# Patient Record
Sex: MALE | Race: WHITE | NOT HISPANIC OR LATINO
[De-identification: names, ages, dates, MRNs, and addresses within clinical notes are randomized per-mention and may not be internally consistent; named-entity substitution may affect disease eponyms.]

---

## 2016-10-15 RX ORDER — METOPROLOL TARTRATE 50 MG
2 TABLET ORAL
Qty: 0 | Refills: 0 | COMMUNITY
Start: 2016-10-15

## 2016-10-15 RX ORDER — METOPROLOL TARTRATE 50 MG
1 TABLET ORAL
Qty: 0 | Refills: 0 | COMMUNITY
Start: 2016-10-15

## 2017-02-01 ENCOUNTER — NON-APPOINTMENT (OUTPATIENT)
Age: 72
End: 2017-02-01

## 2017-02-01 ENCOUNTER — APPOINTMENT (OUTPATIENT)
Dept: CARDIOLOGY | Facility: CLINIC | Age: 72
End: 2017-02-01

## 2017-02-01 VITALS
HEART RATE: 98 BPM | BODY MASS INDEX: 31.98 KG/M2 | HEIGHT: 66 IN | TEMPERATURE: 98.7 F | SYSTOLIC BLOOD PRESSURE: 160 MMHG | DIASTOLIC BLOOD PRESSURE: 99 MMHG | WEIGHT: 199 LBS | OXYGEN SATURATION: 94 % | RESPIRATION RATE: 12 BRPM

## 2017-02-01 VITALS — WEIGHT: 199 LBS | BODY MASS INDEX: 31.98 KG/M2 | HEIGHT: 66 IN

## 2017-02-01 DIAGNOSIS — I49.3 VENTRICULAR PREMATURE DEPOLARIZATION: ICD-10-CM

## 2017-02-01 DIAGNOSIS — I47.2 VENTRICULAR TACHYCARDIA: ICD-10-CM

## 2017-02-01 DIAGNOSIS — Z86.39 PERSONAL HISTORY OF OTHER ENDOCRINE, NUTRITIONAL AND METABOLIC DISEASE: ICD-10-CM

## 2017-02-02 ENCOUNTER — APPOINTMENT (OUTPATIENT)
Dept: ELECTROPHYSIOLOGY | Facility: CLINIC | Age: 72
End: 2017-02-02

## 2017-02-02 DIAGNOSIS — I49.5 SICK SINUS SYNDROME: ICD-10-CM

## 2017-02-28 ENCOUNTER — EMERGENCY (EMERGENCY)
Facility: HOSPITAL | Age: 72
LOS: 1 days | Discharge: HOME | End: 2017-02-28
Attending: EMERGENCY MEDICINE | Admitting: EMERGENCY MEDICINE
Payer: COMMERCIAL

## 2017-02-28 VITALS
WEIGHT: 195.11 LBS | HEART RATE: 90 BPM | OXYGEN SATURATION: 93 % | SYSTOLIC BLOOD PRESSURE: 181 MMHG | HEIGHT: 68 IN | RESPIRATION RATE: 12 BRPM | DIASTOLIC BLOOD PRESSURE: 96 MMHG | TEMPERATURE: 97 F

## 2017-02-28 DIAGNOSIS — J44.9 CHRONIC OBSTRUCTIVE PULMONARY DISEASE, UNSPECIFIED: ICD-10-CM

## 2017-02-28 DIAGNOSIS — Z87.891 PERSONAL HISTORY OF NICOTINE DEPENDENCE: ICD-10-CM

## 2017-02-28 DIAGNOSIS — R79.89 OTHER SPECIFIED ABNORMAL FINDINGS OF BLOOD CHEMISTRY: ICD-10-CM

## 2017-02-28 DIAGNOSIS — I73.9 PERIPHERAL VASCULAR DISEASE, UNSPECIFIED: ICD-10-CM

## 2017-02-28 DIAGNOSIS — R07.89 OTHER CHEST PAIN: ICD-10-CM

## 2017-02-28 DIAGNOSIS — I48.91 UNSPECIFIED ATRIAL FIBRILLATION: ICD-10-CM

## 2017-02-28 DIAGNOSIS — I25.10 ATHEROSCLEROTIC HEART DISEASE OF NATIVE CORONARY ARTERY WITHOUT ANGINA PECTORIS: Chronic | ICD-10-CM

## 2017-02-28 DIAGNOSIS — S85.009A UNSPECIFIED INJURY OF POPLITEAL ARTERY, UNSPECIFIED LEG, INITIAL ENCOUNTER: Chronic | ICD-10-CM

## 2017-02-28 DIAGNOSIS — Z79.82 LONG TERM (CURRENT) USE OF ASPIRIN: ICD-10-CM

## 2017-02-28 DIAGNOSIS — I25.10 ATHEROSCLEROTIC HEART DISEASE OF NATIVE CORONARY ARTERY WITHOUT ANGINA PECTORIS: ICD-10-CM

## 2017-02-28 DIAGNOSIS — E11.9 TYPE 2 DIABETES MELLITUS WITHOUT COMPLICATIONS: ICD-10-CM

## 2017-02-28 DIAGNOSIS — I10 ESSENTIAL (PRIMARY) HYPERTENSION: ICD-10-CM

## 2017-02-28 DIAGNOSIS — Z79.01 LONG TERM (CURRENT) USE OF ANTICOAGULANTS: ICD-10-CM

## 2017-02-28 DIAGNOSIS — S49.90XA UNSPECIFIED INJURY OF SHOULDER AND UPPER ARM, UNSPECIFIED ARM, INITIAL ENCOUNTER: Chronic | ICD-10-CM

## 2017-02-28 LAB
ALBUMIN SERPL ELPH-MCNC: 3.1 G/DL — LOW (ref 3.3–5)
ALP SERPL-CCNC: 81 U/L — SIGNIFICANT CHANGE UP (ref 40–120)
ALT FLD-CCNC: 36 U/L — SIGNIFICANT CHANGE UP (ref 12–78)
AMYLASE P1 CFR SERPL: 55 U/L — SIGNIFICANT CHANGE UP (ref 25–115)
ANION GAP SERPL CALC-SCNC: 10 MMOL/L — SIGNIFICANT CHANGE UP (ref 5–17)
APTT BLD: 58.2 SEC — HIGH (ref 27.5–37.4)
AST SERPL-CCNC: 34 U/L — SIGNIFICANT CHANGE UP (ref 15–37)
BASOPHILS # BLD AUTO: 0.1 K/UL — SIGNIFICANT CHANGE UP (ref 0–0.2)
BASOPHILS NFR BLD AUTO: 1.4 % — SIGNIFICANT CHANGE UP (ref 0–2)
BILIRUB SERPL-MCNC: 0.8 MG/DL — SIGNIFICANT CHANGE UP (ref 0.2–1.2)
BUN SERPL-MCNC: 17 MG/DL — SIGNIFICANT CHANGE UP (ref 7–23)
CALCIUM SERPL-MCNC: 9 MG/DL — SIGNIFICANT CHANGE UP (ref 8.5–10.1)
CHLORIDE SERPL-SCNC: 100 MMOL/L — SIGNIFICANT CHANGE UP (ref 96–108)
CK MB CFR SERPL CALC: 1.8 NG/ML — SIGNIFICANT CHANGE UP (ref 0–3.6)
CK MB CFR SERPL CALC: 2.1 NG/ML — SIGNIFICANT CHANGE UP (ref 0–3.6)
CO2 SERPL-SCNC: 24 MMOL/L — SIGNIFICANT CHANGE UP (ref 22–31)
CREAT SERPL-MCNC: 1.2 MG/DL — SIGNIFICANT CHANGE UP (ref 0.5–1.3)
DIGOXIN SERPL-MCNC: 0.5 NG/ML — LOW (ref 0.8–2)
EOSINOPHIL # BLD AUTO: 0.2 K/UL — SIGNIFICANT CHANGE UP (ref 0–0.5)
EOSINOPHIL NFR BLD AUTO: 3.2 % — SIGNIFICANT CHANGE UP (ref 0–6)
GLUCOSE SERPL-MCNC: 84 MG/DL — SIGNIFICANT CHANGE UP (ref 70–99)
HCT VFR BLD CALC: 50.5 % — HIGH (ref 39–50)
HGB BLD-MCNC: 16 G/DL — SIGNIFICANT CHANGE UP (ref 13–17)
INR BLD: 3.02 RATIO — HIGH (ref 0.88–1.16)
LIDOCAIN IGE QN: 176 U/L — SIGNIFICANT CHANGE UP (ref 73–393)
LYMPHOCYTES # BLD AUTO: 1 K/UL — SIGNIFICANT CHANGE UP (ref 1–3.3)
LYMPHOCYTES # BLD AUTO: 14.3 % — SIGNIFICANT CHANGE UP (ref 13–44)
MCHC RBC-ENTMCNC: 28.9 PG — SIGNIFICANT CHANGE UP (ref 27–34)
MCHC RBC-ENTMCNC: 31.8 GM/DL — LOW (ref 32–36)
MCV RBC AUTO: 90.9 FL — SIGNIFICANT CHANGE UP (ref 80–100)
MONOCYTES # BLD AUTO: 0.8 K/UL — SIGNIFICANT CHANGE UP (ref 0–0.9)
MONOCYTES NFR BLD AUTO: 11.7 % — HIGH (ref 1–9)
NEUTROPHILS # BLD AUTO: 5 K/UL — SIGNIFICANT CHANGE UP (ref 1.8–7.4)
NEUTROPHILS NFR BLD AUTO: 69.4 % — SIGNIFICANT CHANGE UP (ref 43–77)
PLATELET # BLD AUTO: 227 K/UL — SIGNIFICANT CHANGE UP (ref 150–400)
POTASSIUM SERPL-MCNC: 4 MMOL/L — SIGNIFICANT CHANGE UP (ref 3.5–5.3)
POTASSIUM SERPL-SCNC: 4 MMOL/L — SIGNIFICANT CHANGE UP (ref 3.5–5.3)
PROT SERPL-MCNC: 7.3 G/DL — SIGNIFICANT CHANGE UP (ref 6–8.3)
PROTHROM AB SERPL-ACNC: 33.9 SEC — HIGH (ref 10–13.1)
RBC # BLD: 5.55 M/UL — SIGNIFICANT CHANGE UP (ref 4.2–5.8)
RBC # FLD: 14.6 % — HIGH (ref 10.3–14.5)
SODIUM SERPL-SCNC: 134 MMOL/L — LOW (ref 135–145)
TROPONIN I SERPL-MCNC: 0.04 NG/ML — SIGNIFICANT CHANGE UP (ref 0.01–0.04)
TROPONIN I SERPL-MCNC: 0.06 NG/ML — HIGH (ref 0.01–0.04)
WBC # BLD: 7.2 K/UL — SIGNIFICANT CHANGE UP (ref 3.8–10.5)
WBC # FLD AUTO: 7.2 K/UL — SIGNIFICANT CHANGE UP (ref 3.8–10.5)

## 2017-02-28 PROCEDURE — 71020: CPT | Mod: 26

## 2017-02-28 PROCEDURE — 99284 EMERGENCY DEPT VISIT MOD MDM: CPT

## 2017-02-28 RX ORDER — ASPIRIN/CALCIUM CARB/MAGNESIUM 324 MG
325 TABLET ORAL ONCE
Qty: 0 | Refills: 0 | Status: COMPLETED | OUTPATIENT
Start: 2017-02-28 | End: 2017-02-28

## 2017-02-28 RX ORDER — WARFARIN SODIUM 2.5 MG/1
1 TABLET ORAL ONCE
Qty: 0 | Refills: 0 | Status: COMPLETED | OUTPATIENT
Start: 2017-02-28 | End: 2017-02-28

## 2017-02-28 RX ORDER — METOPROLOL TARTRATE 50 MG
25 TABLET ORAL ONCE
Qty: 0 | Refills: 0 | Status: COMPLETED | OUTPATIENT
Start: 2017-02-28 | End: 2017-02-28

## 2017-02-28 RX ORDER — WARFARIN SODIUM 2.5 MG/1
1.2 TABLET ORAL ONCE
Qty: 0 | Refills: 0 | Status: DISCONTINUED | OUTPATIENT
Start: 2017-02-28 | End: 2017-02-28

## 2017-02-28 RX ORDER — SODIUM CHLORIDE 9 MG/ML
3 INJECTION INTRAMUSCULAR; INTRAVENOUS; SUBCUTANEOUS ONCE
Qty: 0 | Refills: 0 | Status: COMPLETED | OUTPATIENT
Start: 2017-02-28 | End: 2017-02-28

## 2017-02-28 RX ADMIN — Medication 325 MILLIGRAM(S): at 21:56

## 2017-02-28 RX ADMIN — SODIUM CHLORIDE 3 MILLILITER(S): 9 INJECTION INTRAMUSCULAR; INTRAVENOUS; SUBCUTANEOUS at 17:15

## 2017-02-28 RX ADMIN — Medication 25 MILLIGRAM(S): at 22:30

## 2017-02-28 RX ADMIN — WARFARIN SODIUM 1 MILLIGRAM(S): 2.5 TABLET ORAL at 22:30

## 2017-02-28 NOTE — ED PROVIDER NOTE - OBJECTIVE STATEMENT
Pt is a 73 yo male who presents to the ED with a cc of discomfort at pacemaker defib site.  Pt reports that this pacemaker was placed Dec 2015 and that he has had no issues with it.  Recently they changed how he transmits his rhythm strips and this has been concerning for him.  He last attempted transmission January 31st and he did receive confirmation that it was sent. Today pt reports that he woke up with a "tinge" over his pacemaker site.  This lasted only a few seconds.  It happened again 4 additional times today and is not associated with exertion.  Denies fever, chills, N/V/D/C, CP, SOB, abd pain.  Pt reports that he has had this sensation before in his abdominal region and the work up relieved no acute pathology.  Pt has a recent stent placed in his LAD several months ago and has been recovering well.  Has known history of A-fib and is on Coumadin for this

## 2017-02-28 NOTE — ED ADULT NURSE REASSESSMENT NOTE - NS ED NURSE REASSESS COMMENT FT1
Spoke to Dr. Honeycutt, re: pt's BP, will order BP medication as well as pt's evening dose of coumadin

## 2017-02-28 NOTE — ED PROVIDER NOTE - CHPI ED SYMPTOMS NEG
no shortness of breath/no vomiting/no cough/no nausea/no back pain/no syncope/no fever/no chills/no dizziness/no diaphoresis

## 2017-02-28 NOTE — ED PROVIDER NOTE - PSH
Arm injury  s/p surgery 2009  CAD S/P percutaneous coronary angioplasty  stent placed  Pacemaker  [employer] serial #[id number]   model #v-268  Popliteal artery injury  iliac/ popliteal angioplasty with stents 2010  S/P Implantation of Automatic Cardioverter/Defibrillator (AICD)    S/P Tonsillectomy

## 2017-02-28 NOTE — ED ADULT NURSE NOTE - PMH
Alcohol abuse  in recovery  Atrial Fibrillation    Carotid Stenosis    Chronic Obstructive Pulmonary Disease (COPD)    Coronary Artery Disease    DM (diabetes mellitus)    Former smoker    Gout    H/O: Rheumatic Fever    HF (heart failure)    Hyperlipidemia    Hypertension    NSVT (nonsustained ventricular tachycardia)    Pacemaker  defibrillator model # v-268 serial # [id number]  PAD (peripheral artery disease)  with stents

## 2017-02-28 NOTE — ED ADULT NURSE NOTE - CHPI ED SYMPTOMS NEG
no syncope/no chills/no dizziness/no shortness of breath/no cough/no vomiting/no nausea/no back pain/no fever/no diaphoresis

## 2017-02-28 NOTE — ED PROVIDER NOTE - CARDIAC, MLM
Normal rate, regular rhythm.  Heart sounds S1, S2.  No murmurs, rubs or gallops. Pacemaker noted to left upper chest wall region, no TTP, no overlying cellulitis at site of pacemaker

## 2017-02-28 NOTE — ED PROVIDER NOTE - PMH
Alcohol abuse  in recovery  Atrial Fibrillation    Carotid Stenosis    Chronic Obstructive Pulmonary Disease (COPD)    Coronary Artery Disease    DM (diabetes mellitus)    Former smoker    Gout    H/O: Rheumatic Fever    HF (heart failure)    Hyperlipidemia    Hypertension    NSVT (nonsustained ventricular tachycardia)    Pacemaker  defibrillator model # v-268 serial # [id number]  PAD (peripheral artery disease)  with stents

## 2017-02-28 NOTE — ED ADULT NURSE NOTE - OBJECTIVE STATEMENT
Pt A&Ox4, ambulatory to ED c/o intermittent left chest pain.  Pt states that he felt a "twinge" in the left chest wall area over implanted AICD device.  Pt states he has had 4 or 5 episodes of this today, lasting "seconds."  Pt denies SOB, dizziness, lightheadedness, N/V.

## 2017-02-28 NOTE — ED PROVIDER NOTE - PLAN OF CARE
Return to the ED for any new or worsening symptoms  Take your medication as prescribed  Follow up with your PMD within the week   Call your cardiologist in the morning to schedule follow up

## 2017-02-28 NOTE — ED PROVIDER NOTE - CARE PLAN
Principal Discharge DX:	Atypical chest pain  Instructions for follow-up, activity and diet:	Return to the ED for any new or worsening symptoms  Take your medication as prescribed  Follow up with your PMD within the week   Call your cardiologist in the morning to schedule follow up  Secondary Diagnosis:	Atrial fibrillation  Secondary Diagnosis:	Elevated troponin

## 2017-02-28 NOTE — ED PROVIDER NOTE - PROGRESS NOTE DETAILS
called placed to pt cardiologist Dr. Camacho to discuss slight elevation in troponin pt currently pain free at this time ASA ordered will continue to monitor Spoke with Dr. Herrera who is covering for Dr. Camacho.  Requests 3rd troponin if stable or trending down pt can be discharged home with follow up in the office.  Pt made aware of lab results and updated on plan of care Patient feels well, denies any chest pain, SOB. Discussed results of blood work, copies given. Will f/u with Dr. Camacho. BP improved after lisinopril.

## 2017-03-01 VITALS
TEMPERATURE: 98 F | HEART RATE: 77 BPM | OXYGEN SATURATION: 98 % | SYSTOLIC BLOOD PRESSURE: 177 MMHG | RESPIRATION RATE: 17 BRPM | DIASTOLIC BLOOD PRESSURE: 88 MMHG

## 2017-03-01 LAB
CK MB CFR SERPL CALC: 2 NG/ML — SIGNIFICANT CHANGE UP (ref 0–3.6)
TROPONIN I SERPL-MCNC: 0.04 NG/ML — SIGNIFICANT CHANGE UP (ref 0.01–0.04)

## 2017-03-01 PROCEDURE — 71046 X-RAY EXAM CHEST 2 VIEWS: CPT

## 2017-03-01 PROCEDURE — 80162 ASSAY OF DIGOXIN TOTAL: CPT

## 2017-03-01 PROCEDURE — 85730 THROMBOPLASTIN TIME PARTIAL: CPT

## 2017-03-01 PROCEDURE — 85610 PROTHROMBIN TIME: CPT

## 2017-03-01 PROCEDURE — 84484 ASSAY OF TROPONIN QUANT: CPT

## 2017-03-01 PROCEDURE — 80053 COMPREHEN METABOLIC PANEL: CPT

## 2017-03-01 PROCEDURE — 99284 EMERGENCY DEPT VISIT MOD MDM: CPT | Mod: 25

## 2017-03-01 PROCEDURE — 85027 COMPLETE CBC AUTOMATED: CPT

## 2017-03-01 PROCEDURE — 82150 ASSAY OF AMYLASE: CPT

## 2017-03-01 PROCEDURE — 83690 ASSAY OF LIPASE: CPT

## 2017-03-01 PROCEDURE — 93005 ELECTROCARDIOGRAM TRACING: CPT

## 2017-03-01 PROCEDURE — 82553 CREATINE MB FRACTION: CPT

## 2017-03-01 RX ORDER — LISINOPRIL 2.5 MG/1
20 TABLET ORAL DAILY
Qty: 0 | Refills: 0 | Status: DISCONTINUED | OUTPATIENT
Start: 2017-03-01 | End: 2017-03-04

## 2017-03-01 RX ADMIN — LISINOPRIL 20 MILLIGRAM(S): 2.5 TABLET ORAL at 05:41

## 2017-03-01 NOTE — ED ADULT NURSE REASSESSMENT NOTE - NS ED NURSE REASSESS COMMENT FT1
Pt is A&OX4. Pt was received on cardiac monitor which was showing frequent PVCs. Dr. Honeycutt is aware. Pt is asymptomatic and denies pain and discomfort. V/s stable. No s/s of distress noted. Continue to monitor pt.

## 2017-03-01 NOTE — ED ADULT NURSE REASSESSMENT NOTE - NS ED NURSE REASSESS COMMENT FT1
Pt's BP is presently 177/88. Dr. Akins was notified who said that is okay for pt to be discharged. Pt is asymptomatic and denies pain and discomfort. No s/s of distress noted. Pt was discharged and ordered.

## 2017-03-02 ENCOUNTER — NON-APPOINTMENT (OUTPATIENT)
Age: 72
End: 2017-03-02

## 2017-03-02 ENCOUNTER — APPOINTMENT (OUTPATIENT)
Dept: CARDIOLOGY | Facility: CLINIC | Age: 72
End: 2017-03-02

## 2017-03-02 VITALS
BODY MASS INDEX: 31.34 KG/M2 | DIASTOLIC BLOOD PRESSURE: 90 MMHG | HEART RATE: 100 BPM | SYSTOLIC BLOOD PRESSURE: 140 MMHG | WEIGHT: 195 LBS | OXYGEN SATURATION: 94 % | HEIGHT: 66 IN

## 2017-03-16 ENCOUNTER — MEDICATION RENEWAL (OUTPATIENT)
Age: 72
End: 2017-03-16

## 2017-04-24 ENCOUNTER — APPOINTMENT (OUTPATIENT)
Dept: ELECTROPHYSIOLOGY | Facility: CLINIC | Age: 72
End: 2017-04-24

## 2017-04-24 ENCOUNTER — NON-APPOINTMENT (OUTPATIENT)
Age: 72
End: 2017-04-24

## 2017-04-24 VITALS — DIASTOLIC BLOOD PRESSURE: 79 MMHG | HEART RATE: 100 BPM | OXYGEN SATURATION: 92 % | SYSTOLIC BLOOD PRESSURE: 175 MMHG

## 2017-06-28 ENCOUNTER — APPOINTMENT (OUTPATIENT)
Dept: CARDIOLOGY | Facility: CLINIC | Age: 72
End: 2017-06-28

## 2017-06-28 ENCOUNTER — NON-APPOINTMENT (OUTPATIENT)
Age: 72
End: 2017-06-28

## 2017-06-28 VITALS — OXYGEN SATURATION: 91 % | WEIGHT: 198 LBS | HEART RATE: 90 BPM | HEIGHT: 66 IN | BODY MASS INDEX: 31.82 KG/M2

## 2017-06-28 VITALS — DIASTOLIC BLOOD PRESSURE: 92 MMHG | SYSTOLIC BLOOD PRESSURE: 140 MMHG

## 2017-06-28 VITALS — DIASTOLIC BLOOD PRESSURE: 97 MMHG | SYSTOLIC BLOOD PRESSURE: 167 MMHG | OXYGEN SATURATION: 94 % | HEART RATE: 79 BPM

## 2017-07-30 ENCOUNTER — INPATIENT (INPATIENT)
Facility: HOSPITAL | Age: 72
LOS: 0 days | Discharge: ANOTHER HOSPITAL | DRG: 280 | End: 2017-07-31
Attending: HOSPITALIST | Admitting: FAMILY MEDICINE
Payer: COMMERCIAL

## 2017-07-30 ENCOUNTER — TRANSCRIPTION ENCOUNTER (OUTPATIENT)
Age: 72
End: 2017-07-30

## 2017-07-30 VITALS
OXYGEN SATURATION: 97 % | WEIGHT: 188.05 LBS | RESPIRATION RATE: 20 BRPM | HEIGHT: 68 IN | TEMPERATURE: 98 F | HEART RATE: 82 BPM | DIASTOLIC BLOOD PRESSURE: 114 MMHG | SYSTOLIC BLOOD PRESSURE: 174 MMHG

## 2017-07-30 DIAGNOSIS — Z29.9 ENCOUNTER FOR PROPHYLACTIC MEASURES, UNSPECIFIED: ICD-10-CM

## 2017-07-30 DIAGNOSIS — S85.009A UNSPECIFIED INJURY OF POPLITEAL ARTERY, UNSPECIFIED LEG, INITIAL ENCOUNTER: Chronic | ICD-10-CM

## 2017-07-30 DIAGNOSIS — I48.91 UNSPECIFIED ATRIAL FIBRILLATION: ICD-10-CM

## 2017-07-30 DIAGNOSIS — S49.90XA UNSPECIFIED INJURY OF SHOULDER AND UPPER ARM, UNSPECIFIED ARM, INITIAL ENCOUNTER: Chronic | ICD-10-CM

## 2017-07-30 DIAGNOSIS — E87.1 HYPO-OSMOLALITY AND HYPONATREMIA: ICD-10-CM

## 2017-07-30 DIAGNOSIS — I25.10 ATHEROSCLEROTIC HEART DISEASE OF NATIVE CORONARY ARTERY WITHOUT ANGINA PECTORIS: ICD-10-CM

## 2017-07-30 DIAGNOSIS — M10.9 GOUT, UNSPECIFIED: ICD-10-CM

## 2017-07-30 DIAGNOSIS — R74.8 ABNORMAL LEVELS OF OTHER SERUM ENZYMES: ICD-10-CM

## 2017-07-30 DIAGNOSIS — I10 ESSENTIAL (PRIMARY) HYPERTENSION: ICD-10-CM

## 2017-07-30 DIAGNOSIS — I25.10 ATHEROSCLEROTIC HEART DISEASE OF NATIVE CORONARY ARTERY WITHOUT ANGINA PECTORIS: Chronic | ICD-10-CM

## 2017-07-30 DIAGNOSIS — E11.9 TYPE 2 DIABETES MELLITUS WITHOUT COMPLICATIONS: ICD-10-CM

## 2017-07-30 LAB
ALBUMIN SERPL ELPH-MCNC: 3.2 G/DL — LOW (ref 3.3–5)
ALP SERPL-CCNC: 97 U/L — SIGNIFICANT CHANGE UP (ref 40–120)
ALT FLD-CCNC: 49 U/L — SIGNIFICANT CHANGE UP (ref 12–78)
ANION GAP SERPL CALC-SCNC: 8 MMOL/L — SIGNIFICANT CHANGE UP (ref 5–17)
AST SERPL-CCNC: 76 U/L — HIGH (ref 15–37)
BILIRUB SERPL-MCNC: 0.6 MG/DL — SIGNIFICANT CHANGE UP (ref 0.2–1.2)
BUN SERPL-MCNC: 23 MG/DL — SIGNIFICANT CHANGE UP (ref 7–23)
CALCIUM SERPL-MCNC: 9 MG/DL — SIGNIFICANT CHANGE UP (ref 8.5–10.1)
CHLORIDE SERPL-SCNC: 98 MMOL/L — SIGNIFICANT CHANGE UP (ref 96–108)
CK MB BLD-MCNC: 10.3 % — HIGH (ref 0–3.5)
CK MB BLD-MCNC: 9.6 % — HIGH (ref 0–3.5)
CK MB CFR SERPL CALC: 15.2 NG/ML — HIGH (ref 0–3.6)
CK MB CFR SERPL CALC: 15.7 NG/ML — HIGH (ref 0–3.6)
CK SERPL-CCNC: 153 U/L — SIGNIFICANT CHANGE UP (ref 26–308)
CK SERPL-CCNC: 158 U/L — SIGNIFICANT CHANGE UP (ref 26–308)
CK SERPL-CCNC: 215 U/L — SIGNIFICANT CHANGE UP (ref 26–308)
CO2 SERPL-SCNC: 28 MMOL/L — SIGNIFICANT CHANGE UP (ref 22–31)
CREAT SERPL-MCNC: 1.3 MG/DL — SIGNIFICANT CHANGE UP (ref 0.5–1.3)
DIGOXIN SERPL-MCNC: 0.4 NG/ML — LOW (ref 0.8–2)
GLUCOSE SERPL-MCNC: 110 MG/DL — HIGH (ref 70–99)
HBA1C BLD-MCNC: 5.9 % — HIGH (ref 4–5.6)
HCT VFR BLD CALC: 53.8 % — HIGH (ref 39–50)
HGB BLD-MCNC: 17.8 G/DL — HIGH (ref 13–17)
INR BLD: 2.1 RATIO — HIGH (ref 0.88–1.16)
MCHC RBC-ENTMCNC: 31 PG — SIGNIFICANT CHANGE UP (ref 27–34)
MCHC RBC-ENTMCNC: 33 GM/DL — SIGNIFICANT CHANGE UP (ref 32–36)
MCV RBC AUTO: 93.6 FL — SIGNIFICANT CHANGE UP (ref 80–100)
PLATELET # BLD AUTO: 227 K/UL — SIGNIFICANT CHANGE UP (ref 150–400)
POTASSIUM SERPL-MCNC: 4.6 MMOL/L — SIGNIFICANT CHANGE UP (ref 3.5–5.3)
POTASSIUM SERPL-SCNC: 4.6 MMOL/L — SIGNIFICANT CHANGE UP (ref 3.5–5.3)
PROT SERPL-MCNC: 7.4 G/DL — SIGNIFICANT CHANGE UP (ref 6–8.3)
PROTHROM AB SERPL-ACNC: 23.2 SEC — HIGH (ref 9.8–12.7)
RBC # BLD: 5.75 M/UL — SIGNIFICANT CHANGE UP (ref 4.2–5.8)
RBC # FLD: 14.9 % — HIGH (ref 10.3–14.5)
SODIUM SERPL-SCNC: 134 MMOL/L — LOW (ref 135–145)
TROPONIN I SERPL-MCNC: 2.32 NG/ML — HIGH (ref 0.01–0.04)
TROPONIN I SERPL-MCNC: 3.03 NG/ML — HIGH (ref 0.01–0.04)
TROPONIN I SERPL-MCNC: 3.09 NG/ML — HIGH (ref 0.01–0.04)
WBC # BLD: 9.2 K/UL — SIGNIFICANT CHANGE UP (ref 3.8–10.5)
WBC # FLD AUTO: 9.2 K/UL — SIGNIFICANT CHANGE UP (ref 3.8–10.5)

## 2017-07-30 PROCEDURE — 71010: CPT | Mod: 26

## 2017-07-30 PROCEDURE — 99292 CRITICAL CARE ADDL 30 MIN: CPT

## 2017-07-30 PROCEDURE — 99285 EMERGENCY DEPT VISIT HI MDM: CPT

## 2017-07-30 PROCEDURE — 99222 1ST HOSP IP/OBS MODERATE 55: CPT

## 2017-07-30 PROCEDURE — 99223 1ST HOSP IP/OBS HIGH 75: CPT | Mod: AI,GC

## 2017-07-30 PROCEDURE — 99291 CRITICAL CARE FIRST HOUR: CPT | Mod: 25

## 2017-07-30 PROCEDURE — 93010 ELECTROCARDIOGRAM REPORT: CPT | Mod: 76

## 2017-07-30 PROCEDURE — 93306 TTE W/DOPPLER COMPLETE: CPT | Mod: 26

## 2017-07-30 RX ORDER — ATORVASTATIN CALCIUM 80 MG/1
40 TABLET, FILM COATED ORAL AT BEDTIME
Qty: 0 | Refills: 0 | Status: DISCONTINUED | OUTPATIENT
Start: 2017-07-30 | End: 2017-07-31

## 2017-07-30 RX ORDER — DIGOXIN 250 MCG
0.25 TABLET ORAL ONCE
Qty: 0 | Refills: 0 | Status: COMPLETED | OUTPATIENT
Start: 2017-07-30 | End: 2017-07-30

## 2017-07-30 RX ORDER — METOPROLOL TARTRATE 50 MG
100 TABLET ORAL DAILY
Qty: 0 | Refills: 0 | Status: DISCONTINUED | OUTPATIENT
Start: 2017-07-30 | End: 2017-07-31

## 2017-07-30 RX ORDER — LISINOPRIL 2.5 MG/1
40 TABLET ORAL DAILY
Qty: 0 | Refills: 0 | Status: DISCONTINUED | OUTPATIENT
Start: 2017-07-30 | End: 2017-07-31

## 2017-07-30 RX ORDER — DEXTROSE 50 % IN WATER 50 %
12.5 SYRINGE (ML) INTRAVENOUS ONCE
Qty: 0 | Refills: 0 | Status: DISCONTINUED | OUTPATIENT
Start: 2017-07-30 | End: 2017-07-30

## 2017-07-30 RX ORDER — FEBUXOSTAT 40 MG/1
40 TABLET ORAL AT BEDTIME
Qty: 0 | Refills: 0 | Status: DISCONTINUED | OUTPATIENT
Start: 2017-07-30 | End: 2017-07-31

## 2017-07-30 RX ORDER — AMIODARONE HYDROCHLORIDE 400 MG/1
150 TABLET ORAL ONCE
Qty: 0 | Refills: 0 | Status: COMPLETED | OUTPATIENT
Start: 2017-07-30 | End: 2017-07-30

## 2017-07-30 RX ORDER — INSULIN LISPRO 100/ML
VIAL (ML) SUBCUTANEOUS
Qty: 0 | Refills: 0 | Status: DISCONTINUED | OUTPATIENT
Start: 2017-07-30 | End: 2017-07-30

## 2017-07-30 RX ORDER — ASPIRIN/CALCIUM CARB/MAGNESIUM 324 MG
325 TABLET ORAL ONCE
Qty: 0 | Refills: 0 | Status: COMPLETED | OUTPATIENT
Start: 2017-07-30 | End: 2017-07-30

## 2017-07-30 RX ORDER — POTASSIUM CHLORIDE 20 MEQ
40 PACKET (EA) ORAL ONCE
Qty: 0 | Refills: 0 | Status: COMPLETED | OUTPATIENT
Start: 2017-07-30 | End: 2017-07-30

## 2017-07-30 RX ORDER — TRIAMTERENE/HYDROCHLOROTHIAZID 75 MG-50MG
1 TABLET ORAL DAILY
Qty: 0 | Refills: 0 | Status: DISCONTINUED | OUTPATIENT
Start: 2017-07-30 | End: 2017-07-30

## 2017-07-30 RX ORDER — DEXTROSE 50 % IN WATER 50 %
25 SYRINGE (ML) INTRAVENOUS ONCE
Qty: 0 | Refills: 0 | Status: DISCONTINUED | OUTPATIENT
Start: 2017-07-30 | End: 2017-07-30

## 2017-07-30 RX ORDER — DIGOXIN 250 MCG
0.12 TABLET ORAL DAILY
Qty: 0 | Refills: 0 | Status: DISCONTINUED | OUTPATIENT
Start: 2017-07-30 | End: 2017-07-31

## 2017-07-30 RX ORDER — SODIUM CHLORIDE 9 MG/ML
1000 INJECTION INTRAMUSCULAR; INTRAVENOUS; SUBCUTANEOUS ONCE
Qty: 0 | Refills: 0 | Status: COMPLETED | OUTPATIENT
Start: 2017-07-30 | End: 2017-07-30

## 2017-07-30 RX ORDER — FINASTERIDE 5 MG/1
5 TABLET, FILM COATED ORAL DAILY
Qty: 0 | Refills: 0 | Status: DISCONTINUED | OUTPATIENT
Start: 2017-07-30 | End: 2017-07-31

## 2017-07-30 RX ORDER — INSULIN LISPRO 100/ML
VIAL (ML) SUBCUTANEOUS AT BEDTIME
Qty: 0 | Refills: 0 | Status: DISCONTINUED | OUTPATIENT
Start: 2017-07-30 | End: 2017-07-30

## 2017-07-30 RX ORDER — MAGNESIUM SULFATE 500 MG/ML
2 VIAL (ML) INJECTION ONCE
Qty: 0 | Refills: 0 | Status: COMPLETED | OUTPATIENT
Start: 2017-07-30 | End: 2017-07-30

## 2017-07-30 RX ORDER — DIGOXIN 250 MCG
0.25 TABLET ORAL DAILY
Qty: 0 | Refills: 0 | Status: DISCONTINUED | OUTPATIENT
Start: 2017-07-30 | End: 2017-07-30

## 2017-07-30 RX ORDER — DIGOXIN 250 MCG
0.25 TABLET ORAL ONCE
Qty: 0 | Refills: 0 | Status: DISCONTINUED | OUTPATIENT
Start: 2017-07-30 | End: 2017-07-30

## 2017-07-30 RX ORDER — WARFARIN SODIUM 2.5 MG/1
2 TABLET ORAL
Qty: 0 | Refills: 0 | Status: DISCONTINUED | OUTPATIENT
Start: 2017-07-30 | End: 2017-07-30

## 2017-07-30 RX ORDER — AMIODARONE HYDROCHLORIDE 400 MG/1
1 TABLET ORAL
Qty: 900 | Refills: 0 | Status: DISCONTINUED | OUTPATIENT
Start: 2017-07-30 | End: 2017-07-31

## 2017-07-30 RX ORDER — ASPIRIN/CALCIUM CARB/MAGNESIUM 324 MG
81 TABLET ORAL DAILY
Qty: 0 | Refills: 0 | Status: DISCONTINUED | OUTPATIENT
Start: 2017-07-30 | End: 2017-07-31

## 2017-07-30 RX ORDER — WARFARIN SODIUM 2.5 MG/1
1.5 TABLET ORAL
Qty: 0 | Refills: 0 | COMMUNITY

## 2017-07-30 RX ORDER — LABETALOL HCL 100 MG
10 TABLET ORAL ONCE
Qty: 0 | Refills: 0 | Status: COMPLETED | OUTPATIENT
Start: 2017-07-30 | End: 2017-07-30

## 2017-07-30 RX ORDER — FUROSEMIDE 40 MG
40 TABLET ORAL ONCE
Qty: 0 | Refills: 0 | Status: COMPLETED | OUTPATIENT
Start: 2017-07-30 | End: 2017-07-30

## 2017-07-30 RX ORDER — CLOPIDOGREL BISULFATE 75 MG/1
75 TABLET, FILM COATED ORAL DAILY
Qty: 0 | Refills: 0 | Status: DISCONTINUED | OUTPATIENT
Start: 2017-07-30 | End: 2017-07-31

## 2017-07-30 RX ORDER — FUROSEMIDE 40 MG
40 TABLET ORAL EVERY 12 HOURS
Qty: 0 | Refills: 0 | Status: DISCONTINUED | OUTPATIENT
Start: 2017-07-30 | End: 2017-07-31

## 2017-07-30 RX ADMIN — Medication 100 MILLIGRAM(S): at 12:10

## 2017-07-30 RX ADMIN — Medication 0.25 MILLIGRAM(S): at 02:40

## 2017-07-30 RX ADMIN — Medication 40 MILLIGRAM(S): at 10:10

## 2017-07-30 RX ADMIN — Medication 50 GRAM(S): at 17:39

## 2017-07-30 RX ADMIN — FEBUXOSTAT 40 MILLIGRAM(S): 40 TABLET ORAL at 22:08

## 2017-07-30 RX ADMIN — SODIUM CHLORIDE 1000 MILLILITER(S): 9 INJECTION INTRAMUSCULAR; INTRAVENOUS; SUBCUTANEOUS at 02:39

## 2017-07-30 RX ADMIN — LISINOPRIL 40 MILLIGRAM(S): 2.5 TABLET ORAL at 17:39

## 2017-07-30 RX ADMIN — Medication 40 MILLIGRAM(S): at 20:49

## 2017-07-30 RX ADMIN — AMIODARONE HYDROCHLORIDE 33.33 MG/MIN: 400 TABLET ORAL at 08:40

## 2017-07-30 RX ADMIN — AMIODARONE HYDROCHLORIDE 618 MILLIGRAM(S): 400 TABLET ORAL at 08:16

## 2017-07-30 RX ADMIN — Medication 10 MILLIGRAM(S): at 03:17

## 2017-07-30 RX ADMIN — Medication 40 MILLIEQUIVALENT(S): at 17:39

## 2017-07-30 RX ADMIN — Medication 0.12 MILLIGRAM(S): at 10:10

## 2017-07-30 RX ADMIN — Medication 81 MILLIGRAM(S): at 12:10

## 2017-07-30 RX ADMIN — FINASTERIDE 5 MILLIGRAM(S): 5 TABLET, FILM COATED ORAL at 12:10

## 2017-07-30 RX ADMIN — Medication 325 MILLIGRAM(S): at 04:22

## 2017-07-30 RX ADMIN — CLOPIDOGREL BISULFATE 75 MILLIGRAM(S): 75 TABLET, FILM COATED ORAL at 12:10

## 2017-07-30 RX ADMIN — ATORVASTATIN CALCIUM 40 MILLIGRAM(S): 80 TABLET, FILM COATED ORAL at 22:08

## 2017-07-30 NOTE — H&P ADULT - NSHPREVIEWOFSYSTEMS_GEN_ALL_CORE
REVIEW OF SYSTEMS:  CONSTITUTIONAL: No fever, No chills,No fatigue,No myalgia,No Body ache  EYES: No eye pain, visual disturbances, or discharge  ENMT:  No ear pain, No nose bleed, No vertigo; No sinus or throat pain  NECK: No pain, No stiffness  RESPIRATORY: No cough, wheezing, No  hemoptysis; No shortness of breath  CARDIOVASCULAR: No chest pain, palpitations, leg swelling  GASTROINTESTINAL: No abdominal or epigastric pain. No nausea, No vomiting; No diarrhea or constipation. [ ] BM  GENITOURINARY: No dysuria, No frequency, No urgency, No hematuria, or incontinence  NEUROLOGICAL: alert and oriented x 3,  No headaches, No dizziness, No numbness,  SKIN:   No itching, burning, rashes, or lesions   MUSCULOSKELETAL: No joint pain or swelling; No muscle pain, No back pain, No extremity pain  PSYCHIATRIC: No depression, anxiety, mood swings, or difficulty sleeping  ROS  [ ] Unable to obtain   REST OF REVIEW Of SYSTEM - [ ] Normal REVIEW OF SYSTEMS:  CONSTITUTIONAL: No fever, No chills, No fatigue, No myalgia,No Body ache  EYES: No eye pain, visual disturbances, or discharge  ENMT: No sinus or throat pain  NECK: No pain, No stiffness  RESPIRATORY: +cough, wheezing, No  hemoptysis; No shortness of breath  CARDIOVASCULAR: No chest pain, palpitations, leg swelling  GASTROINTESTINAL: No abdominal or epigastric pain. No nausea, No vomiting; No diarrhea   GENITOURINARY: No dysuria, No frequency, No urgency, No hematuria, or incontinence  NEUROLOGICAL: alert and oriented x 3,  No headaches, No dizziness, No numbness,  SKIN:   No itching, burning, rashes, or lesions   MUSCULOSKELETAL: No joint pain or swelling; No muscle pain, No back pain, No extremity pain   REST OF REVIEW Of SYSTEM - [ x] Normal

## 2017-07-30 NOTE — ED ADULT NURSE NOTE - OBJECTIVE STATEMENT
patient a/o x 4 with a calm affect states that his AICD was triggered tonight.  patient feels bilateral wrist pain with chest pain and appears diaphoretic.  EKG done upon arrival to ED, pending results of XR and initial lab work.  patient placed on cardiac monitoring

## 2017-07-30 NOTE — CONSULT NOTE ADULT - ASSESSMENT
72 yr old M with PAD, former smoker, COPD, CAD s/p recent PCI to LAD in 2017, HTN, HLD, chronic systolic HF, NSVT,  ICD(2015)/PPM, AF on coumadin, p/w ICD shock x 2. I have interrogated his device which shows two VT/VF events requiring shocks early this AM.  His CE leak is more consistent with ICD shock or old MI, rather than acute event. Continue to trend. EKG with ischemic appearing ST depressions,but these were presents on prior EKGs.  I believe that volume overload and CHF is responsible for his ICD shock. Would diurese with Lasix 40 IV bid  Monitor K, Keep K>4, Mg >2  Amio 150 IV x 1, then Amio gtt to keep him electrically quiet.  Will speak with Dr. Camacho for possible transfer to [facility] for non-emergent angiogram.  Continue with ASA, Lisinopril  Toprol XL, Lisinopril for CHF  Agree with ICU evaluation and admission for VT storm  Will follow with you    Patient is at high risk of decompensation. >35 min spent taking care of patient. 72 yr old M with PAD, former smoker, COPD, CAD s/p recent PCI to LAD in 2017, HTN, HLD, chronic systolic HF, NSVT,  ICD(2015)/PPM, AF on coumadin, p/w ICD shock x 2. I have interrogated his device which shows two VT/VF events requiring shocks early this AM.  His CE leak is more consistent with ICD shock or old MI, rather than acute event. Continue to trend. EKG with ischemic appearing ST depressions,but these were presents on prior EKGs.  I believe that volume overload and CHF is responsible for his ICD shock. Would diurese with Lasix 40 IV bid  Monitor K, Keep K>4, Mg >2  Amio 150 IV x 1, then Amio gtt to keep him electrically quiet.  Will speak with Dr. Camacho for possible transfer to [facility] for non-emergent angiogram.  Continue with ASA, Lisinopril  Toprol XL, Lisinopril for CHF  Repeat TTE to evaluate for worsening of LV function  Agree with ICU evaluation and admission for VT storm  Will follow with you    Patient is at high risk of decompensation. >35 min spent taking care of patient.

## 2017-07-30 NOTE — CONSULT NOTE ADULT - SUBJECTIVE AND OBJECTIVE BOX
ICU Progress Note    HPI:    S:    Pt seen and examined  HD # 1  Pt here for CP, sensation of AICD firing x 2  PMHx CAD s/p PPM/AICD (new unit installed 2 years prior), afib on coumadin, DM, HTN, HLD, COPD  presenting today with sensation of a "pop and a flash" in his chest. This occurred at 2200 and 0100 respectively, lasted for a second. He had no immediate prodrome prior to these events or any CP, SOB, or diaphoresis otherwise.  Pt states he has had a prodrome of "feeling like I am catching a cold" for a few days.      Allergies    No Known Allergies    Intolerances        MEDICATIONS  (STANDING):    MEDICATIONS  (PRN):      Drug Dosing Weight  Height (cm): 172.72 (30 Jul 2017 02:22)  Weight (kg): 85.3 (30 Jul 2017 02:22)  BMI (kg/m2): 28.6 (30 Jul 2017 02:22)  BSA (m2): 1.99 (30 Jul 2017 02:22)        PAST MEDICAL & SURGICAL HISTORY:  NSVT (nonsustained ventricular tachycardia)  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  DM (diabetes mellitus)  HF (heart failure)  PAD (peripheral artery disease): with stents  Former smoker  Hypertension  H/O: Rheumatic Fever  Atrial Fibrillation  Pacemaker: defibrillator model # v-268 serial # [id number]  Hyperlipidemia  Gout  Coronary Artery Disease  Chronic Obstructive Pulmonary Disease (COPD)  Carotid Stenosis  CAD S/P percutaneous coronary angioplasty: stent placed  Popliteal artery injury: iliac/ popliteal angioplasty with stents 2010  Arm injury: s/p surgery 2009  Pacemaker: [employer] serial #[id number]   model #v-268  S/P Implantation of Automatic Cardioverter/Defibrillator (AICD)  S/P Tonsillectomy      FAMILY HISTORY:  No pertinent family history in first degree relatives      ROS: See HPI; otherwise, all systems reviewed and negative.    O:    ICU Vital Signs Last 24 Hrs  T(C): 36.8 (30 Jul 2017 02:46), Max: 36.8 (30 Jul 2017 02:22)  T(F): 98.2 (30 Jul 2017 02:46), Max: 98.2 (30 Jul 2017 02:22)  HR: 85 (30 Jul 2017 03:59) (82 - 89)  BP: 137/77 (30 Jul 2017 03:59) (137/77 - 174/114)  BP(mean): --  ABP: --  ABP(mean): --  RR: 18 (30 Jul 2017 03:59) (18 - 23)  SpO2: 95% (30 Jul 2017 03:59) (95% - 97%)        I&O's Detail    29 Jul 2017 07:01  -  30 Jul 2017 04:49  --------------------------------------------------------  IN:    Sodium Chloride 0.9% IV Bolus: 1000 mL  Total IN: 1000 mL    OUT:  Total OUT: 0 mL    Total NET: 1000 mL              PE:    Constitutional: Healthy M lying in bed in NAD.   Neck: No JVD, trachea midline.   Respiratory: CTA B/L good BS B/L no W/R/R.  Cardiovascular: S1S2+ RRR no M/R/G.  Gastrointestinal: Soft, NTND.  Extremities: No peripheral edema, No cyanosis, clubbing.  Neurological: Awake, conversive, alert, no gross deficits.  Skin: + contusion over L chest wall.    LABS:    CBC Full  -  ( 30 Jul 2017 02:37 )  WBC Count : 9.2 K/uL  Hemoglobin : 17.8 g/dL  Hematocrit : 53.8 %  Platelet Count - Automated : 227 K/uL  Mean Cell Volume : 93.6 fl  Mean Cell Hemoglobin : 31.0 pg  Mean Cell Hemoglobin Concentration : 33.0 gm/dL  Auto Neutrophil # : x  Auto Lymphocyte # : x  Auto Monocyte # : x  Auto Eosinophil # : x  Auto Basophil # : x  Auto Neutrophil % : x  Auto Lymphocyte % : x  Auto Monocyte % : x  Auto Eosinophil % : x  Auto Basophil % : x    07-30    134<L>  |  98  |  23  ----------------------------<  110<H>  4.6   |  28  |  1.30    Ca    9.0      30 Jul 2017 02:37    TPro  7.4  /  Alb  3.2<L>  /  TBili  0.6  /  DBili  x   /  AST  76<H>  /  ALT  49  /  AlkPhos  97  07-30    PT/INR - ( 30 Jul 2017 02:37 )   PT: 23.2 sec;   INR: 2.10 ratio             CAPILLARY BLOOD GLUCOSE        CARDIAC MARKERS ( 30 Jul 2017 02:37 )  2.320 ng/mL / x     / 215 U/L / x     / x          LIVER FUNCTIONS - ( 30 Jul 2017 02:37 )  Alb: 3.2 g/dL / Pro: 7.4 g/dL / ALK PHOS: 97 U/L / ALT: 49 U/L / AST: 76 U/L / GGT: x           CXR:     A:    72yMale  HD #    Here for:    1.        P:    Neuro: GCS 15. Monitor for delirium.  Continue to optimize pain control. Serial Neurologic assessments.    HEENT: No issues.    CV: Continue hemodynamic monitoring    Pulm: Pulmonary toilet.  Continue incentive spirometer.  Chest PT.  Encourage OOB to chair and ambulation. Nebs. f/u ABG, CXR.    GI/Nutrition: Cont diet, bowel Regimen.    /Renal: Monitor UOP. Monitor BMP.  Replete Lytes as needed.    HEME- DVT prophylaxis, SCDs, f/u CBC.    ID:  Cont abx. f/u Cx's.    Lines/Tubes:     Endo:     Skin:     Dispo: Cont ICU Progress Note    HPI:    S:    Pt seen and examined  HD # 1  Pt here for CP, sensation of AICD firing x 2  PMHx CAD s/p PPM/AICD (new unit installed 2 years prior), afib on coumadin, DM, HTN, HLD, COPD  presenting today with sensation of a "pop and a flash" in his chest. This occurred at 2200 and 0100 respectively, lasted for a second. He had no immediate prodrome prior to these events or any CP, SOB, or diaphoresis otherwise.  Pt states he has had a prodrome of "feeling like I am catching a cold" for a few days.      Allergies    No Known Allergies    Intolerances        MEDICATIONS  (STANDING):    MEDICATIONS  (PRN):      Drug Dosing Weight  Height (cm): 172.72 (30 Jul 2017 02:22)  Weight (kg): 85.3 (30 Jul 2017 02:22)  BMI (kg/m2): 28.6 (30 Jul 2017 02:22)  BSA (m2): 1.99 (30 Jul 2017 02:22)        PAST MEDICAL & SURGICAL HISTORY:  NSVT (nonsustained ventricular tachycardia)  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  DM (diabetes mellitus)  HF (heart failure)  PAD (peripheral artery disease): with stents  Former smoker  Hypertension  H/O: Rheumatic Fever  Atrial Fibrillation  Pacemaker: defibrillator model # v-268 serial # [id number]  Hyperlipidemia  Gout  Coronary Artery Disease  Chronic Obstructive Pulmonary Disease (COPD)  Carotid Stenosis  CAD S/P percutaneous coronary angioplasty: stent placed  Popliteal artery injury: iliac/ popliteal angioplasty with stents 2010  Arm injury: s/p surgery 2009  Pacemaker: [employer] serial #[id number]   model #v-268  S/P Implantation of Automatic Cardioverter/Defibrillator (AICD)  S/P Tonsillectomy      FAMILY HISTORY:  No pertinent family history in first degree relatives      ROS: See HPI; otherwise, all systems reviewed and negative.    O:    ICU Vital Signs Last 24 Hrs  T(C): 36.8 (30 Jul 2017 02:46), Max: 36.8 (30 Jul 2017 02:22)  T(F): 98.2 (30 Jul 2017 02:46), Max: 98.2 (30 Jul 2017 02:22)  HR: 85 (30 Jul 2017 03:59) (82 - 89)  BP: 137/77 (30 Jul 2017 03:59) (137/77 - 174/114)  BP(mean): --  ABP: --  ABP(mean): --  RR: 18 (30 Jul 2017 03:59) (18 - 23)  SpO2: 95% (30 Jul 2017 03:59) (95% - 97%)        I&O's Detail    29 Jul 2017 07:01  -  30 Jul 2017 04:49  --------------------------------------------------------  IN:    Sodium Chloride 0.9% IV Bolus: 1000 mL  Total IN: 1000 mL    OUT:  Total OUT: 0 mL    Total NET: 1000 mL              PE:    Constitutional: Healthy M lying in bed in NAD.   Neck: No JVD, trachea midline.   Respiratory: CTA B/L good BS B/L no W/R/R.  Cardiovascular: S1S2+ RRR no M/R/G.  Gastrointestinal: Soft, NTND.  Extremities: No peripheral edema, No cyanosis, clubbing.  Neurological: Awake, conversive, alert, no gross deficits.  Skin: + contusion over L chest wall.    LABS:    CBC Full  -  ( 30 Jul 2017 02:37 )  WBC Count : 9.2 K/uL  Hemoglobin : 17.8 g/dL  Hematocrit : 53.8 %  Platelet Count - Automated : 227 K/uL  Mean Cell Volume : 93.6 fl  Mean Cell Hemoglobin : 31.0 pg  Mean Cell Hemoglobin Concentration : 33.0 gm/dL  Auto Neutrophil # : x  Auto Lymphocyte # : x  Auto Monocyte # : x  Auto Eosinophil # : x  Auto Basophil # : x  Auto Neutrophil % : x  Auto Lymphocyte % : x  Auto Monocyte % : x  Auto Eosinophil % : x  Auto Basophil % : x    07-30    134<L>  |  98  |  23  ----------------------------<  110<H>  4.6   |  28  |  1.30    Ca    9.0      30 Jul 2017 02:37    TPro  7.4  /  Alb  3.2<L>  /  TBili  0.6  /  DBili  x   /  AST  76<H>  /  ALT  49  /  AlkPhos  97  07-30    PT/INR - ( 30 Jul 2017 02:37 )   PT: 23.2 sec;   INR: 2.10 ratio             CAPILLARY BLOOD GLUCOSE        CARDIAC MARKERS ( 30 Jul 2017 02:37 )  2.320 ng/mL / x     / 215 U/L / x     / x          LIVER FUNCTIONS - ( 30 Jul 2017 02:37 )  Alb: 3.2 g/dL / Pro: 7.4 g/dL / ALK PHOS: 97 U/L / ALT: 49 U/L / AST: 76 U/L / GGT: x           CXR: Grossly unremarkable. No signs of volume overload. No PTX.    EKG: Afib. LAD. Ant-lateral ST depressions from V2-V6; this is ALSO noted on prior EKG from 1 week agp    A:    72yMale  HD # 1    Here for:    1. CP/ACS, v   2. Elevated troponin 2/2 myocardial insult from AICD firing  3. CAD  4. HTN  5. DM  6. Afib    P:    Neuro: GCS 15. Monitor for delirium.  Continue to optimize pain control. Serial Neurologic assessments.    HEENT: No issues.    CV: Continue hemodynamic monitoring. ASA. Keep INR therapeutic. Cont beta blockade, digoxin. Trend troponin and EKG. Cards consult. Pacer interrogation.    Pulm: Continue incentive spirometer.   Encourage OOB to chair and ambulation. Nebs.     GI/Nutrition: Cont diet, bowel Regimen.    /Renal: Monitor UOP. Monitor BMP.  Replete Lytes as needed.    HEME- DVT prophylaxis, SCDs, f/u CBC.    ID:  None    Lines/Tubes: PIV.     Endo: No issues.    Skin: Skin care.    Dispo/Recommendations:    Admit to telemetry  Cardiac consultation, pacer interrogation  Tx for ACS with ASA, BB, symptom mgmt at this time  Trend troponin and EKG    Pt does not require admission to ICU at this time    Discussed with eICU and ED physician    CCTime: 40 minutes

## 2017-07-30 NOTE — DISCHARGE NOTE ADULT - MEDICATION SUMMARY - MEDICATIONS TO TAKE
I will START or STAY ON the medications listed below when I get home from the hospital:    finasteride 5 mg oral tablet  -- 1 tab(s) by mouth once a day  -- Indication: For bph    aspirin 81 mg oral delayed release tablet  -- 1 tab(s) by mouth once a day  -- Indication: For Coronary Artery Disease    lisinopril 40 mg oral tablet  -- 1 tab(s) by mouth once a day  -- Indication: For HTN (hypertension)    digoxin 125 mcg (0.125 mg) oral tablet  -- 1 tab(s) by mouth once a day  -- Indication: For Atrial fibrillation    amiodarone  -- 1 milligram(s) intravenous now  1mg /min   -- Indication: For Atrial fibrillation    atorvastatin 40 mg oral tablet  -- 1 tab(s) by mouth once a day (at bedtime)  -- Indication: For Coronary Artery Disease    Uloric 40 mg oral tablet  -- 1 tab(s) by mouth once a day  -- Indication: For Gout    clopidogrel 75 mg oral tablet  -- 1 tab(s) by mouth once a day  -- Indication: For Coronary Artery Disease    metoprolol succinate 100 mg oral tablet, extended release  -- 1 tab(s) by mouth once a day morning   -- Indication: For Atrial fibrillation    furosemide 100 mg/100 mL-0.9% intravenous solution  -- 40 milliliter(s) intravenous every 12 hours  -- Indication: For Coronary Artery Disease I will START or STAY ON the medications listed below when I get home from the hospital:    finasteride 5 mg oral tablet  -- 1 tab(s) by mouth once a day  -- Indication: For bph    aspirin 81 mg oral delayed release tablet  -- 1 tab(s) by mouth once a day  -- Indication: For Coronary Artery Disease    lisinopril 40 mg oral tablet  -- 1 tab(s) by mouth once a day  -- Indication: For Coronary Artery Disease    digoxin 125 mcg (0.125 mg) oral tablet  -- 1 tab(s) by mouth once a day  -- Indication: For Atrial fibrillation    amiodarone  -- 1 milligram(s) intravenous now  1mg /min   -- Indication: For Atrial fibrillation    atorvastatin 40 mg oral tablet  -- 1 tab(s) by mouth once a day (at bedtime)  -- Indication: For Coronary Artery Disease    Uloric 40 mg oral tablet  -- 1 tab(s) by mouth once a day  -- Indication: For Gout    clopidogrel 75 mg oral tablet  -- 1 tab(s) by mouth once a day  -- Indication: For Coronary Artery Disease    metoprolol succinate 100 mg oral tablet, extended release  -- 1 tab(s) by mouth once a day morning   -- Indication: For Atrial fibrillation    furosemide 100 mg/100 mL-0.9% intravenous solution  -- 40 milliliter(s) intravenous every 12 hours  -- Indication: For HTN (hypertension)

## 2017-07-30 NOTE — ED ADULT NURSE REASSESSMENT NOTE - NS ED NURSE REASSESS COMMENT FT1
patient arrived from triage to ED at this time.
Received pt at this time, pt A/O times 3 able to make needs known/ pt breathing with ease/ no c/o  cp at this time. Pt upgraded to ICU started pm amio drip. Will continue to monitor
Report to [facility] in ICU pt to be transported with RN and monitor

## 2017-07-30 NOTE — H&P ADULT - NSHPPHYSICALEXAM_GEN_ALL_CORE
Height (cm): 172.72 (07-30 @ 02:22)  Weight (kg): 85.3 (07-30 @ 02:22)  BMI (kg/m2): 28.6 (07-30 @ 02:22)  BSA (m2): 1.99 (07-30 @ 02:22)  Vital Signs Last 24 Hrs  T(C): 36.8 (30 Jul 2017 02:46), Max: 36.8 (30 Jul 2017 02:22)  T(F): 98.2 (30 Jul 2017 02:46), Max: 98.2 (30 Jul 2017 02:22)  HR: 85 (30 Jul 2017 03:59) (82 - 89)  BP: 137/77 (30 Jul 2017 03:59) (137/77 - 174/114)  BP(mean): --  RR: 18 (30 Jul 2017 03:59) (18 - 23)  SpO2: 95% (30 Jul 2017 03:59) (95% - 97%)  [ ] room air   [ ] 02    PHYSICAL EXAM:  GENERAL:  No acute distresss, well appearing, [ ] Agitated, [ ] Lethargy, [ ] confused   HEAD:  normal  ENMT: normal  NECK:  normal    NERVOUS SYSTEM:  Alert & Oriented X3, no focal deficits [ ]Confusion  [ ] Encephalopathic [ ] Sedated [ ] Other  CHEST/LUNG: Clear to auscultation bilaterally,  [ ] decreased breath sounds at bases  [ ] wheezing   [ ] rhonchi  [ ] crackles  HEART:  Regular rate and rhythm, No murmurs, rubs, or gallops,  [ ] irregular   ABDOMEN:  soft, nontender, nondistended, positive bowel sounds   [ ] obese  EXTREMITIES: No clubbing, cyanosis or edema  SKIN: [ ] venous stasis skin changes Height (cm): 172.72 (07-30 @ 02:22)  Weight (kg): 85.3 (07-30 @ 02:22)  BMI (kg/m2): 28.6 (07-30 @ 02:22)  BSA (m2): 1.99 (07-30 @ 02:22)  Vital Signs Last 24 Hrs  T(C): 36.8 (30 Jul 2017 02:46), Max: 36.8 (30 Jul 2017 02:22)  T(F): 98.2 (30 Jul 2017 02:46), Max: 98.2 (30 Jul 2017 02:22)  HR: 85 (30 Jul 2017 03:59) (82 - 89)  BP: 137/77 (30 Jul 2017 03:59) (137/77 - 174/114)  BP(mean): --  RR: 18 (30 Jul 2017 03:59) (18 - 23)  SpO2: 95% (30 Jul 2017 03:59) (95% - 97%)  [ ] room air   [ ] 02    PHYSICAL EXAM:  GENERAL:  No acute distress, well appearing  HEAD:  normocephalic, atraumatic   ENMT: redness, tonsilar exudate   NECK:  supple, no JVD   NERVOUS SYSTEM:  Alert & Oriented X3, no focal deficits   CHEST/LUNG: Clear to auscultation bilaterally, no wheezing, no rhonchi  no crackles  HEART:  irregular, No murmurs, rubs, or gallops  ABDOMEN:  soft, nontender, nondistended, positive bowel sounds, obese  EXTREMITIES: No clubbing, cyanosis or edema  SKIN: venous stasis skin changes

## 2017-07-30 NOTE — PROGRESS NOTE ADULT - ASSESSMENT
72M with PMHx as above, here s/p AICD discharge x2 for VF at home, NSTEMI, Pulmonary edema due to Acute systolic CHF, BRITANY, Hypokalemia, Hypomagnesemia, episode of NSVT this evening    -Continue Amio gtt  -Pt on BB, ACE-I, Statin, Plavix, ASA  -Coumadin on hold for Cath in am  -F/U Cardiology  -Monitor HD's. Trend CE's  -Diuresis as tolerated  -CXR in am  -Monitor respiratory status. Stable at this time  -NPO for Cath in am. PPI  -Lytes repleted.   -Supportive care

## 2017-07-30 NOTE — DISCHARGE NOTE ADULT - MEDICATION SUMMARY - MEDICATIONS TO STOP TAKING
I will STOP taking the medications listed below when I get home from the hospital:    warfarin 2 mg oral tablet  -- 1 tab(s) by mouth once a day on Sat, Sun, Reji Khoury    hydroCHLOROthiazide-triamterene 25 mg-37.5 mg oral tablet  -- 1 tab(s) by mouth once a day  hold medication due to elevated Cr  M, W, F    warfarin  -- 1.5 milligram(s) by mouth once a day on M, W, F I will STOP taking the medications listed below when I get home from the hospital:    warfarin 2 mg oral tablet  -- 1 tab(s) by mouth once a day on Sat, Sun, Tue, Thurs    ramipril 5 mg oral capsule  -- 1 cap(s) by mouth once a day  Hold medication due to elevated Cr    hydroCHLOROthiazide-triamterene 25 mg-37.5 mg oral tablet  -- 1 tab(s) by mouth once a day  hold medication due to elevated Cr  M, W, F    Coumadin  -- 1.5 milligram(s) by mouth once a day on M, W, F    warfarin  -- 1.5 milligram(s) by mouth once a day on M, W, F

## 2017-07-30 NOTE — DISCHARGE NOTE ADULT - PATIENT PORTAL LINK FT
“You can access the [facility] Patient Portal, offered by [facility], by registering with the following website: [email or web address]”

## 2017-07-30 NOTE — CONSULT NOTE ADULT - ATTENDING COMMENTS
73 yo M with Hx sysCHF, NSVT with AICD placed 2015, CAD s/p stent, Afib on coumadin, carotid stenosis, COPD, DM, with 2 episodes of VF with appropriate AICD fire in setting of NSTEMI.     --amio bolus and gtt  continue ASA, plavix, coumadin, lopressor, ACEI, diuretic, statin  trend cardiac enzymes and EKG  anticipate transfer to [facility] for cath, discussed with Dr. Steel  --type 2 DM, moniter fs, ISS 71 yo M with Hx sysCHF, NSVT with AICD placed 2015, CAD s/p stent, Afib on coumadin, carotid stenosis, COPD, DM, with 2 episodes of VF with appropriate AICD fire in setting of NSTEMI +/- CHF exacerbation.      --amio bolus and gtt  continue ASA, plavix, coumadin, lopressor, ACEI, statin  trend cardiac enzymes and EKG  mild pulm edema, lasix 40 IV  anticipate non-emergency transfer to [facility] for cath, discussed with Dr. Steel  --type 2 DM, moniter fs, ISS  --plan discussed with pt and sister

## 2017-07-30 NOTE — ED PROVIDER NOTE - PSH
Arm injury  s/p surgery 2009  CAD S/P percutaneous coronary angioplasty  stent placed  Pacemaker  [employer] serial #[id number]   model #v-268  Popliteal artery injury  iliac/ popliteal angioplasty with stents 2010  S/P Implantation of Automatic Cardioverter/Defibrillator (AICD)    S/P Tonsillectomy

## 2017-07-30 NOTE — H&P ADULT - PMH
Alcohol abuse  in recovery  Atrial Fibrillation    Carotid Stenosis    Chronic Obstructive Pulmonary Disease (COPD)    Coronary Artery Disease    DM (diabetes mellitus)    Former smoker    Gout    H/O: Rheumatic Fever    HF (heart failure)    Hyperlipidemia    Hypertension    NSVT (nonsustained ventricular tachycardia)    Pacemaker  defibrillator model # v-268 serial # [id number]  PAD (peripheral artery disease)  with stents

## 2017-07-30 NOTE — DISCHARGE NOTE ADULT - HOSPITAL COURSE
72 yr old male with PMH of carotid stenosis, former smoker, COPD, CAD, HTN, HLD, chronic systolic HF, NSVT,  ICD(2015)/PPM, rheumatic fever in childhood, A Fib -on warfarin. DM borderline, obese, former ETOH abuse -in recovery x 25 yrs, gout, PAD with bilateral lower extremity stents presents for chest discomfort. pt states that he experienced a discharge from his [facility] at 11pm and 2 am. He describes the sensation as a pop and a flash associated with pain in the arm b/l and wrist pain. He admits to having milder episode in Feb 2017 for which AICD was interrogated with normal functioning results as per pt. Pt also c/o being dysnea with minimal ADL such as showering. Pt states he leaves with his Sister who take a good care of him. On arrival to the ED, no chest pain, no SOB, no Diaphoresis, no  syncope. He is asymptomatic. His cardiologist is Dr Caamcho. Pt had Cardiac Cath in oct 2016 with stent placement. Defibrillator [employer]  model [id number]  Serial [id number]  paced Dec 13, 2015 by Dr. Eulogio Dominguez     In the ED, labs pertinent for H/H 17.8/53.8, INR 2.1, Troponin of 2.32, EGK showed st nonspecific ST changes with afib rate controlled. Dr. Ivey notified      Unclear if cardiac enzymes form acute ischemic event vs ICD shock.  In any event, he has been treated for NSTEMI  - Continue DAPT with aspirin and Plavix  - Continue statin drug  - Patient net negative from IV Lasix.  Continue for now.    - Monitor K, Keep K>4, Mg >2  - Continue amio gtt  - Continue Toprol  QD  - Continue lisinopril 40 QD  - Transfer to [facility] for cath today.    I spoke to Dr Camacho who requested to transfer the patient to [facility] for cardiac cath, pt is off of coumadin and INR is still > 2.   I notified Dr Ruiz pcp, I spent 40 min for this discharge.   PHYSICAL EXAM    Constitutional: NAD, well-groomed, well-developed  HEENT: PERRLA, EOMI, Normal Hearing, MMM  Neck: No LAD, No JVD  Back: Normal spine flexure, No CVA tenderness  Respiratory: CTAB/L   Cardiovascular: S1 and S2, RRR, no M/G/R  Gastrointestinal: BS+, soft, NT/ND  Extremities: No peripheral edema  Vascular: 2+ peripheral pulses  Neurological: A/O x 3, no focal deficits  Skin: No rashes

## 2017-07-30 NOTE — PROGRESS NOTE ADULT - PROBLEM SELECTOR PLAN 1
EKG with some ischemic changes, r/o ACS,  AICD induced firing 2/2 Vt storm  pt seen by Dr. Steel(cardio) in the ED, given amiodarone 150mg IV push, Pt with two episode of VT storm. Pt will transfer to [facility] for Cardiac angiogram as per cardio, D/W Dr Camacho his cardiologist.   Awaiting ICU consult   Continue close monitoring, f/u Dr. Steel recs   Trend cardiac enzymes.  NPO from midnight, hold coumadin.

## 2017-07-30 NOTE — H&P ADULT - HISTORY OF PRESENT ILLNESS
72 yr old male with PMH of carotid stenosis, former smoker, COPD, CAD, HTN, HLD, chronic systolic HF, NSVT,  ICD/PPM, rheumatic fever in childhood, A Fib -on warfarin. DM borderline, obese, former ETOH abuse -in recovery x 25 yrs, gout, PAD with bilateral lower extremity stents presents for chest discomfort. pt states that he experienced a discharge from his [facility] at 11pm and 2 am. He describes the sensation as a pop and a flash. On arrival to the ED, no chest pain, no SOB, no Diaphoresis, no  syncope. He is asymptomatic. His cardiologist is Dr Camacho    In the ED, labs pertinent for H/H 17.8/53.8, INR 2.1, Troponin of 2.32, EGK showed 72 yr old male with PMH of carotid stenosis, former smoker, COPD, CAD, HTN, HLD, chronic systolic HF, NSVT,  ICD(2015)/PPM, rheumatic fever in childhood, A Fib -on warfarin. DM borderline, obese, former ETOH abuse -in recovery x 25 yrs, gout, PAD with bilateral lower extremity stents presents for chest discomfort. pt states that he experienced a discharge from his [facility] at 11pm and 2 am. He describes the sensation as a pop and a flash associated with pain in the arm b/l and wrist pain. He admits to having milder episode in Feb 2017 for which AICD was interrogated with normal functioning results as per pt. Pt also c/o being dysnea with minimal ADL such as showering. Pt states he leaves with his Sister who take a good care of him. On arrival to the ED, no chest pain, no SOB, no Diaphoresis, no  syncope. He is asymptomatic. His cardiologist is Dr Camacho. Pt had Cardiac Cath in oct 2016 with stent placement. Defibrillator [employer]  model [id number]  Serial [id number]  paced Dec 13, 2015 by Dr. Eulogio Dominguez     In the ED, labs pertinent for H/H 17.8/53.8, INR 2.1, Troponin of 2.32, EGK showed st nonspecific ST changes with afib rate controlled. Dr. Ivey notified

## 2017-07-30 NOTE — DISCHARGE NOTE ADULT - NS AS DC STROKE ED MATERIALS
Stroke Education Booklet/Atrial fibrillation is a risk factor for strokes/Call 911 for Stroke/Risk Factors for Stroke/Stroke Warning Signs and Symptoms/Prescribed Medications/Need for Followup After Discharge Atrial fibrillation is a risk factor for strokes

## 2017-07-30 NOTE — H&P ADULT - PSH
Arm injury  s/p surgery 2009  CAD S/P percutaneous coronary angioplasty  stent placed  Pacemaker  [employer] serial #[id number]   model #v-268  Popliteal artery injury  iliac/ popliteal angioplasty with stents 2010  S/P Implantation of Automatic Cardioverter/Defibrillator (AICD)    S/P Tonsillectomy

## 2017-07-30 NOTE — PROGRESS NOTE ADULT - SUBJECTIVE AND OBJECTIVE BOX
Patient is a 72y old  Male who presents with a chief complaint of I felt my defibrillator fired (30 Jul 2017 17:01)    24 hour events: ***  PAST MEDICAL & SURGICAL HISTORY:  NSVT (nonsustained ventricular tachycardia)  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  DM (diabetes mellitus)  HF (heart failure)  PAD (peripheral artery disease): with stents  Former smoker  Hypertension  H/O: Rheumatic Fever  Atrial Fibrillation  Pacemaker: defibrillator model # v-268 serial # [id number]  Hyperlipidemia  Gout  Coronary Artery Disease  Chronic Obstructive Pulmonary Disease (COPD)  Carotid Stenosis  CAD S/P percutaneous coronary angioplasty: stent placed  Popliteal artery injury: iliac/ popliteal angioplasty with stents 2010  Arm injury: s/p surgery 2009  Pacemaker: [employer] serial #[id number]   model #v-268  S/P Implantation of Automatic Cardioverter/Defibrillator (AICD)  S/P Tonsillectomy      Review of Systems:  Constitutional: No fever, chills, fatigue  Neuro: No headache, numbness, weakness  Resp: No cough, wheezing, shortness of breath  CVS: No chest pain, palpitations, leg swelling  GI: No abdominal pain, nausea, vomiting, diarrhea   : No dysuria, frequency, incontinence  Skin: No itching, burning, rashes, or lesions   Msk: No joint pain or swelling  Psych: No depression, anxiety, mood swings    T(F): 98.1 (07-30-17 @ 19:15), Max: 98.3 (07-30-17 @ 06:45)  HR: 67 (07-30-17 @ 20:00) (67 - 95)  BP: 143/83 (07-30-17 @ 20:00) (135/71 - 174/114)  RR: 24 (07-30-17 @ 20:00) (18 - 35)  SpO2: 96% (07-30-17 @ 20:00) (91% - 97%)  Wt(kg): --        CAPILLARY BLOOD GLUCOSE  136 (30 Jul 2017 12:00)          I&O's Summary    29 Jul 2017 07:01  -  30 Jul 2017 07:00  --------------------------------------------------------  IN: 1000 mL / OUT: 0 mL / NET: 1000 mL    30 Jul 2017 07:01  -  30 Jul 2017 20:54  --------------------------------------------------------  IN: 1500 mL / OUT: 2800 mL / NET: -1300 mL        Physical Exam:     Gen:   Neuro: A&Ox3, non-focal  HEENT: NC/AT  Resp: CTA b/l  CVS: nl S1/S2, RRR  Abd: soft, nt, nd, +bs  Ext: no edema, +pulses  Skin: well perfused, warm    Meds:    lisinopril Oral  digoxin     Tablet Oral  metoprolol succinate ER Oral  amiodarone Infusion IV Continuous  furosemide   Injectable IV Push    finasteride Oral  atorvastatin Oral  febuxostat Oral          aspirin enteric coated Oral  clopidogrel Tablet Oral                                        17.8   9.2   )-----------( 227      ( 30 Jul 2017 02:37 )             53.8       07-30    136  |  100  |  22  ----------------------------<  120<H>  3.5   |  26  |  1.30    Ca    8.6      30 Jul 2017 14:11  Phos  3.0     07-30  Mg     1.7     07-30    TPro  7.4  /  Alb  3.2<L>  /  TBili  0.6  /  DBili  x   /  AST  76<H>  /  ALT  49  /  AlkPhos  97  07-30      CARDIAC MARKERS ( 30 Jul 2017 14:11 )  3.030 ng/mL / x     / 158 U/L / x     / 15.2 ng/mL  CARDIAC MARKERS ( 30 Jul 2017 08:25 )  3.090 ng/mL / x     / 153 U/L / x     / 15.7 ng/mL  CARDIAC MARKERS ( 30 Jul 2017 02:37 )  2.320 ng/mL / x     / 215 U/L / x     / x          PT/INR - ( 30 Jul 2017 02:37 )   PT: 23.2 sec;   INR: 2.10 ratio                     Radiology: ***    Bedside lung ultrasound: ***    Bedside ECHO: ***    CENTRAL LINE: Y/N          DATE INSERTED:              REMOVE: Y/N    NOEL: Y/N                        DATE INSERTED:              REMOVE: Y/N    A-LINE: Y/N                       DATE INSERTED:              REMOVE: Y/N    GLOBAL ISSUE/BEST PRACTICE:  Analgesia:  Sedation:  HOB elevation: yes  Stress ulcer prophylaxis:  VTE prophylaxis:  Glycemic control:  Nutrition:      CODE STATUS: ***  GOC discussion: Y  Critical care time spent (mins): ***  (Reviewing data, imaging, discussing with multidisciplinary team, non inclusive of procedures, discussing goals of care with patient/family) 72M with PMHx of NSVT, s/p AICD, ETOH abuse, copd, dm, systolic HF, PAD, former smoker, htn, a-fib, rheumatic fever, hld, gout, CAD s/p PCI, popliteal artery injury s/p ilio-popliteal stent 2010, s/p tonsillectomy, who was admitted due to AICD firing teice at home. ER work up revealed ST depression on lateral leads (present on previous ekg but more pronounced), elevated troponin 2.3. AICD interrogated showed AICD discharged twice for VF    24 hour events: Started on Amio gtt. Started on BB. ACE-I, Statin, Plavix. Coumadin on hold for Cath in am. Pt had 7 beats of NSVT in evening. electrolytes sent, hypokalemia > treated    PAST MEDICAL & SURGICAL HISTORY:  NSVT (nonsustained ventricular tachycardia)  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  DM (diabetes mellitus)  HF (heart failure)  PAD (peripheral artery disease): with stents  Former smoker  Hypertension  H/O: Rheumatic Fever  Atrial Fibrillation  Pacemaker: defibrillator model # v-268 serial # [id number]  Hyperlipidemia  Gout  Coronary Artery Disease  Chronic Obstructive Pulmonary Disease (COPD)  Carotid Stenosis  CAD S/P percutaneous coronary angioplasty: stent placed  Popliteal artery injury: iliac/ popliteal angioplasty with stents 2010  Arm injury: s/p surgery 2009  Pacemaker: [employer] serial #[id number]   model #v-268  S/P Implantation of Automatic Cardioverter/Defibrillator (AICD)  S/P Tonsillectomy      Review of Systems:  Constitutional: No fever, chills, fatigue  Neuro: No headache, numbness, weakness  Resp: No cough, wheezing, shortness of breath  CVS: No chest pain, palpitations, leg swelling  GI: No abdominal pain, nausea, vomiting, diarrhea   : No dysuria, frequency, incontinence  Skin: No itching, burning, rashes, or lesions   Msk: No joint pain or swelling  Psych: No depression, anxiety, mood swings    T(F): 98.1 (07-30-17 @ 19:15), Max: 98.3 (07-30-17 @ 06:45)  HR: 67 (07-30-17 @ 20:00) (67 - 95)  BP: 143/83 (07-30-17 @ 20:00) (135/71 - 174/114)  RR: 24 (07-30-17 @ 20:00) (18 - 35)  SpO2: 96% (07-30-17 @ 20:00) (91% - 97%)  Wt(kg): --        CAPILLARY BLOOD GLUCOSE  136 (30 Jul 2017 12:00)          I&O's Summary    29 Jul 2017 07:01  -  30 Jul 2017 07:00  --------------------------------------------------------  IN: 1000 mL / OUT: 0 mL / NET: 1000 mL    30 Jul 2017 07:01  -  30 Jul 2017 20:54  --------------------------------------------------------  IN: 1500 mL / OUT: 2800 mL / NET: -1300 mL        Physical Exam:     Gen: lying comfortably in bed  Neuro: A&Ox3, non-focal  HEENT: NC/AT  Resp: CTA b/l  CVS: nl S1/S2, RRR  Abd: soft, nt, nd, +bs  Ext: no edema, +pulses  Skin: well perfused, warm    Meds:    lisinopril Oral  digoxin     Tablet Oral  metoprolol succinate ER Oral  amiodarone Infusion IV Continuous  furosemide   Injectable IV Push  finasteride Oral  atorvastatin Oral  febuxostat Oral  aspirin enteric coated Oral  clopidogrel Tablet Oral                              17.8   9.2   )-----------( 227      ( 30 Jul 2017 02:37 )             53.8       07-30    136  |  100  |  22  ----------------------------<  120<H>  3.5   |  26  |  1.30    Ca    8.6      30 Jul 2017 14:11  Phos  3.0     07-30  Mg     1.7     07-30    TPro  7.4  /  Alb  3.2<L>  /  TBili  0.6  /  DBili  x   /  AST  76<H>  /  ALT  49  /  AlkPhos  97  07-30      CARDIAC MARKERS ( 30 Jul 2017 14:11 )  3.030 ng/mL / x     / 158 U/L / x     / 15.2 ng/mL  CARDIAC MARKERS ( 30 Jul 2017 08:25 )  3.090 ng/mL / x     / 153 U/L / x     / 15.7 ng/mL  CARDIAC MARKERS ( 30 Jul 2017 02:37 )  2.320 ng/mL / x     / 215 U/L / x     / x          PT/INR - ( 30 Jul 2017 02:37 )   PT: 23.2 sec;   INR: 2.10 ratio           CXR (official report pending), mild pulm congestion    EKG 8am: afib about 80bpm, improvement in lateral ST depressions compared to 2am EKG      GLOBAL ISSUE/BEST PRACTICE:  Analgesia: yes  Sedation: no  HOB elevation: yes  Stress ulcer prophylaxis: yes  VTE prophylaxis: yes  Glycemic control: yes  Nutrition: npo      CODE STATUS: Full  GOC discussion: Y  Critical care time spent (mins): 35  (Reviewing data, imaging, discussing with multidisciplinary team, non inclusive of procedures, discussing goals of care with patient/family)

## 2017-07-30 NOTE — H&P ADULT - PROBLEM SELECTOR PLAN 1
EKG with some ischemic changes, r/o ACS,  AICD induced firing 2/2 Vt storm  pt seen by Dr. Steel(cardio) in the ED, given amiodarone 150mg IV push, Pt with two episode of VT storm. Pt likely for transfer to [facility] for Cardiac angiogram  Awaiting ICU consult   Continue close monitoring, f/u Dr. Steel recs   Trend cardiac enzymes. EKG with some ischemic changes, r/o ACS,  AICD induced firing 2/2 Vt storm  pt seen by Dr. Steel(cardio) in the ED, given amiodarone 150mg IV push, Pt with two episode of VT storm. Pt likely for transfer to [facility] for Cardiac angiogram as per cardio  Awaiting ICU consult   Continue close monitoring, f/u Dr. Steel recs   Trend cardiac enzymes.

## 2017-07-30 NOTE — H&P ADULT - PROBLEM SELECTOR PLAN 2
chronic, rate controlled on metoprolol 100mg po daily, on Coumadin INR therapeutic 2.32, will give one dose of 2mg tonight.   f/u am INR, consider holding if pt for transfer as per cardio recs.

## 2017-07-30 NOTE — ED PROVIDER NOTE - PMH
Alcohol abuse  in recovery  Atrial Fibrillation    Carotid Stenosis    Chronic Obstructive Pulmonary Disease (COPD)    Coronary Artery Disease    DM (diabetes mellitus)    Former smoker    Gout    H/O: Rheumatic Fever    HF (heart failure)    Hyperlipidemia    Hypertension    NSVT (nonsustained ventricular tachycardia)    Pacemaker  defibrillator model # v-268 serial # [id number]  PAD (peripheral artery disease)  with stents

## 2017-07-30 NOTE — H&P ADULT - ASSESSMENT
72 yr old male with PMH of carotid stenosis, former smoker, COPD, CAD, HTN, HLD, chronic systolic HF, NSVT,  ICD(2015)/PPM, rheumatic fever in childhood, A Fib -on warfarin. DM borderline, obese, former ETOH abuse -in recovery x 25 yrs, gout, PAD with bilateral lower extremity stents presents for chest discomfort after his AICD fired admitted for elevated troponin with ST depression r/o ACS.

## 2017-07-30 NOTE — CONSULT NOTE ADULT - SUBJECTIVE AND OBJECTIVE BOX
[facility] - Javon Hernandez, Neil Early, Jordi Victor Savella  Office Number: [phone]    Initial Consult Note    CHIEF COMPLAINT: Patient is a 72y old  Male who presents with a chief complaint of chest discomfort (30 Jul 2017 05:28)      HPI:  72 yr old male with PMH of carotid stenosis, former smoker, COPD, CAD, HTN, HLD, chronic systolic HF, NSVT,  ICD(2015)/PPM, rheumatic fever in childhood, A Fib -on warfarin. DM borderline, obese, former ETOH abuse -in recovery x 25 yrs, gout, PAD with bilateral lower extremity stents presents for chest discomfort. pt states that he experienced a discharge from his [facility] at 11pm and 2 am. He describes the sensation as a pop and a flash associated with pain in the arm b/l and wrist pain. He admits to having milder episode in Feb 2017 for which AICD was interrogated with normal functioning results as per pt. Pt also c/o being dysnea with minimal ADL such as showering. Pt states he leaves with his Sister who take a good care of him. On arrival to the ED, no chest pain, no SOB, no Diaphoresis, no  syncope. He is asymptomatic. His cardiologist is Dr Camacho. Pt had Cardiac Cath in oct 2016 with stent placement. Defibrillator [employer]  model [id number]  Serial [id number]  placed Dec 13, 2015 by Dr. Eulogio Dominguez     In the ED, labs pertinent for H/H 17.8/53.8, INR 2.1, Troponin of 2.32, EGK showed st nonspecific ST changes with afib rate controlled. Dr. Steel  notified (30 Jul 2017 05:28)    Pt seen /examined in the ER. Reports ICD like shock x 2 this AM. I have interrogated his device which shows 2 appropriate ICD shocks for VT/VF. Reports mild SOB over the last few days and orthopnea. He is s/p PCI to his LAD earlier this year with Dr. Camacho      PAST MEDICAL & SURGICAL HISTORY:  NSVT (nonsustained ventricular tachycardia)  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  DM (diabetes mellitus)  HF (heart failure)  PAD (peripheral artery disease): with stents  Former smoker  Hypertension  H/O: Rheumatic Fever  Atrial Fibrillation  Pacemaker: defibrillator model # v-268 serial # [id number]  Hyperlipidemia  Gout  Coronary Artery Disease  Chronic Obstructive Pulmonary Disease (COPD)  Carotid Stenosis  CAD S/P percutaneous coronary angioplasty: stent placed  Popliteal artery injury: iliac/ popliteal angioplasty with stents 2010  Arm injury: s/p surgery 2009  Pacemaker: [employer] serial #[id number]   model #v-268  S/P Implantation of Automatic Cardioverter/Defibrillator (AICD)  S/P Tonsillectomy      SOCIAL HISTORY:  No tobacco, ethanol, or drug abuse.    FAMILY HISTORY:  No pertinent family history in first degree relatives    No family history of acute MI or sudden cardiac death.    MEDICATIONS  (STANDING):  finasteride 5 milliGRAM(s) Oral daily  aspirin enteric coated 81 milliGRAM(s) Oral daily  lisinopril 40 milliGRAM(s) Oral daily  digoxin     Tablet 0.125 milliGRAM(s) Oral daily  atorvastatin 40 milliGRAM(s) Oral at bedtime  clopidogrel Tablet 75 milliGRAM(s) Oral daily  metoprolol succinate  milliGRAM(s) Oral daily  febuxostat 40 milliGRAM(s) Oral at bedtime  warfarin 2 milliGRAM(s) Oral <User Schedule>  amiodarone Infusion 1 mG/Min (33.333 mL/Hr) IV Continuous <Continuous>  insulin lispro (HumaLOG) corrective regimen sliding scale   SubCutaneous three times a day before meals  insulin lispro (HumaLOG) corrective regimen sliding scale   SubCutaneous at bedtime  dextrose 50% Injectable 12.5 Gram(s) IV Push once  dextrose 50% Injectable 25 Gram(s) IV Push once  dextrose 50% Injectable 25 Gram(s) IV Push once  furosemide   Injectable 40 milliGRAM(s) IV Push once    MEDICATIONS  (PRN):      Allergies    No Known Allergies    Intolerances        REVIEW OF SYSTEMS:    CONSTITUTIONAL: No weakness, fevers or chills  EYES/ENT: No visual changes;  No vertigo or throat pain   NECK: No pain or stiffness  RESPIRATORY: No cough, wheezing, hemoptysis; No shortness of breath  CARDIOVASCULAR: No chest pain or palpitations  GASTROINTESTINAL: No abdominal pain. No nausea, vomiting, or hematemesis; No diarrhea or constipation. No melena or hematochezia.  GENITOURINARY: No dysuria, frequency or hematuria  NEUROLOGICAL: No numbness or weakness  SKIN: No itching or rash  All other review of systems is negative unless indicated above    VITAL SIGNS:   Vital Signs Last 24 Hrs  T(C): 36.8 (30 Jul 2017 06:45), Max: 36.8 (30 Jul 2017 02:22)  T(F): 98.3 (30 Jul 2017 06:45), Max: 98.3 (30 Jul 2017 06:45)  HR: 91 (30 Jul 2017 08:00) (82 - 91)  BP: 137/91 (30 Jul 2017 08:00) (137/77 - 174/114)  BP(mean): --  RR: 18 (30 Jul 2017 08:00) (18 - 23)  SpO2: 96% (30 Jul 2017 08:00) (95% - 97%)    I&O's Summary    29 Jul 2017 07:01  -  30 Jul 2017 07:00  --------------------------------------------------------  IN: 1000 mL / OUT: 0 mL / NET: 1000 mL        On Exam:    Constitutional: NAD, alert and oriented x 3  Lungs:  Non-labored, breath sounds are clear bilaterally, No wheezing, rales or rhonchi  Cardiovascular: RRR.  S1 and S2 positive.  No murmurs, rubs, gallops or clicks  Gastrointestinal: Bowel Sounds present, soft, nontender.   Lymph: No peripheral edema. No cervical lymphadenopathy.  Neurological: Alert, no focal deficits  Skin: No rashes or ulcers   Psych:  Mood & affect appropriate.    LABS: All Labs Reviewed:                        17.8   9.2   )-----------( 227      ( 30 Jul 2017 02:37 )             53.8     30 Jul 2017 02:37    134    |  98     |  23     ----------------------------<  110    4.6     |  28     |  1.30     Ca    9.0        30 Jul 2017 02:37    TPro  7.4    /  Alb  3.2    /  TBili  0.6    /  DBili  x      /  AST  76     /  ALT  49     /  AlkPhos  97     30 Jul 2017 02:37    PT/INR - ( 30 Jul 2017 02:37 )   PT: 23.2 sec;   INR: 2.10 ratio           CARDIAC MARKERS ( 30 Jul 2017 08:25 )  3.090 ng/mL / x     / 153 U/L / x     / 15.7 ng/mL  CARDIAC MARKERS ( 30 Jul 2017 02:37 )  2.320 ng/mL / x     / 215 U/L / x     / x          Blood Culture:         RADIOLOGY: Grossly clear CXR    EKG: AF with ischemic appearing deep ST depressions in V4-V6.

## 2017-07-30 NOTE — PROGRESS NOTE ADULT - SUBJECTIVE AND OBJECTIVE BOX
Patient is a 72y old  Male who presents with a chief complaint of I felt my defibrillator fired (30 Jul 2017 09:23)      INTERVAL HPI/OVERNIGHT EVENTS: pt feels better, denies any chest pain or sob.     MEDICATIONS  (STANDING):  finasteride 5 milliGRAM(s) Oral daily  aspirin enteric coated 81 milliGRAM(s) Oral daily  lisinopril 40 milliGRAM(s) Oral daily  digoxin     Tablet 0.125 milliGRAM(s) Oral daily  atorvastatin 40 milliGRAM(s) Oral at bedtime  clopidogrel Tablet 75 milliGRAM(s) Oral daily  metoprolol succinate  milliGRAM(s) Oral daily  febuxostat 40 milliGRAM(s) Oral at bedtime  amiodarone Infusion 1 mG/Min (33.333 mL/Hr) IV Continuous <Continuous>  furosemide   Injectable 40 milliGRAM(s) IV Push every 12 hours    MEDICATIONS  (PRN):      Allergies    No Known Allergies    Intolerances        REVIEW OF SYSTEMS:  CONSTITUTIONAL: No fever, weight loss, or fatigue  EYES: No eye pain, visual disturbances, or discharge  ENMT:  No difficulty hearing, tinnitus, vertigo; No sinus or throat pain  NECK: No pain or stiffness  BREASTS: No pain, masses, or nipple discharge  RESPIRATORY: No cough, wheezing, chills or hemoptysis; No shortness of breath  CARDIOVASCULAR: No chest pain, palpitations, dizziness, or leg swelling  GASTROINTESTINAL: No abdominal or epigastric pain. No nausea, vomiting, or hematemesis; No diarrhea or constipation. No melena or hematochezia.  GENITOURINARY: No dysuria, frequency, hematuria, or incontinence  NEUROLOGICAL: No headaches, memory loss, loss of strength, numbness, or tremors  SKIN: No itching, burning, rashes, or lesions   LYMPH NODES: No enlarged glands  ENDOCRINE: No heat or cold intolerance; No hair loss; No polydipsia or polyuria  MUSCULOSKELETAL: No joint pain or swelling; No muscle, back, or extremity pain  PSYCHIATRIC: No depression, anxiety, mood swings, or difficulty sleeping  HEME/LYMPH: No easy bruising, or bleeding gums  ALLERGY AND IMMUNOLOGIC: No hives or eczema    Vital Signs Last 24 Hrs  T(C): 36.4 (30 Jul 2017 12:00), Max: 36.8 (30 Jul 2017 02:22)  T(F): 97.6 (30 Jul 2017 12:00), Max: 98.3 (30 Jul 2017 06:45)  HR: 78 (30 Jul 2017 14:00) (70 - 95)  BP: 144/86 (30 Jul 2017 14:00) (137/74 - 174/114)  BP(mean): 108 (30 Jul 2017 14:00) (88 - 112)  RR: 28 (30 Jul 2017 14:00) (18 - 35)  SpO2: 96% (30 Jul 2017 14:00) (91% - 97%)    PHYSICAL EXAM:  GENERAL: NAD, well-groomed, well-developed  HEAD:  Atraumatic, Normocephalic  EYES: EOMI, PERRLA, conjunctiva and sclera clear  ENMT: No tonsillar erythema, exudates, or enlargement; Moist mucous membranes, Good dentition, No lesions  NECK: Supple, No JVD, Normal thyroid  NERVOUS SYSTEM:  Alert & Oriented X3, Good concentration; Motor Strength 5/5 B/L upper and lower extremities; DTRs 2+ intact and symmetric  CHEST/LUNG: Clear to auscultation bilaterally; No rales, rhonchi, wheezing, or rubs  HEART: Regular rate and rhythm; No murmurs, rubs, or gallops  ABDOMEN: Soft, Nontender, Nondistended; Bowel sounds present  EXTREMITIES:  2+ Peripheral Pulses, No clubbing, cyanosis, or edema  LYMPH: No lymphadenopathy noted  SKIN: No rashes or lesions, bruise on left chest     LABS:                        17.8   9.2   )-----------( 227      ( 30 Jul 2017 02:37 )             53.8     30 Jul 2017 02:37    134    |  98     |  23     ----------------------------<  110    4.6     |  28     |  1.30     Ca    9.0        30 Jul 2017 02:37    TPro  7.4    /  Alb  3.2    /  TBili  0.6    /  DBili  x      /  AST  76     /  ALT  49     /  AlkPhos  97     30 Jul 2017 02:37    PT/INR - ( 30 Jul 2017 02:37 )   PT: 23.2 sec;   INR: 2.10 ratio             CAPILLARY BLOOD GLUCOSE  136 (30 Jul 2017 12:00)          RADIOLOGY & ADDITIONAL TESTS:    Imaging Personally Reviewed: cxr, mild congestion  [x ] YES  [ ] NO    Consultant(s) Notes Reviewed:  [x ] YES  [ ] NO    Care Discussed with Consultants/Other Providers [x ] YES  [ ] NO

## 2017-07-30 NOTE — DISCHARGE NOTE ADULT - CARE PROVIDER_API CALL
Lucio Camacho (MD), Cardiovascular Disease  [address] [place] [address]  Phone: [phone]  Fax: [phone]

## 2017-07-30 NOTE — CONSULT NOTE ADULT - SUBJECTIVE AND OBJECTIVE BOX
HPI:  73 yo M with Hx sysCHF, NSVT with AICD placed 2015, CAD s/p stent, Afib on coumadin, carotid stenosis, COPD, DM, presents with complaints of AICD fire x 2.  Pt in [facility] until last few days had cough with productive sputum, felt generalized malaise.  For last 2-3 nights has had difficulty sleeping due to dyspnea and was sleeping in chair.  Last night had some discomfort in left arm and also b/l wrists but attributed this to arthritis.  He was trying to fall asleep last night when had AICD fire about 11:30.  He was anxious following this, and got out of bed, watched TV for awhile.  Then tried to go back to sleep and AICD fired about 1:30am.  He then came to ED accompanied by his sister.    In ED EKG showed lateral ST depressions (present on prior EKG but more pronounced).  Interrogation showed episodes of VF and appropriate AICD fire.  Troponin elevated at 2.3.    Currently has no left arm pain, CP, SOB, LH, n/v    Allergies: No Known Allergies    PAST MEDICAL & SURGICAL HISTORY:  systolic CHF  Atrial Fibrillation  NSVT (nonsustained ventricular tachycardia)  Pacemaker: defibrillator model # v-268 serial # [id number]  Coronary Artery Disease S/P percutaneous coronary angioplasty: stent placed  Chronic Obstructive Pulmonary Disease (COPD)  Carotid Stenosis  Popliteal artery injury: iliac/ popliteal angioplasty with stents 2010  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  DM (diabetes mellitus)  PAD (peripheral artery disease): with stents  Former smoker  Hypertension  H/O: Rheumatic Fever  Hyperlipidemia  Gout  Arm injury: s/p surgery 2009    Home Medications: reviewed, see med rec    Review of Systems:  Constitutional: No fever, chills, + fatigue  Neuro: No headache, numbness, weakness  Resp: + cough, + shortness of breath  CVS: No chest pain, palpitations, leg swelling  GI: No abdominal pain, nausea, vomiting    T(F): 98.3 (07-30-17 @ 06:45), Max: 98.3 (07-30-17 @ 06:45)  HR: 91 (07-30-17 @ 06:45) (82 - 91)  BP: 138/83 (07-30-17 @ 06:45) (137/77 - 174/114)  RR: 20 (07-30-17 @ 06:45) (18 - 23)  SpO2: 95% (07-30-17 @ 06:45)    I&O's Summary    29 Jul 2017 07:01  -  30 Jul 2017 07:00  --------------------------------------------------------  IN: 1000 mL / OUT: 0 mL / NET: 1000 mL    Physical Exam:     Gen: well appearing M, NAD  Neuro:  A and O x3  HEENT: PERRL, OP clear  CVS: irreg irreg,   Resp: diminished at bases b/l  Abd: soft, NTND  Ext: no edema    Meds:  lisinopril 40 milliGRAM(s) Oral daily  digoxin     Tablet 0.125 milliGRAM(s) Oral daily  triamterene 37.5 mG/hydrochlorothiazide 25 mG Tablet 1 Tablet(s) Oral daily  metoprolol succinate  milliGRAM(s) Oral daily  finasteride 5 milliGRAM(s) Oral daily  atorvastatin 40 milliGRAM(s) Oral at bedtime  febuxostat 40 milliGRAM(s) Oral at bedtime  aspirin enteric coated 81 milliGRAM(s) Oral daily  clopidogrel Tablet 75 milliGRAM(s) Oral daily  warfarin 2 milliGRAM(s) Oral <User Schedule>                           17.8   9.2   )-----------( 227      ( 30 Jul 2017 02:37 )             53.8       07-30    134<L>  |  98  |  23  ----------------------------<  110<H>  4.6   |  28  |  1.30    Ca    9.0      30 Jul 2017 02:37    TPro  7.4  /  Alb  3.2<L>  /  TBili  0.6  /  DBili  x   /  AST  76<H>  /  ALT  49  /  AlkPhos  97  07-30      CARDIAC MARKERS ( 30 Jul 2017 02:37 )  2.320 ng/mL / x     / 215 U/L / x     / x        PT/INR - ( 30 Jul 2017 02:37 )   PT: 23.2 sec;   INR: 2.10 ratio      Radiology:   CXR (my read), mild pulm congestion    EKG 8am: afib about 80bpm, improvement in lateral ST depressions HPI:  71 yo M with Hx sysCHF, NSVT with AICD placed 2015, CAD s/p SARAN to LAD, Afib on coumadin, carotid stenosis, COPD, DM, presents with complaints of AICD fire x 2.  Pt in [facility] until last few days had cough with productive sputum, felt generalized malaise.  For last 2-3 nights has had difficulty sleeping due to dyspnea and was sleeping in chair.  Last night had some discomfort in left arm and also b/l wrists but attributed this to arthritis.  He was trying to fall asleep last night when had AICD fire about 11:30.  He was anxious following this, and got out of bed, watched TV for awhile.  Then tried to go back to sleep and AICD fired about 1:30am.  He then came to ED accompanied by his sister.    In ED EKG showed lateral ST depressions (present on prior EKG but more pronounced).  Interrogation showed episodes of VF and appropriate AICD fire.  Troponin elevated at 2.3.    Currently has no left arm pain, CP, SOB, LH, n/v    Allergies: No Known Allergies    PAST MEDICAL & SURGICAL HISTORY:  systolic CHF  Atrial Fibrillation  NSVT (nonsustained ventricular tachycardia)  Pacemaker: defibrillator model # v-268 serial # [id number]  Coronary Artery Disease S/P percutaneous coronary angioplasty: stent placed  Chronic Obstructive Pulmonary Disease (COPD)  Carotid Stenosis  Popliteal artery injury: iliac/ popliteal angioplasty with stents 2010  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  DM (diabetes mellitus)  PAD (peripheral artery disease): with stents  Former smoker  Hypertension  H/O: Rheumatic Fever  Hyperlipidemia  Gout  Arm injury: s/p surgery 2009    Home Medications: reviewed, see med rec    Review of Systems:  Constitutional: No fever, chills, + fatigue  Neuro: No headache, numbness, weakness  Resp: + cough, + shortness of breath  CVS: No chest pain, palpitations, leg swelling  GI: No abdominal pain, nausea, vomiting    T(F): 98.3 (07-30-17 @ 06:45), Max: 98.3 (07-30-17 @ 06:45)  HR: 91 (07-30-17 @ 06:45) (82 - 91)  BP: 138/83 (07-30-17 @ 06:45) (137/77 - 174/114)  RR: 20 (07-30-17 @ 06:45) (18 - 23)  SpO2: 95% (07-30-17 @ 06:45)    I&O's Summary    29 Jul 2017 07:01  -  30 Jul 2017 07:00  --------------------------------------------------------  IN: 1000 mL / OUT: 0 mL / NET: 1000 mL    Physical Exam:     Gen: well appearing M, NAD  Neuro:  A and O x3  HEENT: PERRL, OP clear  CVS: irreg irreg,   Resp: diminished at bases b/l  Abd: soft, NTND  Ext: no edema    Meds:  lisinopril 40 milliGRAM(s) Oral daily  digoxin     Tablet 0.125 milliGRAM(s) Oral daily  triamterene 37.5 mG/hydrochlorothiazide 25 mG Tablet 1 Tablet(s) Oral daily  metoprolol succinate  milliGRAM(s) Oral daily  finasteride 5 milliGRAM(s) Oral daily  atorvastatin 40 milliGRAM(s) Oral at bedtime  febuxostat 40 milliGRAM(s) Oral at bedtime  aspirin enteric coated 81 milliGRAM(s) Oral daily  clopidogrel Tablet 75 milliGRAM(s) Oral daily  warfarin 2 milliGRAM(s) Oral <User Schedule>                           17.8   9.2   )-----------( 227      ( 30 Jul 2017 02:37 )             53.8       07-30    134<L>  |  98  |  23  ----------------------------<  110<H>  4.6   |  28  |  1.30    Ca    9.0      30 Jul 2017 02:37    TPro  7.4  /  Alb  3.2<L>  /  TBili  0.6  /  DBili  x   /  AST  76<H>  /  ALT  49  /  AlkPhos  97  07-30      CARDIAC MARKERS ( 30 Jul 2017 02:37 )  2.320 ng/mL / x     / 215 U/L / x     / x        PT/INR - ( 30 Jul 2017 02:37 )   PT: 23.2 sec;   INR: 2.10 ratio      Radiology:   CXR (my read), mild pulm congestion    EKG 8am: afib about 80bpm, improvement in lateral ST depressions compared to 2am EKG

## 2017-07-30 NOTE — CHART NOTE - NSCHARTNOTEFT_GEN_A_CORE
Pt with 7 beats of VT.  Asymptomatic with normal BP.  EKG following this similar to 8am EKG with unchanged lateral ST depressions.  Cardiac enzymes drawn near event with normal CPK and unchanged troponin (3).    --electrolytes pending, replete prn  --remains on amio gtt, lopressor, digoxin  --plan to transfer to [facility] tomorrow for cath with Dr. Camacho  --discussed with Dr. Steel

## 2017-07-30 NOTE — PATIENT PROFILE ADULT. - PMH
Alcohol abuse  in recovery  Atrial Fibrillation    Carotid Stenosis    Chronic Obstructive Pulmonary Disease (COPD)    Coronary Artery Disease    DM (diabetes mellitus)    Former smoker    Gout    H/O: Rheumatic Fever    HF (heart failure)    Hyperlipidemia    Hypertension    NSVT (nonsustained ventricular tachycardia)    Pacemaker  defibrillator model # v-268 serial # [id number]  PAD (peripheral artery disease)  with stents

## 2017-07-30 NOTE — ED ADULT TRIAGE NOTE - CHIEF COMPLAINT QUOTE
at 11 pm pt "felt a pop" in his chest, later "had a flash of light", feels his AICD went off, denies chest pain, diaphoretic

## 2017-07-30 NOTE — PATIENT PROFILE ADULT. - PSH
Arm injury  s/p surgery 2009  CAD S/P percutaneous coronary angioplasty  stent placed  Pacemaker  [employer] serial #[id number]   model #v-268  Popliteal artery injury  iliac/ popliteal angioplasty with stents 2010  S/P Implantation of Automatic Cardioverter/Defibrillator (AICD)    S/P Tonsillectomy

## 2017-07-30 NOTE — ED PROVIDER NOTE - OBJECTIVE STATEMENT
73 y/o w/m h/o AF, PPM/AICD. He experienced a discharge from his [facility] at 11pm and 2 am. He describes the sensation as a pop and a flash. On arrival to the ED, no chest pain, no SOB, no Diaphoresis, no  syncope. He is asymptomatic. His cardiologist is Dr Camacho

## 2017-07-31 ENCOUNTER — INPATIENT (INPATIENT)
Facility: HOSPITAL | Age: 72
LOS: 0 days | Discharge: ANOTHER HOSPITAL | End: 2017-08-01
Attending: INTERNAL MEDICINE | Admitting: INTERNAL MEDICINE
Payer: MEDICARE

## 2017-07-31 VITALS
SYSTOLIC BLOOD PRESSURE: 144 MMHG | DIASTOLIC BLOOD PRESSURE: 86 MMHG | HEART RATE: 72 BPM | RESPIRATION RATE: 21 BRPM | OXYGEN SATURATION: 93 %

## 2017-07-31 VITALS — WEIGHT: 153.88 LBS

## 2017-07-31 DIAGNOSIS — I48.2 CHRONIC ATRIAL FIBRILLATION: ICD-10-CM

## 2017-07-31 DIAGNOSIS — I49.01 VENTRICULAR FIBRILLATION: ICD-10-CM

## 2017-07-31 DIAGNOSIS — I21.4 NON-ST ELEVATION (NSTEMI) MYOCARDIAL INFARCTION: ICD-10-CM

## 2017-07-31 DIAGNOSIS — S49.90XA UNSPECIFIED INJURY OF SHOULDER AND UPPER ARM, UNSPECIFIED ARM, INITIAL ENCOUNTER: Chronic | ICD-10-CM

## 2017-07-31 DIAGNOSIS — I25.10 ATHEROSCLEROTIC HEART DISEASE OF NATIVE CORONARY ARTERY WITHOUT ANGINA PECTORIS: Chronic | ICD-10-CM

## 2017-07-31 DIAGNOSIS — I25.110 ATHEROSCLEROTIC HEART DISEASE OF NATIVE CORONARY ARTERY WITH UNSTABLE ANGINA PECTORIS: ICD-10-CM

## 2017-07-31 DIAGNOSIS — S85.009A UNSPECIFIED INJURY OF POPLITEAL ARTERY, UNSPECIFIED LEG, INITIAL ENCOUNTER: Chronic | ICD-10-CM

## 2017-07-31 LAB
ANION GAP SERPL CALC-SCNC: 9 MMOL/L — SIGNIFICANT CHANGE UP (ref 5–17)
APTT BLD: 50.4 SEC — HIGH (ref 27.5–37.4)
BUN SERPL-MCNC: 21 MG/DL — SIGNIFICANT CHANGE UP (ref 7–23)
CALCIUM SERPL-MCNC: 8.8 MG/DL — SIGNIFICANT CHANGE UP (ref 8.5–10.1)
CHLORIDE SERPL-SCNC: 100 MMOL/L — SIGNIFICANT CHANGE UP (ref 96–108)
CO2 SERPL-SCNC: 29 MMOL/L — SIGNIFICANT CHANGE UP (ref 22–31)
CREAT SERPL-MCNC: 1.4 MG/DL — HIGH (ref 0.5–1.3)
GLUCOSE SERPL-MCNC: 95 MG/DL — SIGNIFICANT CHANGE UP (ref 70–99)
HCT VFR BLD CALC: 52.2 % — HIGH (ref 39–50)
HGB BLD-MCNC: 17 G/DL — SIGNIFICANT CHANGE UP (ref 13–17)
INR BLD: 2.41 RATIO — HIGH (ref 0.88–1.16)
MAGNESIUM SERPL-MCNC: 2.2 MG/DL — SIGNIFICANT CHANGE UP (ref 1.6–2.6)
MCHC RBC-ENTMCNC: 30.6 PG — SIGNIFICANT CHANGE UP (ref 27–34)
MCHC RBC-ENTMCNC: 32.6 GM/DL — SIGNIFICANT CHANGE UP (ref 32–36)
MCV RBC AUTO: 94 FL — SIGNIFICANT CHANGE UP (ref 80–100)
PHOSPHATE SERPL-MCNC: 3.5 MG/DL — SIGNIFICANT CHANGE UP (ref 2.5–4.5)
PLATELET # BLD AUTO: 193 K/UL — SIGNIFICANT CHANGE UP (ref 150–400)
POTASSIUM SERPL-MCNC: 3.6 MMOL/L — SIGNIFICANT CHANGE UP (ref 3.5–5.3)
POTASSIUM SERPL-SCNC: 3.6 MMOL/L — SIGNIFICANT CHANGE UP (ref 3.5–5.3)
PROTHROM AB SERPL-ACNC: 26.8 SEC — HIGH (ref 9.8–12.7)
RBC # BLD: 5.55 M/UL — SIGNIFICANT CHANGE UP (ref 4.2–5.8)
RBC # FLD: 14.7 % — HIGH (ref 10.3–14.5)
SODIUM SERPL-SCNC: 138 MMOL/L — SIGNIFICANT CHANGE UP (ref 135–145)
WBC # BLD: 8.5 K/UL — SIGNIFICANT CHANGE UP (ref 3.8–10.5)
WBC # FLD AUTO: 8.5 K/UL — SIGNIFICANT CHANGE UP (ref 3.8–10.5)

## 2017-07-31 PROCEDURE — 93306 TTE W/DOPPLER COMPLETE: CPT

## 2017-07-31 PROCEDURE — 80048 BASIC METABOLIC PNL TOTAL CA: CPT

## 2017-07-31 PROCEDURE — 85730 THROMBOPLASTIN TIME PARTIAL: CPT

## 2017-07-31 PROCEDURE — 99292 CRITICAL CARE ADDL 30 MIN: CPT

## 2017-07-31 PROCEDURE — 96374 THER/PROPH/DIAG INJ IV PUSH: CPT

## 2017-07-31 PROCEDURE — 36415 COLL VENOUS BLD VENIPUNCTURE: CPT

## 2017-07-31 PROCEDURE — 85610 PROTHROMBIN TIME: CPT

## 2017-07-31 PROCEDURE — 99223 1ST HOSP IP/OBS HIGH 75: CPT

## 2017-07-31 PROCEDURE — 80162 ASSAY OF DIGOXIN TOTAL: CPT

## 2017-07-31 PROCEDURE — 99285 EMERGENCY DEPT VISIT HI MDM: CPT | Mod: 25

## 2017-07-31 PROCEDURE — 99291 CRITICAL CARE FIRST HOUR: CPT | Mod: 25

## 2017-07-31 PROCEDURE — 83735 ASSAY OF MAGNESIUM: CPT

## 2017-07-31 PROCEDURE — 82553 CREATINE MB FRACTION: CPT

## 2017-07-31 PROCEDURE — 93282 PRGRMG EVAL IMPLANTABLE DFB: CPT | Mod: 26

## 2017-07-31 PROCEDURE — 84100 ASSAY OF PHOSPHORUS: CPT

## 2017-07-31 PROCEDURE — 85027 COMPLETE CBC AUTOMATED: CPT

## 2017-07-31 PROCEDURE — 93005 ELECTROCARDIOGRAM TRACING: CPT

## 2017-07-31 PROCEDURE — 71045 X-RAY EXAM CHEST 1 VIEW: CPT

## 2017-07-31 PROCEDURE — 82550 ASSAY OF CK (CPK): CPT

## 2017-07-31 PROCEDURE — 99232 SBSQ HOSP IP/OBS MODERATE 35: CPT | Mod: GC

## 2017-07-31 PROCEDURE — 84484 ASSAY OF TROPONIN QUANT: CPT

## 2017-07-31 PROCEDURE — 80053 COMPREHEN METABOLIC PANEL: CPT

## 2017-07-31 PROCEDURE — 99239 HOSP IP/OBS DSCHRG MGMT >30: CPT

## 2017-07-31 PROCEDURE — 83036 HEMOGLOBIN GLYCOSYLATED A1C: CPT

## 2017-07-31 RX ORDER — PHYTONADIONE (VIT K1) 5 MG
5 TABLET ORAL ONCE
Qty: 0 | Refills: 0 | Status: COMPLETED | OUTPATIENT
Start: 2017-07-31 | End: 2017-07-31

## 2017-07-31 RX ORDER — ASPIRIN/CALCIUM CARB/MAGNESIUM 324 MG
81 TABLET ORAL DAILY
Qty: 0 | Refills: 0 | Status: DISCONTINUED | OUTPATIENT
Start: 2017-07-31 | End: 2017-08-01

## 2017-07-31 RX ORDER — FINASTERIDE 5 MG/1
5 TABLET, FILM COATED ORAL DAILY
Qty: 0 | Refills: 0 | Status: DISCONTINUED | OUTPATIENT
Start: 2017-07-31 | End: 2017-08-01

## 2017-07-31 RX ORDER — FINASTERIDE 5 MG/1
1 TABLET, FILM COATED ORAL
Qty: 0 | Refills: 0 | COMMUNITY

## 2017-07-31 RX ORDER — ENOXAPARIN SODIUM 100 MG/ML
40 INJECTION SUBCUTANEOUS DAILY
Qty: 0 | Refills: 0 | Status: DISCONTINUED | OUTPATIENT
Start: 2017-07-31 | End: 2017-08-01

## 2017-07-31 RX ORDER — LISINOPRIL 2.5 MG/1
1 TABLET ORAL
Qty: 0 | Refills: 0 | DISCHARGE
Start: 2017-07-31

## 2017-07-31 RX ORDER — WARFARIN SODIUM 2.5 MG/1
1.5 TABLET ORAL
Qty: 0 | Refills: 0 | COMMUNITY

## 2017-07-31 RX ORDER — FUROSEMIDE 40 MG
40 TABLET ORAL
Qty: 0 | Refills: 0 | DISCHARGE
Start: 2017-07-31

## 2017-07-31 RX ORDER — SODIUM CHLORIDE 9 MG/ML
1000 INJECTION INTRAMUSCULAR; INTRAVENOUS; SUBCUTANEOUS
Qty: 0 | Refills: 0 | Status: DISCONTINUED | OUTPATIENT
Start: 2017-08-01 | End: 2017-08-01

## 2017-07-31 RX ORDER — CLOPIDOGREL BISULFATE 75 MG/1
75 TABLET, FILM COATED ORAL DAILY
Qty: 0 | Refills: 0 | Status: DISCONTINUED | OUTPATIENT
Start: 2017-07-31 | End: 2017-08-01

## 2017-07-31 RX ORDER — ATORVASTATIN CALCIUM 80 MG/1
40 TABLET, FILM COATED ORAL AT BEDTIME
Qty: 0 | Refills: 0 | Status: DISCONTINUED | OUTPATIENT
Start: 2017-07-31 | End: 2017-08-01

## 2017-07-31 RX ORDER — AMIODARONE HYDROCHLORIDE 400 MG/1
200 TABLET ORAL DAILY
Qty: 0 | Refills: 0 | Status: DISCONTINUED | OUTPATIENT
Start: 2017-07-31 | End: 2017-08-01

## 2017-07-31 RX ORDER — ONDANSETRON 8 MG/1
4 TABLET, FILM COATED ORAL EVERY 6 HOURS
Qty: 0 | Refills: 0 | Status: DISCONTINUED | OUTPATIENT
Start: 2017-07-31 | End: 2017-08-01

## 2017-07-31 RX ORDER — FEBUXOSTAT 40 MG/1
40 TABLET ORAL
Qty: 0 | Refills: 0 | Status: DISCONTINUED | OUTPATIENT
Start: 2017-07-31 | End: 2017-08-01

## 2017-07-31 RX ORDER — ONDANSETRON 8 MG/1
4 TABLET, FILM COATED ORAL EVERY 6 HOURS
Qty: 0 | Refills: 0 | Status: DISCONTINUED | OUTPATIENT
Start: 2017-07-31 | End: 2017-07-31

## 2017-07-31 RX ORDER — AMIODARONE HYDROCHLORIDE 400 MG/1
1 TABLET ORAL
Qty: 0 | Refills: 0 | DISCHARGE
Start: 2017-07-31

## 2017-07-31 RX ORDER — POTASSIUM CHLORIDE 20 MEQ
40 PACKET (EA) ORAL ONCE
Qty: 0 | Refills: 0 | Status: COMPLETED | OUTPATIENT
Start: 2017-07-31 | End: 2017-07-31

## 2017-07-31 RX ORDER — METOPROLOL TARTRATE 50 MG
100 TABLET ORAL DAILY
Qty: 0 | Refills: 0 | Status: DISCONTINUED | OUTPATIENT
Start: 2017-07-31 | End: 2017-08-01

## 2017-07-31 RX ORDER — DIGOXIN 250 MCG
0.12 TABLET ORAL DAILY
Qty: 0 | Refills: 0 | Status: DISCONTINUED | OUTPATIENT
Start: 2017-07-31 | End: 2017-08-01

## 2017-07-31 RX ORDER — ACETAMINOPHEN 500 MG
650 TABLET ORAL EVERY 6 HOURS
Qty: 0 | Refills: 0 | Status: DISCONTINUED | OUTPATIENT
Start: 2017-07-31 | End: 2017-08-01

## 2017-07-31 RX ADMIN — Medication 100 MILLIGRAM(S): at 15:32

## 2017-07-31 RX ADMIN — Medication 5 MILLIGRAM(S): at 15:33

## 2017-07-31 RX ADMIN — Medication 81 MILLIGRAM(S): at 15:34

## 2017-07-31 RX ADMIN — Medication 40 MILLIEQUIVALENT(S): at 10:21

## 2017-07-31 RX ADMIN — FEBUXOSTAT 40 MILLIGRAM(S): 40 TABLET ORAL at 18:15

## 2017-07-31 RX ADMIN — FINASTERIDE 5 MILLIGRAM(S): 5 TABLET, FILM COATED ORAL at 15:32

## 2017-07-31 RX ADMIN — Medication 0.12 MILLIGRAM(S): at 06:04

## 2017-07-31 RX ADMIN — CLOPIDOGREL BISULFATE 75 MILLIGRAM(S): 75 TABLET, FILM COATED ORAL at 15:32

## 2017-07-31 RX ADMIN — Medication 100 MILLIGRAM(S): at 06:03

## 2017-07-31 RX ADMIN — ATORVASTATIN CALCIUM 40 MILLIGRAM(S): 80 TABLET, FILM COATED ORAL at 21:50

## 2017-07-31 RX ADMIN — ENOXAPARIN SODIUM 40 MILLIGRAM(S): 100 INJECTION SUBCUTANEOUS at 15:31

## 2017-07-31 RX ADMIN — Medication 40 MILLIGRAM(S): at 06:04

## 2017-07-31 RX ADMIN — Medication 0.12 MILLIGRAM(S): at 15:32

## 2017-07-31 RX ADMIN — AMIODARONE HYDROCHLORIDE 200 MILLIGRAM(S): 400 TABLET ORAL at 15:32

## 2017-07-31 RX ADMIN — LISINOPRIL 40 MILLIGRAM(S): 2.5 TABLET ORAL at 06:03

## 2017-07-31 NOTE — PROGRESS NOTE ADULT - SUBJECTIVE AND OBJECTIVE BOX
[facility]iology [facility] - Javon Hernandez, Nneka, Neil, Jordi Victor  Office Number:  [phone]    Patient resting comfortably in bed in NAD.  Laying flat with no respiratory distress.  No complaints of chest pain, dyspnea, palpitations, PND, or orthopnea.  No overnight events.    Telemetry:  Afib with V pacing on demand.  NSVT noted from yesterday.    MEDICATIONS  (STANDING):  finasteride 5 milliGRAM(s) Oral daily  aspirin enteric coated 81 milliGRAM(s) Oral daily  lisinopril 40 milliGRAM(s) Oral daily  digoxin     Tablet 0.125 milliGRAM(s) Oral daily  atorvastatin 40 milliGRAM(s) Oral at bedtime  clopidogrel Tablet 75 milliGRAM(s) Oral daily  metoprolol succinate  milliGRAM(s) Oral daily  febuxostat 40 milliGRAM(s) Oral at bedtime  amiodarone Infusion 1 mG/Min (33.333 mL/Hr) IV Continuous <Continuous>  furosemide   Injectable 40 milliGRAM(s) IV Push every 12 hours        Allergies    No Known Allergies      Vital Signs Last 24 Hrs  T(C): 36.6 (31 Jul 2017 04:00), Max: 36.8 (30 Jul 2017 06:45)  T(F): 97.8 (31 Jul 2017 04:00), Max: 98.3 (30 Jul 2017 06:45)  HR: 64 (31 Jul 2017 05:00) (60 - 95)  BP: 142/76 (31 Jul 2017 05:00) (101/58 - 160/71)  BP(mean): 104 (31 Jul 2017 05:00) (74 - 112)  RR: 22 (31 Jul 2017 05:00) (15 - 35)  SpO2: 97% (31 Jul 2017 05:00) (91% - 97%)    I&O's Summary    29 Jul 2017 07:01  -  30 Jul 2017 07:00  --------------------------------------------------------  IN: 1000 mL / OUT: 0 mL / NET: 1000 mL    30 Jul 2017 07:01  -  31 Jul 2017 05:58  --------------------------------------------------------  IN: 1766.9 mL / OUT: 4650 mL / NET: -2883.1 mL        ON EXAM:    General: NAD, awake and alert, oriented x 3  HEENT: Mucous membranes are moist, anicteric  Lungs: Non-labored, breath sounds are clear bilaterally, No wheezing, rales or rhonchi  Cardiovascular: Regular, S1 and S2, no murmurs, rubs, or gallops  Gastrointestinal: Bowel Sounds present, soft, nontender.   Lymph: No peripheral edema. No lymphadenopathy.  Skin: No rashes or ulcers  Psych:  Mood & affect appropriate    LABS: All Labs Reviewed:                        17.8   9.2   )-----------( 227      ( 30 Jul 2017 02:37 )             53.8     30 Jul 2017 14:11    136    |  100    |  22     ----------------------------<  120    3.5     |  26     |  1.30   30 Jul 2017 02:37    134    |  98     |  23     ----------------------------<  110    4.6     |  28     |  1.30     Ca    8.6        30 Jul 2017 14:11  Ca    9.0        30 Jul 2017 02:37  Phos  3.0       30 Jul 2017 14:11  Mg     1.7       30 Jul 2017 14:11    TPro  7.4    /  Alb  3.2    /  TBili  0.6    /  DBili  x      /  AST  76     /  ALT  49     /  AlkPhos  97     30 Jul 2017 02:37    PT/INR - ( 30 Jul 2017 02:37 )   PT: 23.2 sec;   INR: 2.10 ratio           CARDIAC MARKERS ( 30 Jul 2017 14:11 )  3.030 ng/mL / x     / 158 U/L / x     / 15.2 ng/mL  CARDIAC MARKERS ( 30 Jul 2017 08:25 )  3.090 ng/mL / x     / 153 U/L / x     / 15.7 ng/mL  CARDIAC MARKERS ( 30 Jul 2017 02:37 )  2.320 ng/mL / x     / 215 U/L / x     / x

## 2017-07-31 NOTE — H&P ADULT - NSHPLABSRESULTS_GEN_ALL_CORE
17.0   8.5   )-----------( 193      ( 31 Jul 2017 06:03 )             52.2     07-31    138  |  100  |  21  ----------------------------<  95  3.6   |  29  |  1.40<H>    Ca    8.8      31 Jul 2017 06:03  Phos  3.5     07-31  Mg     2.2     07-31    TPro  7.4  /  Alb  3.2<L>  /  TBili  0.6  /  DBili  x   /  AST  76<H>  /  ALT  49  /  AlkPhos  97  07-30    CARDIAC MARKERS ( 30 Jul 2017 14:11 )  3.030 ng/mL / x     / 158 U/L / x     / 15.2 ng/mL  CARDIAC MARKERS ( 30 Jul 2017 08:25 )  3.090 ng/mL / x     / 153 U/L / x     / 15.7 ng/mL  CARDIAC MARKERS ( 30 Jul 2017 02:37 )  2.320 ng/mL / x     / 215 U/L / x     / x          LIVER FUNCTIONS - ( 30 Jul 2017 02:37 )  Alb: 3.2 g/dL / Pro: 7.4 g/dL / ALK PHOS: 97 U/L / ALT: 49 U/L / AST: 76 U/L / GGT: x           PT/INR - ( 31 Jul 2017 06:03 )   PT: 26.8 sec;   INR: 2.41 ratio         PTT - ( 31 Jul 2017 06:03 )  PTT:50.4 sec 17.0   8.5   )-----------( 193      ( 31 Jul 2017 06:03 )             52.2     07-31    138  |  100  |  21  ----------------------------<  95  3.6   |  29  |  1.40<H>    Ca    8.8      31 Jul 2017 06:03  Phos  3.5     07-31  Mg     2.2     07-31    TPro  7.4  /  Alb  3.2<L>  /  TBili  0.6  /  DBili  x   /  AST  76<H>  /  ALT  49  /  AlkPhos  97  07-30    CARDIAC MARKERS ( 30 Jul 2017 14:11 )  3.030 ng/mL / x     / 158 U/L / x     / 15.2 ng/mL  CARDIAC MARKERS ( 30 Jul 2017 08:25 )  3.090 ng/mL / x     / 153 U/L / x     / 15.7 ng/mL    ECHO on 7/30: at [facility]:  FINDINGS  Left Ventricle: Moderate segmental left ventricular systolic dysfunction, EF 40%   The septum, distal anterior wall, apex, inferolateral, and distal   inferior wall are hypokinetic.  Aortic Valve: Calcified trileaflet aortic valve.  Mild aortic   insufficiency.  Mitral Valve: Mitral annular calcification and calcified mitral leaflets.   Mild to moderate mitral insufficiency.  Tricuspid Valve: Normal tricuspid valve. Mild tricuspid insufficiency.  Pulmonic Valve: Not Well-visualized.  Left Atrium: Severe left atrial enlargement.  Right Ventricle: Normal right ventricular size and systolic function. A   device wire is noted in the right heart.  Right Atrium: Moderately Enlarged  Pericardium/Pleura: Normal pericardium with no pericardial effusion.    CARDIAC MARKERS ( 30 Jul 2017 02:37 )  2.320 ng/mL / x     / 215 U/L / x     / x        LIVER FUNCTIONS - ( 30 Jul 2017 02:37 )  Alb: 3.2 g/dL / Pro: 7.4 g/dL / ALK PHOS: 97 U/L / ALT: 49 U/L / AST: 76 U/L / GGT: x           PT/INR - ( 31 Jul 2017 06:03 )   PT: 26.8 sec;   INR: 2.41 ratio       PTT - ( 31 Jul 2017 06:03 )  PTT:50.4 sec 17.0   8.5   )-----------( 193      ( 31 Jul 2017 06:03 )             52.2     07-31    138  |  100  |  21  ----------------------------<  95  3.6   |  29  |  1.40<H>    Ca    8.8      31 Jul 2017 06:03  Phos  3.5     07-31  Mg     2.2     07-31    TPro  7.4  /  Alb  3.2<L>  /  TBili  0.6  /  DBili  x   /  AST  76<H>  /  ALT  49  /  AlkPhos  97  07-30    CARDIAC MARKERS ( 30 Jul 2017 14:11 )  3.030 ng/mL / x     / 158 U/L / x     / 15.2 ng/mL  CARDIAC MARKERS ( 30 Jul 2017 08:25 )  3.090 ng/mL / x     / 153 U/L / x     / 15.7 ng/mL    ECHO on 7/30: at [facility]:  FINDINGS  Left Ventricle: Moderate segmental left ventricular systolic dysfunction, EF 40%   The septum, distal anterior wall, apex, inferolateral, and distal   inferior wall are hypokinetic.  Aortic Valve: Calcified trileaflet aortic valve.  Mild aortic   insufficiency.  Mitral Valve: Mitral annular calcification and calcified mitral leaflets.   Mild to moderate mitral insufficiency.  Tricuspid Valve: Normal tricuspid valve. Mild tricuspid insufficiency.  Pulmonic Valve: Not Well-visualized.  Left Atrium: Severe left atrial enlargement.  Right Ventricle: Normal right ventricular size and systolic function. A   device wire is noted in the right heart.  Right Atrium: Moderately Enlarged  Pericardium/Pleura: Normal pericardium with no pericardial effusion.    CARDIAC MARKERS ( 30 Jul 2017 02:37 )  2.320 ng/mL / x     / 215 U/L / x     / x        LIVER FUNCTIONS - ( 30 Jul 2017 02:37 )  Alb: 3.2 g/dL / Pro: 7.4 g/dL / ALK PHOS: 97 U/L / ALT: 49 U/L / AST: 76 U/L / GGT: x           PT/INR - ( 31 Jul 2017 06:03 )   PT: 26.8 sec;   INR: 2.41 ratio       PTT - ( 31 Jul 2017 06:03 )  PTT:50.4 sec      EKG - Atrial fibrillation, LAD, ST depressions V2-V6 (no change compared to 2017)

## 2017-07-31 NOTE — PROGRESS NOTE ADULT - SUBJECTIVE AND OBJECTIVE BOX
Patient is a 72y old  Male who presents with a chief complaint of I felt my defibrillator fired (30 Jul 2017 17:01)      INTERVAL HPI/OVERNIGHT EVENTS: feels better, no chest pain or palpitation.    MEDICATIONS  (STANDING):  finasteride 5 milliGRAM(s) Oral daily  aspirin enteric coated 81 milliGRAM(s) Oral daily  lisinopril 40 milliGRAM(s) Oral daily  digoxin     Tablet 0.125 milliGRAM(s) Oral daily  atorvastatin 40 milliGRAM(s) Oral at bedtime  clopidogrel Tablet 75 milliGRAM(s) Oral daily  metoprolol succinate  milliGRAM(s) Oral daily  febuxostat 40 milliGRAM(s) Oral at bedtime  amiodarone Infusion 1 mG/Min (33.333 mL/Hr) IV Continuous <Continuous>  furosemide   Injectable 40 milliGRAM(s) IV Push every 12 hours    MEDICATIONS  (PRN):      Allergies    No Known Allergies    Intolerances        REVIEW OF SYSTEMS:  CONSTITUTIONAL: No fever, weight loss, or fatigue  EYES: No eye pain, visual disturbances, or discharge  ENMT:  No difficulty hearing, tinnitus, vertigo; No sinus or throat pain  NECK: No pain or stiffness  BREASTS: No pain, masses, or nipple discharge  RESPIRATORY: No cough, wheezing, chills or hemoptysis; No shortness of breath  CARDIOVASCULAR: No chest pain, palpitations, dizziness, or leg swelling  GASTROINTESTINAL: No abdominal or epigastric pain. No nausea, vomiting, or hematemesis; No diarrhea or constipation. No melena or hematochezia.  GENITOURINARY: No dysuria, frequency, hematuria, or incontinence  NEUROLOGICAL: No headaches, memory loss, loss of strength, numbness, or tremors  SKIN: No itching, burning, rashes, or lesions   LYMPH NODES: No enlarged glands  ENDOCRINE: No heat or cold intolerance; No hair loss; No polydipsia or polyuria  MUSCULOSKELETAL: No joint pain or swelling; No muscle, back, or extremity pain  PSYCHIATRIC: No depression, anxiety, mood swings, or difficulty sleeping  HEME/LYMPH: No easy bruising, or bleeding gums  ALLERGY AND IMMUNOLOGIC: No hives or eczema    Vital Signs Last 24 Hrs  T(C): 36.4 (31 Jul 2017 08:00), Max: 36.7 (30 Jul 2017 19:15)  T(F): 97.6 (31 Jul 2017 08:00), Max: 98.1 (30 Jul 2017 19:15)  HR: 72 (31 Jul 2017 09:00) (60 - 95)  BP: 148/91 (31 Jul 2017 09:00) (101/58 - 160/71)  BP(mean): 104 (31 Jul 2017 09:00) (74 - 112)  RR: 25 (31 Jul 2017 09:00) (15 - 47)  SpO2: 91% (31 Jul 2017 09:00) (91% - 97%)    PHYSICAL EXAM:  GENERAL: NAD, well-groomed, well-developed  HEAD:  Atraumatic, Normocephalic  EYES: EOMI, PERRLA, conjunctiva and sclera clear  ENMT: No tonsillar erythema, exudates, or enlargement; Moist mucous membranes, Good dentition, No lesions  NECK: Supple, No JVD, Normal thyroid  NERVOUS SYSTEM:  Alert & Oriented X3, Good concentration; Motor Strength 5/5 B/L upper and lower extremities; DTRs 2+ intact and symmetric  CHEST/LUNG: Clear to auscultation bilaterally; No rales, rhonchi, wheezing, or rubs  HEART: Regular rate and rhythm; No murmurs, rubs, or gallops  ABDOMEN: Soft, Nontender, Nondistended; Bowel sounds present  EXTREMITIES:  2+ Peripheral Pulses, No clubbing, cyanosis, or edema  LYMPH: No lymphadenopathy noted  SKIN: No rashes or lesions    LABS:                        17.0   8.5   )-----------( 193      ( 31 Jul 2017 06:03 )             52.2     31 Jul 2017 06:03    138    |  100    |  21     ----------------------------<  95     3.6     |  29     |  1.40     Ca    8.8        31 Jul 2017 06:03  Phos  3.5       31 Jul 2017 06:03  Mg     2.2       31 Jul 2017 06:03      PT/INR - ( 31 Jul 2017 06:03 )   PT: 26.8 sec;   INR: 2.41 ratio         PTT - ( 31 Jul 2017 06:03 )  PTT:50.4 sec    CAPILLARY BLOOD GLUCOSE  136 (30 Jul 2017 12:00)          RADIOLOGY & ADDITIONAL TESTS:    Imaging Personally Reviewed:  [ ] YES  [ ] NO    Consultant(s) Notes Reviewed:  [ x] YES  [ ] NO    Care Discussed with Consultants/Other Providers [ x] YES  [ ] NO

## 2017-07-31 NOTE — PROCEDURE NOTE - INTERROGATION NOTE: COMMENTS
two episodes of VF recorded 7/29/17 1132pm and 7/30/17 155am, treated with 30J ICD shock. on amiodarone IV gtt.

## 2017-07-31 NOTE — H&P ADULT - NSHPREVIEWOFSYSTEMS_GEN_ALL_CORE
ROS:   All 10 systems reviewed and found to be negative with the exception of what has been described above.

## 2017-07-31 NOTE — PROGRESS NOTE ADULT - NSHPATTENDINGPLANDISCUSS_GEN_ALL_CORE
pt and hospitalist in [facility].
pt, Dr Naik, Dr Steel, Dr Camacho about cardiac cath at am.

## 2017-07-31 NOTE — H&P ADULT - ASSESSMENT
S/P AICD FIRING APPROPRIATELY FOR EPISODE OF VF, R/O ISCHEMIA AS PRECIPITATING EVENT    NON ST ELEVATION MYOCARDIAL INFARCTION S/P AICD FIRING APPROPRIATELY FOR EPISODE OF VF, R/O ISCHEMIA AS PRECIPITATING EVENT    NON ST ELEVATION MYOCARDIAL INFARCTION    CHRONIC ATRIAL FIBRILLATION WITH THERAPEUTIC INR    CHRONIC SYSTOLIC CHF WITH EF 40%    STAGE 3 CKD WITH MILD SUPERIMPOSED ACUTE KIDNEY INJURY      PLAN: S/P AICD FIRING APPROPRIATELY FOR EPISODE OF VF, R/O ISCHEMIA AS PRECIPITATING EVENT    NON ST ELEVATION MYOCARDIAL INFARCTION    CHRONIC ATRIAL FIBRILLATION WITH THERAPEUTIC INR. CHADS2 = 3 (borderline DM, HTN, CHF)    CHRONIC SYSTOLIC CHF WITH EF 40%    STAGE 3 CKD WITH SUPERIMPOSED MILD ACUTE KIDNEY INJURY      PLAN:  -  admit to inpatient, CCU, hospitalist service  -  hold ACE-I, check Cr in am  -  gentle IVFs, d/c diuretics  -  reverse INR with Vitamin K, check INR in am  -  d/c Coumadin  -  continue ASA, Plavix, BB, statin  -  Cardio consult - Dr. Camacho  -  NPO after MD for cardiac cath in am  -  EP consult - Dr. Ameya Casillas  -  d/c IV Amiodarone and start PO Amiodarone 200mg daily  -  DVT prophylaxis - Lovenox and venodynes    d/w Dr. Camacho, patient and CCU nurse S/P AICD FIRING APPROPRIATELY FOR EPISODE OF VT, R/O ISCHEMIA AS PRECIPITATING EVENT    NON ST ELEVATION MYOCARDIAL INFARCTION    CHRONIC ATRIAL FIBRILLATION WITH THERAPEUTIC INR. CHADS2 = 3 (borderline DM, HTN, CHF)    CHRONIC SYSTOLIC CHF WITH EF 40%    STAGE 3 CKD WITH SUPERIMPOSED MILD ACUTE KIDNEY INJURY      PLAN:  -  admit to inpatient, CCU, hospitalist service  -  hold ACE-I, check Cr in am  -  gentle IVFs, d/c diuretics  -  reverse INR with Vitamin K, check INR in am  -  d/c Coumadin  -  continue ASA, Plavix, BB, statin  -  Cardio consult - Dr. Camacho  -  NPO after MD for cardiac cath in am  -  EP consult - Dr. Ameya Casillas  -  d/c IV Amiodarone and start PO Amiodarone 200mg daily  -  DVT prophylaxis - Lovenox and venodynes    d/w Dr. Camacho, patient and CCU nurse

## 2017-07-31 NOTE — PROGRESS NOTE ADULT - PROBLEM SELECTOR PLAN 1
EKG with some ischemic changes, r/o ACS,  AICD induced firing 2/2 Vt storm  pt seen by Dr. Steel(cardio) in the ED, given amiodarone 150mg IV push, Pt with two episode of VT storm. Pt will transfer to [place] for Cardiac angiogram as per cardio, D/W Dr Camacho his cardiologist.   Awaiting ICU consult   Continue close monitoring, f/u Dr. Steel recs   Trend cardiac enzymes.  NPO from midnight, hold coumadin.

## 2017-07-31 NOTE — PROGRESS NOTE ADULT - SUBJECTIVE AND OBJECTIVE BOX
72M with PMHx of NSVT, s/p AICD, ETOH abuse, copd, dm, systolic HF, PAD, former smoker, htn, a-fib, rheumatic fever, hld, gout, CAD s/p PCI, popliteal artery injury s/p ilio-popliteal stent 2010, s/p tonsillectomy admitted for NSTEMI. AICD interrogated showed AICD discharged twice for VF.     24 hour events: No events overnight. On amio gtt. Started on BB. ACE-I, Statin, Plavix. Transfer to [facility] for Cath today. Coumadin on hold.     Review of Systems:  Constitutional: No fever, chills, fatigue.   Neuro: No headache, numbness, weakness  Resp: No cough, wheezing, shortness of breath  CVS: No chest pain, palpitations, leg swelling  GI: No abdominal pain, nausea, vomiting, diarrhea   : No dysuria, frequency, incontinence  Skin: No itching, burning, rashes, or lesions   Msk: No joint pain or swelling  Psych: No depression, anxiety, mood swings    T(F): 97.6 (07-31-17 @ 08:00), Max: 98.1 (07-30-17 @ 19:15)  HR: 68 (07-31-17 @ 13:00) (60 - 81)  BP: 119/75 (07-31-17 @ 13:00) (101/58 - 160/71)  RR: 25 (07-31-17 @ 13:00) (15 - 47)  SpO2: 93% (07-31-17 @ 13:00) (91% - 97%)  Wt(kg): 85.3        CAPILLARY BLOOD GLUCOSE  136 (30 Jul 2017 12:00)        I&O's Summary    30 Jul 2017 07:01  -  31 Jul 2017 07:00  --------------------------------------------------------  IN: 1833.7 mL / OUT: 4650 mL / NET: -2816.3 mL        Physical Exam:   Gen: NAD  Neuro: Alert and oriented   HEENT:   Resp: CTAB  CVS: Irregular rhythm, regular rate.   Abd: Soft, NT/ND, +BS.   Ext: No clubbing, cyanosis, edema  Skin: No rashes, lesions.     MEDICATIONS  (STANDING):  finasteride 5 milliGRAM(s) Oral daily  aspirin enteric coated 81 milliGRAM(s) Oral daily  lisinopril 40 milliGRAM(s) Oral daily  digoxin     Tablet 0.125 milliGRAM(s) Oral daily  atorvastatin 40 milliGRAM(s) Oral at bedtime  clopidogrel Tablet 75 milliGRAM(s) Oral daily  metoprolol succinate  milliGRAM(s) Oral daily  febuxostat 40 milliGRAM(s) Oral at bedtime  amiodarone Infusion 1 mG/Min (33.333 mL/Hr) IV Continuous <Continuous>  furosemide   Injectable 40 milliGRAM(s) IV Push every 12 hours        LABS:                         17.0   8.5   )-----------( 193      ( 31 Jul 2017 06:03 )             52.2       07-31    138  |  100  |  21  ----------------------------<  95  3.6   |  29  |  1.40<H>    Ca    8.8      31 Jul 2017 06:03  Phos  3.5     07-31  Mg     2.2     07-31    TPro  7.4  /  Alb  3.2<L>  /  TBili  0.6  /  DBili  x   /  AST  76<H>  /  ALT  49  /  AlkPhos  97  07-30      CARDIAC MARKERS ( 30 Jul 2017 14:11 )  3.030 ng/mL / x     / 158 U/L / x     / 15.2 ng/mL  CARDIAC MARKERS ( 30 Jul 2017 08:25 )  3.090 ng/mL / x     / 153 U/L / x     / 15.7 ng/mL  CARDIAC MARKERS ( 30 Jul 2017 02:37 )  2.320 ng/mL / x     / 215 U/L / x     / x          PT/INR - ( 31 Jul 2017 06:03 )   PT: 26.8 sec;   INR: 2.41 ratio         PTT - ( 31 Jul 2017 06:03 )  PTT:50.4 sec          Radiology:     ECHO      LVEF: 40%  FINDINGS  Left Ventricle: Moderate segmental left ventricular systolic dysfunction.   The septum, distal anterior wall, apex, inferolateral, and distal   inferior wall are hypokinetic.  Aortic Valve: Calcified trileaflet aortic valve.  Mild aortic   insufficiency.  Mitral Valve: Mitral annular calcification and calcified mitral leaflets.   Mild to moderate mitral insufficiency.  Tricuspid Valve: Normal tricuspid valve. Mild tricuspid insufficiency.  Pulmonic Valve: Not Well-visualized.  Left Atrium: Severe left atrial enlargement.  Right Ventricle: Normal right ventricular size and systolic function. A   device wire is noted in the right heart.  Right Atrium: Moderately Enlarged  Pericardium/Pleura: Normal pericardium with no pericardial effusion.      Bedside ultrasound: Not performed     CENTRAL LINE: No         NOEL: No  A-LINE: No                         GLOBAL ISSUE/BEST PRACTICE:  Analgesia: No  Sedation: No  CAM-ICU:   HOB elevation: yes  Stress ulcer prophylaxis: Not indicated  VTE prophylaxis: Coumadin on hold for procedure   Glycemic control: yes   Nutrition: NPO for procedure     CODE STATUS: Full Code

## 2017-07-31 NOTE — CONSULT NOTE ADULT - PROBLEM SELECTOR RECOMMENDATION 9
s/p AICD shock times two for V. Fib seen on interrogation.  Currently started on amiodarone (loaded). Plan for further ischemia evaluation with cardiac cath when INR comes down. EP seeing and will follow (Dr. Callahan)

## 2017-07-31 NOTE — PROCEDURE NOTE - ADDITIONAL PROCEDURE DETAILS
Dx: ICM  normal function single chamber ICD, appropriate detection of v.fib terminated by ICD shock. continue amiodarone. ischemic eval in am.

## 2017-07-31 NOTE — H&P ADULT - NSHPPHYSICALEXAM_GEN_ALL_CORE
HEENT:  pupils equal and reactive, EOMI, no oropharyngeal lesions, erythema, exudates, oral thrush    NECK:   supple, no carotid bruits, no palpable lymph nodes, no thyromegaly    CV:  +S1, +S2, regular, no murmurs or rubs    RESP:   lungs clear to auscultation bilaterally, no wheezing, rales, rhonchi, good air entry bilaterally    BREAST:  not examined    GI:  abdomen soft, non-tender, non-distended, normal BS, no bruits, no abdominal masses, no palpable masses    RECTAL:  not examined    :  not examined    MSK:   normal muscle tone, no atrophy, no rigidity, no contractions    EXT:   no clubbing, no cyanosis, no edema, no calf pain, swelling or erythema    VASCULAR:  pulses equal and symmetric in the upper and lower extremities    NEURO:  AAOX3, no focal neurological deficits, follows all commands, able to move extremities spontaneously    SKIN:  no ulcers, lesions or rashes HEENT:  pupils equal and reactive, EOMI, no oropharyngeal lesions, erythema, exudates, oral thrush    NECK:   supple, no carotid bruits, no palpable lymph nodes, no thyromegaly    CV:  +S1, +S2, irregular, no murmurs or rubs    RESP:   lungs clear to auscultation bilaterally, no wheezing, rales, rhonchi, good air entry bilaterally    BREAST:  not examined    GI:  abdomen soft, non-tender, non-distended, normal BS, no bruits, no abdominal masses, no palpable masses    RECTAL:  not examined    :  not examined    MSK:   normal muscle tone, no atrophy, no rigidity, no contractions    EXT:   no clubbing, no cyanosis, no edema, no calf pain, swelling or erythema    VASCULAR:  pulses equal and symmetric in the upper and lower extremities    NEURO:  AAOX3, no focal neurological deficits, follows all commands, able to move extremities spontaneously    SKIN:  no ulcers, lesions or rashes

## 2017-07-31 NOTE — PROGRESS NOTE ADULT - ASSESSMENT
72 yr old M with PAD, former smoker, COPD, CAD s/p recent PCI to LAD in 2017, HTN, HLD, chronic systolic HF, NSVT,  ICD(2015)/PPM, AF on coumadin, p/w ICD shock x 2, found to be appropriate shocks for VT/VF:        - Unclear if cardiac enzymes form acute ischemic event vs ICD shock.  In any event, he has been treated for NSTEMI  - Continue DAPT with aspirin and Plavix  - Continue statin drug  - Patient net negative from IV Lasix.  Continue for now.    - Monitor K, Keep K>4, Mg >2  - Continue amio gtt  - Continue Toprol  QD  - Continue lisinopril 40 QD  - Transfer to [facility] for cath today.  Patient is at high risk of decompensation. >35 min spent taking care of patient.

## 2017-07-31 NOTE — H&P ADULT - HISTORY OF PRESENT ILLNESS
HISTORY OF THE PRESENT ILLNESS:     72 M with Hx of ischemic cardiomyoathy with EF 45%, Hx of NSVT s/p AICD, Hx of CAD s/p LAD stent, Atrial Fibrillation on A/C with Coumadin presented to the [place] ED after his defibrillator fired off.  Workup in the ED revealed H/H 17.8/53.8, INR 2.1, Troponin of 2.32, EKG showed nonspecific ST changes with Afib rate and controlled.  Patient seen by Dr. Camacho at [place] and now transferred to [place] to undergo cardiac cath in am.  Pacemaker interogated revealed + Ventricular Fibrillation and AICD fired appropriately.  Patient without any chest pain, shortness of breath at this time.  Case discussed with Dr. Camacho today, will reverse his INR with Vitamin K in preparation for the procedure in am,    PAST MEDICAL HISTORY:  Hx of Alcohol abuse, now in recovery  Atrial Fibrillation on A/C with Coumadin  Carotid Stenosis    COPD - not on home O2  CAD, s/p LAD stent  Borderline Diabetes Mellitus  Obesity  Gout    Hx of Rheumatic Fever as a child  Chronic Systolic CHF   Hyperlipidemia    Hypertension    NSVT, s/p AICD placement  PAD with stents    PAST SURGICAL HISTORY:  s/p arm surgery 2009  s/p LAD stent  s/p liac/ popliteal angioplasty with stents 2010  s/p PPM/AICD  s/p Tonsillectomy.      FAMILY HISTORY:   non-contributory to the patient's current presentation 72 M with Hx of ischemic cardiomyoathy with EF 45%, Hx of NSVT s/p AICD, Hx of CAD s/p LAD stent, Atrial Fibrillation on A/C with Coumadin presented to the [place] ED after his defibrillator fired off.  Workup in the ED revealed H/H 17.8/53.8, INR 2.1, Troponin of 2.32, EKG showed nonspecific ST changes with Afib rate and controlled.  Patient seen by Dr. Camacho at [place] and now transferred to [place] to undergo cardiac cath in am.  Pacemaker interogated revealed + Ventricular Fibrillation and AICD fired appropriately.  Patient without any chest pain, shortness of breath at this time.  Case discussed with Dr. Camacho today, will reverse his INR with Vitamin K in preparation for the procedure in am,    PAST MEDICAL HISTORY:  Hx of Alcohol abuse, now in recovery  Atrial Fibrillation on A/C with Coumadin  Carotid Stenosis    COPD - not on home O2  CAD, s/p LAD stent  Borderline Diabetes Mellitus  Obesity  Gout    Hx of Rheumatic Fever as a child  Chronic Systolic CHF   Hyperlipidemia    Hypertension    NSVT, s/p AICD placement  PAD with stents  CKD, stage 3    PAST SURGICAL HISTORY:  s/p arm surgery 2009  s/p LAD stent  s/p liac/ popliteal angioplasty with stents 2010  s/p PPM/AICD  s/p Tonsillectomy      FAMILY HISTORY:   non-contributory to the patient's current presentation 72 M with Hx of ischemic cardiomyoathy with EF 45%, Hx of NSVT s/p AICD, Hx of CAD s/p LAD stent, Atrial Fibrillation on A/C with Coumadin presented to the [place] ED after his defibrillator fired off.  Workup in the ED revealed H/H 17.8/53.8, INR 2.1, Troponin of 2.32, EKG showed nonspecific ST changes with Afib, rate controlled.  Patient seen by Dr. Camacho at [place] and now transferred to [place] to undergo cardiac cath in am.  Pacemaker interrogation revealed + Ventricular Tachycardia and AICD fired appropriately.  Patient without any chest pain, shortness of breath at this time.  Case discussed with Dr. Camacho today, will reverse his INR with Vitamin K in preparation for the procedure in am.    PAST MEDICAL HISTORY:  Hx of Alcohol abuse, now in recovery  Atrial Fibrillation on A/C with Coumadin  Carotid Stenosis    COPD - not on home O2  CAD, s/p LAD stent  Borderline Diabetes Mellitus  Obesity  Gout    Hx of Rheumatic Fever as a child  Chronic Systolic CHF   Hyperlipidemia    Hypertension    NSVT, s/p AICD placement  PAD with stents  CKD, stage 3    PAST SURGICAL HISTORY:  s/p arm surgery 2009  s/p LAD stent  s/p liac/ popliteal angioplasty with stents 2010  s/p PPM/AICD  s/p Tonsillectomy      FAMILY HISTORY:   non-contributory to the patient's current presentation

## 2017-07-31 NOTE — CONSULT NOTE ADULT - PROBLEM SELECTOR PROBLEM 2
Coronary artery disease involving native coronary artery with unstable angina pectoris, unspecified whether native or transplanted heart

## 2017-07-31 NOTE — PROGRESS NOTE ADULT - ASSESSMENT
72M with PMH admitted for NSTEMI and AICD discharge x2 for VF. Pulmonary edema due to Acute systolic CHF, BRITANY, Hypokalemia, Hypomagnesemia.     Neuro:   Cardio:  Resp:  GI: 72M with PMH admitted for NSTEMI and AICD discharge x2 for VF. Pulmonary edema due to Acute systolic CHF, BRITANY, Hypokalemia, Hypomagnesemia. Transfer to  for Cath.     Neuro/psyc: No plan    Cardio: Afib - Continue with digoxin and toprol xl, Hold coumadin for cath. Continue with amio gtt. CAD - Continue with BB, ACEI, Statin, Plavix, ASA. CHF - continue lasix IV   Resp: Monitor respiratory status  GI: No plan   : BPH - continue with finasteride   Renal: BRITANY - monitor electrolytes and replete as needed

## 2017-07-31 NOTE — CONSULT NOTE ADULT - SUBJECTIVE AND OBJECTIVE BOX
CHIEF COMPLAINT: chest discomfort with AICD firing.  HPI:  72 yr old male with PMHx CAD, prior MI, Carotid Disease, former smoker, COPD, HTN, HLD, Chronic systolic/diastolic HF, Chronic atrial fibrillation, NSVT, ICD placement and recent generator change/PPM, rheumatic fever in childhood, DM borderline, obesity, former ETOH abuse -in recovery x 25 yrs, gout, PAD with bilateral lower extremity stents who presented for chest discomfort to [facility]. ICD shocked him twice at 11pm and 2 am day of presentation. He describes the sensation as a pop and a flash associated with pain in the arm b/l and wrist pain. Pt reports being dysnic with some ADL such as showering, but this has been this way for years and has not worsened. Denies claudication with normal activities. Pt had Cardiac Cath in oct 2016 with stent placement (LAD). Defibrillator [employer]- model [id number]. Serial [id number]. Interogation with Dr. Eulogio Wynn in 3/2017 showed no major events. Device interrogated and shows 2 appropriate ICD shocks for VF. Now transfered for further evaluation.    [id number]   ANNALISA july 2015 Right 1.1, left 0.94. Abdominal ultrasound negative for AAA. 2/2013 Carotid Mild disease bilaterally. CjwfyPibzwfz2Zfimi   EKG: 3/2/17, Afib, IVCD, Moderate ventricular response ZzlzfXhkassl0Zwl WwgydRfjnlcx14Jhfti   Stress Test: 9/2016, inferior wall defect, Pharm: inferior MI, new anterior wall ischemia. VceonZsyyweg23Gbm GrikgJfcyrrk65Fbfro   Echo: 11/2015, LVH, mild AI, mild tr, normal LV function, mild pulmonary hypertension, enlarged LA size, mild mitral regurgitation LVEF 65%. OpflgXrbsksk12Dlm ZzhpiKsgntyo55Lkqhk OnczdCejfccs69Qgj NzvodZjepeny38Rdkrd XrkulYzppjsr43Wsf EthvcHnuuqqi85Fojrh GmghqOrmuxra04Srf QaulzYykgnux02Jepnq GwrkfQmsfoce99Ojf FnciaUzdeqlz5Mxkly   Cardiac Cath: 2009, 2016, 2 Vessel CAD with 60% LAD and 100% RCA. NL LV FX. 10/2016: 2 Vessel CAD with 80% LAD and 100% RCA and NL LV FX. SddumNqinsdq2Nst PiyprYoqrumi81Yrfdc   Stent: 2010, 2016, 2010: stents in left and right iliacs and rt. popliteal. and rt. subclavian artery., 2016: SARAN to LAD. RdgblBgllvah16Wcu FkjdaVbokdql27Vjxdo WggayAschlga20Vuy PjncpWxrawjf58Xgtcv GhmzlDapssbm79Utp BtsctCqynycd92Rycbc UzpecRimryfb44Aet UbxiwXrhjkje48Ihxns MpilcQidzwca18Dae KnrewSoaocfp07Uwhbi XtjpnBjjwbxv72Qmy MhefdEkfwhxu04Kfydz GkfieXphdsby06Abk OuqpbNumpyzk43Pqnos YuzahLndoqvr35Mph OwifjFfotqfx77Zpzov LwzjuZgfadlw29Kzj QlbclBvkzrcz12Rtpmc PewtnXlqrhoj59Krd EwkqxXojhdrb26Ajolm JobeoFdfpnrw86Khc AevnpvhoxsAnmegibs808j7og-tk65-711i-v792-d7f619s44271UfuoCaj      PAST MEDICAL & SURGICAL HISTORY:  NSVT (nonsustained ventricular tachycardia)  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  DM (diabetes mellitus)  HF (heart failure)  PAD (peripheral artery disease): with stents  Former smoker  Hypertension  H/O: Rheumatic Fever  Atrial Fibrillation  Pacemaker: defibrillator model # v-268 serial # [id number]  Hyperlipidemia  Gout  Coronary Artery Disease  Chronic Obstructive Pulmonary Disease (COPD)  Carotid Stenosis  CAD S/P percutaneous coronary angioplasty: stent placed  Popliteal artery injury: iliac/ popliteal angioplasty with stents 2010  Arm injury: s/p surgery 2009  Pacemaker: [employer] serial #[id number]   model #v-268  S/P Implantation of Automatic Cardioverter/Defibrillator (AICD)  S/P Tonsillectomy      SOCIAL HISTORY: Former smoker and ethanol use, both of which have stopped many yrs ago., No drug abuse.    FAMILY HISTORY:  No family history of acute MI or sudden cardiac death.    Allergies    No Known Allergies    Outpatient Meds.: Reviewed (See Med Rec.).    REVIEW OF SYSTEMS:    CONSTITUTIONAL: No weakness, fevers or chills  EYES/ENT: No visual changes;  No vertigo or throat pain   NECK: No pain or stiffness  RESPIRATORY: No cough, wheezing, hemoptysis;   CARDIOVASCULAR: No chest pain or palpitations  GASTROINTESTINAL: No abdominal pain. No nausea, vomiting, or hematemesis; No diarrhea or constipation. No melena or hematochezia.  GENITOURINARY: No dysuria, frequency or hematuria  NEUROLOGICAL: No numbness or weakness  SKIN: No itching or rash  All other review of systems is negative unless indicated above (9 Systems reviewed)    Vital Signs Last 24 Hrs  T(C): 36.4 (31 Jul 2017 08:00), Max: 36.7 (30 Jul 2017 19:15)  T(F): 97.6 (31 Jul 2017 08:00), Max: 98.1 (30 Jul 2017 19:15)  HR: 68 (31 Jul 2017 13:00) (60 - 81)  BP: 119/75 (31 Jul 2017 13:00) (101/58 - 160/71)  BP(mean): 86 (31 Jul 2017 13:00) (74 - 112)  RR: 25 (31 Jul 2017 13:00) (15 - 47)  SpO2: 93% (31 Jul 2017 13:00) (91% - 97%)    PHYSICAL EXAM:    Constitutional: NAD, awake and alert, well-developed  HEENT: PERRLA, EOMI,  No oral cyanosis. Oropharynx Clean and Dry.  Neck:  Supple,  No JVD, No Thyroid enlargement. Bilateral Carotid Bruits.  Respiratory: Breath sounds are clear bilaterally, No wheezing, rales or rhonchi  Cardiovascular: NL S1 and S2, IR, IR, 1-2/6 NELLIE to LUSB  Gastrointestinal: Bowel Sounds present, soft, NT, ND, No HSM.   Extremities: No peripheral edema. No clubbing or cyanosis.    Vascular: 1+ peripheral pulses in LE   Neurological: A/O x 3, no gross focal motor.   Musculoskeletal: no calf tenderness or joint swelling.  Skin: No rashes.        LABS: All Labs Reviewed:                        17.8   9.2   )-----------( 227      ( 30 Jul 2017 02:37 )             53.8     30 Jul 2017 02:37    134    |  98     |  23     ----------------------------<  110    4.6     |  28     |  1.30     Ca    9.0        30 Jul 2017 02:37    TPro  7.4    /  Alb  3.2    /  TBili  0.6    /  DBili  x      /  AST  76     /  ALT  49     /  AlkPhos  97     30 Jul 2017 02:37    PT/INR - ( 30 Jul 2017 02:37 )   PT: 23.2 sec;   INR: 2.10 ratio           CARDIAC MARKERS ( 30 Jul 2017 08:25 )  3.090 ng/mL / x     / 153 U/L / x     / 15.7 ng/mL  CARDIAC MARKERS ( 30 Jul 2017 02:37 )  2.320 ng/mL / x     / 215 U/L / x     / x          RADIOLOGY: 7/30/17Grossly clear CXR    EKG: Afib/fluter/LAHB/ ST-T chnages inferolaterally.

## 2017-07-31 NOTE — PROGRESS NOTE ADULT - ATTENDING COMMENTS
71 yo M with Hx sysCHF, NSVT with AICD placed 2015, CAD s/p stent, Afib on coumadin, carotid stenosis, COPD, DM, with 2 episodes of VF with appropriate AICD fire in setting of NSTEMI +/- CHF exacerbation.      --hemodynamically stable  --continue amio gtt, ASA, plavix, lopressor, ACEI, statin  lasix prn volume overload  --stable for transfer to  today for cath
pt will be transfer to [facility].

## 2017-08-01 ENCOUNTER — OUTPATIENT (OUTPATIENT)
Dept: OUTPATIENT SERVICES | Facility: HOSPITAL | Age: 72
LOS: 1 days | End: 2017-08-01
Payer: MEDICAID

## 2017-08-01 ENCOUNTER — INPATIENT (INPATIENT)
Facility: HOSPITAL | Age: 72
LOS: 9 days | Discharge: HOME | DRG: 235 | End: 2017-08-11
Attending: THORACIC SURGERY (CARDIOTHORACIC VASCULAR SURGERY) | Admitting: THORACIC SURGERY (CARDIOTHORACIC VASCULAR SURGERY)
Payer: COMMERCIAL

## 2017-08-01 ENCOUNTER — TRANSCRIPTION ENCOUNTER (OUTPATIENT)
Age: 72
End: 2017-08-01

## 2017-08-01 VITALS
OXYGEN SATURATION: 90 % | DIASTOLIC BLOOD PRESSURE: 44 MMHG | RESPIRATION RATE: 25 BRPM | SYSTOLIC BLOOD PRESSURE: 122 MMHG | WEIGHT: 193.35 LBS | HEIGHT: 68 IN | TEMPERATURE: 98 F | HEART RATE: 77 BPM

## 2017-08-01 VITALS — WEIGHT: 199.96 LBS

## 2017-08-01 DIAGNOSIS — S49.90XA UNSPECIFIED INJURY OF SHOULDER AND UPPER ARM, UNSPECIFIED ARM, INITIAL ENCOUNTER: Chronic | ICD-10-CM

## 2017-08-01 DIAGNOSIS — I25.10 ATHEROSCLEROTIC HEART DISEASE OF NATIVE CORONARY ARTERY WITHOUT ANGINA PECTORIS: ICD-10-CM

## 2017-08-01 DIAGNOSIS — S85.009A UNSPECIFIED INJURY OF POPLITEAL ARTERY, UNSPECIFIED LEG, INITIAL ENCOUNTER: Chronic | ICD-10-CM

## 2017-08-01 DIAGNOSIS — I25.10 ATHEROSCLEROTIC HEART DISEASE OF NATIVE CORONARY ARTERY WITHOUT ANGINA PECTORIS: Chronic | ICD-10-CM

## 2017-08-01 DIAGNOSIS — I10 ESSENTIAL (PRIMARY) HYPERTENSION: ICD-10-CM

## 2017-08-01 LAB
ALBUMIN SERPL ELPH-MCNC: 3.7 G/DL — SIGNIFICANT CHANGE UP (ref 3.3–5)
ALP SERPL-CCNC: 80 U/L — SIGNIFICANT CHANGE UP (ref 40–120)
ALT FLD-CCNC: 28 U/L RC — SIGNIFICANT CHANGE UP (ref 10–45)
ANION GAP SERPL CALC-SCNC: 12 MMOL/L — SIGNIFICANT CHANGE UP (ref 5–17)
ANION GAP SERPL CALC-SCNC: 8 MMOL/L — SIGNIFICANT CHANGE UP (ref 5–17)
APTT BLD: 41.2 SEC — HIGH (ref 27.5–37.4)
AST SERPL-CCNC: 29 U/L — SIGNIFICANT CHANGE UP (ref 10–40)
BASOPHILS # BLD AUTO: 0.1 K/UL — SIGNIFICANT CHANGE UP (ref 0–0.2)
BASOPHILS NFR BLD AUTO: 1 % — SIGNIFICANT CHANGE UP (ref 0–2)
BILIRUB SERPL-MCNC: 1 MG/DL — SIGNIFICANT CHANGE UP (ref 0.2–1.2)
BLD GP AB SCN SERPL QL: NEGATIVE — SIGNIFICANT CHANGE UP
BUN SERPL-MCNC: 31 MG/DL — HIGH (ref 7–23)
BUN SERPL-MCNC: 32 MG/DL — HIGH (ref 7–23)
CALCIUM SERPL-MCNC: 9.2 MG/DL — SIGNIFICANT CHANGE UP (ref 8.5–10.1)
CALCIUM SERPL-MCNC: 9.3 MG/DL — SIGNIFICANT CHANGE UP (ref 8.4–10.5)
CHLORIDE SERPL-SCNC: 100 MMOL/L — SIGNIFICANT CHANGE UP (ref 96–108)
CHLORIDE SERPL-SCNC: 98 MMOL/L — SIGNIFICANT CHANGE UP (ref 96–108)
CO2 SERPL-SCNC: 27 MMOL/L — SIGNIFICANT CHANGE UP (ref 22–31)
CO2 SERPL-SCNC: 28 MMOL/L — SIGNIFICANT CHANGE UP (ref 22–31)
CREAT SERPL-MCNC: 1.5 MG/DL — HIGH (ref 0.5–1.3)
CREAT SERPL-MCNC: 1.54 MG/DL — HIGH (ref 0.5–1.3)
EOSINOPHIL # BLD AUTO: 0.2 K/UL — SIGNIFICANT CHANGE UP (ref 0–0.5)
EOSINOPHIL NFR BLD AUTO: 2.4 % — SIGNIFICANT CHANGE UP (ref 0–6)
GLUCOSE SERPL-MCNC: 106 MG/DL — HIGH (ref 70–99)
GLUCOSE SERPL-MCNC: 79 MG/DL — SIGNIFICANT CHANGE UP (ref 70–99)
HBA1C BLD-MCNC: 5.9 % — HIGH (ref 4–5.6)
HCT VFR BLD CALC: 51.4 % — HIGH (ref 39–50)
HCT VFR BLD CALC: 52.6 % — HIGH (ref 39–50)
HGB BLD-MCNC: 16.8 G/DL — SIGNIFICANT CHANGE UP (ref 13–17)
HGB BLD-MCNC: 17.2 G/DL — HIGH (ref 13–17)
INR BLD: 1.43 RATIO — HIGH (ref 0.88–1.16)
INR BLD: 1.74 RATIO — HIGH (ref 0.88–1.16)
LYMPHOCYTES # BLD AUTO: 1 K/UL — SIGNIFICANT CHANGE UP (ref 1–3.3)
LYMPHOCYTES # BLD AUTO: 12.8 % — LOW (ref 13–44)
MCHC RBC-ENTMCNC: 29.8 PG — SIGNIFICANT CHANGE UP (ref 27–34)
MCHC RBC-ENTMCNC: 31.3 PG — SIGNIFICANT CHANGE UP (ref 27–34)
MCHC RBC-ENTMCNC: 32.6 GM/DL — SIGNIFICANT CHANGE UP (ref 32–36)
MCHC RBC-ENTMCNC: 32.7 GM/DL — SIGNIFICANT CHANGE UP (ref 32–36)
MCV RBC AUTO: 91.2 FL — SIGNIFICANT CHANGE UP (ref 80–100)
MCV RBC AUTO: 95.9 FL — SIGNIFICANT CHANGE UP (ref 80–100)
MONOCYTES # BLD AUTO: 0.9 K/UL — SIGNIFICANT CHANGE UP (ref 0–0.9)
MONOCYTES NFR BLD AUTO: 12.7 % — SIGNIFICANT CHANGE UP (ref 2–14)
MRSA PCR RESULT.: SIGNIFICANT CHANGE UP
NEUTROPHILS # BLD AUTO: 5.3 K/UL — SIGNIFICANT CHANGE UP (ref 1.8–7.4)
NEUTROPHILS NFR BLD AUTO: 71 % — SIGNIFICANT CHANGE UP (ref 43–77)
NT-PROBNP SERPL-SCNC: 1366 PG/ML — HIGH (ref 0–300)
PLATELET # BLD AUTO: 210 K/UL — SIGNIFICANT CHANGE UP (ref 150–400)
PLATELET # BLD AUTO: 214 K/UL — SIGNIFICANT CHANGE UP (ref 150–400)
POTASSIUM SERPL-MCNC: 3.9 MMOL/L — SIGNIFICANT CHANGE UP (ref 3.5–5.3)
POTASSIUM SERPL-MCNC: 4.4 MMOL/L — SIGNIFICANT CHANGE UP (ref 3.5–5.3)
POTASSIUM SERPL-SCNC: 3.9 MMOL/L — SIGNIFICANT CHANGE UP (ref 3.5–5.3)
POTASSIUM SERPL-SCNC: 4.4 MMOL/L — SIGNIFICANT CHANGE UP (ref 3.5–5.3)
PROT SERPL-MCNC: 6.9 G/DL — SIGNIFICANT CHANGE UP (ref 6–8.3)
PROTHROM AB SERPL-ACNC: 15.7 SEC — HIGH (ref 9.8–12.7)
PROTHROM AB SERPL-ACNC: 19 SEC — HIGH (ref 9.8–12.7)
RBC # BLD: 5.49 M/UL — SIGNIFICANT CHANGE UP (ref 4.2–5.8)
RBC # BLD: 5.64 M/UL — SIGNIFICANT CHANGE UP (ref 4.2–5.8)
RBC # FLD: 14.3 % — SIGNIFICANT CHANGE UP (ref 10.3–14.5)
RBC # FLD: 14.7 % — HIGH (ref 10.3–14.5)
RH IG SCN BLD-IMP: POSITIVE — SIGNIFICANT CHANGE UP
S AUREUS DNA NOSE QL NAA+PROBE: DETECTED
SODIUM SERPL-SCNC: 136 MMOL/L — SIGNIFICANT CHANGE UP (ref 135–145)
SODIUM SERPL-SCNC: 137 MMOL/L — SIGNIFICANT CHANGE UP (ref 135–145)
WBC # BLD: 7.4 K/UL — SIGNIFICANT CHANGE UP (ref 3.8–10.5)
WBC # BLD: 9.6 K/UL — SIGNIFICANT CHANGE UP (ref 3.8–10.5)
WBC # FLD AUTO: 7.4 K/UL — SIGNIFICANT CHANGE UP (ref 3.8–10.5)
WBC # FLD AUTO: 9.6 K/UL — SIGNIFICANT CHANGE UP (ref 3.8–10.5)

## 2017-08-01 PROCEDURE — 93880 EXTRACRANIAL BILAT STUDY: CPT | Mod: 26

## 2017-08-01 PROCEDURE — 93010 ELECTROCARDIOGRAM REPORT: CPT

## 2017-08-01 PROCEDURE — 71010: CPT | Mod: 26

## 2017-08-01 PROCEDURE — 99152 MOD SED SAME PHYS/QHP 5/>YRS: CPT

## 2017-08-01 PROCEDURE — 93454 CORONARY ARTERY ANGIO S&I: CPT | Mod: 26

## 2017-08-01 PROCEDURE — G9001: CPT

## 2017-08-01 RX ORDER — FINASTERIDE 5 MG/1
5 TABLET, FILM COATED ORAL DAILY
Qty: 0 | Refills: 0 | Status: DISCONTINUED | OUTPATIENT
Start: 2017-08-01 | End: 2017-08-04

## 2017-08-01 RX ORDER — WARFARIN SODIUM 2.5 MG/1
2 TABLET ORAL
Qty: 0 | Refills: 0 | COMMUNITY

## 2017-08-01 RX ORDER — SODIUM CHLORIDE 9 MG/ML
3 INJECTION INTRAMUSCULAR; INTRAVENOUS; SUBCUTANEOUS EVERY 8 HOURS
Qty: 0 | Refills: 0 | Status: DISCONTINUED | OUTPATIENT
Start: 2017-08-01 | End: 2017-08-04

## 2017-08-01 RX ORDER — AMIODARONE HYDROCHLORIDE 400 MG/1
200 TABLET ORAL DAILY
Qty: 0 | Refills: 0 | Status: DISCONTINUED | OUTPATIENT
Start: 2017-08-01 | End: 2017-08-04

## 2017-08-01 RX ORDER — HEPARIN SODIUM 5000 [USP'U]/ML
1000 INJECTION INTRAVENOUS; SUBCUTANEOUS
Qty: 25000 | Refills: 0 | Status: DISCONTINUED | OUTPATIENT
Start: 2017-08-01 | End: 2017-08-02

## 2017-08-01 RX ORDER — WARFARIN SODIUM 2.5 MG/1
1.25 TABLET ORAL
Qty: 0 | Refills: 0 | COMMUNITY

## 2017-08-01 RX ORDER — METOPROLOL TARTRATE 50 MG
100 TABLET ORAL DAILY
Qty: 0 | Refills: 0 | Status: DISCONTINUED | OUTPATIENT
Start: 2017-08-01 | End: 2017-08-04

## 2017-08-01 RX ORDER — LISINOPRIL 2.5 MG/1
40 TABLET ORAL DAILY
Qty: 0 | Refills: 0 | Status: DISCONTINUED | OUTPATIENT
Start: 2017-08-01 | End: 2017-08-01

## 2017-08-01 RX ORDER — ATORVASTATIN CALCIUM 80 MG/1
40 TABLET, FILM COATED ORAL AT BEDTIME
Qty: 0 | Refills: 0 | Status: DISCONTINUED | OUTPATIENT
Start: 2017-08-01 | End: 2017-08-04

## 2017-08-01 RX ORDER — DIGOXIN 250 MCG
0.12 TABLET ORAL DAILY
Qty: 0 | Refills: 0 | Status: DISCONTINUED | OUTPATIENT
Start: 2017-08-01 | End: 2017-08-04

## 2017-08-01 RX ORDER — ASPIRIN/CALCIUM CARB/MAGNESIUM 324 MG
81 TABLET ORAL DAILY
Qty: 0 | Refills: 0 | Status: DISCONTINUED | OUTPATIENT
Start: 2017-08-01 | End: 2017-08-04

## 2017-08-01 RX ADMIN — FINASTERIDE 5 MILLIGRAM(S): 5 TABLET, FILM COATED ORAL at 13:17

## 2017-08-01 RX ADMIN — ATORVASTATIN CALCIUM 40 MILLIGRAM(S): 80 TABLET, FILM COATED ORAL at 21:44

## 2017-08-01 RX ADMIN — LISINOPRIL 40 MILLIGRAM(S): 2.5 TABLET ORAL at 17:17

## 2017-08-01 RX ADMIN — SODIUM CHLORIDE 3 MILLILITER(S): 9 INJECTION INTRAMUSCULAR; INTRAVENOUS; SUBCUTANEOUS at 21:44

## 2017-08-01 RX ADMIN — Medication 81 MILLIGRAM(S): at 17:17

## 2017-08-01 RX ADMIN — Medication 100 MILLIGRAM(S): at 17:18

## 2017-08-01 RX ADMIN — Medication 100 MILLIGRAM(S): at 06:28

## 2017-08-01 RX ADMIN — SODIUM CHLORIDE 50 MILLILITER(S): 9 INJECTION INTRAMUSCULAR; INTRAVENOUS; SUBCUTANEOUS at 04:47

## 2017-08-01 RX ADMIN — AMIODARONE HYDROCHLORIDE 200 MILLIGRAM(S): 400 TABLET ORAL at 17:17

## 2017-08-01 RX ADMIN — Medication 0.12 MILLIGRAM(S): at 17:17

## 2017-08-01 RX ADMIN — CLOPIDOGREL BISULFATE 75 MILLIGRAM(S): 75 TABLET, FILM COATED ORAL at 13:17

## 2017-08-01 RX ADMIN — AMIODARONE HYDROCHLORIDE 200 MILLIGRAM(S): 400 TABLET ORAL at 06:28

## 2017-08-01 RX ADMIN — Medication 0.12 MILLIGRAM(S): at 06:27

## 2017-08-01 RX ADMIN — HEPARIN SODIUM 10 UNIT(S)/HR: 5000 INJECTION INTRAVENOUS; SUBCUTANEOUS at 19:06

## 2017-08-01 RX ADMIN — FINASTERIDE 5 MILLIGRAM(S): 5 TABLET, FILM COATED ORAL at 17:23

## 2017-08-01 RX ADMIN — Medication 81 MILLIGRAM(S): at 13:17

## 2017-08-01 NOTE — DISCHARGE NOTE ADULT - CARE PLAN
Principal Discharge DX:	Coronary artery disease involving native coronary artery with unstable angina pectoris, unspecified whether native or transplanted heart  Goal:	coronary revascularization  Instructions for follow-up, activity and diet:	activity as tolerated  Secondary Diagnosis:	Chronic atrial fibrillation

## 2017-08-01 NOTE — H&P ADULT - PSH
Arm injury  s/p surgery 2009  CAD S/P percutaneous coronary angioplasty  stent placed  Pacemaker  [employer] serial #[id number]   model #v-268  Popliteal artery injury  iliac/ popliteal angioplasty with stents 2010  S/P Implantation of Automatic Cardioverter/Defibrillator (AICD)    S/P Tonsillectomy

## 2017-08-01 NOTE — H&P ADULT - HISTORY OF PRESENT ILLNESS
72 year old gentleman transferred from [facility] for surgical evaluation of multivessel CAD found on cardiac catheterization this morning. Patient has a history of AICD placement in 2015 with prior stent placement.  Patient found to have VT for which AICD was triggered and resuscitated patient, thought to be ischemic in nature.  Currently without chest pain, no nausea, vomiting, no syncope.

## 2017-08-01 NOTE — H&P ADULT - NSHPPHYSICALEXAM_GEN_ALL_CORE
PHYSICAL EXAM    Constitutitional: A&O x 3, No acute distress    HEENT: EOMS-I, PERRLA, Throat pink without visible erythema or lesions    Respiratory: CTA bilaterally, mildly diminished breath sounds at bilateral bases.  No rhonchi, rales    Cardiovascular:  +S1, +S2 Regular rate and rhythm    Gastrointestinal:  Abdomen is soft, nontender, non distended.  Normal bowel sounds throughout.     Extremities: GARCIA, pulses 2+ and symmetrical throughout.  No edema, no swelling, no tenderness noted on exam

## 2017-08-01 NOTE — H&P ADULT - PMH
Alcohol abuse  in recovery  Atrial Fibrillation    Carotid Stenosis    Chronic Obstructive Pulmonary Disease (COPD)    Coronary Artery Disease    DM (diabetes mellitus)    Former smoker    Gout    H/O: Rheumatic Fever    HF (heart failure)    Hyperlipidemia    Hypertension    NSVT (nonsustained ventricular tachycardia)    Pacemaker  defibrillator model # v-268 serial # [id number]  PAD (peripheral artery disease)  with stents

## 2017-08-01 NOTE — DISCHARGE NOTE ADULT - MEDICATION SUMMARY - MEDICATIONS TO STOP TAKING
I will STOP taking the medications listed below when I get home from the hospital:    triamterene-hydrochlorothiazide 37.5mg-25mg oral tablet  -- 1 tab(s) by mouth once a day    Coumadin  -- 2 milligram(s) by mouth   Tues, Thurs, Sat, Sun    Coumadin 1 mg oral tablet  -- 1.25 tab(s) by mouth Monday, Wednesday, and Friday

## 2017-08-01 NOTE — DISCHARGE NOTE ADULT - CARE PROVIDER_API CALL
Lucio Camacho (MD), Cardiovascular Disease  [address] [place] [address]  Phone: [phone]  Fax: [phone]

## 2017-08-01 NOTE — PROGRESS NOTE ADULT - ASSESSMENT
Patient now s/p angiogram that revealed multi vessel disease.  - Plan to transfer to [facility] for CABG with Dr Myers

## 2017-08-01 NOTE — DISCHARGE NOTE ADULT - PATIENT PORTAL LINK FT
“You can access the [employer] Patient Portal, offered by [facility], by registering with the following website: [email or web address]”

## 2017-08-02 DIAGNOSIS — Z86.79 PERSONAL HISTORY OF OTHER DISEASES OF THE CIRCULATORY SYSTEM: ICD-10-CM

## 2017-08-02 DIAGNOSIS — I25.10 ATHEROSCLEROTIC HEART DISEASE OF NATIVE CORONARY ARTERY WITHOUT ANGINA PECTORIS: ICD-10-CM

## 2017-08-02 LAB
ALBUMIN SERPL ELPH-MCNC: 3.2 G/DL — LOW (ref 3.3–5)
ALP SERPL-CCNC: 74 U/L — SIGNIFICANT CHANGE UP (ref 40–120)
ALT FLD-CCNC: 25 U/L RC — SIGNIFICANT CHANGE UP (ref 10–45)
ANION GAP SERPL CALC-SCNC: 11 MMOL/L — SIGNIFICANT CHANGE UP (ref 5–17)
APPEARANCE UR: CLEAR — SIGNIFICANT CHANGE UP
APTT BLD: 108.8 SEC — HIGH (ref 27.5–37.4)
APTT BLD: 131.4 SEC — CRITICAL HIGH (ref 27.5–37.4)
APTT BLD: 65.2 SEC — HIGH (ref 27.5–37.4)
APTT BLD: 85.2 SEC — HIGH (ref 27.5–37.4)
AST SERPL-CCNC: 28 U/L — SIGNIFICANT CHANGE UP (ref 10–40)
BILIRUB SERPL-MCNC: 0.6 MG/DL — SIGNIFICANT CHANGE UP (ref 0.2–1.2)
BILIRUB UR-MCNC: NEGATIVE — SIGNIFICANT CHANGE UP
BUN SERPL-MCNC: 35 MG/DL — HIGH (ref 7–23)
CALCIUM SERPL-MCNC: 8.8 MG/DL — SIGNIFICANT CHANGE UP (ref 8.4–10.5)
CHLORIDE SERPL-SCNC: 98 MMOL/L — SIGNIFICANT CHANGE UP (ref 96–108)
CO2 SERPL-SCNC: 26 MMOL/L — SIGNIFICANT CHANGE UP (ref 22–31)
COLOR SPEC: SIGNIFICANT CHANGE UP
CREAT SERPL-MCNC: 1.53 MG/DL — HIGH (ref 0.5–1.3)
DIFF PNL FLD: NEGATIVE — SIGNIFICANT CHANGE UP
GLUCOSE SERPL-MCNC: 93 MG/DL — SIGNIFICANT CHANGE UP (ref 70–99)
GLUCOSE UR QL: NEGATIVE — SIGNIFICANT CHANGE UP
HCT VFR BLD CALC: 49.8 % — SIGNIFICANT CHANGE UP (ref 39–50)
HGB BLD-MCNC: 16.5 G/DL — SIGNIFICANT CHANGE UP (ref 13–17)
KETONES UR-MCNC: NEGATIVE — SIGNIFICANT CHANGE UP
LEUKOCYTE ESTERASE UR-ACNC: NEGATIVE — SIGNIFICANT CHANGE UP
MCHC RBC-ENTMCNC: 31.6 PG — SIGNIFICANT CHANGE UP (ref 27–34)
MCHC RBC-ENTMCNC: 33.1 GM/DL — SIGNIFICANT CHANGE UP (ref 32–36)
MCV RBC AUTO: 95.5 FL — SIGNIFICANT CHANGE UP (ref 80–100)
NITRITE UR-MCNC: NEGATIVE — SIGNIFICANT CHANGE UP
PH UR: 6 — SIGNIFICANT CHANGE UP (ref 5–8)
PLATELET # BLD AUTO: 183 K/UL — SIGNIFICANT CHANGE UP (ref 150–400)
POTASSIUM SERPL-MCNC: 4.2 MMOL/L — SIGNIFICANT CHANGE UP (ref 3.5–5.3)
POTASSIUM SERPL-SCNC: 4.2 MMOL/L — SIGNIFICANT CHANGE UP (ref 3.5–5.3)
PROT SERPL-MCNC: 6.5 G/DL — SIGNIFICANT CHANGE UP (ref 6–8.3)
PROT UR-MCNC: 30 MG/DL
RBC # BLD: 5.21 M/UL — SIGNIFICANT CHANGE UP (ref 4.2–5.8)
RBC # FLD: 14.1 % — SIGNIFICANT CHANGE UP (ref 10.3–14.5)
RBC CASTS # UR COMP ASSIST: SIGNIFICANT CHANGE UP /HPF (ref 0–2)
SODIUM SERPL-SCNC: 135 MMOL/L — SIGNIFICANT CHANGE UP (ref 135–145)
SP GR SPEC: 1.01 — SIGNIFICANT CHANGE UP (ref 1.01–1.02)
TSH SERPL-MCNC: 2.76 UIU/ML — SIGNIFICANT CHANGE UP (ref 0.27–4.2)
UROBILINOGEN FLD QL: NEGATIVE — SIGNIFICANT CHANGE UP
WBC # BLD: 8.6 K/UL — SIGNIFICANT CHANGE UP (ref 3.8–10.5)
WBC # FLD AUTO: 8.6 K/UL — SIGNIFICANT CHANGE UP (ref 3.8–10.5)

## 2017-08-02 PROCEDURE — 93010 ELECTROCARDIOGRAM REPORT: CPT

## 2017-08-02 PROCEDURE — 99232 SBSQ HOSP IP/OBS MODERATE 35: CPT

## 2017-08-02 RX ORDER — ASPIRIN/CALCIUM CARB/MAGNESIUM 324 MG
81 TABLET ORAL DAILY
Qty: 0 | Refills: 0 | Status: DISCONTINUED | OUTPATIENT
Start: 2017-08-02 | End: 2017-08-02

## 2017-08-02 RX ORDER — MAGNESIUM SULFATE 500 MG/ML
2 VIAL (ML) INJECTION ONCE
Qty: 0 | Refills: 0 | Status: COMPLETED | OUTPATIENT
Start: 2017-08-02 | End: 2017-08-02

## 2017-08-02 RX ORDER — HEPARIN SODIUM 5000 [USP'U]/ML
800 INJECTION INTRAVENOUS; SUBCUTANEOUS
Qty: 25000 | Refills: 0 | Status: DISCONTINUED | OUTPATIENT
Start: 2017-08-02 | End: 2017-08-04

## 2017-08-02 RX ORDER — POTASSIUM CHLORIDE 20 MEQ
20 PACKET (EA) ORAL ONCE
Qty: 0 | Refills: 0 | Status: COMPLETED | OUTPATIENT
Start: 2017-08-02 | End: 2017-08-02

## 2017-08-02 RX ORDER — MUPIROCIN 20 MG/G
1 OINTMENT TOPICAL
Qty: 0 | Refills: 0 | Status: DISCONTINUED | OUTPATIENT
Start: 2017-08-02 | End: 2017-08-04

## 2017-08-02 RX ADMIN — HEPARIN SODIUM 10 UNIT(S)/HR: 5000 INJECTION INTRAVENOUS; SUBCUTANEOUS at 08:59

## 2017-08-02 RX ADMIN — MUPIROCIN 1 APPLICATION(S): 20 OINTMENT TOPICAL at 17:35

## 2017-08-02 RX ADMIN — SODIUM CHLORIDE 3 MILLILITER(S): 9 INJECTION INTRAMUSCULAR; INTRAVENOUS; SUBCUTANEOUS at 05:32

## 2017-08-02 RX ADMIN — AMIODARONE HYDROCHLORIDE 200 MILLIGRAM(S): 400 TABLET ORAL at 05:31

## 2017-08-02 RX ADMIN — Medication 0.12 MILLIGRAM(S): at 05:31

## 2017-08-02 RX ADMIN — HEPARIN SODIUM 8 UNIT(S)/HR: 5000 INJECTION INTRAVENOUS; SUBCUTANEOUS at 23:15

## 2017-08-02 RX ADMIN — Medication 100 MILLIGRAM(S): at 05:31

## 2017-08-02 RX ADMIN — Medication 50 GRAM(S): at 03:39

## 2017-08-02 RX ADMIN — ATORVASTATIN CALCIUM 40 MILLIGRAM(S): 80 TABLET, FILM COATED ORAL at 22:05

## 2017-08-02 RX ADMIN — Medication 20 MILLIEQUIVALENT(S): at 03:39

## 2017-08-02 RX ADMIN — SODIUM CHLORIDE 3 MILLILITER(S): 9 INJECTION INTRAMUSCULAR; INTRAVENOUS; SUBCUTANEOUS at 11:01

## 2017-08-02 RX ADMIN — FINASTERIDE 5 MILLIGRAM(S): 5 TABLET, FILM COATED ORAL at 11:26

## 2017-08-02 RX ADMIN — HEPARIN SODIUM 8 UNIT(S)/HR: 5000 INJECTION INTRAVENOUS; SUBCUTANEOUS at 17:35

## 2017-08-02 RX ADMIN — Medication 81 MILLIGRAM(S): at 11:26

## 2017-08-02 RX ADMIN — HEPARIN SODIUM 10 UNIT(S)/HR: 5000 INJECTION INTRAVENOUS; SUBCUTANEOUS at 01:47

## 2017-08-02 RX ADMIN — SODIUM CHLORIDE 3 MILLILITER(S): 9 INJECTION INTRAMUSCULAR; INTRAVENOUS; SUBCUTANEOUS at 22:06

## 2017-08-02 NOTE — PROGRESS NOTE ADULT - SUBJECTIVE AND OBJECTIVE BOX
VITAL SIGNS    Telemetry:      Vital Signs Last 24 Hrs  T(C): 36.6 (08-02-17 @ 05:09), Max: 36.9 (08-01-17 @ 18:00)  T(F): 97.8 (08-02-17 @ 05:09), Max: 98.4 (08-01-17 @ 18:00)  HR: 57 (08-02-17 @ 05:09) (57 - 85)  BP: 138/87 (08-02-17 @ 05:09) (108/74 - 143/86)  RR: 18 (08-02-17 @ 05:09) (18 - 30)  SpO2: 97% (08-02-17 @ 05:09) (90% - 97%)                   08-01 @ 07:01  -  08-02 @ 07:00  --------------------------------------------------------  IN: 750 mL / OUT: 1075 mL / NET: -325 mL    08-02 @ 07:01  -  08-02 @ 12:16  --------------------------------------------------------  IN: 388 mL / OUT: 250 mL / NET: 138 mL          Daily Height in cm: 172.72 (01 Aug 2017 14:45)    Daily       Bilirubin Total, Serum: 0.6 mg/dL (08-02 @ 01:15)  Bilirubin Total, Serum: 1.0 mg/dL (08-01 @ 15:39)    CAPILLARY BLOOD GLUCOSE  106 (01 Aug 2017 16:00)              Drains:     MS         [  ] Drainage:                 L Pleural  [  ]  Drainage:                R Pleural  [  ]  Drainage:    Pacing Wires        [  ]   Settings:                                  Isolated  [  ]    Coumadin    [ ] YES          [  ]      NO         Reason:                               PHYSICAL EXAM        Neurology: alert and oriented x 3, nonfocal, no gross deficits    CV :RRR S1 S2 LACW AICD  Lungs:CTA  Abdomen: soft, nontender, nondistended, positive bowel sounds, last bowel movement   :voiding             Extremities: no varicosities +DP trace edema          Pre op for OR this week  Discussed with Cardiothoracic Team at AM rounds. VITAL SIGNS    Telemetry:  afib vpaced on demand 60-80  WCT    Vital Signs Last 24 Hrs  T(C): 36.6 (08-02-17 @ 05:09), Max: 36.9 (08-01-17 @ 18:00)  T(F): 97.8 (08-02-17 @ 05:09), Max: 98.4 (08-01-17 @ 18:00)  HR: 57 (08-02-17 @ 05:09) (57 - 85)  BP: 138/87 (08-02-17 @ 05:09) (108/74 - 143/86)  RR: 18 (08-02-17 @ 05:09) (18 - 30)  SpO2: 97% (08-02-17 @ 05:09) (90% - 97%)                   08-01 @ 07:01  -  08-02 @ 07:00  --------------------------------------------------------  IN: 750 mL / OUT: 1075 mL / NET: -325 mL    08-02 @ 07:01  -  08-02 @ 12:16  --------------------------------------------------------  IN: 388 mL / OUT: 250 mL / NET: 138 mL          Daily Height in cm: 172.72 (01 Aug 2017 14:45)    Daily       Bilirubin Total, Serum: 0.6 mg/dL (08-02 @ 01:15)  Bilirubin Total, Serum: 1.0 mg/dL (08-01 @ 15:39)    CAPILLARY BLOOD GLUCOSE  106 (01 Aug 2017 16:00)              Drains:     MS         [  ] Drainage:                 L Pleural  [  ]  Drainage:                R Pleural  [  ]  Drainage:    Pacing Wires        [  ]   Settings:                                  Isolated  [  ]    Coumadin    [ ] YES          [  ]      NO         Reason:                               PHYSICAL EXAM        Neurology: alert and oriented x 3, nonfocal, no gross deficits    CV :RRR S1 S2 LACW AICD  Lungs:CTA  Abdomen: soft, nontender, nondistended, positive bowel sounds, last bowel movement   :voiding             Extremities: no varicosities +DP trace edema          Pre op for OR this week  Discussed with Cardiothoracic Team at AM rounds.

## 2017-08-02 NOTE — PROGRESS NOTE ADULT - ASSESSMENT
This is a 72 year old gentleman transferred from [facility] for surgical evaluation of multivessel CAD found on cardiac catheterization this morning. Patient has a history of AICD placement in 2015 with prior stent placement.  Patient found to have VT for which AICD was triggered and resuscitated patient, thought to be ischemic in nature.  Currently without chest pain, no nausea, vomiting, no syncope.  8/2 Comfortable   Heparin Gtt therapeutic denies CP c/w cardiac surgery evaluation repeat BMP for Creatine today This is a 72 year old gentleman transferred from [facility] for surgical evaluation of multivessel CAD found on cardiac catheterization this morning. Patient has a history of AICD placement in 2015 with prior stent placement.  Patient found to have VT for which AICD was triggered and resuscitated patient, thought to be ischemic in nature.  Currently without chest pain, no nausea, vomiting, no syncope.  8/2 Comfortable   Heparin Gtt therapeutic denies CP c/w cardiac surgery evaluation repeat BMP for Creatine today MSSA in nares started on mupirocin

## 2017-08-02 NOTE — PROGRESS NOTE ADULT - PROBLEM SELECTOR PLAN 1
Heparin gtt  telemetry  PFT   EKG  LABS  OR this sweek Heparin gtt  telemetry  PFT   asa metoprolol atorvastatin   EKG  LABS  OR this averyek

## 2017-08-03 DIAGNOSIS — E78.5 HYPERLIPIDEMIA, UNSPECIFIED: ICD-10-CM

## 2017-08-03 DIAGNOSIS — I21.4 NON-ST ELEVATION (NSTEMI) MYOCARDIAL INFARCTION: ICD-10-CM

## 2017-08-03 DIAGNOSIS — I50.22 CHRONIC SYSTOLIC (CONGESTIVE) HEART FAILURE: ICD-10-CM

## 2017-08-03 DIAGNOSIS — M10.9 GOUT, UNSPECIFIED: ICD-10-CM

## 2017-08-03 DIAGNOSIS — I11.0 HYPERTENSIVE HEART DISEASE WITH HEART FAILURE: ICD-10-CM

## 2017-08-03 DIAGNOSIS — I73.9 PERIPHERAL VASCULAR DISEASE, UNSPECIFIED: ICD-10-CM

## 2017-08-03 DIAGNOSIS — Z87.891 PERSONAL HISTORY OF NICOTINE DEPENDENCE: ICD-10-CM

## 2017-08-03 DIAGNOSIS — Z95.810 PRESENCE OF AUTOMATIC (IMPLANTABLE) CARDIAC DEFIBRILLATOR: ICD-10-CM

## 2017-08-03 DIAGNOSIS — I47.2 VENTRICULAR TACHYCARDIA: ICD-10-CM

## 2017-08-03 DIAGNOSIS — Z95.0 PRESENCE OF CARDIAC PACEMAKER: ICD-10-CM

## 2017-08-03 DIAGNOSIS — N18.3 CHRONIC KIDNEY DISEASE, STAGE 3 (MODERATE): ICD-10-CM

## 2017-08-03 DIAGNOSIS — I25.10 ATHEROSCLEROTIC HEART DISEASE OF NATIVE CORONARY ARTERY WITHOUT ANGINA PECTORIS: ICD-10-CM

## 2017-08-03 DIAGNOSIS — I48.2 CHRONIC ATRIAL FIBRILLATION: ICD-10-CM

## 2017-08-03 DIAGNOSIS — I48.91 UNSPECIFIED ATRIAL FIBRILLATION: ICD-10-CM

## 2017-08-03 DIAGNOSIS — Z95.828 PRESENCE OF OTHER VASCULAR IMPLANTS AND GRAFTS: ICD-10-CM

## 2017-08-03 DIAGNOSIS — Z79.82 LONG TERM (CURRENT) USE OF ASPIRIN: ICD-10-CM

## 2017-08-03 DIAGNOSIS — J44.9 CHRONIC OBSTRUCTIVE PULMONARY DISEASE, UNSPECIFIED: ICD-10-CM

## 2017-08-03 DIAGNOSIS — I25.5 ISCHEMIC CARDIOMYOPATHY: ICD-10-CM

## 2017-08-03 DIAGNOSIS — I65.29 OCCLUSION AND STENOSIS OF UNSPECIFIED CAROTID ARTERY: ICD-10-CM

## 2017-08-03 DIAGNOSIS — E03.9 HYPOTHYROIDISM, UNSPECIFIED: ICD-10-CM

## 2017-08-03 DIAGNOSIS — Z79.01 LONG TERM (CURRENT) USE OF ANTICOAGULANTS: ICD-10-CM

## 2017-08-03 DIAGNOSIS — I13.0 HYPERTENSIVE HEART AND CHRONIC KIDNEY DISEASE WITH HEART FAILURE AND STAGE 1 THROUGH STAGE 4 CHRONIC KIDNEY DISEASE, OR UNSPECIFIED CHRONIC KIDNEY DISEASE: ICD-10-CM

## 2017-08-03 DIAGNOSIS — E87.1 HYPO-OSMOLALITY AND HYPONATREMIA: ICD-10-CM

## 2017-08-03 DIAGNOSIS — E11.8 TYPE 2 DIABETES MELLITUS WITH UNSPECIFIED COMPLICATIONS: ICD-10-CM

## 2017-08-03 DIAGNOSIS — Z95.5 PRESENCE OF CORONARY ANGIOPLASTY IMPLANT AND GRAFT: ICD-10-CM

## 2017-08-03 LAB
ANION GAP SERPL CALC-SCNC: 12 MMOL/L — SIGNIFICANT CHANGE UP (ref 5–17)
APTT BLD: 74.9 SEC — HIGH (ref 27.5–37.4)
BLD GP AB SCN SERPL QL: NEGATIVE — SIGNIFICANT CHANGE UP
BUN SERPL-MCNC: 30 MG/DL — HIGH (ref 7–23)
CALCIUM SERPL-MCNC: 9.1 MG/DL — SIGNIFICANT CHANGE UP (ref 8.4–10.5)
CHLORIDE SERPL-SCNC: 98 MMOL/L — SIGNIFICANT CHANGE UP (ref 96–108)
CO2 SERPL-SCNC: 22 MMOL/L — SIGNIFICANT CHANGE UP (ref 22–31)
CREAT SERPL-MCNC: 1.32 MG/DL — HIGH (ref 0.5–1.3)
GLUCOSE SERPL-MCNC: 109 MG/DL — HIGH (ref 70–99)
HCT VFR BLD CALC: 47.8 % — SIGNIFICANT CHANGE UP (ref 39–50)
HGB BLD-MCNC: 15.7 G/DL — SIGNIFICANT CHANGE UP (ref 13–17)
MCHC RBC-ENTMCNC: 31.2 PG — SIGNIFICANT CHANGE UP (ref 27–34)
MCHC RBC-ENTMCNC: 32.9 GM/DL — SIGNIFICANT CHANGE UP (ref 32–36)
MCV RBC AUTO: 95 FL — SIGNIFICANT CHANGE UP (ref 80–100)
PLATELET # BLD AUTO: 179 K/UL — SIGNIFICANT CHANGE UP (ref 150–400)
POTASSIUM SERPL-MCNC: 4.4 MMOL/L — SIGNIFICANT CHANGE UP (ref 3.5–5.3)
POTASSIUM SERPL-SCNC: 4.4 MMOL/L — SIGNIFICANT CHANGE UP (ref 3.5–5.3)
RBC # BLD: 5.04 M/UL — SIGNIFICANT CHANGE UP (ref 4.2–5.8)
RBC # FLD: 14.1 % — SIGNIFICANT CHANGE UP (ref 10.3–14.5)
RH IG SCN BLD-IMP: POSITIVE — SIGNIFICANT CHANGE UP
SODIUM SERPL-SCNC: 132 MMOL/L — LOW (ref 135–145)
WBC # BLD: 9.3 K/UL — SIGNIFICANT CHANGE UP (ref 3.8–10.5)
WBC # FLD AUTO: 9.3 K/UL — SIGNIFICANT CHANGE UP (ref 3.8–10.5)

## 2017-08-03 PROCEDURE — 94010 BREATHING CAPACITY TEST: CPT | Mod: 26

## 2017-08-03 RX ORDER — ALPRAZOLAM 0.25 MG
0.25 TABLET ORAL AT BEDTIME
Qty: 0 | Refills: 0 | Status: DISCONTINUED | OUTPATIENT
Start: 2017-08-03 | End: 2017-08-04

## 2017-08-03 RX ORDER — CHLORHEXIDINE GLUCONATE 213 G/1000ML
1 SOLUTION TOPICAL ONCE
Qty: 0 | Refills: 0 | Status: COMPLETED | OUTPATIENT
Start: 2017-08-03 | End: 2017-08-03

## 2017-08-03 RX ORDER — SIMETHICONE 80 MG/1
80 TABLET, CHEWABLE ORAL
Qty: 0 | Refills: 0 | Status: DISCONTINUED | OUTPATIENT
Start: 2017-08-03 | End: 2017-08-04

## 2017-08-03 RX ORDER — CEFUROXIME AXETIL 250 MG
1500 TABLET ORAL ONCE
Qty: 0 | Refills: 0 | Status: DISCONTINUED | OUTPATIENT
Start: 2017-08-03 | End: 2017-08-04

## 2017-08-03 RX ORDER — CHLORHEXIDINE GLUCONATE 213 G/1000ML
30 SOLUTION TOPICAL ONCE
Qty: 0 | Refills: 0 | Status: COMPLETED | OUTPATIENT
Start: 2017-08-03 | End: 2017-08-04

## 2017-08-03 RX ORDER — ACETAMINOPHEN 500 MG
650 TABLET ORAL EVERY 6 HOURS
Qty: 0 | Refills: 0 | Status: DISCONTINUED | OUTPATIENT
Start: 2017-08-03 | End: 2017-08-04

## 2017-08-03 RX ORDER — NITROGLYCERIN 6.5 MG
1 CAPSULE, EXTENDED RELEASE ORAL ONCE
Qty: 0 | Refills: 0 | Status: COMPLETED | OUTPATIENT
Start: 2017-08-03 | End: 2017-08-03

## 2017-08-03 RX ADMIN — Medication 0.25 MILLIGRAM(S): at 03:02

## 2017-08-03 RX ADMIN — SODIUM CHLORIDE 3 MILLILITER(S): 9 INJECTION INTRAMUSCULAR; INTRAVENOUS; SUBCUTANEOUS at 21:30

## 2017-08-03 RX ADMIN — HEPARIN SODIUM 8 UNIT(S)/HR: 5000 INJECTION INTRAVENOUS; SUBCUTANEOUS at 06:02

## 2017-08-03 RX ADMIN — Medication 1 INCH(S): at 03:00

## 2017-08-03 RX ADMIN — CHLORHEXIDINE GLUCONATE 1 APPLICATION(S): 213 SOLUTION TOPICAL at 21:29

## 2017-08-03 RX ADMIN — Medication 0.25 MILLIGRAM(S): at 21:35

## 2017-08-03 RX ADMIN — ATORVASTATIN CALCIUM 40 MILLIGRAM(S): 80 TABLET, FILM COATED ORAL at 21:35

## 2017-08-03 RX ADMIN — SODIUM CHLORIDE 3 MILLILITER(S): 9 INJECTION INTRAMUSCULAR; INTRAVENOUS; SUBCUTANEOUS at 05:11

## 2017-08-03 RX ADMIN — AMIODARONE HYDROCHLORIDE 200 MILLIGRAM(S): 400 TABLET ORAL at 05:08

## 2017-08-03 RX ADMIN — Medication 100 MILLIGRAM(S): at 05:08

## 2017-08-03 RX ADMIN — SODIUM CHLORIDE 3 MILLILITER(S): 9 INJECTION INTRAMUSCULAR; INTRAVENOUS; SUBCUTANEOUS at 09:36

## 2017-08-03 RX ADMIN — Medication 1 INCH(S): at 15:35

## 2017-08-03 RX ADMIN — Medication 0.12 MILLIGRAM(S): at 05:08

## 2017-08-03 RX ADMIN — MUPIROCIN 1 APPLICATION(S): 20 OINTMENT TOPICAL at 17:34

## 2017-08-03 RX ADMIN — MUPIROCIN 1 APPLICATION(S): 20 OINTMENT TOPICAL at 05:08

## 2017-08-03 RX ADMIN — SIMETHICONE 80 MILLIGRAM(S): 80 TABLET, CHEWABLE ORAL at 00:33

## 2017-08-03 RX ADMIN — Medication 81 MILLIGRAM(S): at 13:14

## 2017-08-03 RX ADMIN — FINASTERIDE 5 MILLIGRAM(S): 5 TABLET, FILM COATED ORAL at 13:15

## 2017-08-03 NOTE — PROVIDER CONTACT NOTE (OTHER) - ACTION/TREATMENT ORDERED:
Magnesium and potassium given at this time. Will follow and continue to monitor patient.
VS repeated. /84-95-18. 97 % O2 sat. Will continue cardiac monitoring.  Pt. relieved from shoulder pain.Will continue plan of care.

## 2017-08-03 NOTE — PROGRESS NOTE ADULT - ASSESSMENT
This is a 72 year old gentleman transferred from [facility] for surgical evaluation of multivessel CAD found on cardiac catheterization this morning. Patient has a history of AICD placement in 2015 with prior stent placement.  Patient found to have VT for which AICD was triggered and resuscitated patient, thought to be ischemic in nature.   8/2 Comfortable  Heparin Gtt therapeutic denies CP c/w cardiac surgery evaluation repeat BMP for Creatine today MSSA in nares started on mupirocin

## 2017-08-03 NOTE — PROGRESS NOTE ADULT - SUBJECTIVE AND OBJECTIVE BOX
Cardiac Surgery Pre-op Note:    CC: Patient is a 72y old  Male who presents with a chief complaint of CAD (01 Aug 2017 14:48)      Referring Physician: Dr Camacho                                                                                                           Surgeon: Dr Myers     Procedure: 17 c3l    Allergies    No Known Allergies    Intolerances    HPI:  72 year old gentleman transferred from [facility] for surgical evaluation of multivessel CAD found on cardiac catheterization this morning. Patient has a history of AICD placement in  with prior stent placement.  Patient found to have VT for which AICD was triggered and resuscitated patient, thought to be ischemic in nature.     PAST MEDICAL & SURGICAL HISTORY:  NSVT (nonsustained ventricular tachycardia)  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  DM (diabetes mellitus)  HF (heart failure)  PAD (peripheral artery disease): with stents  Former smoker  Hypertension  H/O: Rheumatic Fever  Atrial Fibrillation  Pacemaker: defibrillator model # v-268 serial # [id number]  Hyperlipidemia  Gout  Coronary Artery Disease  Chronic Obstructive Pulmonary Disease (COPD)  Carotid Stenosis  CAD S/P percutaneous coronary angioplasty: stent placed  Popliteal artery injury: iliac/ popliteal angioplasty with stents   Arm injury: s/p surgery   Pacemaker: [employer] serial #[id number]   model #v-268  S/P Implantation of Automatic Cardioverter/Defibrillator (AICD)  S/P Tonsillectomy      MEDICATIONS  (STANDING):  finasteride 5 milliGRAM(s) Oral daily  aspirin enteric coated 81 milliGRAM(s) Oral daily  digoxin     Tablet 0.125 milliGRAM(s) Oral daily  amiodarone    Tablet 200 milliGRAM(s) Oral daily  atorvastatin 40 milliGRAM(s) Oral at bedtime  metoprolol succinate  milliGRAM(s) Oral daily  sodium chloride 0.9% lock flush 3 milliLiter(s) IV Push every 8 hours  mupirocin 2% Ointment 1 Application(s) Topical two times a day  heparin  Infusion 800 Unit(s)/Hr (8 mL/Hr) IV Continuous <Continuous>  chlorhexidine 4% Liquid 1 Application(s) Topical once  chlorhexidine 0.12% Liquid 30 milliLiter(s) Swish and Spit once  cefuroxime  IVPB 1500 milliGRAM(s) IV Intermittent once    MEDICATIONS  (PRN):  simethicone 80 milliGRAM(s) Chew four times a day PRN Gas  ALPRAZolam 0.25 milliGRAM(s) Oral at bedtime PRN anxiety/insomnia  acetaminophen   Tablet. 650 milliGRAM(s) Oral every 6 hours PRN Mild Pain (1 - 3)        Labs:                        15.7   9.3   )-----------( 179      ( 03 Aug 2017 05:12 )             47.8         132<L>  |  98  |  30<H>  ----------------------------<  109<H>  4.4   |  22  |  1.32<H>    Ca    9.1      03 Aug 2017 05:12    TPro  6.5  /  Alb  3.2<L>  /  TBili  0.6  /  DBili  x   /  AST  28  /  ALT  25  /  AlkPhos  74      PT/INR - ( 01 Aug 2017 15:38 )   PT: 15.7 sec;   INR: 1.43 ratio         PTT - ( 03 Aug 2017 05:12 )  PTT:74.9 sec    Blood Type: ABO Interpretation: A ( @ 09:46)    HGB A1C: Hemoglobin A1C, Whole Blood: 5.9 % ( @ 20:20)    Prealbumin:   Pro-BNP: Serum Pro-Brain Natriuretic Peptide: 1366 pg/mL ( @ 15:39)    Thyroid Panel:  @ 20:45/2.76  --/--/--    MRSA: MRSA PCR Result.: NotDetec ( @ 20:20)   / MSSA: MSSA PCR Result.: Detected ( @ 20:20)    Urinalysis Basic - ( 02 Aug 2017 01:36 )    Color: x / Appearance: Clear / S.012 / pH: x  Gluc: x / Ketone: Negative  / Bili: Negative / Urobili: Negative   Blood: x / Protein< from: Xray Chest 1 View AP -PORTABLE-Routine (17 @ 15:54) >  : 30 mg/dL / Nitrite: Negative   Leuk Esterase: Negative / RBC: 0-2 /HPF / WBC x   Sq Epi: x / Non Sq Epi: x / Bacteria: x        CXR:   < from: Xray Chest 1 View AP -PORTABLE-Routine (17 @ 15:54) >  The heart is normal in size. The lungs are clear. A pacer is in good   position.      < end of copied text >    EKG:paced    Carotid Duplex:      PFT's:  < from: [facility] Duplex Carotid, Bilat (17 @ 16:55) >  No hemodynamically significant carotid artery stenoses.    < end of copied text >    Echocardiogram:    Cath: 17 2VD       Daily Weight in k.6 (03 Aug 2017 09:20)    Echocardiogram:  < from: TTE Echo Doppler w/o Cont (17 @ 14:53) >  LVEF: 40%  FINDINGS  Left Ventricle: Moderate segmental left ventricular systolic dysfunction.   The septum, distal anterior wall, apex, inferolateral, and distal   inferior wall are hypokinetic.  Aortic Valve: Calcified trileaflet aortic valve.  Mild aortic   insufficiency.  Mitral Valve: Mitral annular calcification and calcified mitral leaflets.   Mild to moderate mitral insufficiency.  Tricuspid Valve: Normal tricuspid valve. Mild tricuspid insufficiency.  Pulmonic Valve: Not Well-visualized.  Left Atrium: Severe left atrial enlargement.  Right Ventricle: Normal right ventricular size and systolic function. A   device wire is noted in the right heart.  Right Atrium: Moderately Enlarged  Pericardium/Pleura: Normal pericardium with no pericardial effusion.      < end of copied text >  TelemetryVP/afib 60-80  T(C): 36.3 (17 @ 05:53), Max: 36.7 (17 @ 13:30)  HR: 90 (17 @ 05:53) (73 - 99)  BP: 134/76 (17 @ 05:53) (115/75 - 149/91)  RR: 18 (17 @ 05:53) (18 - 18)  SpO2: 92% (17 @ 05:53) (92% - 97%)  Wt(kg): --    Gen: WN/WD NAD  Neuro: AAOx3, nonfocal  Pulm: CTA B/L  CV: RRR, S1S2  Abd: Soft, NT, ND +BS  Ext: No edema, + peripheral pulses Rt groin cath site ecchymosis no hematoma       Pt has AICD/PPM [ x] Yes  [ ] No             Brand Name: [employer]  Pre-op Beta Blocker ordered within 24 hrs of surgery (CABG ONLY)?  [ ] Yes  [ ] No  If not, Why?  Type & Cross  [x ] Yes  [ ] No  NPO after Midnight [x ] Yes  [ ] No  Pre-op ABX ordered, to be taped on chart:  [x ] Yes  [ ] No     Hibiclens/Peridex ordered [x ] Yes  [ ] No  Consent  to be obtained   by surgeon

## 2017-08-03 NOTE — CHART NOTE - NSCHARTNOTEFT_GEN_A_CORE
Operation / Date: Pre-op CABG    Called by nurse for: left side arm pain and indigestion    71 yo M with pmh of CAD s/p PTCA and AICD who presented to [facility] with suspected AICD firing on 7/31, transferred to [place] for cardiac cath which revealed multivessel coronary disease, and transferred to [facility] for further management.  Patient walked to the NP office by RN with complaint of epigastric "gas" type pain and left arm pressure.   Denies dizziness, diaphoresis, vomiting, orthopnea, FAITH.   BP noted to be 149/91.    Vital Signs Last 24 Hrs  T(F): 97.3 (03 Aug 2017 05:53), Max: 98.1 (02 Aug 2017 13:30)  HR: 90 (03 Aug 2017 05:53) (73 - 99)  BP: 134/76 (03 Aug 2017 05:53) (115/75 - 149/91)  RR: 18 (03 Aug 2017 05:53) (18 - 18)  SpO2: 92% (03 Aug 2017 05:53) (92% - 97%)    02 Aug 2017 07:01  -  03 Aug 2017 05:56  --------------------------------------------------------  IN:    heparin Infusion: 28 mL    heparin Infusion: 32 mL    Oral Fluid: 1100 mL  Total IN: 1160 mL    OUT:    Voided: 1600 mL  Total OUT: 1600 mL    Total NET: -440 mL    Cath: < from: Cardiac Cath Lab - Adult (08.01.17 @ 10:12) >    Cardiac Arteries and Lesion Findings    LMCA: Diffuse irregularity.There is mild diffuse disease noted.  LAD: Diffuse irregularity.   Prox LAD: Distal subsection.98% stenosis 20 mm length.Pre procedure ANAIS   III flow was noted. Good runoff was present.The lesion was diagnosed as a   high risk lesion. The lesion was previously treated with the following devices: drug eluting  stent.   1st Diag: Ostial.85% stenosis 9 mm length.Pre procedure ANAIS III flow was   noted. Good runoff was present.The lesion was diagnosed as a moderate   risk  lesion.  LCx: Diffuse irregularity.   Prox CX: Distal subsection.70% stenosis 14 mm length.Pre procedure ANAIS   III flow was noted. Good runoff was present.The lesion was diagnosed as a   low risk lesion.   1st Ob Chelsie: Ostial.80% stenosis 8 mm length.Pre procedure ANAIS III flow   was noted. Good runoff was present.The lesion was diagnosed as a moderate   risk lesion.  RCA: Diffuse irregularity.  Prox RCA: Proximal subsection.100% stenosis 35 mm length.Pre procedure   ANAIS 0 flow was noted. The lesion was diagnosed as a high risk lesion.     Diagnostic Conclusions: 3 Vessel CAD with V. Fib cardiac arrest resuscitated by AICD.   Recommendations: CT Surgical consultation regarding CABG    LABS:                        15.7   9.3   )-----------( 179      ( 03 Aug 2017 05:12 )             47.8     08-03    132<L>  |  98  |  30<H>  ----------------------------<  109<H>  4.4   |  22  |  1.32<H>    Ca    9.1      03 Aug 2017 05:12    TPro  6.5  /  Alb  3.2<L>  /  TBili  0.6  /  DBili  x   /  AST  28  /  ALT  25  /  AlkPhos  74  08-02    PT/INR - ( 01 Aug 2017 15:38 )   PT: 15.7 sec;   INR: 1.43 ratio         PTT - ( 03 Aug 2017 05:12 )  PTT:74.9 sec      MEDICATIONS  (STANDING):  finasteride 5 milliGRAM(s) Oral daily  aspirin enteric coated 81 milliGRAM(s) Oral daily  digoxin     Tablet 0.125 milliGRAM(s) Oral daily  amiodarone    Tablet 200 milliGRAM(s) Oral daily  atorvastatin 40 milliGRAM(s) Oral at bedtime  metoprolol succinate  milliGRAM(s) Oral daily  mupirocin 2% Ointment 1 Application(s) Topical two times a day  heparin  Infusion 800 Unit(s)/Hr (8 mL/Hr) IV Continuous <Continuous>    71 yo M with pmh of CAD s/p PTCA and AICD who presented to [facility] with suspected AICD firing on 7/31, transferred to [place] for cardiac cath which revealed multivessel coronary disease, and transferred to [facility] for further management.  Patient  with complaint of epigastric discomfort, left arm pressure, and found to be hypertensive.      Plan: dx: angina pain  1) Cath report reviewed  2) administer 1' nitropaste  3) maintain heparin gtt  4) 12 lead EKG  5) re-check blood pressure in 30 min    If persistent pain, will escalate to CTU attending.

## 2017-08-03 NOTE — PROGRESS NOTE ADULT - PROBLEM SELECTOR PLAN 1
Heparin gtt  telemetry  nitro paste for angina like pain  asa metoprolol atorvastatin  Pre op skin prep  Cabgt x3 /EVG 1st case 8/4/17 with Dr Myers

## 2017-08-03 NOTE — PROVIDER CONTACT NOTE (OTHER) - BACKGROUND
Was at [facility]  because of VT/ AICD fired x2. S/P cath . TVD. For OHS on Friday.  Had 23 bts of WCT Hx of CAD,HTN,AICD, AFIB,  Carotid stenosis. Rheumatic  fever. PAD,DM.Alcohol abuse.
Pt. transfer from [facility] s/p cath via Rt. groin- MVD. Patient on heparin gtt @10ml/hr. Possible OHS on Thursday.

## 2017-08-03 NOTE — PROVIDER CONTACT NOTE (OTHER) - RECOMMENDATIONS
Magnesium 2gm; potassium 20 meq to be given at this time.
Seen and examined by Romy Rodriguez. Orders made and carried out. NItropaste 1 inch to chest wall applied.  Xanax 0.25 mg given.

## 2017-08-03 NOTE — PROVIDER CONTACT NOTE (OTHER) - ASSESSMENT
RN in patient room, patient asymptomatic, only c/o of discomfort of his LUQ, which resolve with repositioning. VSS. HR: 76; BP: 143/86; RR:18; O2 sat 95.
Patient awake,alert and oriented x4. Skin warm and dry to touch. respiration regular and easy. Bedrest encouraged. No SOB. Complained also of inability to sleep. Anxious. VS T 97.4-99-/91 95% O2 sat.

## 2017-08-04 ENCOUNTER — APPOINTMENT (OUTPATIENT)
Dept: CARDIOTHORACIC SURGERY | Facility: HOSPITAL | Age: 72
End: 2017-08-04
Payer: MEDICARE

## 2017-08-04 DIAGNOSIS — E11.51 TYPE 2 DIABETES MELLITUS WITH DIABETIC PERIPHERAL ANGIOPATHY WITHOUT GANGRENE: ICD-10-CM

## 2017-08-04 DIAGNOSIS — E11.22 TYPE 2 DIABETES MELLITUS WITH DIABETIC CHRONIC KIDNEY DISEASE: ICD-10-CM

## 2017-08-04 LAB
ALBUMIN SERPL ELPH-MCNC: 2.9 G/DL — LOW (ref 3.3–5)
ALP SERPL-CCNC: 70 U/L — SIGNIFICANT CHANGE UP (ref 40–120)
ALT FLD-CCNC: 32 U/L RC — SIGNIFICANT CHANGE UP (ref 10–45)
ANION GAP SERPL CALC-SCNC: 11 MMOL/L — SIGNIFICANT CHANGE UP (ref 5–17)
ANION GAP SERPL CALC-SCNC: 16 MMOL/L — SIGNIFICANT CHANGE UP (ref 5–17)
APTT BLD: 34.5 SEC — SIGNIFICANT CHANGE UP (ref 27.5–37.4)
APTT BLD: 41.7 SEC — HIGH (ref 27.5–37.4)
AST SERPL-CCNC: 73 U/L — HIGH (ref 10–40)
BASE EXCESS BLDMV CALC-SCNC: -2.7 MMOL/L — SIGNIFICANT CHANGE UP (ref -3–3)
BASE EXCESS BLDMV CALC-SCNC: -2.9 MMOL/L — SIGNIFICANT CHANGE UP (ref -3–3)
BASE EXCESS BLDMV CALC-SCNC: -4.8 MMOL/L — LOW (ref -3–3)
BASE EXCESS BLDMV CALC-SCNC: -6.4 MMOL/L — LOW (ref -3–3)
BASE EXCESS BLDMV CALC-SCNC: -7.1 MMOL/L — LOW (ref -3–3)
BASOPHILS # BLD AUTO: 0 K/UL — SIGNIFICANT CHANGE UP (ref 0–0.2)
BILIRUB SERPL-MCNC: 1 MG/DL — SIGNIFICANT CHANGE UP (ref 0.2–1.2)
BUN SERPL-MCNC: 22 MG/DL — SIGNIFICANT CHANGE UP (ref 7–23)
BUN SERPL-MCNC: 23 MG/DL — SIGNIFICANT CHANGE UP (ref 7–23)
CALCIUM SERPL-MCNC: 10.9 MG/DL — HIGH (ref 8.4–10.5)
CALCIUM SERPL-MCNC: 9.1 MG/DL — SIGNIFICANT CHANGE UP (ref 8.4–10.5)
CHLORIDE SERPL-SCNC: 101 MMOL/L — SIGNIFICANT CHANGE UP (ref 96–108)
CHLORIDE SERPL-SCNC: 98 MMOL/L — SIGNIFICANT CHANGE UP (ref 96–108)
CK MB BLD-MCNC: 8.3 % — HIGH (ref 0–3.5)
CK MB CFR SERPL CALC: 28 NG/ML — HIGH (ref 0–6.7)
CK SERPL-CCNC: 339 U/L — HIGH (ref 30–200)
CO2 BLDMV-SCNC: 19 MMOL/L — LOW (ref 21–29)
CO2 BLDMV-SCNC: 20 MMOL/L — LOW (ref 21–29)
CO2 BLDMV-SCNC: 22 MMOL/L — SIGNIFICANT CHANGE UP (ref 21–29)
CO2 BLDMV-SCNC: 24 MMOL/L — SIGNIFICANT CHANGE UP (ref 21–29)
CO2 BLDMV-SCNC: 25 MMOL/L — SIGNIFICANT CHANGE UP (ref 21–29)
CO2 SERPL-SCNC: 20 MMOL/L — LOW (ref 22–31)
CO2 SERPL-SCNC: 27 MMOL/L — SIGNIFICANT CHANGE UP (ref 22–31)
CREAT SERPL-MCNC: 1.35 MG/DL — HIGH (ref 0.5–1.3)
CREAT SERPL-MCNC: 1.43 MG/DL — HIGH (ref 0.5–1.3)
EOSINOPHIL # BLD AUTO: 0.2 K/UL — SIGNIFICANT CHANGE UP (ref 0–0.5)
EOSINOPHIL NFR BLD AUTO: 1 % — SIGNIFICANT CHANGE UP (ref 0–6)
FIBRINOGEN PPP-MCNC: 619 MG/DL — HIGH (ref 310–510)
GAS PNL BLDA: SIGNIFICANT CHANGE UP
GAS PNL BLDMV: SIGNIFICANT CHANGE UP
GLUCOSE SERPL-MCNC: 128 MG/DL — HIGH (ref 70–99)
GLUCOSE SERPL-MCNC: 87 MG/DL — SIGNIFICANT CHANGE UP (ref 70–99)
HCO3 BLDMV-SCNC: 18 MMOL/L — LOW (ref 20–28)
HCO3 BLDMV-SCNC: 19 MMOL/L — LOW (ref 20–28)
HCO3 BLDMV-SCNC: 21 MMOL/L — SIGNIFICANT CHANGE UP (ref 20–28)
HCO3 BLDMV-SCNC: 23 MMOL/L — SIGNIFICANT CHANGE UP (ref 20–28)
HCO3 BLDMV-SCNC: 23 MMOL/L — SIGNIFICANT CHANGE UP (ref 20–28)
HCT VFR BLD CALC: 47 % — SIGNIFICANT CHANGE UP (ref 39–50)
HCT VFR BLD CALC: 49.4 % — SIGNIFICANT CHANGE UP (ref 39–50)
HGB BLD-MCNC: 15.1 G/DL — SIGNIFICANT CHANGE UP (ref 13–17)
HGB BLD-MCNC: 15.6 G/DL — SIGNIFICANT CHANGE UP (ref 13–17)
HOROWITZ INDEX BLDMV+IHG-RTO: 100 — SIGNIFICANT CHANGE UP
HOROWITZ INDEX BLDMV+IHG-RTO: 40 — SIGNIFICANT CHANGE UP
HOROWITZ INDEX BLDMV+IHG-RTO: 50 — SIGNIFICANT CHANGE UP
HOROWITZ INDEX BLDMV+IHG-RTO: 50 — SIGNIFICANT CHANGE UP
HOROWITZ INDEX BLDMV+IHG-RTO: 60 — SIGNIFICANT CHANGE UP
INR BLD: 1.15 RATIO — SIGNIFICANT CHANGE UP (ref 0.88–1.16)
INR BLD: 1.3 RATIO — HIGH (ref 0.88–1.16)
LYMPHOCYTES # BLD AUTO: 2.2 K/UL — SIGNIFICANT CHANGE UP (ref 1–3.3)
LYMPHOCYTES # BLD AUTO: 5 % — LOW (ref 13–44)
MCHC RBC-ENTMCNC: 30.1 PG — SIGNIFICANT CHANGE UP (ref 27–34)
MCHC RBC-ENTMCNC: 30.6 PG — SIGNIFICANT CHANGE UP (ref 27–34)
MCHC RBC-ENTMCNC: 31.6 GM/DL — LOW (ref 32–36)
MCHC RBC-ENTMCNC: 32.1 GM/DL — SIGNIFICANT CHANGE UP (ref 32–36)
MCV RBC AUTO: 95.2 FL — SIGNIFICANT CHANGE UP (ref 80–100)
MCV RBC AUTO: 95.5 FL — SIGNIFICANT CHANGE UP (ref 80–100)
MONOCYTES # BLD AUTO: 3.1 K/UL — HIGH (ref 0–0.9)
MONOCYTES NFR BLD AUTO: 11 % — SIGNIFICANT CHANGE UP (ref 2–14)
NEUTROPHILS # BLD AUTO: 21.3 K/UL — HIGH (ref 1.8–7.4)
NEUTROPHILS NFR BLD AUTO: 79 % — HIGH (ref 43–77)
O2 CT VFR BLD CALC: 32 MMHG — SIGNIFICANT CHANGE UP (ref 30–65)
O2 CT VFR BLD CALC: 39 MMHG — SIGNIFICANT CHANGE UP (ref 30–65)
O2 CT VFR BLD CALC: 39 MMHG — SIGNIFICANT CHANGE UP (ref 30–65)
O2 CT VFR BLD CALC: 40 MMHG — SIGNIFICANT CHANGE UP (ref 30–65)
O2 CT VFR BLD CALC: 47 MMHG — SIGNIFICANT CHANGE UP (ref 30–65)
PCO2 BLDMV: 34 MMHG — SIGNIFICANT CHANGE UP (ref 30–65)
PCO2 BLDMV: 36 MMHG — SIGNIFICANT CHANGE UP (ref 30–65)
PCO2 BLDMV: 37 MMHG — SIGNIFICANT CHANGE UP (ref 30–65)
PCO2 BLDMV: 48 MMHG — SIGNIFICANT CHANGE UP (ref 30–65)
PCO2 BLDMV: 53 MMHG — SIGNIFICANT CHANGE UP (ref 30–65)
PH BLDMV: 7.25 — LOW (ref 7.32–7.45)
PH BLDMV: 7.3 — LOW (ref 7.32–7.45)
PH BLDMV: 7.32 — SIGNIFICANT CHANGE UP (ref 7.32–7.45)
PH BLDMV: 7.33 — SIGNIFICANT CHANGE UP (ref 7.32–7.45)
PH BLDMV: 7.39 — SIGNIFICANT CHANGE UP (ref 7.32–7.45)
PLATELET # BLD AUTO: 172 K/UL — SIGNIFICANT CHANGE UP (ref 150–400)
PLATELET # BLD AUTO: 173 K/UL — SIGNIFICANT CHANGE UP (ref 150–400)
POTASSIUM SERPL-MCNC: 4.4 MMOL/L — SIGNIFICANT CHANGE UP (ref 3.5–5.3)
POTASSIUM SERPL-MCNC: 5.1 MMOL/L — SIGNIFICANT CHANGE UP (ref 3.5–5.3)
POTASSIUM SERPL-SCNC: 4.4 MMOL/L — SIGNIFICANT CHANGE UP (ref 3.5–5.3)
POTASSIUM SERPL-SCNC: 5.1 MMOL/L — SIGNIFICANT CHANGE UP (ref 3.5–5.3)
PROT SERPL-MCNC: 6 G/DL — SIGNIFICANT CHANGE UP (ref 6–8.3)
PROTHROM AB SERPL-ACNC: 12.5 SEC — SIGNIFICANT CHANGE UP (ref 9.8–12.7)
PROTHROM AB SERPL-ACNC: 14.2 SEC — HIGH (ref 9.8–12.7)
RBC # BLD: 4.92 M/UL — SIGNIFICANT CHANGE UP (ref 4.2–5.8)
RBC # BLD: 5.2 M/UL — SIGNIFICANT CHANGE UP (ref 4.2–5.8)
RBC # FLD: 14.2 % — SIGNIFICANT CHANGE UP (ref 10.3–14.5)
RBC # FLD: 14.2 % — SIGNIFICANT CHANGE UP (ref 10.3–14.5)
SAO2 % BLDMV: 56 % — LOW (ref 60–90)
SAO2 % BLDMV: 64 % — SIGNIFICANT CHANGE UP (ref 60–90)
SAO2 % BLDMV: 67 % — SIGNIFICANT CHANGE UP (ref 60–90)
SAO2 % BLDMV: 69 % — SIGNIFICANT CHANGE UP (ref 60–90)
SAO2 % BLDMV: 72 % — SIGNIFICANT CHANGE UP (ref 60–90)
SODIUM SERPL-SCNC: 136 MMOL/L — SIGNIFICANT CHANGE UP (ref 135–145)
SODIUM SERPL-SCNC: 137 MMOL/L — SIGNIFICANT CHANGE UP (ref 135–145)
TROPONIN T SERPL-MCNC: 1.72 NG/ML — HIGH (ref 0–0.06)
WBC # BLD: 26.9 K/UL — HIGH (ref 3.8–10.5)
WBC # BLD: 8.9 K/UL — SIGNIFICANT CHANGE UP (ref 3.8–10.5)
WBC # FLD AUTO: 26.9 K/UL — HIGH (ref 3.8–10.5)
WBC # FLD AUTO: 8.9 K/UL — SIGNIFICANT CHANGE UP (ref 3.8–10.5)

## 2017-08-04 PROCEDURE — 33533 CABG ARTERIAL SINGLE: CPT

## 2017-08-04 PROCEDURE — 33533 CABG ARTERIAL SINGLE: CPT | Mod: AS

## 2017-08-04 PROCEDURE — 93010 ELECTROCARDIOGRAM REPORT: CPT

## 2017-08-04 PROCEDURE — 33970 AORTIC CIRCULATION ASSIST: CPT | Mod: AS

## 2017-08-04 PROCEDURE — 33970 AORTIC CIRCULATION ASSIST: CPT

## 2017-08-04 PROCEDURE — 71010: CPT | Mod: 26,76

## 2017-08-04 RX ORDER — HYDROMORPHONE HYDROCHLORIDE 2 MG/ML
0.5 INJECTION INTRAMUSCULAR; INTRAVENOUS; SUBCUTANEOUS ONCE
Qty: 0 | Refills: 0 | Status: DISCONTINUED | OUTPATIENT
Start: 2017-08-04 | End: 2017-08-04

## 2017-08-04 RX ORDER — IPRATROPIUM/ALBUTEROL SULFATE 18-103MCG
3 AEROSOL WITH ADAPTER (GRAM) INHALATION ONCE
Qty: 0 | Refills: 0 | Status: COMPLETED | OUTPATIENT
Start: 2017-08-04 | End: 2017-08-04

## 2017-08-04 RX ORDER — POTASSIUM CHLORIDE 20 MEQ
10 PACKET (EA) ORAL
Qty: 0 | Refills: 0 | Status: DISCONTINUED | OUTPATIENT
Start: 2017-08-04 | End: 2017-08-04

## 2017-08-04 RX ORDER — ACETAMINOPHEN 500 MG
1000 TABLET ORAL ONCE
Qty: 0 | Refills: 0 | Status: COMPLETED | OUTPATIENT
Start: 2017-08-04 | End: 2017-08-04

## 2017-08-04 RX ORDER — INSULIN HUMAN 100 [IU]/ML
2 INJECTION, SOLUTION SUBCUTANEOUS
Qty: 100 | Refills: 0 | Status: DISCONTINUED | OUTPATIENT
Start: 2017-08-04 | End: 2017-08-06

## 2017-08-04 RX ORDER — DOBUTAMINE HCL 250MG/20ML
2.5 VIAL (ML) INTRAVENOUS
Qty: 500 | Refills: 0 | Status: DISCONTINUED | OUTPATIENT
Start: 2017-08-04 | End: 2017-08-05

## 2017-08-04 RX ORDER — SODIUM CHLORIDE 9 MG/ML
3 INJECTION INTRAMUSCULAR; INTRAVENOUS; SUBCUTANEOUS EVERY 8 HOURS
Qty: 0 | Refills: 0 | Status: DISCONTINUED | OUTPATIENT
Start: 2017-08-04 | End: 2017-08-04

## 2017-08-04 RX ORDER — POTASSIUM CHLORIDE 20 MEQ
10 PACKET (EA) ORAL ONCE
Qty: 0 | Refills: 0 | Status: DISCONTINUED | OUTPATIENT
Start: 2017-08-04 | End: 2017-08-04

## 2017-08-04 RX ORDER — MEPERIDINE HYDROCHLORIDE 50 MG/ML
25 INJECTION INTRAMUSCULAR; INTRAVENOUS; SUBCUTANEOUS ONCE
Qty: 0 | Refills: 0 | Status: DISCONTINUED | OUTPATIENT
Start: 2017-08-04 | End: 2017-08-05

## 2017-08-04 RX ORDER — DOCUSATE SODIUM 100 MG
100 CAPSULE ORAL THREE TIMES A DAY
Qty: 0 | Refills: 0 | Status: DISCONTINUED | OUTPATIENT
Start: 2017-08-04 | End: 2017-08-11

## 2017-08-04 RX ORDER — SODIUM CHLORIDE 9 MG/ML
500 INJECTION, SOLUTION INTRAVENOUS
Qty: 0 | Refills: 0 | Status: DISCONTINUED | OUTPATIENT
Start: 2017-08-04 | End: 2017-08-04

## 2017-08-04 RX ORDER — DOCUSATE SODIUM 100 MG
100 CAPSULE ORAL THREE TIMES A DAY
Qty: 0 | Refills: 0 | Status: DISCONTINUED | OUTPATIENT
Start: 2017-08-04 | End: 2017-08-04

## 2017-08-04 RX ORDER — PANTOPRAZOLE SODIUM 20 MG/1
40 TABLET, DELAYED RELEASE ORAL DAILY
Qty: 0 | Refills: 0 | Status: DISCONTINUED | OUTPATIENT
Start: 2017-08-04 | End: 2017-08-06

## 2017-08-04 RX ORDER — SODIUM CHLORIDE 9 MG/ML
500 INJECTION, SOLUTION INTRAVENOUS ONCE
Qty: 0 | Refills: 0 | Status: COMPLETED | OUTPATIENT
Start: 2017-08-04 | End: 2017-08-04

## 2017-08-04 RX ORDER — EPINEPHRINE 0.3 MG/.3ML
0.02 INJECTION INTRAMUSCULAR; SUBCUTANEOUS
Qty: 4 | Refills: 0 | Status: DISCONTINUED | OUTPATIENT
Start: 2017-08-04 | End: 2017-08-04

## 2017-08-04 RX ORDER — ASPIRIN/CALCIUM CARB/MAGNESIUM 324 MG
325 TABLET ORAL DAILY
Qty: 0 | Refills: 0 | Status: DISCONTINUED | OUTPATIENT
Start: 2017-08-05 | End: 2017-08-06

## 2017-08-04 RX ORDER — NOREPINEPHRINE BITARTRATE/D5W 8 MG/250ML
0.04 PLASTIC BAG, INJECTION (ML) INTRAVENOUS
Qty: 8 | Refills: 0 | Status: DISCONTINUED | OUTPATIENT
Start: 2017-08-04 | End: 2017-08-05

## 2017-08-04 RX ORDER — FAMOTIDINE 10 MG/ML
20 INJECTION INTRAVENOUS
Qty: 0 | Refills: 0 | Status: DISCONTINUED | OUTPATIENT
Start: 2017-08-04 | End: 2017-08-04

## 2017-08-04 RX ORDER — SODIUM CHLORIDE 9 MG/ML
500 INJECTION, SOLUTION INTRAVENOUS ONCE
Qty: 0 | Refills: 0 | Status: DISCONTINUED | OUTPATIENT
Start: 2017-08-04 | End: 2017-08-04

## 2017-08-04 RX ORDER — POTASSIUM CHLORIDE 20 MEQ
10 PACKET (EA) ORAL
Qty: 0 | Refills: 0 | Status: COMPLETED | OUTPATIENT
Start: 2017-08-04 | End: 2017-08-04

## 2017-08-04 RX ORDER — CEFUROXIME AXETIL 250 MG
1500 TABLET ORAL EVERY 8 HOURS
Qty: 0 | Refills: 0 | Status: COMPLETED | OUTPATIENT
Start: 2017-08-04 | End: 2017-08-06

## 2017-08-04 RX ORDER — ASPIRIN/CALCIUM CARB/MAGNESIUM 324 MG
300 TABLET ORAL ONCE
Qty: 0 | Refills: 0 | Status: COMPLETED | OUTPATIENT
Start: 2017-08-04 | End: 2017-08-04

## 2017-08-04 RX ORDER — ALBUMIN HUMAN 25 %
250 VIAL (ML) INTRAVENOUS ONCE
Qty: 0 | Refills: 0 | Status: COMPLETED | OUTPATIENT
Start: 2017-08-04 | End: 2017-08-04

## 2017-08-04 RX ADMIN — MUPIROCIN 1 APPLICATION(S): 20 OINTMENT TOPICAL at 05:15

## 2017-08-04 RX ADMIN — AMIODARONE HYDROCHLORIDE 200 MILLIGRAM(S): 400 TABLET ORAL at 05:15

## 2017-08-04 RX ADMIN — Medication 125 MILLILITER(S): at 16:13

## 2017-08-04 RX ADMIN — SODIUM CHLORIDE 3000 MILLILITER(S): 9 INJECTION, SOLUTION INTRAVENOUS at 16:00

## 2017-08-04 RX ADMIN — Medication 6.58 MICROGRAM(S)/KG/MIN: at 16:13

## 2017-08-04 RX ADMIN — Medication 400 MILLIGRAM(S): at 22:00

## 2017-08-04 RX ADMIN — SODIUM CHLORIDE 3 MILLILITER(S): 9 INJECTION INTRAMUSCULAR; INTRAVENOUS; SUBCUTANEOUS at 05:16

## 2017-08-04 RX ADMIN — HYDROMORPHONE HYDROCHLORIDE 0.5 MILLIGRAM(S): 2 INJECTION INTRAMUSCULAR; INTRAVENOUS; SUBCUTANEOUS at 20:00

## 2017-08-04 RX ADMIN — CHLORHEXIDINE GLUCONATE 30 MILLILITER(S): 213 SOLUTION TOPICAL at 06:29

## 2017-08-04 RX ADMIN — Medication 50 MILLIEQUIVALENT(S): at 21:00

## 2017-08-04 RX ADMIN — Medication 300 MILLIGRAM(S): at 20:02

## 2017-08-04 RX ADMIN — Medication 3 MILLILITER(S): at 22:06

## 2017-08-04 RX ADMIN — HYDROMORPHONE HYDROCHLORIDE 0.5 MILLIGRAM(S): 2 INJECTION INTRAMUSCULAR; INTRAVENOUS; SUBCUTANEOUS at 20:15

## 2017-08-04 RX ADMIN — Medication 0.12 MILLIGRAM(S): at 05:14

## 2017-08-04 RX ADMIN — Medication 100 MILLIGRAM(S): at 05:14

## 2017-08-04 RX ADMIN — EPINEPHRINE 6.58 MICROGRAM(S)/KG/MIN: 0.3 INJECTION INTRAMUSCULAR; SUBCUTANEOUS at 16:13

## 2017-08-04 RX ADMIN — PANTOPRAZOLE SODIUM 40 MILLIGRAM(S): 20 TABLET, DELAYED RELEASE ORAL at 16:15

## 2017-08-04 RX ADMIN — Medication 1000 MILLIGRAM(S): at 22:15

## 2017-08-04 RX ADMIN — Medication 500 MILLILITER(S): at 16:46

## 2017-08-04 RX ADMIN — Medication 50 MILLIEQUIVALENT(S): at 22:30

## 2017-08-04 RX ADMIN — Medication 100 MILLIGRAM(S): at 16:14

## 2017-08-04 NOTE — BRIEF OPERATIVE NOTE - COMMENTS
Bilateral femoral cutdowns.  Right femoral intra-aortic balloon pump placement.  CABG x1 LIMA to the LAD, off pump.

## 2017-08-04 NOTE — BRIEF OPERATIVE NOTE - PROCEDURE
Pulsation balloon implan  08/04/2017    Active  AMATTIA  Cutdown of right groin for femoral artery catheter  08/04/2017    Active  AMATTIA  Cutdown of left groin for femoral artery catheter  08/04/2017    Active  AMATTIA  CABG with single internal mammary artery  08/04/2017    Active  AMATTIA

## 2017-08-04 NOTE — AIRWAY REMOVAL NOTE  ADULT & PEDS - ARTIFICAL AIRWAY REMOVAL COMMENTS
Written order verified, patient identified by name and date of birth. Present at the bedside .... Kimi Yanez RN was present at the bedside.

## 2017-08-05 LAB
ALBUMIN SERPL ELPH-MCNC: 3.2 G/DL — LOW (ref 3.3–5)
ALP SERPL-CCNC: 42 U/L — SIGNIFICANT CHANGE UP (ref 40–120)
ALT FLD-CCNC: 25 U/L RC — SIGNIFICANT CHANGE UP (ref 10–45)
ANION GAP SERPL CALC-SCNC: 13 MMOL/L — SIGNIFICANT CHANGE UP (ref 5–17)
ANION GAP SERPL CALC-SCNC: 16 MMOL/L — SIGNIFICANT CHANGE UP (ref 5–17)
APTT BLD: 32.1 SEC — SIGNIFICANT CHANGE UP (ref 27.5–37.4)
AST SERPL-CCNC: 52 U/L — HIGH (ref 10–40)
BASE EXCESS BLDMV CALC-SCNC: -0.9 MMOL/L — SIGNIFICANT CHANGE UP (ref -3–3)
BASE EXCESS BLDMV CALC-SCNC: -2.6 MMOL/L — SIGNIFICANT CHANGE UP (ref -3–3)
BASE EXCESS BLDMV CALC-SCNC: -3.9 MMOL/L — LOW (ref -3–3)
BASOPHILS # BLD AUTO: 0 K/UL — SIGNIFICANT CHANGE UP (ref 0–0.2)
BASOPHILS NFR BLD AUTO: 0 % — SIGNIFICANT CHANGE UP (ref 0–2)
BILIRUB SERPL-MCNC: 1 MG/DL — SIGNIFICANT CHANGE UP (ref 0.2–1.2)
BUN SERPL-MCNC: 22 MG/DL — SIGNIFICANT CHANGE UP (ref 7–23)
BUN SERPL-MCNC: 27 MG/DL — HIGH (ref 7–23)
CALCIUM SERPL-MCNC: 8.6 MG/DL — SIGNIFICANT CHANGE UP (ref 8.4–10.5)
CALCIUM SERPL-MCNC: 8.9 MG/DL — SIGNIFICANT CHANGE UP (ref 8.4–10.5)
CHLORIDE SERPL-SCNC: 101 MMOL/L — SIGNIFICANT CHANGE UP (ref 96–108)
CHLORIDE SERPL-SCNC: 106 MMOL/L — SIGNIFICANT CHANGE UP (ref 96–108)
CK MB BLD-MCNC: 6.5 % — HIGH (ref 0–3.5)
CK MB CFR SERPL CALC: 22.3 NG/ML — HIGH (ref 0–6.7)
CK SERPL-CCNC: 345 U/L — HIGH (ref 30–200)
CO2 BLDMV-SCNC: 24 MMOL/L — SIGNIFICANT CHANGE UP (ref 21–29)
CO2 BLDMV-SCNC: 26 MMOL/L — SIGNIFICANT CHANGE UP (ref 21–29)
CO2 BLDMV-SCNC: 27 MMOL/L — SIGNIFICANT CHANGE UP (ref 21–29)
CO2 SERPL-SCNC: 21 MMOL/L — LOW (ref 22–31)
CO2 SERPL-SCNC: 21 MMOL/L — LOW (ref 22–31)
CREAT SERPL-MCNC: 1.46 MG/DL — HIGH (ref 0.5–1.3)
CREAT SERPL-MCNC: 1.64 MG/DL — HIGH (ref 0.5–1.3)
EOSINOPHIL # BLD AUTO: 0 K/UL — SIGNIFICANT CHANGE UP (ref 0–0.5)
EOSINOPHIL NFR BLD AUTO: 0.1 % — SIGNIFICANT CHANGE UP (ref 0–6)
GAS PNL BLDA: SIGNIFICANT CHANGE UP
GAS PNL BLDMV: SIGNIFICANT CHANGE UP
GLUCOSE SERPL-MCNC: 128 MG/DL — HIGH (ref 70–99)
GLUCOSE SERPL-MCNC: 173 MG/DL — HIGH (ref 70–99)
HCO3 BLDMV-SCNC: 23 MMOL/L — SIGNIFICANT CHANGE UP (ref 20–28)
HCO3 BLDMV-SCNC: 24 MMOL/L — SIGNIFICANT CHANGE UP (ref 20–28)
HCO3 BLDMV-SCNC: 25 MMOL/L — SIGNIFICANT CHANGE UP (ref 20–28)
HCT VFR BLD CALC: 34.4 % — LOW (ref 39–50)
HCT VFR BLD CALC: 35.3 % — LOW (ref 39–50)
HGB BLD-MCNC: 11.6 G/DL — LOW (ref 13–17)
HGB BLD-MCNC: 11.6 G/DL — LOW (ref 13–17)
HOROWITZ INDEX BLDMV+IHG-RTO: 50 — SIGNIFICANT CHANGE UP
INR BLD: 1.51 RATIO — HIGH (ref 0.88–1.16)
LYMPHOCYTES # BLD AUTO: 0.5 K/UL — LOW (ref 1–3.3)
LYMPHOCYTES # BLD AUTO: 3.3 % — LOW (ref 13–44)
MCHC RBC-ENTMCNC: 31.4 PG — SIGNIFICANT CHANGE UP (ref 27–34)
MCHC RBC-ENTMCNC: 32.1 PG — SIGNIFICANT CHANGE UP (ref 27–34)
MCHC RBC-ENTMCNC: 32.8 GM/DL — SIGNIFICANT CHANGE UP (ref 32–36)
MCHC RBC-ENTMCNC: 33.7 GM/DL — SIGNIFICANT CHANGE UP (ref 32–36)
MCV RBC AUTO: 95.3 FL — SIGNIFICANT CHANGE UP (ref 80–100)
MCV RBC AUTO: 95.8 FL — SIGNIFICANT CHANGE UP (ref 80–100)
MONOCYTES # BLD AUTO: 1.6 K/UL — HIGH (ref 0–0.9)
MONOCYTES NFR BLD AUTO: 11.1 % — SIGNIFICANT CHANGE UP (ref 2–14)
NEUTROPHILS # BLD AUTO: 12.4 K/UL — HIGH (ref 1.8–7.4)
NEUTROPHILS NFR BLD AUTO: 85.5 % — HIGH (ref 43–77)
O2 CT VFR BLD CALC: 34 MMHG — SIGNIFICANT CHANGE UP (ref 30–65)
O2 CT VFR BLD CALC: 35 MMHG — SIGNIFICANT CHANGE UP (ref 30–65)
O2 CT VFR BLD CALC: 38 MMHG — SIGNIFICANT CHANGE UP (ref 30–65)
PCO2 BLDMV: 50 MMHG — SIGNIFICANT CHANGE UP (ref 30–65)
PCO2 BLDMV: 51 MMHG — SIGNIFICANT CHANGE UP (ref 30–65)
PCO2 BLDMV: 56 MMHG — SIGNIFICANT CHANGE UP (ref 30–65)
PH BLDMV: 7.26 — LOW (ref 7.32–7.45)
PH BLDMV: 7.27 — LOW (ref 7.32–7.45)
PH BLDMV: 7.32 — SIGNIFICANT CHANGE UP (ref 7.32–7.45)
PLATELET # BLD AUTO: 121 K/UL — LOW (ref 150–400)
PLATELET # BLD AUTO: 121 K/UL — LOW (ref 150–400)
POTASSIUM SERPL-MCNC: 4.4 MMOL/L — SIGNIFICANT CHANGE UP (ref 3.5–5.3)
POTASSIUM SERPL-MCNC: 4.6 MMOL/L — SIGNIFICANT CHANGE UP (ref 3.5–5.3)
POTASSIUM SERPL-SCNC: 4.4 MMOL/L — SIGNIFICANT CHANGE UP (ref 3.5–5.3)
POTASSIUM SERPL-SCNC: 4.6 MMOL/L — SIGNIFICANT CHANGE UP (ref 3.5–5.3)
PROT SERPL-MCNC: 5.2 G/DL — LOW (ref 6–8.3)
PROTHROM AB SERPL-ACNC: 16.4 SEC — HIGH (ref 9.8–12.7)
RBC # BLD: 3.61 M/UL — LOW (ref 4.2–5.8)
RBC # BLD: 3.68 M/UL — LOW (ref 4.2–5.8)
RBC # FLD: 13.9 % — SIGNIFICANT CHANGE UP (ref 10.3–14.5)
RBC # FLD: 14.3 % — SIGNIFICANT CHANGE UP (ref 10.3–14.5)
SAO2 % BLDMV: 55 % — LOW (ref 60–90)
SAO2 % BLDMV: 59 % — LOW (ref 60–90)
SAO2 % BLDMV: 62 % — SIGNIFICANT CHANGE UP (ref 60–90)
SODIUM SERPL-SCNC: 138 MMOL/L — SIGNIFICANT CHANGE UP (ref 135–145)
SODIUM SERPL-SCNC: 140 MMOL/L — SIGNIFICANT CHANGE UP (ref 135–145)
TROPONIN T SERPL-MCNC: 1.31 NG/ML — HIGH (ref 0–0.06)
WBC # BLD: 14.5 K/UL — HIGH (ref 3.8–10.5)
WBC # BLD: 16.4 K/UL — HIGH (ref 3.8–10.5)
WBC # FLD AUTO: 14.5 K/UL — HIGH (ref 3.8–10.5)
WBC # FLD AUTO: 16.4 K/UL — HIGH (ref 3.8–10.5)

## 2017-08-05 PROCEDURE — 99292 CRITICAL CARE ADDL 30 MIN: CPT

## 2017-08-05 PROCEDURE — 93010 ELECTROCARDIOGRAM REPORT: CPT

## 2017-08-05 PROCEDURE — 71010: CPT | Mod: 26

## 2017-08-05 PROCEDURE — 99291 CRITICAL CARE FIRST HOUR: CPT

## 2017-08-05 RX ORDER — FUROSEMIDE 40 MG
40 TABLET ORAL ONCE
Qty: 0 | Refills: 0 | Status: COMPLETED | OUTPATIENT
Start: 2017-08-05 | End: 2017-08-05

## 2017-08-05 RX ORDER — FUROSEMIDE 40 MG
10 TABLET ORAL ONCE
Qty: 0 | Refills: 0 | Status: COMPLETED | OUTPATIENT
Start: 2017-08-05 | End: 2017-08-05

## 2017-08-05 RX ORDER — ACETAMINOPHEN 500 MG
1000 TABLET ORAL ONCE
Qty: 0 | Refills: 0 | Status: COMPLETED | OUTPATIENT
Start: 2017-08-05 | End: 2017-08-05

## 2017-08-05 RX ORDER — WARFARIN SODIUM 2.5 MG/1
2 TABLET ORAL ONCE
Qty: 0 | Refills: 0 | Status: COMPLETED | OUTPATIENT
Start: 2017-08-05 | End: 2017-08-05

## 2017-08-05 RX ORDER — FENTANYL CITRATE 50 UG/ML
50 INJECTION INTRAVENOUS ONCE
Qty: 0 | Refills: 0 | Status: DISCONTINUED | OUTPATIENT
Start: 2017-08-05 | End: 2017-08-05

## 2017-08-05 RX ORDER — VASOPRESSIN 20 [USP'U]/ML
0.1 INJECTION INTRAVENOUS
Qty: 100 | Refills: 0 | Status: DISCONTINUED | OUTPATIENT
Start: 2017-08-05 | End: 2017-08-06

## 2017-08-05 RX ORDER — ENOXAPARIN SODIUM 100 MG/ML
40 INJECTION SUBCUTANEOUS DAILY
Qty: 0 | Refills: 0 | Status: DISCONTINUED | OUTPATIENT
Start: 2017-08-05 | End: 2017-08-09

## 2017-08-05 RX ORDER — DIGOXIN 250 MCG
0.25 TABLET ORAL ONCE
Qty: 0 | Refills: 0 | Status: COMPLETED | OUTPATIENT
Start: 2017-08-05 | End: 2017-08-05

## 2017-08-05 RX ORDER — MAGNESIUM SULFATE 500 MG/ML
2 VIAL (ML) INJECTION ONCE
Qty: 0 | Refills: 0 | Status: COMPLETED | OUTPATIENT
Start: 2017-08-05 | End: 2017-08-05

## 2017-08-05 RX ORDER — FENTANYL CITRATE 50 UG/ML
25 INJECTION INTRAVENOUS ONCE
Qty: 0 | Refills: 0 | Status: DISCONTINUED | OUTPATIENT
Start: 2017-08-05 | End: 2017-08-05

## 2017-08-05 RX ORDER — ALBUMIN HUMAN 25 %
250 VIAL (ML) INTRAVENOUS ONCE
Qty: 0 | Refills: 0 | Status: COMPLETED | OUTPATIENT
Start: 2017-08-05 | End: 2017-08-05

## 2017-08-05 RX ORDER — AMIODARONE HYDROCHLORIDE 400 MG/1
150 TABLET ORAL ONCE
Qty: 0 | Refills: 0 | Status: COMPLETED | OUTPATIENT
Start: 2017-08-05 | End: 2017-08-05

## 2017-08-05 RX ORDER — FINASTERIDE 5 MG/1
5 TABLET, FILM COATED ORAL DAILY
Qty: 0 | Refills: 0 | Status: DISCONTINUED | OUTPATIENT
Start: 2017-08-05 | End: 2017-08-11

## 2017-08-05 RX ORDER — HYDROMORPHONE HYDROCHLORIDE 2 MG/ML
0.5 INJECTION INTRAMUSCULAR; INTRAVENOUS; SUBCUTANEOUS ONCE
Qty: 0 | Refills: 0 | Status: DISCONTINUED | OUTPATIENT
Start: 2017-08-05 | End: 2017-08-05

## 2017-08-05 RX ORDER — AMIODARONE HYDROCHLORIDE 400 MG/1
200 TABLET ORAL DAILY
Qty: 0 | Refills: 0 | Status: DISCONTINUED | OUTPATIENT
Start: 2017-08-05 | End: 2017-08-11

## 2017-08-05 RX ORDER — ATORVASTATIN CALCIUM 80 MG/1
40 TABLET, FILM COATED ORAL AT BEDTIME
Qty: 0 | Refills: 0 | Status: DISCONTINUED | OUTPATIENT
Start: 2017-08-05 | End: 2017-08-11

## 2017-08-05 RX ORDER — IPRATROPIUM/ALBUTEROL SULFATE 18-103MCG
3 AEROSOL WITH ADAPTER (GRAM) INHALATION EVERY 6 HOURS
Qty: 0 | Refills: 0 | Status: DISCONTINUED | OUTPATIENT
Start: 2017-08-05 | End: 2017-08-11

## 2017-08-05 RX ORDER — BUDESONIDE, MICRONIZED 100 %
0.25 POWDER (GRAM) MISCELLANEOUS
Qty: 0 | Refills: 0 | Status: DISCONTINUED | OUTPATIENT
Start: 2017-08-05 | End: 2017-08-11

## 2017-08-05 RX ORDER — DIGOXIN 250 MCG
0.5 TABLET ORAL ONCE
Qty: 0 | Refills: 0 | Status: COMPLETED | OUTPATIENT
Start: 2017-08-05 | End: 2017-08-05

## 2017-08-05 RX ADMIN — Medication 10 MILLIGRAM(S): at 06:16

## 2017-08-05 RX ADMIN — Medication 0.5 MILLIGRAM(S): at 19:49

## 2017-08-05 RX ADMIN — Medication 6.58 MICROGRAM(S)/KG/MIN: at 05:51

## 2017-08-05 RX ADMIN — AMIODARONE HYDROCHLORIDE 200 MILLIGRAM(S): 400 TABLET ORAL at 19:49

## 2017-08-05 RX ADMIN — FINASTERIDE 5 MILLIGRAM(S): 5 TABLET, FILM COATED ORAL at 12:46

## 2017-08-05 RX ADMIN — FENTANYL CITRATE 25 MICROGRAM(S): 50 INJECTION INTRAVENOUS at 20:00

## 2017-08-05 RX ADMIN — FENTANYL CITRATE 50 MICROGRAM(S): 50 INJECTION INTRAVENOUS at 16:00

## 2017-08-05 RX ADMIN — Medication 100 MILLIGRAM(S): at 15:38

## 2017-08-05 RX ADMIN — INSULIN HUMAN 2 UNIT(S)/HR: 100 INJECTION, SOLUTION SUBCUTANEOUS at 19:49

## 2017-08-05 RX ADMIN — VASOPRESSIN 6 UNIT(S)/MIN: 20 INJECTION INTRAVENOUS at 08:27

## 2017-08-05 RX ADMIN — Medication 1000 MILLIGRAM(S): at 13:15

## 2017-08-05 RX ADMIN — HYDROMORPHONE HYDROCHLORIDE 0.5 MILLIGRAM(S): 2 INJECTION INTRAMUSCULAR; INTRAVENOUS; SUBCUTANEOUS at 10:15

## 2017-08-05 RX ADMIN — Medication 6.58 MICROGRAM(S)/KG/MIN: at 08:28

## 2017-08-05 RX ADMIN — HYDROMORPHONE HYDROCHLORIDE 0.5 MILLIGRAM(S): 2 INJECTION INTRAMUSCULAR; INTRAVENOUS; SUBCUTANEOUS at 22:00

## 2017-08-05 RX ADMIN — Medication 10 MILLIGRAM(S): at 05:51

## 2017-08-05 RX ADMIN — Medication 0.25 MILLIGRAM(S): at 17:17

## 2017-08-05 RX ADMIN — WARFARIN SODIUM 2 MILLIGRAM(S): 2.5 TABLET ORAL at 22:07

## 2017-08-05 RX ADMIN — Medication 50 GRAM(S): at 03:00

## 2017-08-05 RX ADMIN — HYDROMORPHONE HYDROCHLORIDE 0.5 MILLIGRAM(S): 2 INJECTION INTRAMUSCULAR; INTRAVENOUS; SUBCUTANEOUS at 01:29

## 2017-08-05 RX ADMIN — FENTANYL CITRATE 50 MICROGRAM(S): 50 INJECTION INTRAVENOUS at 11:15

## 2017-08-05 RX ADMIN — Medication 100 MILLIGRAM(S): at 22:07

## 2017-08-05 RX ADMIN — Medication 3 MILLILITER(S): at 12:23

## 2017-08-05 RX ADMIN — Medication 100 MILLIGRAM(S): at 01:00

## 2017-08-05 RX ADMIN — Medication 100 MILLIGRAM(S): at 08:26

## 2017-08-05 RX ADMIN — FENTANYL CITRATE 50 MICROGRAM(S): 50 INJECTION INTRAVENOUS at 11:00

## 2017-08-05 RX ADMIN — ATORVASTATIN CALCIUM 40 MILLIGRAM(S): 80 TABLET, FILM COATED ORAL at 22:07

## 2017-08-05 RX ADMIN — ENOXAPARIN SODIUM 40 MILLIGRAM(S): 100 INJECTION SUBCUTANEOUS at 12:38

## 2017-08-05 RX ADMIN — HYDROMORPHONE HYDROCHLORIDE 0.5 MILLIGRAM(S): 2 INJECTION INTRAMUSCULAR; INTRAVENOUS; SUBCUTANEOUS at 22:15

## 2017-08-05 RX ADMIN — VASOPRESSIN 6 UNIT(S)/MIN: 20 INJECTION INTRAVENOUS at 05:51

## 2017-08-05 RX ADMIN — Medication 0.25 MILLIGRAM(S): at 12:00

## 2017-08-05 RX ADMIN — Medication 325 MILLIGRAM(S): at 12:38

## 2017-08-05 RX ADMIN — Medication 250 MILLILITER(S): at 13:40

## 2017-08-05 RX ADMIN — Medication 400 MILLIGRAM(S): at 12:38

## 2017-08-05 RX ADMIN — Medication 0.25 MILLIGRAM(S): at 08:58

## 2017-08-05 RX ADMIN — Medication 40 MILLIGRAM(S): at 20:45

## 2017-08-05 RX ADMIN — FENTANYL CITRATE 25 MICROGRAM(S): 50 INJECTION INTRAVENOUS at 19:45

## 2017-08-05 RX ADMIN — HYDROMORPHONE HYDROCHLORIDE 0.5 MILLIGRAM(S): 2 INJECTION INTRAMUSCULAR; INTRAVENOUS; SUBCUTANEOUS at 10:00

## 2017-08-05 RX ADMIN — Medication 3 MILLILITER(S): at 17:17

## 2017-08-05 RX ADMIN — Medication 100 MILLIGRAM(S): at 17:25

## 2017-08-05 RX ADMIN — HYDROMORPHONE HYDROCHLORIDE 0.5 MILLIGRAM(S): 2 INJECTION INTRAMUSCULAR; INTRAVENOUS; SUBCUTANEOUS at 01:45

## 2017-08-05 RX ADMIN — FENTANYL CITRATE 50 MICROGRAM(S): 50 INJECTION INTRAVENOUS at 16:15

## 2017-08-05 RX ADMIN — Medication 3 MILLILITER(S): at 23:52

## 2017-08-05 RX ADMIN — PANTOPRAZOLE SODIUM 40 MILLIGRAM(S): 20 TABLET, DELAYED RELEASE ORAL at 12:38

## 2017-08-05 RX ADMIN — Medication 3 MILLILITER(S): at 09:00

## 2017-08-05 RX ADMIN — AMIODARONE HYDROCHLORIDE 600 MILLIGRAM(S): 400 TABLET ORAL at 12:39

## 2017-08-05 NOTE — PHYSICAL THERAPY INITIAL EVALUATION ADULT - GAIT DEVIATIONS NOTED, PT EVAL
decreased velocity of limb motion/increased time in double stance/decreased weight-shifting ability/decreased stride length/decreased elvira/standing rests breaks required during walk due to some SOB/decreased step length

## 2017-08-05 NOTE — PHYSICAL THERAPY INITIAL EVALUATION ADULT - GENERAL OBSERVATIONS, REHAB EVAL
Pt received OOB sitting in chair, A&Ox3, following commands, +chest tubex3, +high flow (non rebreather for walk)

## 2017-08-05 NOTE — PHYSICAL THERAPY INITIAL EVALUATION ADULT - DISCHARGE DISPOSITION, PT EVAL
home w/ home PT/home with home PT for improved strength balance & endurance for activity, pending increased amb & stairs (about 2 steps to enter), cont to assess need for assistive device, assist from sister as necessary

## 2017-08-05 NOTE — PROGRESS NOTE ADULT - SUBJECTIVE AND OBJECTIVE BOX
CRITICAL CARE ATTENDING - CTICU    MEDICATIONS  (STANDING):  pantoprazole  Injectable 40 milliGRAM(s) IV Push daily  docusate sodium 100 milliGRAM(s) Oral three times a day  insulin Infusion 2 Unit(s)/Hr (2 mL/Hr) IV Continuous <Continuous>  aspirin enteric coated 325 milliGRAM(s) Oral daily  norepinephrine Infusion 0.04 MICROgram(s)/kG/Min (6.577 mL/Hr) IV Continuous <Continuous>  cefuroxime  IVPB 1500 milliGRAM(s) IV Intermittent every 8 hours  DOBUTamine Infusion 2.5 MICROgram(s)/kG/Min (6.578 mL/Hr) IV Continuous <Continuous>  vasopressin Infusion 0.1 Unit(s)/Min (6 mL/Hr) IV Continuous <Continuous>  enoxaparin Injectable 40 milliGRAM(s) SubCutaneous daily  finasteride 5 milliGRAM(s) Oral daily  atorvastatin 40 milliGRAM(s) Oral at bedtime  ALBUTerol/ipratropium for Nebulization 3 milliLiter(s) Nebulizer every 6 hours  buDESOnide   0.25 milliGRAM(s) Respule 0.25 milliGRAM(s) Inhalation two times a day  HYDROmorphone  Injectable 0.5 milliGRAM(s) IV Push once                                    11.6   16.4  )-----------( 121      ( 05 Aug 2017 11:10 )             35.3       08    138  |  101  |  27<H>  ----------------------------<  173<H>  4.4   |  21<L>  |  1.64<H>    Ca    8.9      05 Aug 2017 11:10    TPro  5.2<L>  /  Alb  3.2<L>  /  TBili  1.0  /  DBili  x   /  AST  52<H>  /  ALT  25  /  AlkPhos  42  08-      PT/INR - ( 04 Aug 2017 15:10 )   PT: 14.2 sec;   INR: 1.30 ratio         PTT - ( 04 Aug 2017 15:10 )  PTT:34.5 sec    Mode: CPAP with PS  FiO2: 40  PEEP: 5  PS: 5  MAP: 7      Daily     Daily Weight in k.8 (05 Aug 2017 00:00)      08-04 @ 07:01  -  08 @ 07:00  --------------------------------------------------------  IN: 2109.6 mL / OUT: 1255 mL / NET: 854.6 mL    08 @ 07:01  -   @ 12:07  --------------------------------------------------------  IN: 0 mL / OUT: 635 mL / NET: -635 mL        Critically Ill patient  : [ ] preoperative ,   [x ] post operative    Requires :  [x ] Arterial Line   [x ] Central Line  [x ] PA catheter  [ ] IABP  [ ] ECMO  [ ] LVAD  [ ] Ventilator  [ ] pacemaker [ ] Impella.[x] BiPAP    Bedside evaluation , monitoring , treatment of hemodynamics , fluids , IVP/ IVCD meds.        Diagnosis:     POD !: CABG X 1 L - Off Pump    Cardiogenic Shock - resolving    CHF- acute [x ]   chronic [ ]    systolic [x ]   diatolic [ ]          - Echo- EF - 20%            [x ] RV dysfunction          - Cxr-cardiomegally, edema          - Clinical-  [x ]inotropes   [x ]pressors   [x ]diuresis   [ ]IABP   [ ]ECMO   [ ]LVAD   [ ]Respiratory Failure [x] BiPAP    Hemodynamic lability,instability. Requires IVCD [x ] vasopressors [x ] inotropes  [ ] vasodilator  [ ]IVSS fluid  to maintain MAP, perfusion, C.I.     A Fib., tachycardia    COPD    AICD - PPM - set low rate at 80    Requires bedside physical therapy, mobilization and total half-way care.     Requires chest PT, pulmonary toilet suctioning to maintain SaO2,  patent airway and treat atelectasis.     fluid overload     Hypotension a/w Hypertension, requiring intravenous medication.     Requires BiPAP for hypoxemia and chf    [place] Tran catheter interpretation and therapeutic management of unstable hemodynamics     Renal Failure           -               Discussed with CT surgeon, Physician's Assistant - Nurse Practitioner- Critical care medicine team.   Dicussed at  AM / PM rounds.   Chart, labs , films reviewed.    Total Time: 120 min.

## 2017-08-05 NOTE — CHART NOTE - NSCHARTNOTEFT_GEN_A_CORE
Pt's [place] device reprogrammed to LRL of 80 as per request of the primary team.  Currently limits at  from a prior of .

## 2017-08-05 NOTE — PROGRESS NOTE ADULT - PROBLEM SELECTOR PLAN 1
- DVT prophylaxix  - pain ma- glycemic control  nagment  - monitor i/o's  - hemodynamic monitoring  - Bipap PRN  - pulmonary toilet  - discuss with attending on rounds  - OOBTC  - PT in AM  - titrate vasopressors

## 2017-08-05 NOTE — PHYSICAL THERAPY INITIAL EVALUATION ADULT - ADDITIONAL COMMENTS
Pt resides in private home with sister, about 2 steps to enter with handrail, 1 level. PTA independent in mobility and ADL's, owns no DME, works as a pianist.

## 2017-08-05 NOTE — PHYSICAL THERAPY INITIAL EVALUATION ADULT - PERTINENT HX OF CURRENT PROBLEM, REHAB EVAL
Pt is 72M admitted 8/1/17 transferred from [facility] for surgical evaluation of multivessel CAD found on cardiac cath. Pt w/history of AICD placement(2015) w/prior stent placement. Pt found to have VT for which AICD was triggered & resuscitated patient, thought to be ischemic in nature.

## 2017-08-05 NOTE — PROGRESS NOTE ADULT - SUBJECTIVE AND OBJECTIVE BOX
Operation s/p off pump C1L  POD 1  Narrative Patient laying in bed.  awake. extubated    Vital Signs Last 24 Hrs  T(C): 37.1 (04 Aug 2017 23:00), Max: 37.3 (04 Aug 2017 20:00)  T(F): 98.8 (04 Aug 2017 23:00), Max: 99.1 (04 Aug 2017 20:00)  HR: 100 (04 Aug 2017 23:45) (91 - 113)  BP: 142/92 (04 Aug 2017 05:02) (142/92 - 142/92)  BP(mean): --  RR: 16 (04 Aug 2017 23:45) (15 - 31)  SpO2: 97% (04 Aug 2017 23:45) (92% - 100%)  I&O's Detail    03 Aug 2017 07:01  -  04 Aug 2017 07:00  --------------------------------------------------------  IN:    heparin Infusion: 168 mL    Oral Fluid: 960 mL  Total IN: 1128 mL    OUT:    Voided: 1025 mL  Total OUT: 1025 mL    Total NET: 103 mL      04 Aug 2017 07:01  -  05 Aug 2017 00:35  --------------------------------------------------------  IN:    Albumin 5%  - 250 mL: 1000 mL    DOBUTamine Infusion: 33 mL    EPINEPHrine Infusion: 19.8 mL    insulin Infusion: 18 mL    Lactated Ringers IV Bolus: 500 mL    norepinephrine Infusion: 88 mL  Total IN: 1658.8 mL    OUT:    Chest Tube: 125 mL    Chest Tube: 35 mL    Chest Tube: 95 mL    Indwelling Catheter - Urethral: 490 mL  Total OUT: 745 mL    Total NET: 913.8 mL      I&O's Summary    03 Aug 2017 07:01  -  04 Aug 2017 07:00  --------------------------------------------------------  IN: 1128 mL / OUT: 1025 mL / NET: 103 mL    04 Aug 2017 07:01  -  05 Aug 2017 00:35  --------------------------------------------------------  IN: 1658.8 mL / OUT: 745 mL / NET: 913.8 mL        LABS:  CBC Full  -  ( 04 Aug 2017 15:10 )  WBC Count : 26.9 K/uL  Hemoglobin : 15.1 g/dL  Hematocrit : 47.0 %  Platelet Count - Automated : 172 K/uL  Mean Cell Volume : 95.5 fl  Mean Cell Hemoglobin : 30.6 pg  Mean Cell Hemoglobin Concentration : 32.1 gm/dL  Auto Neutrophil # : 21.3 K/uL  Auto Lymphocyte # : 2.2 K/uL  Auto Monocyte # : 3.1 K/uL  Auto Eosinophil # : 0.2 K/uL  Auto Basophil # : 0.0 K/uL  Auto Neutrophil % : 79.0 %  Auto Lymphocyte % : 5.0 %  Auto Monocyte % : 11.0 %  Auto Eosinophil % : 1.0 %  Auto Basophil % : x    08-04    137  |  101  |  22  ----------------------------<  128<H>  5.1   |  20<L>  |  1.35<H>    Ca    10.9<H>      04 Aug 2017 15:10    TPro  6.0  /  Alb  2.9<L>  /  TBili  1.0  /  DBili  x   /  AST  73<H>  /  ALT  32  /  AlkPhos  70  08-04    PT/INR - ( 04 Aug 2017 15:10 )   PT: 14.2 sec;   INR: 1.30 ratio         PTT - ( 04 Aug 2017 15:10 )  PTT:34.5 sec    Daily     Daily     MEDICATIONS  (STANDING):  pantoprazole  Injectable 40 milliGRAM(s) IV Push daily  docusate sodium 100 milliGRAM(s) Oral three times a day  insulin Infusion 2 Unit(s)/Hr (2 mL/Hr) IV Continuous <Continuous>  aspirin enteric coated 325 milliGRAM(s) Oral daily  norepinephrine Infusion 0.04 MICROgram(s)/kG/Min (6.577 mL/Hr) IV Continuous <Continuous>  cefuroxime  IVPB 1500 milliGRAM(s) IV Intermittent every 8 hours  DOBUTamine Infusion 2.5 MICROgram(s)/kG/Min (6.578 mL/Hr) IV Continuous <Continuous>    MEDICATIONS  (PRN):  meperidine     Injectable 25 milliGRAM(s) IV Push once PRN For Shivering      General: A+Ox3, in NAD  Chest: sternal dressing C/D/I  Heart: S1, S2, a fib  Lungs: CTA B/L, without W/R/R  Abdomen: Soft, ND, NT, decreased BS  Extremeties: without edema B/L LE, positive DP pulses B/L     Does the patient have a history of CHF: yes  -If yes, systolic or diastolic: systolic  -pre-operative EF: 20    Extubation within 24 hours:  yes    Does the patient have a history of kidney disease: yes  -give CKD stage if applicable: stage 3A (GFR = 54)   -what is patient's baseline Creatinine: 1.3    Beta Blockers contraindicated for the first 24 hours due to vasopressor/inotrpic medication: dobutamine and levophed  -If on pressors, indication: vasoplegia    Did the patient receive transfusion of blood and/or products:  -If yes, indication: none    DVT PPX: heparin    Gin-operative antibiotics discontinued within 48 hours of CTU admission:  -name/date/time of discontinue  rimma/aug6, 2017 @ 0000  RADIOLOGY & ADDITIONAL TESTS:    Patient care discussed on morning interdisciplinary rounds with CTS team.

## 2017-08-05 NOTE — PROGRESS NOTE ADULT - SUBJECTIVE AND OBJECTIVE BOX
Post-Anesthetic Evaluation:    The Patient was interviewed and evaluated    Vital Signs Last 24 Hrs  T(C): 36.8 (05 Aug 2017 08:00), Max: 37.3 (04 Aug 2017 20:00)  T(F): 98.2 (05 Aug 2017 08:00), Max: 99.1 (04 Aug 2017 20:00)  HR: 109 (05 Aug 2017 08:00) (79 - 113)  BP: --  BP(mean): --107/54 (70)  RR: 21 (05 Aug 2017 08:00) (13 - 31)  SpO2: 97% (05 Aug 2017 08:00) (92% - 100%)    Evaluation:      (X) No apparent complications or complaints regarding anesthesia care at this time  (X) All questions were answered    Condition:  () Stable      ( ) Guarded      ( x) Critical    Recommendations:  (X) None     ( ) Other:

## 2017-08-06 DIAGNOSIS — I25.118 ATHEROSCLEROTIC HEART DISEASE OF NATIVE CORONARY ARTERY WITH OTHER FORMS OF ANGINA PECTORIS: ICD-10-CM

## 2017-08-06 LAB
ALBUMIN SERPL ELPH-MCNC: 3.4 G/DL — SIGNIFICANT CHANGE UP (ref 3.3–5)
ALP SERPL-CCNC: 47 U/L — SIGNIFICANT CHANGE UP (ref 40–120)
ALT FLD-CCNC: 26 U/L RC — SIGNIFICANT CHANGE UP (ref 10–45)
ANION GAP SERPL CALC-SCNC: 14 MMOL/L — SIGNIFICANT CHANGE UP (ref 5–17)
APTT BLD: 31.9 SEC — SIGNIFICANT CHANGE UP (ref 27.5–37.4)
AST SERPL-CCNC: 54 U/L — HIGH (ref 10–40)
BILIRUB SERPL-MCNC: 0.8 MG/DL — SIGNIFICANT CHANGE UP (ref 0.2–1.2)
BUN SERPL-MCNC: 29 MG/DL — HIGH (ref 7–23)
CALCIUM SERPL-MCNC: 9 MG/DL — SIGNIFICANT CHANGE UP (ref 8.4–10.5)
CHLORIDE SERPL-SCNC: 101 MMOL/L — SIGNIFICANT CHANGE UP (ref 96–108)
CO2 SERPL-SCNC: 22 MMOL/L — SIGNIFICANT CHANGE UP (ref 22–31)
CREAT SERPL-MCNC: 1.77 MG/DL — HIGH (ref 0.5–1.3)
GAS PNL BLDA: SIGNIFICANT CHANGE UP
GAS PNL BLDV: SIGNIFICANT CHANGE UP
GLUCOSE SERPL-MCNC: 135 MG/DL — HIGH (ref 70–99)
HCT VFR BLD CALC: 32.4 % — LOW (ref 39–50)
HGB BLD-MCNC: 10.7 G/DL — LOW (ref 13–17)
INR BLD: 1.47 RATIO — HIGH (ref 0.88–1.16)
INR BLD: 1.69 RATIO — HIGH (ref 0.88–1.16)
MCHC RBC-ENTMCNC: 31.7 PG — SIGNIFICANT CHANGE UP (ref 27–34)
MCHC RBC-ENTMCNC: 33 GM/DL — SIGNIFICANT CHANGE UP (ref 32–36)
MCV RBC AUTO: 96 FL — SIGNIFICANT CHANGE UP (ref 80–100)
NT-PROBNP SERPL-SCNC: 8335 PG/ML — HIGH (ref 0–300)
PHOSPHATE SERPL-MCNC: 4.5 MG/DL — SIGNIFICANT CHANGE UP (ref 2.5–4.5)
PLATELET # BLD AUTO: 114 K/UL — LOW (ref 150–400)
POTASSIUM SERPL-MCNC: 4.1 MMOL/L — SIGNIFICANT CHANGE UP (ref 3.5–5.3)
POTASSIUM SERPL-SCNC: 4.1 MMOL/L — SIGNIFICANT CHANGE UP (ref 3.5–5.3)
PROT SERPL-MCNC: 5.8 G/DL — LOW (ref 6–8.3)
PROTHROM AB SERPL-ACNC: 16 SEC — HIGH (ref 9.8–12.7)
PROTHROM AB SERPL-ACNC: 18.6 SEC — HIGH (ref 9.8–12.7)
RBC # BLD: 3.37 M/UL — LOW (ref 4.2–5.8)
RBC # FLD: 14.2 % — SIGNIFICANT CHANGE UP (ref 10.3–14.5)
SODIUM SERPL-SCNC: 137 MMOL/L — SIGNIFICANT CHANGE UP (ref 135–145)
WBC # BLD: 16.1 K/UL — HIGH (ref 3.8–10.5)
WBC # FLD AUTO: 16.1 K/UL — HIGH (ref 3.8–10.5)

## 2017-08-06 PROCEDURE — 71010: CPT | Mod: 26,76

## 2017-08-06 RX ORDER — HYDROMORPHONE HYDROCHLORIDE 2 MG/ML
0.5 INJECTION INTRAMUSCULAR; INTRAVENOUS; SUBCUTANEOUS ONCE
Qty: 0 | Refills: 0 | Status: DISCONTINUED | OUTPATIENT
Start: 2017-08-06 | End: 2017-08-06

## 2017-08-06 RX ORDER — DIGOXIN 250 MCG
0.12 TABLET ORAL DAILY
Qty: 0 | Refills: 0 | Status: DISCONTINUED | OUTPATIENT
Start: 2017-08-06 | End: 2017-08-11

## 2017-08-06 RX ORDER — METOPROLOL TARTRATE 50 MG
25 TABLET ORAL
Qty: 0 | Refills: 0 | Status: DISCONTINUED | OUTPATIENT
Start: 2017-08-06 | End: 2017-08-07

## 2017-08-06 RX ORDER — ASPIRIN/CALCIUM CARB/MAGNESIUM 324 MG
81 TABLET ORAL DAILY
Qty: 0 | Refills: 0 | Status: DISCONTINUED | OUTPATIENT
Start: 2017-08-06 | End: 2017-08-11

## 2017-08-06 RX ORDER — MAGNESIUM SULFATE 500 MG/ML
1 VIAL (ML) INJECTION ONCE
Qty: 0 | Refills: 0 | Status: COMPLETED | OUTPATIENT
Start: 2017-08-06 | End: 2017-08-06

## 2017-08-06 RX ORDER — AMIODARONE HYDROCHLORIDE 400 MG/1
150 TABLET ORAL ONCE
Qty: 0 | Refills: 0 | Status: COMPLETED | OUTPATIENT
Start: 2017-08-06 | End: 2017-08-06

## 2017-08-06 RX ORDER — ACETAMINOPHEN 500 MG
1000 TABLET ORAL ONCE
Qty: 0 | Refills: 0 | Status: COMPLETED | OUTPATIENT
Start: 2017-08-06 | End: 2017-08-06

## 2017-08-06 RX ORDER — CEFUROXIME AXETIL 250 MG
1500 TABLET ORAL EVERY 8 HOURS
Qty: 0 | Refills: 0 | Status: DISCONTINUED | OUTPATIENT
Start: 2017-08-06 | End: 2017-08-08

## 2017-08-06 RX ORDER — WARFARIN SODIUM 2.5 MG/1
3 TABLET ORAL ONCE
Qty: 0 | Refills: 0 | Status: COMPLETED | OUTPATIENT
Start: 2017-08-06 | End: 2017-08-06

## 2017-08-06 RX ORDER — POTASSIUM CHLORIDE 20 MEQ
10 PACKET (EA) ORAL
Qty: 0 | Refills: 0 | Status: COMPLETED | OUTPATIENT
Start: 2017-08-06 | End: 2017-08-06

## 2017-08-06 RX ORDER — FENTANYL CITRATE 50 UG/ML
25 INJECTION INTRAVENOUS ONCE
Qty: 0 | Refills: 0 | Status: DISCONTINUED | OUTPATIENT
Start: 2017-08-06 | End: 2017-08-06

## 2017-08-06 RX ORDER — METOPROLOL TARTRATE 50 MG
5 TABLET ORAL ONCE
Qty: 0 | Refills: 0 | Status: COMPLETED | OUTPATIENT
Start: 2017-08-06 | End: 2017-08-06

## 2017-08-06 RX ORDER — PANTOPRAZOLE SODIUM 20 MG/1
40 TABLET, DELAYED RELEASE ORAL
Qty: 0 | Refills: 0 | Status: DISCONTINUED | OUTPATIENT
Start: 2017-08-06 | End: 2017-08-11

## 2017-08-06 RX ADMIN — Medication 3 MILLILITER(S): at 05:47

## 2017-08-06 RX ADMIN — Medication 100 MILLIGRAM(S): at 10:53

## 2017-08-06 RX ADMIN — Medication 3 MILLILITER(S): at 23:17

## 2017-08-06 RX ADMIN — Medication 400 MILLIGRAM(S): at 01:00

## 2017-08-06 RX ADMIN — ENOXAPARIN SODIUM 40 MILLIGRAM(S): 100 INJECTION SUBCUTANEOUS at 13:19

## 2017-08-06 RX ADMIN — Medication 100 MILLIGRAM(S): at 21:47

## 2017-08-06 RX ADMIN — HYDROMORPHONE HYDROCHLORIDE 0.5 MILLIGRAM(S): 2 INJECTION INTRAMUSCULAR; INTRAVENOUS; SUBCUTANEOUS at 21:45

## 2017-08-06 RX ADMIN — ATORVASTATIN CALCIUM 40 MILLIGRAM(S): 80 TABLET, FILM COATED ORAL at 21:47

## 2017-08-06 RX ADMIN — Medication 3 MILLILITER(S): at 17:09

## 2017-08-06 RX ADMIN — Medication 50 MILLIEQUIVALENT(S): at 02:15

## 2017-08-06 RX ADMIN — WARFARIN SODIUM 3 MILLIGRAM(S): 2.5 TABLET ORAL at 21:45

## 2017-08-06 RX ADMIN — Medication 25 MILLIGRAM(S): at 21:44

## 2017-08-06 RX ADMIN — Medication 100 MILLIGRAM(S): at 00:13

## 2017-08-06 RX ADMIN — Medication 100 MILLIGRAM(S): at 06:32

## 2017-08-06 RX ADMIN — Medication 1000 MILLIGRAM(S): at 01:15

## 2017-08-06 RX ADMIN — Medication 0.12 MILLIGRAM(S): at 06:32

## 2017-08-06 RX ADMIN — Medication 0.25 MILLIGRAM(S): at 17:09

## 2017-08-06 RX ADMIN — HYDROMORPHONE HYDROCHLORIDE 0.5 MILLIGRAM(S): 2 INJECTION INTRAMUSCULAR; INTRAVENOUS; SUBCUTANEOUS at 22:00

## 2017-08-06 RX ADMIN — Medication 3 MILLILITER(S): at 11:39

## 2017-08-06 RX ADMIN — PANTOPRAZOLE SODIUM 40 MILLIGRAM(S): 20 TABLET, DELAYED RELEASE ORAL at 21:47

## 2017-08-06 RX ADMIN — Medication 325 MILLIGRAM(S): at 13:20

## 2017-08-06 RX ADMIN — Medication 100 MILLIGRAM(S): at 13:22

## 2017-08-06 RX ADMIN — Medication 0.25 MILLIGRAM(S): at 05:47

## 2017-08-06 RX ADMIN — Medication 5 MILLIGRAM(S): at 21:44

## 2017-08-06 RX ADMIN — FINASTERIDE 5 MILLIGRAM(S): 5 TABLET, FILM COATED ORAL at 13:21

## 2017-08-06 RX ADMIN — HYDROMORPHONE HYDROCHLORIDE 0.5 MILLIGRAM(S): 2 INJECTION INTRAMUSCULAR; INTRAVENOUS; SUBCUTANEOUS at 12:00

## 2017-08-06 RX ADMIN — HYDROMORPHONE HYDROCHLORIDE 0.5 MILLIGRAM(S): 2 INJECTION INTRAMUSCULAR; INTRAVENOUS; SUBCUTANEOUS at 12:15

## 2017-08-06 RX ADMIN — FENTANYL CITRATE 25 MICROGRAM(S): 50 INJECTION INTRAVENOUS at 01:00

## 2017-08-06 RX ADMIN — AMIODARONE HYDROCHLORIDE 200 MILLIGRAM(S): 400 TABLET ORAL at 06:32

## 2017-08-06 RX ADMIN — FENTANYL CITRATE 25 MICROGRAM(S): 50 INJECTION INTRAVENOUS at 01:15

## 2017-08-06 RX ADMIN — Medication 100 GRAM(S): at 19:00

## 2017-08-06 RX ADMIN — AMIODARONE HYDROCHLORIDE 600 MILLIGRAM(S): 400 TABLET ORAL at 18:30

## 2017-08-06 RX ADMIN — PANTOPRAZOLE SODIUM 40 MILLIGRAM(S): 20 TABLET, DELAYED RELEASE ORAL at 13:20

## 2017-08-06 RX ADMIN — Medication 50 MILLIEQUIVALENT(S): at 02:50

## 2017-08-06 RX ADMIN — Medication 100 MILLIGRAM(S): at 21:48

## 2017-08-06 RX ADMIN — Medication 100 MILLIGRAM(S): at 13:20

## 2017-08-06 NOTE — PROGRESS NOTE ADULT - PROBLEM SELECTOR PLAN 1
Continue postoperative recovery in CTU.   Supplement oxygen for O2 Sat >92%.  Dobutamine titrated off for atrial fibrillation with rapid response.  Amiodarone and digoxin restarted for rate control.  Lasix IVP given for systolic heart failure management.  Coumadin 2mg ordered for INR goal 2-3 (atrial fibrillation).  Progess ambulation status.  Monitor strict I+O's to maintain negative fluid balance.

## 2017-08-06 NOTE — PROGRESS NOTE ADULT - SUBJECTIVE AND OBJECTIVE BOX
Operation:   CABG X 1 Off Pump 8/4/2017  POD 2  Narrative:  Patient is resting in bed.    Vital Signs Last 24 Hrs  T(C): 37.2 (05 Aug 2017 23:00), Max: 37.4 (05 Aug 2017 12:00)  T(F): 99 (05 Aug 2017 23:00), Max: 99.3 (05 Aug 2017 12:00)  HR: 115 (06 Aug 2017 00:00) (79 - 140)  BP: 115/66 (05 Aug 2017 14:00) (115/66 - 143/58)  BP(mean): 84 (05 Aug 2017 14:00) (83 - 84)  RR: 20 (06 Aug 2017 00:21) (13 - 48)  SpO2: 99% (06 Aug 2017 00:21) (78% - 100%)  I&O's Detail    04 Aug 2017 07:01  -  05 Aug 2017 07:00  --------------------------------------------------------  IN:    Albumin 5%  - 250 mL: 1000 mL    DOBUTamine Infusion: 85.8 mL    EPINEPHrine Infusion: 19.8 mL    insulin Infusion: 34 mL    IV PiggyBack: 300 mL    Lactated Ringers IV Bolus: 500 mL    norepinephrine Infusion: 162 mL    vasopressin Infusion: 8 mL  Total IN: 2109.6 mL    OUT:    Chest Tube: 160 mL    Chest Tube: 90 mL    Chest Tube: 125 mL    Indwelling Catheter - Urethral: 880 mL  Total OUT: 1255 mL    Total NET: 854.6 mL      05 Aug 2017 07:01  -  06 Aug 2017 00:27  --------------------------------------------------------  IN:    Albumin 5%  - 250 mL: 250 mL    DOBUTamine Infusion: 79.2 mL    insulin Infusion: 48 mL    IV PiggyBack: 150 mL    norepinephrine Infusion: 43 mL    vasopressin Infusion: 19 mL  Total IN: 589.2 mL    OUT:    Chest Tube: 60 mL    Chest Tube: 20 mL    Chest Tube: 150 mL    Indwelling Catheter - Urethral: 1765 mL  Total OUT: 1995 mL    Total NET: -1405.8 mL          CHEST TUBE: Left Pleural, AIR LEAKS: No. On Suction .  EPICARDIAL WIRES: Yes  NOEL: Yes  LABS:  PT/INR - ( 05 Aug 2017 15:32 )   PT: 16.4 sec;   INR: 1.51 ratio         PTT - ( 05 Aug 2017 15:32 )  PTT:32.1 sec                        11.6   16.4  )-----------( 121      ( 05 Aug 2017 11:10 )             35.3     ABG - ( 05 Aug 2017 22:59 )  pH: 7.37  /  pCO2: 43    /  pO2: 70    / HCO3: 24    / Base Excess: -.6   /  SaO2: 94                08-05    138  |  101  |  27<H>  ----------------------------<  173<H>  4.4   |  21<L>  |  1.64<H>    Ca    8.9      05 Aug 2017 11:10    TPro  5.2<L>  /  Alb  3.2<L>  /  TBili  1.0  /  DBili  x   /  AST  52<H>  /  ALT  25  /  AlkPhos  42  08-05      MEDICATIONS  (STANDING):  pantoprazole  Injectable 40 milliGRAM(s) IV Push daily  docusate sodium 100 milliGRAM(s) Oral three times a day  insulin Infusion 2 Unit(s)/Hr (2 mL/Hr) IV Continuous <Continuous>  aspirin enteric coated 325 milliGRAM(s) Oral daily  vasopressin Infusion 0.1 Unit(s)/Min (6 mL/Hr) IV Continuous <Continuous>  enoxaparin Injectable 40 milliGRAM(s) SubCutaneous daily  finasteride 5 milliGRAM(s) Oral daily  atorvastatin 40 milliGRAM(s) Oral at bedtime  ALBUTerol/ipratropium for Nebulization 3 milliLiter(s) Nebulizer every 6 hours  buDESOnide   0.25 milliGRAM(s) Respule 0.25 milliGRAM(s) Inhalation two times a day  amiodarone    Tablet 200 milliGRAM(s) Oral daily    MEDICATIONS  (PRN):      General: A+Ox3, in NAD, laying in bed comfortably; Following commands  Chest: sternal dressing C/D/I; sternum stable  Heart: S1, Irregular, No audible murmurs  Lungs: CTA B/L (diminished at bases)  Abdomen: Soft, ND, NT, positive BSx4  Extremeties: All extremities warm with good strength; No edema B/L LE's; positive DP pulses B/L by palpation    Does the patient have a history of CHF? Yes  -If yes, systolic  -pre-operative EF 20%    Extubation within 24 hours? Yes    CXR: Mild PVC bilateral.    Does the patient have a history of kidney disease? Yes  -give CKD stage if applicable  STAGE 3  -what is patient's baseline Creatinine 1.89    Beta Blockers contraindicated for the first 24 hours due to vasopressor/inotropic medication?  -If on pressors, indication:        DVT PPX: Coumadin      RADIOLOGY & ADDITIONAL TESTS:    Plan:    Patient seen and discussed at bedside with Dr. Posey

## 2017-08-06 NOTE — PROVIDER CONTACT NOTE (MEDICATION) - ACTION/TREATMENT ORDERED:
As per MD Posey administer 150 amio bolus & check potassium
As per NP Checo continue to monitor. No further interventions @ this time

## 2017-08-06 NOTE — PROGRESS NOTE ADULT - ASSESSMENT
73 yo Male POD 2 CABG X2 with PMH Atrial fibrillation, AICD/NSVT, COPD, Systolic heart failure, CAD with Stents, HTN , HLD, DM2.

## 2017-08-07 DIAGNOSIS — R73.03 PREDIABETES: ICD-10-CM

## 2017-08-07 DIAGNOSIS — I50.23 ACUTE ON CHRONIC SYSTOLIC (CONGESTIVE) HEART FAILURE: ICD-10-CM

## 2017-08-07 LAB
ALBUMIN SERPL ELPH-MCNC: 3.1 G/DL — LOW (ref 3.3–5)
ALP SERPL-CCNC: 66 U/L — SIGNIFICANT CHANGE UP (ref 40–120)
ALT FLD-CCNC: 28 U/L RC — SIGNIFICANT CHANGE UP (ref 10–45)
ANION GAP SERPL CALC-SCNC: 10 MMOL/L — SIGNIFICANT CHANGE UP (ref 5–17)
AST SERPL-CCNC: 39 U/L — SIGNIFICANT CHANGE UP (ref 10–40)
BILIRUB SERPL-MCNC: 0.8 MG/DL — SIGNIFICANT CHANGE UP (ref 0.2–1.2)
BUN SERPL-MCNC: 28 MG/DL — HIGH (ref 7–23)
CALCIUM SERPL-MCNC: 9.2 MG/DL — SIGNIFICANT CHANGE UP (ref 8.4–10.5)
CHLORIDE SERPL-SCNC: 101 MMOL/L — SIGNIFICANT CHANGE UP (ref 96–108)
CO2 SERPL-SCNC: 25 MMOL/L — SIGNIFICANT CHANGE UP (ref 22–31)
CREAT SERPL-MCNC: 1.44 MG/DL — HIGH (ref 0.5–1.3)
GLUCOSE SERPL-MCNC: 99 MG/DL — SIGNIFICANT CHANGE UP (ref 70–99)
HCT VFR BLD CALC: 34.9 % — LOW (ref 39–50)
HGB BLD-MCNC: 11.4 G/DL — LOW (ref 13–17)
INR BLD: 2.21 RATIO — HIGH (ref 0.88–1.16)
MCHC RBC-ENTMCNC: 31.7 PG — SIGNIFICANT CHANGE UP (ref 27–34)
MCHC RBC-ENTMCNC: 32.7 GM/DL — SIGNIFICANT CHANGE UP (ref 32–36)
MCV RBC AUTO: 97.1 FL — SIGNIFICANT CHANGE UP (ref 80–100)
PHOSPHATE SERPL-MCNC: 3.1 MG/DL — SIGNIFICANT CHANGE UP (ref 2.5–4.5)
PLATELET # BLD AUTO: 133 K/UL — LOW (ref 150–400)
POTASSIUM SERPL-MCNC: 4.4 MMOL/L — SIGNIFICANT CHANGE UP (ref 3.5–5.3)
POTASSIUM SERPL-SCNC: 4.4 MMOL/L — SIGNIFICANT CHANGE UP (ref 3.5–5.3)
PROT SERPL-MCNC: 5.8 G/DL — LOW (ref 6–8.3)
PROTHROM AB SERPL-ACNC: 24.3 SEC — HIGH (ref 9.8–12.7)
RBC # BLD: 3.59 M/UL — LOW (ref 4.2–5.8)
RBC # FLD: 14.4 % — SIGNIFICANT CHANGE UP (ref 10.3–14.5)
SODIUM SERPL-SCNC: 136 MMOL/L — SIGNIFICANT CHANGE UP (ref 135–145)
WBC # BLD: 13.5 K/UL — HIGH (ref 3.8–10.5)
WBC # FLD AUTO: 13.5 K/UL — HIGH (ref 3.8–10.5)

## 2017-08-07 PROCEDURE — 71010: CPT | Mod: 26

## 2017-08-07 RX ORDER — WARFARIN SODIUM 2.5 MG/1
2 TABLET ORAL ONCE
Qty: 0 | Refills: 0 | Status: COMPLETED | OUTPATIENT
Start: 2017-08-07 | End: 2017-08-07

## 2017-08-07 RX ORDER — METOPROLOL TARTRATE 50 MG
50 TABLET ORAL
Qty: 0 | Refills: 0 | Status: DISCONTINUED | OUTPATIENT
Start: 2017-08-07 | End: 2017-08-09

## 2017-08-07 RX ORDER — METOPROLOL TARTRATE 50 MG
25 TABLET ORAL ONCE
Qty: 0 | Refills: 0 | Status: COMPLETED | OUTPATIENT
Start: 2017-08-07 | End: 2017-08-07

## 2017-08-07 RX ORDER — OXYCODONE HYDROCHLORIDE 5 MG/1
5 TABLET ORAL EVERY 4 HOURS
Qty: 0 | Refills: 0 | Status: DISCONTINUED | OUTPATIENT
Start: 2017-08-07 | End: 2017-08-07

## 2017-08-07 RX ORDER — ACETAMINOPHEN 500 MG
650 TABLET ORAL EVERY 6 HOURS
Qty: 0 | Refills: 0 | Status: DISCONTINUED | OUTPATIENT
Start: 2017-08-07 | End: 2017-08-11

## 2017-08-07 RX ADMIN — Medication 81 MILLIGRAM(S): at 12:45

## 2017-08-07 RX ADMIN — Medication 50 MILLIGRAM(S): at 17:05

## 2017-08-07 RX ADMIN — Medication 0.25 MILLIGRAM(S): at 17:05

## 2017-08-07 RX ADMIN — Medication 100 MILLIGRAM(S): at 22:33

## 2017-08-07 RX ADMIN — FINASTERIDE 5 MILLIGRAM(S): 5 TABLET, FILM COATED ORAL at 12:45

## 2017-08-07 RX ADMIN — Medication 0.25 MILLIGRAM(S): at 05:49

## 2017-08-07 RX ADMIN — Medication 100 MILLIGRAM(S): at 12:46

## 2017-08-07 RX ADMIN — Medication 3 MILLILITER(S): at 12:46

## 2017-08-07 RX ADMIN — OXYCODONE HYDROCHLORIDE 5 MILLIGRAM(S): 5 TABLET ORAL at 13:56

## 2017-08-07 RX ADMIN — Medication 100 MILLIGRAM(S): at 12:45

## 2017-08-07 RX ADMIN — PANTOPRAZOLE SODIUM 40 MILLIGRAM(S): 20 TABLET, DELAYED RELEASE ORAL at 08:55

## 2017-08-07 RX ADMIN — Medication 3 MILLILITER(S): at 17:06

## 2017-08-07 RX ADMIN — Medication 25 MILLIGRAM(S): at 06:29

## 2017-08-07 RX ADMIN — Medication 0.12 MILLIGRAM(S): at 06:29

## 2017-08-07 RX ADMIN — Medication 100 MILLIGRAM(S): at 06:30

## 2017-08-07 RX ADMIN — OXYCODONE HYDROCHLORIDE 5 MILLIGRAM(S): 5 TABLET ORAL at 13:26

## 2017-08-07 RX ADMIN — AMIODARONE HYDROCHLORIDE 200 MILLIGRAM(S): 400 TABLET ORAL at 06:29

## 2017-08-07 RX ADMIN — WARFARIN SODIUM 2 MILLIGRAM(S): 2.5 TABLET ORAL at 22:32

## 2017-08-07 RX ADMIN — Medication 25 MILLIGRAM(S): at 15:55

## 2017-08-07 RX ADMIN — Medication 100 MILLIGRAM(S): at 06:31

## 2017-08-07 RX ADMIN — Medication 3 MILLILITER(S): at 05:50

## 2017-08-07 RX ADMIN — Medication 100 MILLIGRAM(S): at 22:32

## 2017-08-07 RX ADMIN — ATORVASTATIN CALCIUM 40 MILLIGRAM(S): 80 TABLET, FILM COATED ORAL at 22:32

## 2017-08-07 RX ADMIN — ENOXAPARIN SODIUM 40 MILLIGRAM(S): 100 INJECTION SUBCUTANEOUS at 12:46

## 2017-08-07 NOTE — PROGRESS NOTE ADULT - ASSESSMENT
72 year old gentleman transferred from [facility] for surgical evaluation of multivessel CAD found on cardiac catheterization. Patient has a history of AICD placement in 2015 with prior stent placement, copd,diabetes , afib.  Patient found to have VT for which AICD was triggered and resuscitated patient, thought to be ischemic in nature.    8/4/17 s/p cabg x 1 w lima, OP, bilateral groin cutdowns, iabp placement  Post op she required inotrope's  and pressors , rapid afib, inotropes weaned off.  Amio and dig given, beta blockers added.  The patient has a ICD.  Anticoagulated for afib  8/7 transferred to [facility] 72 year old gentleman transferred from [facility] for surgical evaluation of multivessel CAD found on cardiac catheterization. Patient has a history of AICD placement in 2015 with prior stent placement, copd,diabetes , afib.  Patient found to have VT for which AICD was triggered and resuscitated patient, thought to be ischemic in nature.    8/4/17 s/p cabg x 1 w lima, OP, bilateral groin cutdowns, iabp placement  Post op he required inotrope's  and pressors , rapid afib, inotropes weaned off.  Amio and dig given, beta blockers added.  The patient has a ICD.  Anticoagulated for afib  8/7 transferred to [facility]

## 2017-08-07 NOTE — PROGRESS NOTE ADULT - SUBJECTIVE AND OBJECTIVE BOX
Im ok , good    VITAL SIGNS    Telemetry:  afib     Vital Signs Last 24 Hrs  T(C): 36.7 (17 @ 15:04), Max: 37.8 (17 @ 16:00)  T(F): 98.1 (17 @ 15:04), Max: 100 (17 @ 16:00)  HR: 102 (17 @ 15:04) (82 - 138)  BP: 103/59 (17 @ 15:04) (90/51 - 130/82)  RR: 18 (17 @ 15:04) (18 - 34)  SpO2: 97% (17 @ 15:04) (81% - 98%)                    @ 07:01  -   @ 07:00  --------------------------------------------------------  IN: 100 mL / OUT: 1160 mL / NET: -1060 mL     @ 07:01  -   @ 15:56  --------------------------------------------------------  IN: 660 mL / OUT: 300 mL / NET: 360 mL          Daily     Daily Weight in k.8 (07 Aug 2017 01:00)        CAPILLARY BLOOD GLUCOSE  111 (06 Aug 2017 19:00)                  Pacing Wires        [  ]   Settings:                                  Isolated  [ x ]    Coumadin    [ ] YES          [x  ]      NO         Reason:                               PHYSICAL EXAM        Neurology: alert and oriented x 3, nonfocal, no gross deficits    CV : irreg/irreg afib    Sternal Wound :  CDI , Stable    Lungs: bibasilar crackles    Abdomen: soft, nontender, nondistended, positive bowel sounds, last bowel movement -    :     voiding          Extremities:     trace edema - calve tenderness          docusate sodium 100 milliGRAM(s) Oral three times a day  enoxaparin Injectable 40 milliGRAM(s) SubCutaneous daily  finasteride 5 milliGRAM(s) Oral daily  atorvastatin 40 milliGRAM(s) Oral at bedtime  ALBUTerol/ipratropium for Nebulization 3 milliLiter(s) Nebulizer every 6 hours  buDESOnide   0.25 milliGRAM(s) Respule 0.25 milliGRAM(s) Inhalation two times a day  amiodarone    Tablet 200 milliGRAM(s) Oral daily  digoxin     Tablet 0.125 milliGRAM(s) Oral daily  cefuroxime  IVPB 1500 milliGRAM(s) IV Intermittent every 8 hours  aspirin enteric coated 81 milliGRAM(s) Oral daily  pantoprazole    Tablet 40 milliGRAM(s) Oral before breakfast  acetaminophen   Tablet. 650 milliGRAM(s) Oral every 6 hours PRN  metoprolol 25 milliGRAM(s) Oral once  metoprolol 50 milliGRAM(s) Oral two times a day                              Physical Therapy Rec:   Home  [  ]   Home w/ PT  [  ]  Rehab  [  ]    Discussed with Cardiothoracic Team at AM rounds.

## 2017-08-07 NOTE — PROGRESS NOTE ADULT - SUBJECTIVE AND OBJECTIVE BOX
Operation; C1l (opcab)  POD # 3    Patient is doing well, resting comfortably; complains of mild incisional pain that is relieved by pain medication.    Vital Signs Last 24 Hrs  T(C): 37.7 (06 Aug 2017 19:00), Max: 37.8 (06 Aug 2017 16:00)  T(F): 99.9 (06 Aug 2017 19:00), Max: 100 (06 Aug 2017 16:00)  HR: 88 (07 Aug 2017 04:00) (88 - 138)  BP: 99/56 (07 Aug 2017 04:00) (99/56 - 136/66)  BP(mean): 71 (07 Aug 2017 04:00) (71 - 94)  RR: 19 (07 Aug 2017 04:00) (15 - 34)  SpO2: 98% (07 Aug 2017 04:00) (81% - 99%)  I&O's Detail    05 Aug 2017 07:01  -  06 Aug 2017 07:00  --------------------------------------------------------  IN:    Albumin 5%  - 250 mL: 250 mL    DOBUTamine Infusion: 79.2 mL    insulin Infusion: 48 mL    IV PiggyBack: 450 mL    norepinephrine Infusion: 43 mL    vasopressin Infusion: 19 mL  Total IN: 889.2 mL    OUT:    Chest Tube: 60 mL    Chest Tube: 20 mL    Chest Tube: 250 mL    Indwelling Catheter - Urethral: 2060 mL  Total OUT: 2390 mL    Total NET: -1500.8 mL      06 Aug 2017 07:01  -  07 Aug 2017 04:20  --------------------------------------------------------  IN:    IV PiggyBack: 100 mL  Total IN: 100 mL    OUT:    Indwelling Catheter - Urethral: 610 mL    Voided: 300 mL  Total OUT: 910 mL    Total NET: -810 mL    EPICARDIAL WIRES: Ventricular wires   NOEL: Yes/No.    MEDICATIONS  (STANDING):  docusate sodium 100 milliGRAM(s) Oral three times a day  enoxaparin Injectable 40 milliGRAM(s) SubCutaneous daily  finasteride 5 milliGRAM(s) Oral daily  atorvastatin 40 milliGRAM(s) Oral at bedtime  ALBUTerol/ipratropium for Nebulization 3 milliLiter(s) Nebulizer every 6 hours  buDESOnide   0.25 milliGRAM(s) Respule 0.25 milliGRAM(s) Inhalation two times a day  amiodarone    Tablet 200 milliGRAM(s) Oral daily  digoxin     Tablet 0.125 milliGRAM(s) Oral daily  cefuroxime  IVPB 1500 milliGRAM(s) IV Intermittent every 8 hours  aspirin enteric coated 81 milliGRAM(s) Oral daily  metoprolol 25 milliGRAM(s) Oral two times a day  pantoprazole    Tablet 40 milliGRAM(s) Oral before breakfast    MEDICATIONS  (PRN):      General: A+Ox3, in NAD  Chest: sternal dressing C/D/I  Heart: S1, S2, RRR  Lungs: CTA B/L, without W/R/R  Abdomen: Soft, ND, NT, positive BS  Extremities; without edema B/L LE, positive DP pulses B/L     Does the patient have a history of CHF? yes  -If yes, systolic   -pre-operative EF 25%    Extubation within 24 hours? Yes    CXR reviewed with attending.     Does the patient have a history of kidney disease? Yes  -give CKD stage if applicable CKD 2  -what is patient's baseline Creatinine 2.1    Beta Blockers contraindicated for the first 24 hours due to vasopressor/inotropic medication      Did the patient receive transfusion of blood and/or products? none  -If yes, indication    DVT PPX with vendodynes as well as chemical prophylaxis.    Gin-operative antibiotics to be discontinued within 48 hours of CTU admission    Patient care discussed on morning interdisciplinary rounds with CTS team.

## 2017-08-08 DIAGNOSIS — Z95.1 PRESENCE OF AORTOCORONARY BYPASS GRAFT: ICD-10-CM

## 2017-08-08 DIAGNOSIS — I48.91 UNSPECIFIED ATRIAL FIBRILLATION: ICD-10-CM

## 2017-08-08 LAB
ANION GAP SERPL CALC-SCNC: 13 MMOL/L — SIGNIFICANT CHANGE UP (ref 5–17)
BUN SERPL-MCNC: 36 MG/DL — HIGH (ref 7–23)
CALCIUM SERPL-MCNC: 9 MG/DL — SIGNIFICANT CHANGE UP (ref 8.4–10.5)
CHLORIDE SERPL-SCNC: 97 MMOL/L — SIGNIFICANT CHANGE UP (ref 96–108)
CO2 SERPL-SCNC: 24 MMOL/L — SIGNIFICANT CHANGE UP (ref 22–31)
CREAT SERPL-MCNC: 1.47 MG/DL — HIGH (ref 0.5–1.3)
GLUCOSE SERPL-MCNC: 92 MG/DL — SIGNIFICANT CHANGE UP (ref 70–99)
GRAM STN FLD: SIGNIFICANT CHANGE UP
HCT VFR BLD CALC: 34.4 % — LOW (ref 39–50)
HGB BLD-MCNC: 11.5 G/DL — LOW (ref 13–17)
INR BLD: 2.06 RATIO — HIGH (ref 0.88–1.16)
MCHC RBC-ENTMCNC: 32.2 PG — SIGNIFICANT CHANGE UP (ref 27–34)
MCHC RBC-ENTMCNC: 33.3 GM/DL — SIGNIFICANT CHANGE UP (ref 32–36)
MCV RBC AUTO: 96.6 FL — SIGNIFICANT CHANGE UP (ref 80–100)
PLATELET # BLD AUTO: 187 K/UL — SIGNIFICANT CHANGE UP (ref 150–400)
POTASSIUM SERPL-MCNC: 4.2 MMOL/L — SIGNIFICANT CHANGE UP (ref 3.5–5.3)
POTASSIUM SERPL-SCNC: 4.2 MMOL/L — SIGNIFICANT CHANGE UP (ref 3.5–5.3)
PROTHROM AB SERPL-ACNC: 22.6 SEC — HIGH (ref 9.8–12.7)
RBC # BLD: 3.57 M/UL — LOW (ref 4.2–5.8)
RBC # FLD: 14 % — SIGNIFICANT CHANGE UP (ref 10.3–14.5)
SODIUM SERPL-SCNC: 134 MMOL/L — LOW (ref 135–145)
SPECIMEN SOURCE: SIGNIFICANT CHANGE UP
WBC # BLD: 13.3 K/UL — HIGH (ref 3.8–10.5)
WBC # FLD AUTO: 13.3 K/UL — HIGH (ref 3.8–10.5)

## 2017-08-08 RX ORDER — FUROSEMIDE 40 MG
40 TABLET ORAL DAILY
Qty: 0 | Refills: 0 | Status: DISCONTINUED | OUTPATIENT
Start: 2017-08-08 | End: 2017-08-11

## 2017-08-08 RX ORDER — WARFARIN SODIUM 2.5 MG/1
3 TABLET ORAL ONCE
Qty: 0 | Refills: 0 | Status: COMPLETED | OUTPATIENT
Start: 2017-08-08 | End: 2017-08-08

## 2017-08-08 RX ORDER — POTASSIUM CHLORIDE 20 MEQ
10 PACKET (EA) ORAL DAILY
Qty: 0 | Refills: 0 | Status: DISCONTINUED | OUTPATIENT
Start: 2017-08-08 | End: 2017-08-11

## 2017-08-08 RX ADMIN — Medication 40 MILLIGRAM(S): at 11:36

## 2017-08-08 RX ADMIN — Medication 650 MILLIGRAM(S): at 06:53

## 2017-08-08 RX ADMIN — Medication 50 MILLIGRAM(S): at 06:17

## 2017-08-08 RX ADMIN — Medication 0.12 MILLIGRAM(S): at 06:17

## 2017-08-08 RX ADMIN — Medication 10 MILLIEQUIVALENT(S): at 11:36

## 2017-08-08 RX ADMIN — AMIODARONE HYDROCHLORIDE 200 MILLIGRAM(S): 400 TABLET ORAL at 06:17

## 2017-08-08 RX ADMIN — Medication 3 MILLILITER(S): at 22:01

## 2017-08-08 RX ADMIN — Medication 3 MILLILITER(S): at 00:10

## 2017-08-08 RX ADMIN — Medication 650 MILLIGRAM(S): at 07:28

## 2017-08-08 RX ADMIN — Medication 650 MILLIGRAM(S): at 12:00

## 2017-08-08 RX ADMIN — ATORVASTATIN CALCIUM 40 MILLIGRAM(S): 80 TABLET, FILM COATED ORAL at 21:57

## 2017-08-08 RX ADMIN — Medication 100 MILLIGRAM(S): at 06:18

## 2017-08-08 RX ADMIN — Medication 81 MILLIGRAM(S): at 11:30

## 2017-08-08 RX ADMIN — PANTOPRAZOLE SODIUM 40 MILLIGRAM(S): 20 TABLET, DELAYED RELEASE ORAL at 06:17

## 2017-08-08 RX ADMIN — Medication 3 MILLILITER(S): at 11:30

## 2017-08-08 RX ADMIN — WARFARIN SODIUM 3 MILLIGRAM(S): 2.5 TABLET ORAL at 21:57

## 2017-08-08 RX ADMIN — Medication 650 MILLIGRAM(S): at 11:39

## 2017-08-08 RX ADMIN — Medication 3 MILLILITER(S): at 06:29

## 2017-08-08 RX ADMIN — Medication 100 MILLIGRAM(S): at 06:17

## 2017-08-08 RX ADMIN — Medication 0.25 MILLIGRAM(S): at 16:57

## 2017-08-08 RX ADMIN — Medication 50 MILLIGRAM(S): at 16:57

## 2017-08-08 RX ADMIN — ENOXAPARIN SODIUM 40 MILLIGRAM(S): 100 INJECTION SUBCUTANEOUS at 11:36

## 2017-08-08 RX ADMIN — Medication 0.25 MILLIGRAM(S): at 06:45

## 2017-08-08 RX ADMIN — Medication 100 MILLIGRAM(S): at 21:58

## 2017-08-08 RX ADMIN — FINASTERIDE 5 MILLIGRAM(S): 5 TABLET, FILM COATED ORAL at 11:30

## 2017-08-08 RX ADMIN — Medication 3 MILLILITER(S): at 16:58

## 2017-08-08 NOTE — DIETITIAN INITIAL EVALUATION ADULT. - ADHERENCE
good/Pt's wife prepares healthful meals for pt at home, knows to watch sodium content, cooks lean meats and vegetables

## 2017-08-08 NOTE — PROCEDURE NOTE - ADDITIONAL PROCEDURE DETAILS
Interrogation Noted: Called to adjust base rate  1. Not pacemaker dependent  2. Battery longevity 3.2- 4.5 years  3. Sensing/pacing thresholds adequate  4. Measured data WNL  5. Base rate changed from 80 to 70bpm as per CTU team request.
1. Measured data WNL  2. ICD reactivated with same preop paramaters  3. Pacing rate decreased back to 60 ppm as d/w anesthesia
1. Not pacemaker dependent  2. Battery longevity 4.0-5.8 years  3. Sensing/pacing thresholds adequate  4. R2pads on patient to external defibrillator  5. Measured data WNL  6. ICD deactivated prior to surgery and pacing rate increased to 80 ppm per anesthesia  7. Call post op for ICD reactivation

## 2017-08-08 NOTE — PROGRESS NOTE ADULT - PROBLEM SELECTOR PLAN 1
Asa, Statin, B-blocker, Chest PT, Incentive spirometry, wound care and  assessment  Ambulate   transfer to floor

## 2017-08-08 NOTE — DIETITIAN INITIAL EVALUATION ADULT. - ENERGY NEEDS
ht: 68 inches. wt: 199.2 pounds (current, standing, +2 B/L foot and leg edema, +1 generalized edema). BMI: 30.3 kG/m2. UBW: 200 pounds. IBW: 154 pounds +/- 10%. %IBW: 129%  Other pertinent objective information: 72 year old male pt with PMH HTN, pre-DM, COPD, AICD, prior stent found to have multivessel disease on cath, s/p CABG x 1 8/4/17. No pressure ulcers.

## 2017-08-08 NOTE — DIETITIAN INITIAL EVALUATION ADULT. - OTHER INFO
Pt seen for LOS. Pt reports good po intake/appetite at this time, observed 100% meal intake. Pt denies N/V/diarrhea, denies GI distress at this time, denies food allergies, denies difficulty chewing/swallowing. Pt admits to hx "borderline" diabetes, was instructed by MD to lose 20 pounds for which he lost ~10 pounds PTA through calorie reduction. Pt and wife interested in heart-healthy diet education and coumadin/vitamin K education at this time.

## 2017-08-08 NOTE — DIETITIAN INITIAL EVALUATION ADULT. - NS AS NUTRI INTERV MEALS SNACK
Carbohydrate - modified diet/General/healthful diet/Mineral - modified diet/1) Recommend continue current DASH/TLC, consistent CHO diet for heart health and blood glucose control.

## 2017-08-08 NOTE — DIETITIAN INITIAL EVALUATION ADULT. - NS AS NUTRI INTERV ED CONTENT
Nutrition relationship to health/disease/Recommended modifications/Purpose of the nutrition education/1) Educated pt and spouse on heart healthy and coumadin/vitamin K dietary recommendations. Discussed the following: low sodium diet recommendations, recommended sodium intake per day, review of high sodium food items to limit/avoid, healthy meal prep and seasoning ideas, adequate intake of fruits and vegetables, foods high in vitamin K, moderate intensity physical activity as tolerated/ per MD discretion upon d/c, 5-10% body weight loss to promote improved overall metabolic profile. Heart-healthy nutrition therapy handout + Coumadin booklet provided to pt for review at his leisure.

## 2017-08-08 NOTE — DIETITIAN INITIAL EVALUATION ADULT. - ORAL INTAKE PTA
Pt reports good po intake PTA; pt eats eggs with toast or cereal for breakfast; cold cut sandwich or take-out meal for lunch, hot meal prepared by spouse for dinner. Pt denies taking vitamin/mineral supplements PTA./good

## 2017-08-08 NOTE — PROGRESS NOTE ADULT - ASSESSMENT
72 year old gentleman transferred from [facility] for surgical evaluation of multivessel CAD found on cardiac catheterization. Patient has a history of AICD placement in 2015 with prior stent placement, copd,diabetes , afib.  Patient found to have VT for which AICD was triggered and resuscitated patient, thought to be ischemic in nature.    8/4/17 s/p cabg x 1 w lima, OP, bilateral groin cutdowns, iabp placement  Post op he required inotrope's  and pressors , rapid afib, inotropes weaned off.  Amio and dig given, beta blockers added.  The patient has a ICD.  Anticoagulated for afib  8/7 transferred to sdu  Beta blocker increased yesterday for HR control  Transfer to floor

## 2017-08-09 DIAGNOSIS — E87.79 OTHER FLUID OVERLOAD: ICD-10-CM

## 2017-08-09 LAB
ANION GAP SERPL CALC-SCNC: 15 MMOL/L — SIGNIFICANT CHANGE UP (ref 5–17)
BUN SERPL-MCNC: 37 MG/DL — HIGH (ref 7–23)
CALCIUM SERPL-MCNC: 8.9 MG/DL — SIGNIFICANT CHANGE UP (ref 8.4–10.5)
CHLORIDE SERPL-SCNC: 93 MMOL/L — LOW (ref 96–108)
CO2 SERPL-SCNC: 23 MMOL/L — SIGNIFICANT CHANGE UP (ref 22–31)
CREAT SERPL-MCNC: 1.35 MG/DL — HIGH (ref 0.5–1.3)
GLUCOSE SERPL-MCNC: 94 MG/DL — SIGNIFICANT CHANGE UP (ref 70–99)
HCT VFR BLD CALC: 34.2 % — LOW (ref 39–50)
HGB BLD-MCNC: 11.4 G/DL — LOW (ref 13–17)
INR BLD: 3.42 RATIO — HIGH (ref 0.88–1.16)
MCHC RBC-ENTMCNC: 31.8 PG — SIGNIFICANT CHANGE UP (ref 27–34)
MCHC RBC-ENTMCNC: 33.3 GM/DL — SIGNIFICANT CHANGE UP (ref 32–36)
MCV RBC AUTO: 95.4 FL — SIGNIFICANT CHANGE UP (ref 80–100)
PLATELET # BLD AUTO: 249 K/UL — SIGNIFICANT CHANGE UP (ref 150–400)
POTASSIUM SERPL-MCNC: 4.1 MMOL/L — SIGNIFICANT CHANGE UP (ref 3.5–5.3)
POTASSIUM SERPL-SCNC: 4.1 MMOL/L — SIGNIFICANT CHANGE UP (ref 3.5–5.3)
PROTHROM AB SERPL-ACNC: 38.2 SEC — HIGH (ref 9.8–12.7)
RBC # BLD: 3.58 M/UL — LOW (ref 4.2–5.8)
RBC # FLD: 14.1 % — SIGNIFICANT CHANGE UP (ref 10.3–14.5)
SODIUM SERPL-SCNC: 131 MMOL/L — LOW (ref 135–145)
WBC # BLD: 13 K/UL — HIGH (ref 3.8–10.5)
WBC # FLD AUTO: 13 K/UL — HIGH (ref 3.8–10.5)

## 2017-08-09 PROCEDURE — 93306 TTE W/DOPPLER COMPLETE: CPT | Mod: 26

## 2017-08-09 PROCEDURE — 71020: CPT | Mod: 26

## 2017-08-09 RX ORDER — FUROSEMIDE 40 MG
40 TABLET ORAL ONCE
Qty: 0 | Refills: 0 | Status: COMPLETED | OUTPATIENT
Start: 2017-08-09 | End: 2017-08-09

## 2017-08-09 RX ORDER — METOPROLOL TARTRATE 50 MG
100 TABLET ORAL DAILY
Qty: 0 | Refills: 0 | Status: DISCONTINUED | OUTPATIENT
Start: 2017-08-10 | End: 2017-08-11

## 2017-08-09 RX ADMIN — Medication 3 MILLILITER(S): at 18:02

## 2017-08-09 RX ADMIN — Medication 3 MILLILITER(S): at 23:43

## 2017-08-09 RX ADMIN — FINASTERIDE 5 MILLIGRAM(S): 5 TABLET, FILM COATED ORAL at 13:00

## 2017-08-09 RX ADMIN — Medication 3 MILLILITER(S): at 05:42

## 2017-08-09 RX ADMIN — Medication 0.25 MILLIGRAM(S): at 05:42

## 2017-08-09 RX ADMIN — Medication 50 MILLIGRAM(S): at 05:42

## 2017-08-09 RX ADMIN — ATORVASTATIN CALCIUM 40 MILLIGRAM(S): 80 TABLET, FILM COATED ORAL at 21:10

## 2017-08-09 RX ADMIN — Medication 50 MILLIGRAM(S): at 18:02

## 2017-08-09 RX ADMIN — PANTOPRAZOLE SODIUM 40 MILLIGRAM(S): 20 TABLET, DELAYED RELEASE ORAL at 05:41

## 2017-08-09 RX ADMIN — Medication 0.12 MILLIGRAM(S): at 05:41

## 2017-08-09 RX ADMIN — Medication 81 MILLIGRAM(S): at 13:00

## 2017-08-09 RX ADMIN — Medication 100 MILLIGRAM(S): at 05:41

## 2017-08-09 RX ADMIN — Medication 10 MILLIEQUIVALENT(S): at 13:00

## 2017-08-09 RX ADMIN — Medication 0.25 MILLIGRAM(S): at 18:02

## 2017-08-09 RX ADMIN — Medication 40 MILLIGRAM(S): at 16:05

## 2017-08-09 RX ADMIN — Medication 3 MILLILITER(S): at 13:00

## 2017-08-09 RX ADMIN — AMIODARONE HYDROCHLORIDE 200 MILLIGRAM(S): 400 TABLET ORAL at 05:41

## 2017-08-09 RX ADMIN — Medication 100 MILLIGRAM(S): at 21:10

## 2017-08-09 RX ADMIN — Medication 40 MILLIGRAM(S): at 05:41

## 2017-08-09 NOTE — PROGRESS NOTE ADULT - ASSESSMENT
72 year old gentleman transferred from [facility] for surgical evaluation of multivessel CAD found on cardiac catheterization. Patient has a history of AICD placement in 2015 with prior stent placement, copd,diabetes , afib.  Patient found to have VT for which AICD was triggered and resuscitated patient, thought to be ischemic in nature.    8/4/17 s/p cabg x 1 w lima, OP, bilateral groin cutdowns, iabp placement  Post op he required inotrope's  and pressors , rapid afib, inotropes weaned off.  Amio and dig given, beta blockers added.  The patient has a ICD.  Anticoagulated for afib with coumadin   INR 3.4 today.  HOLD coumadin today   8/7 transferred to sdu  Beta blocker increased yesterday for HR control  Transfer to floor 72 year old gentleman transferred from [facility] for surgical evaluation of multivessel CAD found on cardiac catheterization. Patient has a history of AICD placement in 2015 with prior stent placement, copd,diabetes , afib.  Patient found to have VT for which AICD was triggered and resuscitated patient, thought to be ischemic in nature.    8/4/17 s/p cabg x 1 w lima, OP, bilateral groin cutdowns, iabp placement  Post op he required inotrope's  and pressors , rapid afib, inotropes weaned off.  Amio and dig given, beta blockers added.  The patient has a ICD.  Anticoagulated for afib with coumadin   INR 3.4 today.  HOLD coumadin today   8/7 transferred to sdu  Beta blocker increased yesterday for HR control  Transfer to floor   8/8 PPM rate decreased to 70-   amio/ b-blockers and dig. 125 qd   8/9 ck echo and pa/ lateral chest xray as per Dr. Myers   discharge planning home thur 8/10 if INR and HR stable

## 2017-08-09 NOTE — PROGRESS NOTE ADULT - PROBLEM SELECTOR PLAN 1
Asa, Statin, B-blocker, Chest PT, Incentive spirometry, wound care and  assessment  Ambulate   transfer to floor continue postop care  antacid  pulm toilet  dvt prophylaxis  pain management   Asa, Statin, B-blocker,   ck Echo  ck xray   discharge planning - home thur 8/10 as per Lucia

## 2017-08-09 NOTE — PROGRESS NOTE ADULT - SUBJECTIVE AND OBJECTIVE BOX
VITAL SIGNS    Telemetry:  afib V. paced 90's with Wct   Vital Signs Last 24 Hrs  T(C): 37.1 (17 @ 04:40), Max: 37.1 (17 @ 04:40)  T(F): 98.8 (17 @ 04:40), Max: 98.8 (17 @ 04:40)  HR: 95 (17 @ 04:40) (85 - 95)  BP: 145/84 (17 @ 04:40) (98/60 - 145/84)  RR: 20 (17 @ 04:40) (18 - 20)  SpO2: 93% (17 @ 04:40) (93% - 94%)             @ 07:01  -   @ 07:00  --------------------------------------------------------  IN: 1220 mL / OUT: 1350 mL / NET: -130 mL       Daily     Daily Weight in k.4 (09 Aug 2017 07:04)  Admit Wt: Drug Dosing Weight  Height (cm): 172.72 (01 Aug 2017 14:45)  Weight (kg): 87.7 (01 Aug 2017 14:45)  BMI (kg/m2): 29.4 (01 Aug 2017 14:45)  BSA (m2): 2.01 (01 Aug 2017 14:45)        PHYSICAL EXAM    Subjective: "I feel ok."   Neurology: alert and oriented x 3, nonfocal, no gross deficits  CV : tele:  afib V. paced 90's with Wct   Sternal Wound :  CDI EVELINA   Lungs: clear diminished at the bases.  RR easy, unlabored   Abdomen: soft, nontender, nondistended, positive bowel sounds, + bowel movement   Neg N/V/D; obese abdomen   :  pt voiding without difficulty   Extremities:   GARCIA; +1 LE edema, neg calf tenderness.   PPP bilaterally ; bilateral groin sites cdi, evelina

## 2017-08-09 NOTE — PROGRESS NOTE ADULT - PROBLEM SELECTOR PLAN 2
Anticoagulation  Hr control Anticoagulation with coumadin INR 3.4 today - hold couamdin today  continue amio/ b-blockers/ dig .125 qd

## 2017-08-10 ENCOUNTER — TRANSCRIPTION ENCOUNTER (OUTPATIENT)
Age: 72
End: 2017-08-10

## 2017-08-10 ENCOUNTER — APPOINTMENT (OUTPATIENT)
Dept: ELECTROPHYSIOLOGY | Facility: HOSPITAL | Age: 72
End: 2017-08-10

## 2017-08-10 LAB
ANION GAP SERPL CALC-SCNC: 18 MMOL/L — HIGH (ref 5–17)
BUN SERPL-MCNC: 31 MG/DL — HIGH (ref 7–23)
CALCIUM SERPL-MCNC: 8.5 MG/DL — SIGNIFICANT CHANGE UP (ref 8.4–10.5)
CHLORIDE SERPL-SCNC: 91 MMOL/L — LOW (ref 96–108)
CO2 SERPL-SCNC: 17 MMOL/L — LOW (ref 22–31)
CREAT SERPL-MCNC: 1.24 MG/DL — SIGNIFICANT CHANGE UP (ref 0.5–1.3)
CULTURE RESULTS: SIGNIFICANT CHANGE UP
GLUCOSE SERPL-MCNC: 168 MG/DL — HIGH (ref 70–99)
HCT VFR BLD CALC: 34.3 % — LOW (ref 39–50)
HGB BLD-MCNC: 11.6 G/DL — LOW (ref 13–17)
INR BLD: 4.23 RATIO — HIGH (ref 0.88–1.16)
MCHC RBC-ENTMCNC: 31.9 PG — SIGNIFICANT CHANGE UP (ref 27–34)
MCHC RBC-ENTMCNC: 33.8 GM/DL — SIGNIFICANT CHANGE UP (ref 32–36)
MCV RBC AUTO: 94.3 FL — SIGNIFICANT CHANGE UP (ref 80–100)
PLATELET # BLD AUTO: 253 K/UL — SIGNIFICANT CHANGE UP (ref 150–400)
POTASSIUM SERPL-MCNC: 4.6 MMOL/L — SIGNIFICANT CHANGE UP (ref 3.5–5.3)
POTASSIUM SERPL-SCNC: 4.6 MMOL/L — SIGNIFICANT CHANGE UP (ref 3.5–5.3)
PROTHROM AB SERPL-ACNC: 47.1 SEC — HIGH (ref 9.8–12.7)
RBC # BLD: 3.64 M/UL — LOW (ref 4.2–5.8)
RBC # FLD: 14.1 % — SIGNIFICANT CHANGE UP (ref 10.3–14.5)
SODIUM SERPL-SCNC: 126 MMOL/L — LOW (ref 135–145)
SPECIMEN SOURCE: SIGNIFICANT CHANGE UP
WBC # BLD: 12.3 K/UL — HIGH (ref 3.8–10.5)
WBC # FLD AUTO: 12.3 K/UL — HIGH (ref 3.8–10.5)

## 2017-08-10 PROCEDURE — 93010 ELECTROCARDIOGRAM REPORT: CPT

## 2017-08-10 RX ADMIN — Medication 3 MILLILITER(S): at 17:48

## 2017-08-10 RX ADMIN — Medication 100 MILLIGRAM(S): at 06:37

## 2017-08-10 RX ADMIN — AMIODARONE HYDROCHLORIDE 200 MILLIGRAM(S): 400 TABLET ORAL at 06:37

## 2017-08-10 RX ADMIN — Medication 0.12 MILLIGRAM(S): at 06:37

## 2017-08-10 RX ADMIN — Medication 100 MILLIGRAM(S): at 15:11

## 2017-08-10 RX ADMIN — Medication 0.25 MILLIGRAM(S): at 17:48

## 2017-08-10 RX ADMIN — Medication 3 MILLILITER(S): at 11:47

## 2017-08-10 RX ADMIN — ATORVASTATIN CALCIUM 40 MILLIGRAM(S): 80 TABLET, FILM COATED ORAL at 21:37

## 2017-08-10 RX ADMIN — Medication 10 MILLIEQUIVALENT(S): at 11:47

## 2017-08-10 RX ADMIN — Medication 3 MILLILITER(S): at 23:50

## 2017-08-10 RX ADMIN — FINASTERIDE 5 MILLIGRAM(S): 5 TABLET, FILM COATED ORAL at 11:47

## 2017-08-10 RX ADMIN — Medication 0.25 MILLIGRAM(S): at 06:37

## 2017-08-10 RX ADMIN — Medication 3 MILLILITER(S): at 06:37

## 2017-08-10 RX ADMIN — PANTOPRAZOLE SODIUM 40 MILLIGRAM(S): 20 TABLET, DELAYED RELEASE ORAL at 06:36

## 2017-08-10 RX ADMIN — Medication 40 MILLIGRAM(S): at 06:37

## 2017-08-10 RX ADMIN — Medication 81 MILLIGRAM(S): at 11:47

## 2017-08-10 RX ADMIN — Medication 100 MILLIGRAM(S): at 21:37

## 2017-08-10 NOTE — DISCHARGE NOTE ADULT - MEDICATION SUMMARY - MEDICATIONS TO STOP TAKING
I will STOP taking the medications listed below when I get home from the hospital:    clopidogrel 75 mg oral tablet  -- 1 tab(s) by mouth once a day    ramipril 10 mg oral capsule  -- 1 cap(s) by mouth once a day

## 2017-08-10 NOTE — DISCHARGE NOTE ADULT - MEDICATION SUMMARY - MEDICATIONS TO TAKE
I will START or STAY ON the medications listed below when I get home from the hospital:    finasteride 5 mg oral tablet  -- 1 tab(s) by mouth once a day  -- Indication: For prostate    aspirin 81 mg oral delayed release tablet  -- 1 tab(s) by mouth once a day  -- Indication: For Coronary Artery Disease    acetaminophen 325 mg oral tablet  -- 2 tab(s) by mouth every 6 hours, As needed, Mild Pain (1 - 3)  -- Indication: For pain    Percocet 5/325 oral tablet  -- 1 tab(s) by mouth every 6 hours MDD:4  -- Indication: For pain    digoxin 125 mcg (0.125 mg) oral tablet  -- 1 tab(s) by mouth once a day  -- Indication: For Atrial fibrillation, unspecified type    amiodarone 200 mg oral tablet  -- 1 tab(s) by mouth once a day  -- Indication: For Atrial fibrillation, unspecified type    warfarin 2.5 mg oral tablet  -- 0.5 tab(s) by mouth once a day  -- Indication: For Atrial fibrillation, unspecified type    warfarin 2 mg oral tablet  -- 1 tab(s) by mouth once a day  -- Indication: For Atrial fibrillation, unspecified type    atorvastatin 40 mg oral tablet  -- 1 tab(s) by mouth once a day (at bedtime)  -- Indication: For Atherosclerosis of native coronary artery without angina pectoris    febuxostat 40 mg oral tablet  -- 1 tab(s) by mouth once a day  -- Indication: For gout    metoprolol succinate 100 mg oral tablet, extended release  -- 1 tab(s) by mouth once a day  -- Indication: For Coronary Artery Disease    furosemide 40 mg oral tablet  -- 1 tab(s) by mouth 2 times a day  -- Indication: For WATER PILL    Lasix 40 mg oral tablet  -- 1 tab(s) by mouth once a day  -- Indication: For water pill    docusate sodium 100 mg oral capsule  -- 1 cap(s) by mouth 3 times a day  -- Indication: For Stool softener    potassium chloride 10 mEq oral tablet, extended release  -- 2 tab(s) by mouth once a day  -- Indication: For elektrolyte supplement for water pill    pantoprazole 40 mg oral delayed release tablet  -- 1 tab(s) by mouth once a day (before a meal)  -- Indication: For Heartburn

## 2017-08-10 NOTE — PROGRESS NOTE ADULT - PROBLEM SELECTOR PLAN 1
continue postop care  antacid  pulm toilet  dvt prophylaxis  pain management   Asa, Statin, B-blocker,   discharge planning - home  Fri 8/11/17 as per Lucia

## 2017-08-10 NOTE — DISCHARGE NOTE ADULT - PLAN OF CARE
full recovery after  bypass surgery 1. Daily Shower  2. Weight yourself daily and notify any weight gain greater than 2-3 pounds in 24 hours.  3. Regular diet - low fat, low cholesterol, no added salt.  4. Cleanse Midsternal incision and leg incision daily while showering with warm water and mild soap, pat dry and maintain open to air.   5. Follow Cardiac Surgery Do's and Don'ts discharge instructions.   6. No driving until cleared by MD.   7. No heavy lifting nothing greater than 5 pounds until cleared by MD.   8. Call / Notify MD any fever greater than 101.0  9. Increase Activity as tolerated. Your heart rate and rhythm will be controlled. Continue with your cardiologist and primary care MD. Continue your current medications. Call your physician for palpitations, feelings of rapid heart beat, lightheadedness, or dizziness. If you are on warfarin (Coumadin), have your blood work drawn (prescription given) on  Monday 8/14/17 at Dr Camacho office____. Ask your nurse for written material about Coumadin and to provide patient education before discharge.

## 2017-08-10 NOTE — PROGRESS NOTE ADULT - PROVIDER SPECIALTY LIST ADULT
Anesthesia
CT Surgery
Critical Care
Critical Care
CT Surgery

## 2017-08-10 NOTE — DISCHARGE NOTE ADULT - NS AS ACTIVITY OBS
Walking-Outdoors allowed/No Heavy lifting/straining/Walking-Indoors allowed/no driving until cleared by surgeon/Do not drive or operate machinery/Showering allowed

## 2017-08-10 NOTE — PROGRESS NOTE ADULT - PROBLEM SELECTOR PLAN 2
Anticoagulation with coumadin INR 4.23 today - hold couamdin today  continue amio/ b-blockers/ dig .125 qd

## 2017-08-10 NOTE — PROGRESS NOTE ADULT - PROBLEM SELECTOR PROBLEM 2
Atrial fibrillation, unspecified type
Atrial fibrillation, unspecified type
H/O: Rheumatic Fever
Atrial fibrillation, unspecified type
H/O: Rheumatic Fever

## 2017-08-10 NOTE — DISCHARGE NOTE ADULT - DURABLE MEDICAL EQUIPMENT AGENCY
Rolling walker delivered to bedside 8/11 by [facility] [employer] [phone].

## 2017-08-10 NOTE — DISCHARGE NOTE ADULT - OTHER SIGNIFICANT FINDINGS
Subjective:  "I am happy I am being discharged"   Neurology: alert and oriented x 3, nonfocal, no gross deficits  CV : s1s1 reg no MRGS  Sternal Wound :  CDI , Stable  Lungs:  fine cracles bilat  equal chest expansion  Abdomen: soft, nontender, nondistended, positive bowel sounds, last bowel movement 8/11/17  :    voids  Extremities:   b/l groins ecchymosis, no hematoma +2BLE  edema,  +dp b/l , no calt tenderness b/l , warm and perfused b/l

## 2017-08-10 NOTE — DISCHARGE NOTE ADULT - PATIENT PORTAL LINK FT
“You can access the [employer] Patient Portal, offered by [facility], by registering with the following website: [email or web address]”

## 2017-08-10 NOTE — DISCHARGE NOTE ADULT - NS AS DC FU [facility] CONTRAINDICATIONS ACEI/ARB MEDS
worsening renal function/renal disease/dysfunction/to be resumed outpaitent/other reasons documented by MD/NP/PA or Pharmacist

## 2017-08-10 NOTE — DISCHARGE NOTE ADULT - INSTRUCTIONS
Regular diet - low fat, low cholesterol, no added salt. Warfarin compatible diet please refer to provided material; about dietary restrictions

## 2017-08-10 NOTE — DISCHARGE NOTE ADULT - CARE PROVIDER_API CALL
Murali Myers), Surgery; Thoracic and Cardiac Surgery  [address] [place] [id number]  Phone: [phone]  Fax: [phone]    Lucio Camacho), Cardiovascular Disease  [address] [place] [address]  Phone: [phone]  Fax: [phone]

## 2017-08-10 NOTE — PROGRESS NOTE ADULT - ASSESSMENT
72 year old gentleman transferred from [facility] for surgical evaluation of multivessel CAD found on cardiac catheterization. Patient has a history of AICD placement in 2015 with prior stent placement, copd,diabetes , afib.  Patient found to have VT for which AICD was triggered and resuscitated patient, thought to be ischemic in nature.    8/4/17 s/p cabg x 1 w lima, OP, bilateral groin cutdowns, iabp placement  Post op he required inotrope's  and pressors , rapid afib, inotropes weaned off.  Amio and dig given, beta blockers added.  The patient has a ICD.  Anticoagulated for afib with coumadin   INR 3.4 today.  HOLD coumadin today   8/7 transferred to sdu  Beta blocker increased yesterday for HR control  Transfer to floor   8/8 PPM rate decreased to 70-   amio/ b-blockers and dig. 125 qd   8/9 ck echo and pa/ lateral chest xray as per Dr. Myers   8/10 d/c cancelled due supra therapeutic INR 4.23 dispo planned  Fr 8/11/17  if INR stable   discharge planning home thur 8/10 if INR and HR stable

## 2017-08-10 NOTE — PROGRESS NOTE ADULT - SUBJECTIVE AND OBJECTIVE BOX
VITAL SIGNS    Telemetry:  afib/   Vital Signs Last 24 Hrs  T(C): 36.7 (08-10-17 @ 04:57), Max: 37.1 (17 @ 13:23)  T(F): 98 (08-10-17 @ 04:57), Max: 98.7 (17 @ 13:23)  HR: 110 (08-10-17 @ 11:14) (94 - 110)  BP: 132/65 (08-10-17 @ 11:14) (108/67 - 132/65)  RR: 18 (08-10-17 @ 04:57) (18 - 18)  SpO2: 86% (08-10-17 @ 11:14) (86% - 96%)             @ 07:01  -  08-10 @ 07:00  --------------------------------------------------------  IN: 1310 mL / OUT: 0 mL / NET: 1310 mL    08-10 @ 07:01  -  08-10 @ 11:58  --------------------------------------------------------  IN: 0 mL / OUT: 200 mL / NET: -200 mL         Daily Weight in k.4 (10 Aug 2017 11:56)  Admit Wt: Drug Dosing Weight  Height (cm): 172.72 (01 Aug 2017 14:45)  Weight (kg): 87.7 (01 Aug 2017 14:45)  BMI (kg/m2): 29.4 (01 Aug 2017 14:45)  BSA (m2): 2.01 (01 Aug 2017 14:45)        MEDICATIONS  docusate sodium 100 milliGRAM(s) Oral three times a day  finasteride 5 milliGRAM(s) Oral daily  atorvastatin 40 milliGRAM(s) Oral at bedtime  ALBUTerol/ipratropium for Nebulization 3 milliLiter(s) Nebulizer every 6 hours  buDESOnide   0.25 milliGRAM(s) Respule 0.25 milliGRAM(s) Inhalation two times a day  amiodarone    Tablet 200 milliGRAM(s) Oral daily  digoxin     Tablet 0.125 milliGRAM(s) Oral daily  aspirin enteric coated 81 milliGRAM(s) Oral daily  pantoprazole    Tablet 40 milliGRAM(s) Oral before breakfast  acetaminophen   Tablet. 650 milliGRAM(s) Oral every 6 hours PRN  furosemide    Tablet 40 milliGRAM(s) Oral daily  potassium chloride    Tablet ER 10 milliEquivalent(s) Oral daily  metoprolol succinate  milliGRAM(s) Oral daily      PHYSICAL EXAM    Subjective:" I am upset I am not going home today"   Neurology: alert and oriented x 3, nonfocal, no gross deficits  CV : s1s1 reg no MRGS  Sternal Wound :  CDI , Stable, chest tube sites with staples evelina   Lungs: CTA, equal chest expansion  Abdomen: soft, nontender, nondistended, positive bowel sounds, last bowel movement 17  :    voids  Extremities:  b/l groin sites CDI, +2 BLE  edema,  +dp b/l , no calt tenderness b/l , warm and perfused b/l    LABS  08-10    126<L>  |  91<L>  |  31<H>  ----------------------------<  168<H>  4.6   |  17<L>  |  1.24    Ca    8.5      10 Aug 2017 09:11                                   11.6   12.3  )-----------( 253      ( 10 Aug 2017 09:11 )             34.3          PT/INR - ( 10 Aug 2017 09:11 )   PT: 47.1 sec;   INR: 4.23 ratio         PAST MEDICAL & SURGICAL HISTORY:  NSVT (nonsustained ventricular tachycardia)  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  DM (diabetes mellitus)  HF (heart failure)  PAD (peripheral artery disease): with stents  Former smoker  Hypertension  H/O: Rheumatic Fever  Atrial Fibrillation  Pacemaker: defibrillator model # v-268 serial # [id number]  Hyperlipidemia  Gout  Coronary Artery Disease  Chronic Obstructive Pulmonary Disease (COPD)  Carotid Stenosis  CAD S/P percutaneous coronary angioplasty: stent placed  Popliteal artery injury: iliac/ popliteal angioplasty with stents   Arm injury: s/p surgery   Pacemaker: [employer] serial #[id number]   model #v-268  S/P Implantation of Automatic Cardioverter/Defibrillator (AICD)  S/P Tonsillectomy       Physical Therapy Rec:   Home  [  ]   Home w/ PT  [ x ]  Rehab  [  ]

## 2017-08-10 NOTE — DISCHARGE NOTE ADULT - ADDITIONAL INSTRUCTIONS
You have an appointment with Dr Myers on Monday 8/21/17 10 am please call office [phone] if you have to reschedule  Please schedule an appointment with your Cardiologist in 3-4 weeks, please call the office to schedule an appointment. You have an appointment with Dr Myers on Monday 8/21/17 10 am please call office [phone] if you have to reschedule  INR check on Monday 8/14/17 1030 at Dr Herrera office [phone] You have an appointment with Dr Myers on Wednesday 8/23/17 330pm please call office [phone] if you have to reschedule  INR check on Monday 8/14/17 1030 at Dr Herrera office [phone]

## 2017-08-10 NOTE — DISCHARGE NOTE ADULT - CARE PROVIDERS DIRECT ADDRESSES
[email or web address]

## 2017-08-10 NOTE — PROGRESS NOTE ADULT - PROBLEM SELECTOR PLAN 3
diuresis with lasix daily   daily weights  CXR 8/9/17-small b/l pleural effussions c/w diuresis
diuresis with lasix

## 2017-08-10 NOTE — PROGRESS NOTE ADULT - PROBLEM SELECTOR PROBLEM 1
Coronary artery disease involving native coronary artery, angina presence unspecified, unspecified whether native or transplanted heart
S/P CABG x 1
S/P CABG x 1
Coronary Artery Disease
Coronary artery disease involving native coronary artery, angina presence unspecified, unspecified whether native or transplanted heart
Coronary artery disease with other form of angina pectoris
S/P CABG x 1

## 2017-08-10 NOTE — DISCHARGE NOTE ADULT - CARE PLAN
Principal Discharge DX:	S/P CABG x 1  Goal:	full recovery after  bypass surgery  Instructions for follow-up, activity and diet:	1. Daily Shower  2. Weight yourself daily and notify any weight gain greater than 2-3 pounds in 24 hours.  3. Regular diet - low fat, low cholesterol, no added salt.  4. Cleanse Midsternal incision and leg incision daily while showering with warm water and mild soap, pat dry and maintain open to air.   5. Follow Cardiac Surgery Do's and Don'ts discharge instructions.   6. No driving until cleared by MD.   7. No heavy lifting nothing greater than 5 pounds until cleared by MD.   8. Call / Notify MD any fever greater than 101.0  9. Increase Activity as tolerated.  Secondary Diagnosis:	Atrial fibrillation, unspecified type  Goal:	Your heart rate and rhythm will be controlled.  Instructions for follow-up, activity and diet:	Continue with your cardiologist and primary care MD. Continue your current medications. Call your physician for palpitations, feelings of rapid heart beat, lightheadedness, or dizziness. If you are on warfarin (Coumadin), have your blood work drawn (prescription given) on  Monday 8/14/17 at Dr Camacho office____. Ask your nurse for written material about Coumadin and to provide patient education before discharge.

## 2017-08-10 NOTE — DISCHARGE NOTE ADULT - HOSPITAL COURSE
Medically stable for routine home discharge today in spite of supratherapeutic INR of 4.3 pt coumadin was held for 2 days and will be on hold until Sun 8/13/17. Pt to have  INR checked on Monday 8/14/17 at Dr Camacho office. Pt has small b/l pl effusions the setting post op fluid overload with low EF  send home with 5 days of Lasix 490mg bid then to be continued as daily. Also due to potential for worsening renal function post op ACI/ARB was not resumed immediately  during post operative period. Midthoracic incision CDI, b/l grois from cut down  IABP CDI ecchymosis throughout groins soft without evidence of hematoma. Pt has outpatient apt with Dr Myers on 8/21/17. 72 year old gentleman transferred from [facility] for surgical evaluation of multivessel CAD found on cardiac catheterization. Patient has a history of AICD placement in 2015 with prior stent placement, copd,diabetes , afib.  Patient found to have VT for which AICD was triggered and resuscitated patient, thought to be ischemic in nature.    8/4/17 s/p cabg x 1 w lima, OP, bilateral groin cutdowns, iabp placement  Post op he required inotrope's  and pressors , rapid afib, inotropes weaned off.  Amio and dig given, beta blockers added.  The patient has a ICD.  Anticoagulated for afib with coumadin   INR 3.4 today.  HOLD coumadin today   8/7 transferred to [facility]  Beta blocker increased yesterday for HR control  Transfer to floor   8/8 PPM rate decreased to 70-   amio/ b-blockers and dig. 125 qd   8/9 ck echo and pa/ lateral chest xray as per Dr. Myers   8/10 d/c cancelled due supra therapeutic INR 4.23 dispo planned  Fr 8/11/17  if INR stable   discharge planning home thur 8/10 if INR and HR stable  Medically stable for routine home discharge today in spite of supratherapeutic INR of 4.3 pt coumadin was held for 2 days and will be on hold until Sun 8/13/17. Pt to have  INR checked on Monday 8/14/17 at Dr Camacho office. Pt has small b/l pl effusions the setting post op fluid overload with low EF  send home with 5 days of Lasix 490mg bid then to be continued as daily. Also due to potential for worsening renal function post op ACI/ARB was not resumed immediately  during post operative period. Midthoracic incision CDI, b/l grois from cut down  IABP CDI ecchymosis throughout groins soft without evidence of hematoma. Pt has outpatient apt with Dr Myers on 8/21/17. 72 year old gentleman transferred from [facility] for surgical evaluation of multivessel CAD found on cardiac catheterization. Patient has a history of AICD placement in 2015 with prior stent placement, copd,diabetes , afib.  Patient found to have VT for which AICD was triggered and resuscitated patient, thought to be ischemic in nature.    8/4/17 s/p cabg x 1 w lima, OP, bilateral groin cutdowns, iabp placement  Post op he required inotrope's  and pressors , rapid afib, inotropes weaned off.  Amio and dig given, beta blockers added.  The patient has a ICD.  Anticoagulated for afib with coumadin   INR 3.4 today.  HOLD coumadin today   8/7 transferred to [facility]  Beta blocker increased yesterday for HR control  Transfer to floor   8/8 PPM rate decreased to 70-   amio/ b-blockers and dig. 125 qd   8/9 ck echo and pa/ lateral chest xray as per Dr. Myers   8/10 d/c cancelled due supra therapeutic INR 4.23 dispo planned  Fr 8/11/17  if INR stable   discharge planning home thur 8/10 if INR and HR stable  Medically stable for routine home discharge today in spite of supratherapeutic INR of 4.3 pt coumadin was held for 2 days and will be on hold until Sun 8/13/17. Pt to have  INR checked on Monday 8/14/17 at Dr Camacho office. Pt has small b/l pl effusions the setting post op fluid overload with low EF  send home with 5 days of Lasix 490mg bid then to be continued as daily. Also due to potential for worsening renal function post op ACI/ARB was not resumed immediately  during post operative period. Midthoracic incision CDI, b/l grois from cut down  IABP CDI ecchymosis throughout groins soft without evidence of hematoma. Pt has outpatient apt with Dr Myers on 8/23/17

## 2017-08-10 NOTE — DISCHARGE NOTE ADULT - HOME CARE AGENCY
[facility] [phone] Visiting nurse to call to arrange home care visit for SOC 1-2 days after discharge. Please call home care agency with any questions or concerns. [facility] [phone] Visiting nurse to call to arrange home care visit for SOC 1-2 days after discharge. Please call home care agency with any questions or concerns.

## 2017-08-11 VITALS
HEART RATE: 82 BPM | TEMPERATURE: 98 F | OXYGEN SATURATION: 98 % | DIASTOLIC BLOOD PRESSURE: 68 MMHG | RESPIRATION RATE: 17 BRPM | SYSTOLIC BLOOD PRESSURE: 109 MMHG

## 2017-08-11 LAB
ANION GAP SERPL CALC-SCNC: 14 MMOL/L — SIGNIFICANT CHANGE UP (ref 5–17)
BUN SERPL-MCNC: 29 MG/DL — HIGH (ref 7–23)
CALCIUM SERPL-MCNC: 8.5 MG/DL — SIGNIFICANT CHANGE UP (ref 8.4–10.5)
CHLORIDE SERPL-SCNC: 92 MMOL/L — LOW (ref 96–108)
CO2 SERPL-SCNC: 26 MMOL/L — SIGNIFICANT CHANGE UP (ref 22–31)
CREAT SERPL-MCNC: 1.32 MG/DL — HIGH (ref 0.5–1.3)
GLUCOSE SERPL-MCNC: 100 MG/DL — HIGH (ref 70–99)
HCT VFR BLD CALC: 32.7 % — LOW (ref 39–50)
HGB BLD-MCNC: 11 G/DL — LOW (ref 13–17)
INR BLD: 4.3 RATIO — HIGH (ref 0.88–1.16)
MCHC RBC-ENTMCNC: 31.9 PG — SIGNIFICANT CHANGE UP (ref 27–34)
MCHC RBC-ENTMCNC: 33.7 GM/DL — SIGNIFICANT CHANGE UP (ref 32–36)
MCV RBC AUTO: 94.8 FL — SIGNIFICANT CHANGE UP (ref 80–100)
PLATELET # BLD AUTO: 287 K/UL — SIGNIFICANT CHANGE UP (ref 150–400)
POTASSIUM SERPL-MCNC: 3.7 MMOL/L — SIGNIFICANT CHANGE UP (ref 3.5–5.3)
POTASSIUM SERPL-SCNC: 3.7 MMOL/L — SIGNIFICANT CHANGE UP (ref 3.5–5.3)
PROTHROM AB SERPL-ACNC: 48.3 SEC — HIGH (ref 9.8–12.7)
RBC # BLD: 3.45 M/UL — LOW (ref 4.2–5.8)
RBC # FLD: 14.2 % — SIGNIFICANT CHANGE UP (ref 10.3–14.5)
SODIUM SERPL-SCNC: 132 MMOL/L — LOW (ref 135–145)
WBC # BLD: 11.2 K/UL — HIGH (ref 3.8–10.5)
WBC # FLD AUTO: 11.2 K/UL — HIGH (ref 3.8–10.5)

## 2017-08-11 PROCEDURE — 85730 THROMBOPLASTIN TIME PARTIAL: CPT

## 2017-08-11 PROCEDURE — 93005 ELECTROCARDIOGRAM TRACING: CPT

## 2017-08-11 PROCEDURE — 85384 FIBRINOGEN ACTIVITY: CPT

## 2017-08-11 PROCEDURE — 82947 ASSAY GLUCOSE BLOOD QUANT: CPT

## 2017-08-11 PROCEDURE — 86891 AUTOLOGOUS BLOOD OP SALVAGE: CPT

## 2017-08-11 PROCEDURE — P9047: CPT

## 2017-08-11 PROCEDURE — 87640 STAPH A DNA AMP PROBE: CPT

## 2017-08-11 PROCEDURE — 86850 RBC ANTIBODY SCREEN: CPT

## 2017-08-11 PROCEDURE — 94002 VENT MGMT INPAT INIT DAY: CPT

## 2017-08-11 PROCEDURE — 86901 BLOOD TYPING SEROLOGIC RH(D): CPT

## 2017-08-11 PROCEDURE — 80048 BASIC METABOLIC PNL TOTAL CA: CPT

## 2017-08-11 PROCEDURE — 71046 X-RAY EXAM CHEST 2 VIEWS: CPT

## 2017-08-11 PROCEDURE — 93306 TTE W/DOPPLER COMPLETE: CPT

## 2017-08-11 PROCEDURE — 86923 COMPATIBILITY TEST ELECTRIC: CPT

## 2017-08-11 PROCEDURE — 93880 EXTRACRANIAL BILAT STUDY: CPT

## 2017-08-11 PROCEDURE — P9045: CPT

## 2017-08-11 PROCEDURE — 87641 MR-STAPH DNA AMP PROBE: CPT

## 2017-08-11 PROCEDURE — 97116 GAIT TRAINING THERAPY: CPT

## 2017-08-11 PROCEDURE — 84100 ASSAY OF PHOSPHORUS: CPT

## 2017-08-11 PROCEDURE — 82553 CREATINE MB FRACTION: CPT

## 2017-08-11 PROCEDURE — 80053 COMPREHEN METABOLIC PANEL: CPT

## 2017-08-11 PROCEDURE — 85027 COMPLETE CBC AUTOMATED: CPT

## 2017-08-11 PROCEDURE — 82803 BLOOD GASES ANY COMBINATION: CPT

## 2017-08-11 PROCEDURE — C1751: CPT

## 2017-08-11 PROCEDURE — 87070 CULTURE OTHR SPECIMN AEROBIC: CPT

## 2017-08-11 PROCEDURE — 84484 ASSAY OF TROPONIN QUANT: CPT

## 2017-08-11 PROCEDURE — 97530 THERAPEUTIC ACTIVITIES: CPT

## 2017-08-11 PROCEDURE — P9016: CPT

## 2017-08-11 PROCEDURE — 94660 CPAP INITIATION&MGMT: CPT

## 2017-08-11 PROCEDURE — C1769: CPT

## 2017-08-11 PROCEDURE — 94640 AIRWAY INHALATION TREATMENT: CPT

## 2017-08-11 PROCEDURE — 85610 PROTHROMBIN TIME: CPT

## 2017-08-11 PROCEDURE — 85014 HEMATOCRIT: CPT

## 2017-08-11 PROCEDURE — 71045 X-RAY EXAM CHEST 1 VIEW: CPT

## 2017-08-11 PROCEDURE — 82330 ASSAY OF CALCIUM: CPT

## 2017-08-11 PROCEDURE — 84132 ASSAY OF SERUM POTASSIUM: CPT

## 2017-08-11 PROCEDURE — 84295 ASSAY OF SERUM SODIUM: CPT

## 2017-08-11 PROCEDURE — 86900 BLOOD TYPING SEROLOGIC ABO: CPT

## 2017-08-11 PROCEDURE — 83605 ASSAY OF LACTIC ACID: CPT

## 2017-08-11 PROCEDURE — 99238 HOSP IP/OBS DSCHRG MGMT 30/<: CPT

## 2017-08-11 PROCEDURE — 82550 ASSAY OF CK (CPK): CPT

## 2017-08-11 PROCEDURE — C1889: CPT

## 2017-08-11 PROCEDURE — 83880 ASSAY OF NATRIURETIC PEPTIDE: CPT

## 2017-08-11 PROCEDURE — 97162 PT EVAL MOD COMPLEX 30 MIN: CPT

## 2017-08-11 PROCEDURE — 82435 ASSAY OF BLOOD CHLORIDE: CPT

## 2017-08-11 PROCEDURE — 83036 HEMOGLOBIN GLYCOSYLATED A1C: CPT

## 2017-08-11 PROCEDURE — 94010 BREATHING CAPACITY TEST: CPT

## 2017-08-11 PROCEDURE — 82533 TOTAL CORTISOL: CPT

## 2017-08-11 PROCEDURE — 82565 ASSAY OF CREATININE: CPT

## 2017-08-11 PROCEDURE — 84443 ASSAY THYROID STIM HORMONE: CPT

## 2017-08-11 PROCEDURE — 81001 URINALYSIS AUTO W/SCOPE: CPT

## 2017-08-11 RX ORDER — FINASTERIDE 5 MG/1
1 TABLET, FILM COATED ORAL
Qty: 0 | Refills: 0 | COMMUNITY
Start: 2017-08-11

## 2017-08-11 RX ORDER — FEBUXOSTAT 40 MG/1
1 TABLET ORAL
Qty: 0 | Refills: 0 | COMMUNITY

## 2017-08-11 RX ORDER — AMIODARONE HYDROCHLORIDE 400 MG/1
1 TABLET ORAL
Qty: 0 | Refills: 0 | COMMUNITY

## 2017-08-11 RX ORDER — AMIODARONE HYDROCHLORIDE 400 MG/1
1 TABLET ORAL
Qty: 30 | Refills: 0
Start: 2017-08-11 | End: 2017-09-10

## 2017-08-11 RX ORDER — PANTOPRAZOLE SODIUM 20 MG/1
1 TABLET, DELAYED RELEASE ORAL
Qty: 30 | Refills: 0
Start: 2017-08-11 | End: 2017-09-10

## 2017-08-11 RX ORDER — METOPROLOL TARTRATE 50 MG
1 TABLET ORAL
Qty: 30 | Refills: 0 | OUTPATIENT
Start: 2017-08-11 | End: 2017-09-10

## 2017-08-11 RX ORDER — POTASSIUM CHLORIDE 20 MEQ
2 PACKET (EA) ORAL
Qty: 60 | Refills: 0 | OUTPATIENT
Start: 2017-08-11 | End: 2017-09-10

## 2017-08-11 RX ORDER — ATORVASTATIN CALCIUM 80 MG/1
1 TABLET, FILM COATED ORAL
Qty: 0 | Refills: 0 | COMMUNITY
Start: 2017-08-11

## 2017-08-11 RX ORDER — FINASTERIDE 5 MG/1
1 TABLET, FILM COATED ORAL
Qty: 0 | Refills: 0 | COMMUNITY

## 2017-08-11 RX ORDER — ACETAMINOPHEN 500 MG
2 TABLET ORAL
Qty: 0 | Refills: 0 | DISCHARGE
Start: 2017-08-11

## 2017-08-11 RX ORDER — FUROSEMIDE 40 MG
1 TABLET ORAL
Qty: 10 | Refills: 0 | OUTPATIENT
Start: 2017-08-11 | End: 2017-08-16

## 2017-08-11 RX ORDER — DIGOXIN 250 MCG
1 TABLET ORAL
Qty: 0 | Refills: 0 | COMMUNITY
Start: 2017-08-11

## 2017-08-11 RX ORDER — RAMIPRIL 5 MG
1 CAPSULE ORAL
Qty: 0 | Refills: 0 | COMMUNITY

## 2017-08-11 RX ORDER — POTASSIUM CHLORIDE 20 MEQ
20 PACKET (EA) ORAL
Qty: 0 | Refills: 0 | Status: COMPLETED | OUTPATIENT
Start: 2017-08-11 | End: 2017-08-11

## 2017-08-11 RX ORDER — DOCUSATE SODIUM 100 MG
1 CAPSULE ORAL
Qty: 0 | Refills: 0 | DISCHARGE
Start: 2017-08-11

## 2017-08-11 RX ADMIN — Medication 20 MILLIEQUIVALENT(S): at 11:56

## 2017-08-11 RX ADMIN — Medication 650 MILLIGRAM(S): at 11:56

## 2017-08-11 RX ADMIN — Medication 10 MILLIEQUIVALENT(S): at 11:56

## 2017-08-11 RX ADMIN — FINASTERIDE 5 MILLIGRAM(S): 5 TABLET, FILM COATED ORAL at 11:56

## 2017-08-11 RX ADMIN — Medication 81 MILLIGRAM(S): at 11:56

## 2017-08-11 RX ADMIN — Medication 3 MILLILITER(S): at 11:55

## 2017-08-11 RX ADMIN — Medication 650 MILLIGRAM(S): at 12:30

## 2017-08-11 RX ADMIN — AMIODARONE HYDROCHLORIDE 200 MILLIGRAM(S): 400 TABLET ORAL at 05:19

## 2017-08-11 RX ADMIN — Medication 0.25 MILLIGRAM(S): at 05:19

## 2017-08-11 RX ADMIN — Medication 20 MILLIEQUIVALENT(S): at 10:00

## 2017-08-11 RX ADMIN — Medication 40 MILLIGRAM(S): at 05:19

## 2017-08-11 RX ADMIN — Medication 100 MILLIGRAM(S): at 11:55

## 2017-08-11 RX ADMIN — Medication 3 MILLILITER(S): at 05:19

## 2017-08-11 RX ADMIN — PANTOPRAZOLE SODIUM 40 MILLIGRAM(S): 20 TABLET, DELAYED RELEASE ORAL at 05:19

## 2017-08-11 RX ADMIN — Medication 100 MILLIGRAM(S): at 05:19

## 2017-08-11 RX ADMIN — Medication 0.12 MILLIGRAM(S): at 05:19

## 2017-08-13 LAB
CORTICOSTEROID BINDING GLOBULIN RESULT: 1.9 MG/DL — SIGNIFICANT CHANGE UP
CORTIS F/TOTAL MFR SERPL: 57 % — SIGNIFICANT CHANGE UP
CORTIS SERPL-MCNC: 30 UG/DL — SIGNIFICANT CHANGE UP
CORTISOL, FREE RESULT: 17 UG/DL — SIGNIFICANT CHANGE UP

## 2017-08-13 RX ORDER — WARFARIN SODIUM 2.5 MG/1
0.5 TABLET ORAL
Qty: 0 | Refills: 0 | COMMUNITY
Start: 2017-08-13

## 2017-08-14 ENCOUNTER — APPOINTMENT (OUTPATIENT)
Dept: CARDIOLOGY | Facility: CLINIC | Age: 72
End: 2017-08-14

## 2017-08-14 RX ORDER — WARFARIN SODIUM 2.5 MG/1
1 TABLET ORAL
Qty: 1 | Refills: 0 | OUTPATIENT
Start: 2017-08-14 | End: 2017-08-15

## 2017-08-16 ENCOUNTER — INPATIENT (INPATIENT)
Facility: HOSPITAL | Age: 72
LOS: 2 days | Discharge: HOME | DRG: 291 | End: 2017-08-19
Attending: THORACIC SURGERY (CARDIOTHORACIC VASCULAR SURGERY) | Admitting: THORACIC SURGERY (CARDIOTHORACIC VASCULAR SURGERY)
Payer: COMMERCIAL

## 2017-08-16 ENCOUNTER — APPOINTMENT (OUTPATIENT)
Dept: CARDIOTHORACIC SURGERY | Facility: CLINIC | Age: 72
End: 2017-08-16
Payer: MEDICARE

## 2017-08-16 VITALS
HEART RATE: 76 BPM | TEMPERATURE: 98 F | DIASTOLIC BLOOD PRESSURE: 81 MMHG | OXYGEN SATURATION: 92 % | RESPIRATION RATE: 18 BRPM | SYSTOLIC BLOOD PRESSURE: 148 MMHG

## 2017-08-16 VITALS
HEART RATE: 77 BPM | WEIGHT: 194 LBS | TEMPERATURE: 97.6 F | RESPIRATION RATE: 15 BRPM | BODY MASS INDEX: 31.18 KG/M2 | SYSTOLIC BLOOD PRESSURE: 131 MMHG | DIASTOLIC BLOOD PRESSURE: 77 MMHG | OXYGEN SATURATION: 90 % | HEIGHT: 66 IN

## 2017-08-16 DIAGNOSIS — I25.10 ATHEROSCLEROTIC HEART DISEASE OF NATIVE CORONARY ARTERY WITHOUT ANGINA PECTORIS: ICD-10-CM

## 2017-08-16 DIAGNOSIS — R06.02 SHORTNESS OF BREATH: ICD-10-CM

## 2017-08-16 DIAGNOSIS — I25.10 ATHEROSCLEROTIC HEART DISEASE OF NATIVE CORONARY ARTERY WITHOUT ANGINA PECTORIS: Chronic | ICD-10-CM

## 2017-08-16 DIAGNOSIS — S85.009A UNSPECIFIED INJURY OF POPLITEAL ARTERY, UNSPECIFIED LEG, INITIAL ENCOUNTER: Chronic | ICD-10-CM

## 2017-08-16 DIAGNOSIS — Z95.1 PRESENCE OF AORTOCORONARY BYPASS GRAFT: Chronic | ICD-10-CM

## 2017-08-16 DIAGNOSIS — I50.9 HEART FAILURE, UNSPECIFIED: ICD-10-CM

## 2017-08-16 DIAGNOSIS — E11.9 TYPE 2 DIABETES MELLITUS WITHOUT COMPLICATIONS: ICD-10-CM

## 2017-08-16 DIAGNOSIS — J44.9 CHRONIC OBSTRUCTIVE PULMONARY DISEASE, UNSPECIFIED: ICD-10-CM

## 2017-08-16 DIAGNOSIS — S49.90XA UNSPECIFIED INJURY OF SHOULDER AND UPPER ARM, UNSPECIFIED ARM, INITIAL ENCOUNTER: Chronic | ICD-10-CM

## 2017-08-16 LAB
ALBUMIN SERPL ELPH-MCNC: 3.2 G/DL — LOW (ref 3.3–5)
ALP SERPL-CCNC: 112 U/L — SIGNIFICANT CHANGE UP (ref 40–120)
ALT FLD-CCNC: 90 U/L RC — HIGH (ref 10–45)
ANION GAP SERPL CALC-SCNC: 15 MMOL/L — SIGNIFICANT CHANGE UP (ref 5–17)
APPEARANCE UR: CLEAR — SIGNIFICANT CHANGE UP
APTT BLD: 39.5 SEC — HIGH (ref 27.5–37.4)
AST SERPL-CCNC: 68 U/L — HIGH (ref 10–40)
BASOPHILS # BLD AUTO: 0 K/UL — SIGNIFICANT CHANGE UP (ref 0–0.2)
BASOPHILS NFR BLD AUTO: 0.3 % — SIGNIFICANT CHANGE UP (ref 0–2)
BILIRUB SERPL-MCNC: 0.9 MG/DL — SIGNIFICANT CHANGE UP (ref 0.2–1.2)
BILIRUB UR-MCNC: NEGATIVE — SIGNIFICANT CHANGE UP
BUN SERPL-MCNC: 25 MG/DL — HIGH (ref 7–23)
CALCIUM SERPL-MCNC: 8.8 MG/DL — SIGNIFICANT CHANGE UP (ref 8.4–10.5)
CHLORIDE SERPL-SCNC: 87 MMOL/L — LOW (ref 96–108)
CO2 SERPL-SCNC: 27 MMOL/L — SIGNIFICANT CHANGE UP (ref 22–31)
COLOR SPEC: YELLOW — SIGNIFICANT CHANGE UP
CREAT SERPL-MCNC: 1.37 MG/DL — HIGH (ref 0.5–1.3)
DIFF PNL FLD: NEGATIVE — SIGNIFICANT CHANGE UP
EOSINOPHIL # BLD AUTO: 0.2 K/UL — SIGNIFICANT CHANGE UP (ref 0–0.5)
EOSINOPHIL NFR BLD AUTO: 1.4 % — SIGNIFICANT CHANGE UP (ref 0–6)
GLUCOSE SERPL-MCNC: 118 MG/DL — HIGH (ref 70–99)
GLUCOSE UR QL: NEGATIVE — SIGNIFICANT CHANGE UP
HCT VFR BLD CALC: 37.3 % — LOW (ref 39–50)
HGB BLD-MCNC: 11.7 G/DL — LOW (ref 13–17)
INR BLD: 2.87 RATIO — HIGH (ref 0.88–1.16)
KETONES UR-MCNC: NEGATIVE — SIGNIFICANT CHANGE UP
LEUKOCYTE ESTERASE UR-ACNC: NEGATIVE — SIGNIFICANT CHANGE UP
LYMPHOCYTES # BLD AUTO: 1.1 K/UL — SIGNIFICANT CHANGE UP (ref 1–3.3)
LYMPHOCYTES # BLD AUTO: 9.2 % — LOW (ref 13–44)
MCHC RBC-ENTMCNC: 29.9 PG — SIGNIFICANT CHANGE UP (ref 27–34)
MCHC RBC-ENTMCNC: 31.4 GM/DL — LOW (ref 32–36)
MCV RBC AUTO: 95.3 FL — SIGNIFICANT CHANGE UP (ref 80–100)
MONOCYTES # BLD AUTO: 1.1 K/UL — HIGH (ref 0–0.9)
MONOCYTES NFR BLD AUTO: 9.2 % — SIGNIFICANT CHANGE UP (ref 2–14)
NEUTROPHILS # BLD AUTO: 9.5 K/UL — HIGH (ref 1.8–7.4)
NEUTROPHILS NFR BLD AUTO: 79.8 % — HIGH (ref 43–77)
NITRITE UR-MCNC: NEGATIVE — SIGNIFICANT CHANGE UP
PH UR: 6 — SIGNIFICANT CHANGE UP (ref 5–8)
PLATELET # BLD AUTO: 528 K/UL — HIGH (ref 150–400)
POTASSIUM SERPL-MCNC: 4.6 MMOL/L — SIGNIFICANT CHANGE UP (ref 3.5–5.3)
POTASSIUM SERPL-SCNC: 4.6 MMOL/L — SIGNIFICANT CHANGE UP (ref 3.5–5.3)
PROT SERPL-MCNC: 6.6 G/DL — SIGNIFICANT CHANGE UP (ref 6–8.3)
PROT UR-MCNC: 30 MG/DL
PROTHROM AB SERPL-ACNC: 32 SEC — HIGH (ref 9.8–12.7)
RBC # BLD: 3.92 M/UL — LOW (ref 4.2–5.8)
RBC # FLD: 14.9 % — HIGH (ref 10.3–14.5)
SODIUM SERPL-SCNC: 129 MMOL/L — LOW (ref 135–145)
SP GR SPEC: 1.01 — LOW (ref 1.01–1.02)
UROBILINOGEN FLD QL: NEGATIVE — SIGNIFICANT CHANGE UP
WBC # BLD: 11.9 K/UL — HIGH (ref 3.8–10.5)
WBC # FLD AUTO: 11.9 K/UL — HIGH (ref 3.8–10.5)

## 2017-08-16 PROCEDURE — 93308 TTE F-UP OR LMTD: CPT | Mod: 26

## 2017-08-16 PROCEDURE — 93321 DOPPLER ECHO F-UP/LMTD STD: CPT | Mod: 26

## 2017-08-16 PROCEDURE — 99024 POSTOP FOLLOW-UP VISIT: CPT

## 2017-08-16 PROCEDURE — 71010: CPT | Mod: 26

## 2017-08-16 RX ORDER — FINASTERIDE 5 MG/1
5 TABLET, FILM COATED ORAL DAILY
Qty: 0 | Refills: 0 | Status: DISCONTINUED | OUTPATIENT
Start: 2017-08-16 | End: 2017-08-19

## 2017-08-16 RX ORDER — SODIUM CHLORIDE 9 MG/ML
3 INJECTION INTRAMUSCULAR; INTRAVENOUS; SUBCUTANEOUS EVERY 8 HOURS
Qty: 0 | Refills: 0 | Status: DISCONTINUED | OUTPATIENT
Start: 2017-08-16 | End: 2017-08-19

## 2017-08-16 RX ORDER — FUROSEMIDE 40 MG
40 TABLET ORAL
Qty: 0 | Refills: 0 | Status: DISCONTINUED | OUTPATIENT
Start: 2017-08-16 | End: 2017-08-17

## 2017-08-16 RX ORDER — DOCUSATE SODIUM 100 MG
100 CAPSULE ORAL THREE TIMES A DAY
Qty: 0 | Refills: 0 | Status: DISCONTINUED | OUTPATIENT
Start: 2017-08-16 | End: 2017-08-19

## 2017-08-16 RX ORDER — POTASSIUM CHLORIDE 20 MEQ
20 PACKET (EA) ORAL
Qty: 0 | Refills: 0 | Status: DISCONTINUED | OUTPATIENT
Start: 2017-08-16 | End: 2017-08-17

## 2017-08-16 RX ORDER — COLCHICINE 0.6 MG/1
0.6 TABLET, FILM COATED ORAL DAILY
Qty: 90 | Refills: 0 | Status: DISCONTINUED | COMMUNITY
Start: 2017-06-28 | End: 2017-08-16

## 2017-08-16 RX ORDER — MILRINONE LACTATE 1 MG/ML
0.38 INJECTION, SOLUTION INTRAVENOUS
Qty: 20 | Refills: 0 | Status: DISCONTINUED | OUTPATIENT
Start: 2017-08-16 | End: 2017-08-16

## 2017-08-16 RX ORDER — SPIRONOLACTONE 25 MG/1
25 TABLET, FILM COATED ORAL DAILY
Qty: 0 | Refills: 0 | Status: DISCONTINUED | OUTPATIENT
Start: 2017-08-16 | End: 2017-08-19

## 2017-08-16 RX ORDER — FUROSEMIDE 40 MG
1 TABLET ORAL
Qty: 30 | Refills: 0 | OUTPATIENT
Start: 2017-08-16 | End: 2017-09-15

## 2017-08-16 RX ORDER — PANTOPRAZOLE SODIUM 20 MG/1
40 TABLET, DELAYED RELEASE ORAL
Qty: 0 | Refills: 0 | Status: DISCONTINUED | OUTPATIENT
Start: 2017-08-16 | End: 2017-08-19

## 2017-08-16 RX ORDER — CAPTOPRIL 12.5 MG/1
3.12 TABLET ORAL THREE TIMES A DAY
Qty: 0 | Refills: 0 | Status: DISCONTINUED | OUTPATIENT
Start: 2017-08-16 | End: 2017-08-16

## 2017-08-16 RX ORDER — CAPTOPRIL 12.5 MG/1
6.25 TABLET ORAL THREE TIMES A DAY
Qty: 0 | Refills: 0 | Status: DISCONTINUED | OUTPATIENT
Start: 2017-08-16 | End: 2017-08-17

## 2017-08-16 RX ORDER — AMIODARONE HYDROCHLORIDE 400 MG/1
200 TABLET ORAL DAILY
Qty: 0 | Refills: 0 | Status: DISCONTINUED | OUTPATIENT
Start: 2017-08-16 | End: 2017-08-19

## 2017-08-16 RX ORDER — ATORVASTATIN CALCIUM 80 MG/1
40 TABLET, FILM COATED ORAL AT BEDTIME
Qty: 0 | Refills: 0 | Status: DISCONTINUED | OUTPATIENT
Start: 2017-08-16 | End: 2017-08-19

## 2017-08-16 RX ORDER — OXYCODONE AND ACETAMINOPHEN 5; 325 MG/1; MG/1
1 TABLET ORAL EVERY 6 HOURS
Qty: 0 | Refills: 0 | Status: DISCONTINUED | OUTPATIENT
Start: 2017-08-16 | End: 2017-08-19

## 2017-08-16 RX ORDER — ASPIRIN/CALCIUM CARB/MAGNESIUM 324 MG
81 TABLET ORAL DAILY
Qty: 0 | Refills: 0 | Status: DISCONTINUED | OUTPATIENT
Start: 2017-08-16 | End: 2017-08-19

## 2017-08-16 RX ADMIN — Medication 100 MILLIGRAM(S): at 22:07

## 2017-08-16 RX ADMIN — MILRINONE LACTATE 9.81 MICROGRAM(S)/KG/MIN: 1 INJECTION, SOLUTION INTRAVENOUS at 19:41

## 2017-08-16 RX ADMIN — CAPTOPRIL 6.25 MILLIGRAM(S): 12.5 TABLET ORAL at 22:07

## 2017-08-16 RX ADMIN — Medication 20 MILLIEQUIVALENT(S): at 22:07

## 2017-08-16 RX ADMIN — SPIRONOLACTONE 25 MILLIGRAM(S): 25 TABLET, FILM COATED ORAL at 22:07

## 2017-08-16 RX ADMIN — SODIUM CHLORIDE 3 MILLILITER(S): 9 INJECTION INTRAMUSCULAR; INTRAVENOUS; SUBCUTANEOUS at 20:47

## 2017-08-16 RX ADMIN — Medication 40 MILLIGRAM(S): at 20:29

## 2017-08-16 RX ADMIN — ATORVASTATIN CALCIUM 40 MILLIGRAM(S): 80 TABLET, FILM COATED ORAL at 22:07

## 2017-08-16 NOTE — CONSULT NOTE ADULT - SUBJECTIVE AND OBJECTIVE BOX
Chief Complaint: HF    HPI:  72 year old male with copd ,diabetes , afib (on Coumadin), AICD (2015 for Vtach ef 25%) CAD with PCI and C1L /IABP on 8/4/17 with Dr Myers.  Post op course remarkable for  transient BRITANY on CKD and post op fluid overload requiring diuresis.  Pt presented the CTS office with worsening FAITH regardless of activity level. Pt endorses inability to lay flat and has been trying to sleep in recliner. Denies chest pain, endorses occasional palpitations at rest. No chest pain.    PMH:   NSVT (nonsustained ventricular tachycardia)  Alcohol abuse  DM (diabetes mellitus)  HF (heart failure)  PAD (peripheral artery disease)  Former smoker  Hypertension  H/O: Rheumatic Fever  Atrial Fibrillation  Pacemaker  Hyperlipidemia  H/O: Hypertension  Gout  Coronary Artery Disease  Chronic Obstructive Pulmonary Disease (COPD)  Carotid Stenosis    PSH:   S/P CABG x 1  S/P CABG x 3  CAD S/P percutaneous coronary angioplasty  Popliteal artery injury  Arm injury  Pacemaker  S/P Implantation of Automatic Cardioverter/Defibrillator (AICD)  S/P Tonsillectomy  Hypertension    Medications:   sodium chloride 0.9% lock flush 3 milliLiter(s) IV Push every 8 hours  furosemide   Injectable 40 milliGRAM(s) IV Push two times a day  finasteride 5 milliGRAM(s) Oral daily  aspirin enteric coated 81 milliGRAM(s) Oral daily  oxyCODONE    5 mG/acetaminophen 325 mG 1 Tablet(s) Oral every 6 hours PRN  amiodarone    Tablet 200 milliGRAM(s) Oral daily  atorvastatin 40 milliGRAM(s) Oral at bedtime  docusate sodium 100 milliGRAM(s) Oral three times a day  potassium chloride    Tablet ER 20 milliEquivalent(s) Oral two times a day  pantoprazole    Tablet 40 milliGRAM(s) Oral before breakfast  milrinone Infusion 0.375 MICROgram(s)/kG/Min IV Continuous <Continuous>    Allergies:  No Known Allergies    FAMILY HISTORY:  No pertinent family history in first degree relatives    Social History:  Smoking: none  Alcohol: social  Drugs: non    Review of Systems:  REVIEW OF SYSTEMS:    CONSTITUTIONAL: No weakness, fevers or chills  EYES/ENT: No visual changes;  No dysphagia  NECK: No pain or stiffness  RESPIRATORY: No cough, wheezing, hemoptysis; No shortness of breath  CARDIOVASCULAR: No chest pain or palpitations; No lower extremity edema  GASTROINTESTINAL: No abdominal or epigastric pain. No nausea, vomiting, or hematemesis; No diarrhea or constipation. No melena or hematochezia.  BACK: No back pain  GENITOURINARY: No dysuria, frequency or hematuria  NEUROLOGICAL: No numbness or weakness  SKIN: No itching, burning, rashes, or lesions   All other review of systems is negative unless indicated above.  [x ] 10 point review of systems is otherwise negative except as mentioned above            [ ]Unable to obtain    Physical Exam:  T(C): 36.7 (08-16-17 @ 17:34), Max: 36.7 (08-16-17 @ 17:34)  HR: 76 (08-16-17 @ 17:34) (76 - 76)  BP: 148/81 (08-16-17 @ 17:34) (148/81 - 148/81)  RR: 18 (08-16-17 @ 17:34) (18 - 18)  SpO2: 92% (08-16-17 @ 17:34) (92% - 92%)  Wt(kg): --  GENERAL: No acute distress, well-developed  HEAD:  Atraumatic, Normocephalic  ENT: EOMI, PERRLA, conjunctiva and sclera clear, Neck supple, No JVD, moist mucosa  CHEST/LUNG: dimished BS at bases. + Crackles  BACK: No spinal tenderness  HEART: irregular; No murmurs, rubs, or gallops  ABDOMEN: Soft, Nontender, Nondistended; Bowel sounds present  EXTREMITIES:  No clubbing, cyanosis, or edema  PSYCH: Nl behavior, nl affect  NEUROLOGY: AAOx3, non-focal, cranial nerves intact  SKIN: Normal color, No rashes or lesions      08-16 @ 07:01  -  08-16 @ 19:54  --------------------------------------------------------  IN: 240 mL / OUT: 0 mL / NET: 240 mL      Daily Height in cm: 172.72 (16 Aug 2017 18:41)    Daily     Cardiovascular Diagnostic Testing:  ECG:    Echo: 1. Mitral annular calcification. Mitral annular dilatation  with normal leaflet opening. Minimal mitral regurgitation.  2. Aortic valve not well visualized; appears to be a  calcified trileaflet valve with normal opening.. Mild  aortic regurgitation.  3. Endocardium not well visualized; moderate segmental left  ventricular systolic dysfunction. The inferior and septal  walls are severely hypokinetic. Abnormal septal motion  consistent with paced rhythm/post-operative septal motion.  Patient in atrial fibrillation; ejection fraction varies  with R-R interval. Recommend limited imaging with  intravenous echo contrast to better assess LV systolic  function if clinically indicated.  4. The right ventricle is not well visualized; right  ventricle appears normal size with decreased systolic  function. A device wire is noted in the right heart.  5. Estimated pulmonary artery systolic pressure equals 54  mm Hg, assuming right atrial pressure equals 8 mm Hg,  consistent with moderate pulmonary pressures.  ------------------------------------------------------------------------  Confirmed on  8/9/2017 - 17:15:39 by Deepali Velásquez M.D.    Stress Testing:    Cath: Cardiac Cath: 2009, 2 Vessel CAD with 60% LAD and 100% RCA. NL LV FX. IboipYaxnibb7Ire JpfuiQlixlvc51Zdghj EazykUwgokaw14Kcc LboujXpobzux78Ocpgj KifanTidarkq15Ima IsadjVbgjyxn49Xkgrq EkukaVagildb13Ahq WclbaFbkakyj86Bjqrx IgmmiNqewrqx91Jhp PaxfoRdzcbge28Imxgs WouzeMysymnv83Tfe ZbuznPdirtpk31Wqoid RmnqaYikntmb15Dij FxsgoKruoyia87Wnbac XzhusCcpooku04Lph GmlseKbszzrr11Wcoew NknthIdgmvdm35Xky IdpjyFrepfkf81Wnoln SjizwCnrhrdv30Qed GjrihSjnrtds70Rlmdl WtreoJjvbzll73Hyz TmgftTjguomy73Clvxu MxyssMvyakrs03Khu QjdwyujyrxNmuevczc968a7gg-qh75-850r-w037-d3g636p21768HvgjOvy  Cardiac Cath: 2009, 2 Vessel CAD with 60% LAD and 100% RCA. NL LV FX. FghupRscouha7Pdw SpqroWvylurj23Vxgqn ZbhiyOntbajl18Hsa HkjcyDlzbyyn26Mpzbv JjukhZbrxedi95Eip PymuxNlsbxuj60Vddgc SvqxwJqnpqnj80Cgi HgoreXzqkrls87Bxlab QuwonPfmiygl68Duw HhizeRxhogxs40Svirg XuyczQvncivp42Ire TzibbBhtvjgh27Qiqij MzuaqQhafdul37Ljp EdmfrMxmkhsw90Gofvf IyhwcKuovupr86Tqc PlayfSuidmmo06Sfyfg UwfzjRpjliyd28Lts NpbnvSzwkldo05Vxqay YbhbiZhysgzr45Yli WmafgJijyuta87Tfyfw ZolwrVbufhsl92Voq ChacoTxkkwzv00Rzluh CgzwsMogedqh59Lan AellljetfwExrqguer416w9kb-vm38-113j-y245-h7g941g76194KvstTbj    10/2016 (worsening SOB) DIAGNOSTIC IMPRESSIONS: 2 vessel CAD with progression in LAD since cath in  2009. Low NL LV FX with inferior hypokinesis.  DIAGNOSTIC RECOMMENDATIONS: PCI of LAD  INTERVENTIONAL IMPRESSIONS: Unsuccesful PCI of LAD    Cath: 10/14/2016: LAD:   --  Proximal LAD: There was a 90 % stenosis.  --  Mid LAD: There was a 80 % stenosis.  COMPLICATIONS: There were no complications.  INTERVENTIONAL IMPRESSIONS: Successful PCI of LAD    Cath 8/1: Impression  Diagnostic Conclusions  3 Vessel CAD with V. Fib cardiac arrest resuscitated by AICD.  RecommendationsCT Surgical consultation regarding CABG    Interpretation of Telemetry: afib, v paced    Imaging: cxr - pulmonary edema    Labs: pending Chief Complaint: HF    HPI:  72 year old male with copd ,diabetes , afib (on Coumadin), AICD (2015 for Vtach ef 25%) CAD with PCI and C1L /IABP on 8/4/17 with Dr Myers.  Post op course remarkable for  transient BRITANY on CKD and post op fluid overload requiring diuresis.  Pt presented the CTS office with worsening FAITH regardless of activity level. Pt endorses inability to lay flat and has been trying to sleep in recliner. Denies chest pain, endorses occasional palpitations at rest. No chest pain.    PMH:   NSVT (nonsustained ventricular tachycardia)  Alcohol abuse  DM (diabetes mellitus)  HF (heart failure)  PAD (peripheral artery disease)  Former smoker  Hypertension  H/O: Rheumatic Fever  Atrial Fibrillation  Pacemaker  Hyperlipidemia  H/O: Hypertension  Gout  Coronary Artery Disease  Chronic Obstructive Pulmonary Disease (COPD)  Carotid Stenosis    PSH:   S/P CABG x 1  S/P CABG x 3  CAD S/P percutaneous coronary angioplasty  Popliteal artery injury  Arm injury  Pacemaker  S/P Implantation of Automatic Cardioverter/Defibrillator (AICD)  S/P Tonsillectomy  Hypertension    Medications:   sodium chloride 0.9% lock flush 3 milliLiter(s) IV Push every 8 hours  furosemide   Injectable 40 milliGRAM(s) IV Push two times a day  finasteride 5 milliGRAM(s) Oral daily  aspirin enteric coated 81 milliGRAM(s) Oral daily  oxyCODONE    5 mG/acetaminophen 325 mG 1 Tablet(s) Oral every 6 hours PRN  amiodarone    Tablet 200 milliGRAM(s) Oral daily  atorvastatin 40 milliGRAM(s) Oral at bedtime  docusate sodium 100 milliGRAM(s) Oral three times a day  potassium chloride    Tablet ER 20 milliEquivalent(s) Oral two times a day  pantoprazole    Tablet 40 milliGRAM(s) Oral before breakfast  milrinone Infusion 0.375 MICROgram(s)/kG/Min IV Continuous <Continuous>    Allergies:  No Known Allergies    FAMILY HISTORY:  No pertinent family history in first degree relatives    Social History:  Smoking: none  Alcohol: social  Drugs: non    Review of Systems:  REVIEW OF SYSTEMS:    CONSTITUTIONAL: No weakness, fevers or chills  EYES/ENT: No visual changes;  No dysphagia  NECK: No pain or stiffness  RESPIRATORY: No cough, wheezing, hemoptysis; No shortness of breath  CARDIOVASCULAR: No chest pain or palpitations; No lower extremity edema  GASTROINTESTINAL: No abdominal or epigastric pain. No nausea, vomiting, or hematemesis; No diarrhea or constipation. No melena or hematochezia.  BACK: No back pain  GENITOURINARY: No dysuria, frequency or hematuria  NEUROLOGICAL: No numbness or weakness  SKIN: No itching, burning, rashes, or lesions   All other review of systems is negative unless indicated above.  [x ] 10 point review of systems is otherwise negative except as mentioned above            [ ]Unable to obtain    Physical Exam:  T(C): 36.7 (08-16-17 @ 17:34), Max: 36.7 (08-16-17 @ 17:34)  HR: 76 (08-16-17 @ 17:34) (76 - 76)  BP: 148/81 (08-16-17 @ 17:34) (148/81 - 148/81)  RR: 18 (08-16-17 @ 17:34) (18 - 18)  SpO2: 92% (08-16-17 @ 17:34) (92% - 92%)  Wt(kg): -- (199lbs on dischare and now 192)  GENERAL: No acute distress, well-developed  HEAD:  Atraumatic, Normocephalic  ENT: EOMI, PERRLA, conjunctiva and sclera clear, Neck supple, No JVD, moist mucosa  CHEST/LUNG: dimished BS at bases. + Crackles  BACK: No spinal tenderness  HEART: irregular; No murmurs, rubs, or gallops  ABDOMEN: Soft, Nontender, Nondistended; Bowel sounds present  EXTREMITIES:  No clubbing, cyanosis, or edema  PSYCH: Nl behavior, nl affect  NEUROLOGY: AAOx3, non-focal, cranial nerves intact  SKIN: Normal color, No rashes or lesions      08-16 @ 07:01  -  08-16 @ 19:54  --------------------------------------------------------  IN: 240 mL / OUT: 0 mL / NET: 240 mL      Daily Height in cm: 172.72 (16 Aug 2017 18:41)    Daily     Cardiovascular Diagnostic Testing:  ECG:    Echo: 1. Mitral annular calcification. Mitral annular dilatation  with normal leaflet opening. Minimal mitral regurgitation.  2. Aortic valve not well visualized; appears to be a  calcified trileaflet valve with normal opening.. Mild  aortic regurgitation.  3. Endocardium not well visualized; moderate segmental left  ventricular systolic dysfunction. The inferior and septal  walls are severely hypokinetic. Abnormal septal motion  consistent with paced rhythm/post-operative septal motion.  Patient in atrial fibrillation; ejection fraction varies  with R-R interval. Recommend limited imaging with  intravenous echo contrast to better assess LV systolic  function if clinically indicated.  4. The right ventricle is not well visualized; right  ventricle appears normal size with decreased systolic  function. A device wire is noted in the right heart.  5. Estimated pulmonary artery systolic pressure equals 54  mm Hg, assuming right atrial pressure equals 8 mm Hg,  consistent with moderate pulmonary pressures.  ------------------------------------------------------------------------  Confirmed on  8/9/2017 - 17:15:39 by Deepali Velásquez M.D.    Stress Testing:    Cath: Cardiac Cath: 2009, 2 Vessel CAD with 60% LAD and 100% RCA. NL LV FX. BnvvxUpgtmiw9Vth AejofCkfljgi20Ubfse TzgmmOxxapej33Xzp DjcjrPoobrnb19Jeuld XzdzhPeilbuu12Ypg AciajWupyato61Eahoq TmhjfMlhmqdi06Vjn KmvfdMigdduh38Bvlzx XaopgGummgav31Ins GcfzxTmbibze50Cqqoc HrrrqHdntoka61Tge QswisJbjysnn58Hylby CsllrKeuqnrr25Xmy ZlxfrRykrgfo25Uukrn CimwhKfjwoxo07Bsy AkwkeIidqcsz22Idhwx YjxlyHiychvu01Mxy QmtqaMervuhi01Aytqt ZbwhuQabjrjc91Fcn YkwcfTduukgu42Jirxv VmhsoZaqlrdh33Mvr PnykjUrnxptb11Ekinu ChvvuPnfwzld11Txq SjwubvidmyDhspkadb097p2ei-yb92-263c-z778-p9p831q53810QojeKrm  Cardiac Cath: 2009, 2 Vessel CAD with 60% LAD and 100% RCA. NL LV FX. EqwkqYzgrorr4Npa NoksuEzniheb95Xqkrw ArkgcVykkstl10Kbk KhkgvBapeaiq18Uadoa PfhkxAdlbsvq65Yay MwzrgAohfbpe20Yzvlr ZrbsnAfpzwfo54Pjh SgyioBhabhuk19Lfyme PsufeYynsfti03Yvo IgumqKgrolft02Nkvji PqjuiQhpczte57Pyp CutrtFpsunwq45Grwxk KifibVylygzg37Axg ZnjvrWmgyjnm40Mejay VdutdUfvrlqj52Elc YlunrWupdnau81Mlyog BhwcrDjkxpfv77Grm MnpbsDjlsglt34Ieafy CdamgBynfoee56Ohk LtdosBkhlwnq29Yvuka KowklSogowai12Dcw PhsubLkazdhy92Ngagf VjmalSaosvpu70Fss AmvpmmczuyHvsimllu098l4tz-up77-243u-v732-d5n692f41697LulmFck    10/2016 (worsening SOB) DIAGNOSTIC IMPRESSIONS: 2 vessel CAD with progression in LAD since cath in  2009. Low NL LV FX with inferior hypokinesis.  DIAGNOSTIC RECOMMENDATIONS: PCI of LAD  INTERVENTIONAL IMPRESSIONS: Unsuccesful PCI of LAD    Cath: 10/14/2016: LAD:   --  Proximal LAD: There was a 90 % stenosis.  --  Mid LAD: There was a 80 % stenosis.  COMPLICATIONS: There were no complications.  INTERVENTIONAL IMPRESSIONS: Successful PCI of LAD    Cath 8/1: Impression  Diagnostic Conclusions  3 Vessel CAD with V. Fib cardiac arrest resuscitated by AICD.  RecommendationsCT Surgical consultation regarding CABG    Interpretation of Telemetry: afib, v paced    Imaging: cxr - pulmonary edema    Labs: pending Chief Complaint: HF    HPI:  72 year old male with copd ,diabetes , afib (on Coumadin), AICD (2015 for Vtach ef 25%) CAD with PCI and C1L /IABP on 8/4/17 with Dr Myers.  Post op course remarkable for  transient BRITANY on CKD and post op fluid overload requiring diuresis.  Pt presented the CTS office with worsening FAITH regardless of activity level. Pt endorses inability to lay flat and has been trying to sleep in recliner. Denies chest pain, endorses occasional palpitations at rest. No chest pain.    PMH:   NSVT (nonsustained ventricular tachycardia)  Alcohol abuse  DM (diabetes mellitus)  HF (heart failure)  PAD (peripheral artery disease)  Former smoker  Hypertension  H/O: Rheumatic Fever  Atrial Fibrillation  Pacemaker  Hyperlipidemia  H/O: Hypertension  Gout  Coronary Artery Disease  Chronic Obstructive Pulmonary Disease (COPD)  Carotid Stenosis    PSH:   S/P CABG x 1  S/P CABG x 3  CAD S/P percutaneous coronary angioplasty  Popliteal artery injury  Arm injury  Pacemaker  S/P Implantation of Automatic Cardioverter/Defibrillator (AICD)  S/P Tonsillectomy  Hypertension    Medications:   sodium chloride 0.9% lock flush 3 milliLiter(s) IV Push every 8 hours  furosemide   Injectable 40 milliGRAM(s) IV Push two times a day  finasteride 5 milliGRAM(s) Oral daily  aspirin enteric coated 81 milliGRAM(s) Oral daily  oxyCODONE    5 mG/acetaminophen 325 mG 1 Tablet(s) Oral every 6 hours PRN  amiodarone    Tablet 200 milliGRAM(s) Oral daily  atorvastatin 40 milliGRAM(s) Oral at bedtime  docusate sodium 100 milliGRAM(s) Oral three times a day  potassium chloride    Tablet ER 20 milliEquivalent(s) Oral two times a day  pantoprazole    Tablet 40 milliGRAM(s) Oral before breakfast  milrinone Infusion 0.375 MICROgram(s)/kG/Min IV Continuous <Continuous>    Allergies:  No Known Allergies    FAMILY HISTORY:  No pertinent family history in first degree relatives    Social History:  Smoking: none  Alcohol: social  Drugs: non    Review of Systems:  REVIEW OF SYSTEMS:    CONSTITUTIONAL: No weakness, fevers or chills  EYES/ENT: No visual changes;  No dysphagia  NECK: No pain or stiffness  RESPIRATORY: No cough, wheezing, hemoptysis; No shortness of breath  CARDIOVASCULAR: No chest pain or palpitations; No lower extremity edema  GASTROINTESTINAL: No abdominal or epigastric pain. No nausea, vomiting, or hematemesis; No diarrhea or constipation. No melena or hematochezia.  BACK: No back pain  GENITOURINARY: No dysuria, frequency or hematuria  NEUROLOGICAL: No numbness or weakness  SKIN: No itching, burning, rashes, or lesions   All other review of systems is negative unless indicated above.  [x ] 10 point review of systems is otherwise negative except as mentioned above            [ ]Unable to obtain    Physical Exam:  T(C): 36.7 (08-16-17 @ 17:34), Max: 36.7 (08-16-17 @ 17:34)  HR: 76 (08-16-17 @ 17:34) (76 - 76)  BP: 148/81 (08-16-17 @ 17:34) (148/81 - 148/81)  RR: 18 (08-16-17 @ 17:34) (18 - 18)  SpO2: 92% (08-16-17 @ 17:34) (92% - 92%)  Wt(kg): -- (199lbs on dischare and now 192)  GENERAL: No acute distress, well-developed  HEAD:  Atraumatic, Normocephalic  ENT: EOMI, PERRLA, conjunctiva and sclera clear, Neck supple, No JVD, moist mucosa  CHEST/LUNG: dimished BS at bases. + Crackles  BACK: No spinal tenderness  HEART: irregular; No murmurs, rubs, or gallops  ABDOMEN: Soft, Nontender, Nondistended; Bowel sounds present  EXTREMITIES:  No clubbing, cyanosis, or edema  PSYCH: Nl behavior, nl affect  NEUROLOGY: AAOx3, non-focal, cranial nerves intact  SKIN: Normal color, No rashes or lesions      08-16 @ 07:01  -  08-16 @ 19:54  --------------------------------------------------------  IN: 240 mL / OUT: 0 mL / NET: 240 mL      Daily Height in cm: 172.72 (16 Aug 2017 18:41)    Daily     Cardiovascular Diagnostic Testing:  ECG:    Echo: 1. Mitral annular calcification. Mitral annular dilatation  with normal leaflet opening. Minimal mitral regurgitation.  2. Aortic valve not well visualized; appears to be a  calcified trileaflet valve with normal opening.. Mild  aortic regurgitation.  3. Endocardium not well visualized; moderate segmental left  ventricular systolic dysfunction. The inferior and septal  walls are severely hypokinetic. Abnormal septal motion  consistent with paced rhythm/post-operative septal motion.  Patient in atrial fibrillation; ejection fraction varies  with R-R interval. Recommend limited imaging with  intravenous echo contrast to better assess LV systolic  function if clinically indicated.  4. The right ventricle is not well visualized; right  ventricle appears normal size with decreased systolic  function. A device wire is noted in the right heart.  5. Estimated pulmonary artery systolic pressure equals 54  mm Hg, assuming right atrial pressure equals 8 mm Hg,  consistent with moderate pulmonary pressures.  ------------------------------------------------------------------------  Confirmed on  8/9/2017 - 17:15:39 by Depeali Velásquez M.D.    Stress Testing:    Cath: Cardiac Cath: 2009, 2 Vessel CAD with 60% LAD and 100% RCA. NL LV FX. FnuymJjyaspc7Ikl XsamcRbqwngu98Fdxhs QthzfXcxoelc17Mas JxyeeUtbrfvp45Ubfvc JyaghBzphbse46Wov PkapwCcjfihk97Hsnwd ZgxxaEuopwzw77Yay BgmroJcygkhw85Nyrna OnnerVxqwroe39Uxx NyipjDjdqzdm59Sjsoa TthxbQedrgwp99Pcp NjapxJigotcw96Djsyh IsbscDfqwjfd00Seh RaavgSssmhkm98Oigxe CwppjLbnqnps15Rph UcxsbCpkvhdc49Quvdw CrboeUcsiwhx99Uxm CiewqTopozpz91Hwabe IvdofAheqafu80Oyl VxuykOmztijm16Qhmoi PcygkTsbnhpm23Iho DwqrcRspggef78Dypxf OnbfxEohrrix08Vom OfiwgnebbaZscbxvtp488k4mx-zb44-374f-z426-m7i687w71529QvrfLba  Cardiac Cath: 2009, 2 Vessel CAD with 60% LAD and 100% RCA. NL LV FX. NfbegDaptkdg0Uom FekxpRphbsoz41Vxoeq CuojgSdmafha08Luk WvqasMpuonzh78Kxfpg CxtdxFpidmwo52Rrt LugcrShjzzhy68Bgfxk QbscxAsccuxs28Jdh MccowXrrufxd46Xpqkb IyqxdKnpjjnw52Xwq YekfmBbrqbtr68Svoyd IrjdzPzjruqs46Enp GskxiIllgcld28Inywq JofgtHnkdljk93Xxc KhovySeielzx51Rpfap WvzzbOwbzbrf11Xzv KngluRdbwlih15Uddru XwkniHceufqm24Mjw ZssavHyxvwjj20Hijlb AbiekQoktdnl56Awz RyrjdOalksvw88Bchbi RhwzbQhwouvh66Wap CxzngDlfhywk78Zqdjm ZlqzhBydpirt63Tsh UoheyrfygjVbmtuwzf051b3yn-gt09-464i-r622-j1z006n11213BmjlSbc    10/2016 (worsening SOB) DIAGNOSTIC IMPRESSIONS: 2 vessel CAD with progression in LAD since cath in  2009. Low NL LV FX with inferior hypokinesis.  DIAGNOSTIC RECOMMENDATIONS: PCI of LAD  INTERVENTIONAL IMPRESSIONS: Unsuccesful PCI of LAD    Cath: 10/14/2016: LAD:   --  Proximal LAD: There was a 90 % stenosis.  --  Mid LAD: There was a 80 % stenosis.  COMPLICATIONS: There were no complications.  INTERVENTIONAL IMPRESSIONS: Successful PCI of LAD    Cath 8/1: Impression  Diagnostic Conclusions  3 Vessel CAD with V. Fib cardiac arrest resuscitated by AICD.  RecommendationsCT Surgical consultation regarding CABG    Interpretation of Telemetry: afib, v paced    Imaging: cxr - pulmonary edema    Labs:                       11.7   11.9  )-----------( 528      ( 16 Aug 2017 20:13 )             37.3   08-17    130<L>  |  87<L>  |  23  ----------------------------<  89  3.8   |  28  |  1.27    Ca    8.7      17 Aug 2017 06:37  Mg     1.9     08-17    TPro  6.4  /  Alb  3.2<L>  /  TBili  1.0  /  DBili  x   /  AST  56<H>  /  ALT  84<H>  /  AlkPhos  107  08-17  PT/INR - ( 16 Aug 2017 20:13 )   PT: 32.0 sec;   INR: 2.87 ratio    PTT - ( 16 Aug 2017 20:13 )  PTT:39.5 sec    Thyroid Stimulating Hormone, Serum (08.17.17 @ 01:03)    Thyroid Stimulating Hormone, Serum: 6.45 uIU/mL  T4, Serum (08.17.17 @ 01:03)    T4, Serum: 6.8 ug/dL  Triiodothyronine, Total (T3 Total) (08.17.17 @ 01:03)    Triiodothyronine, Total (T3 Total): 70 ng/dL Chief Complaint: SOB    HPI:  72 year old male with copd ,diabetes , afib (on Coumadin), AICD (2015 for Vtach ef 25%) CAD with PCI and C1L /IABP on 8/4/17 with Dr yMers.  Post op course remarkable for  transient BRITANY on CKD and post op fluid overload requiring diuresis.  Pt presented the CTS office with worsening FAITH regardless of activity level. Pt endorses inability to lay flat and has been trying to sleep in recliner. Denies chest pain, endorses occasional palpitations at rest. No chest pain.    PMH:   NSVT (nonsustained ventricular tachycardia)  Alcohol abuse - in remission  DM (diabetes mellitus)  HF (heart failure)  PAD (peripheral artery disease)  Former smoker  Hypertension  H/O: Rheumatic Fever  Atrial Fibrillation  Pacemaker  Hyperlipidemia  H/O: Hypertension  Gout  Coronary Artery Disease  Chronic Obstructive Pulmonary Disease (COPD)  Carotid Stenosis    PSH:   S/P CABG x 1  S/P CABG x 3  CAD S/P percutaneous coronary angioplasty  Popliteal artery injury  Arm injury  Pacemaker  S/P Implantation of Automatic Cardioverter/Defibrillator (AICD)  S/P Tonsillectomy  Hypertension    Medications:   sodium chloride 0.9% lock flush 3 milliLiter(s) IV Push every 8 hours  furosemide   Injectable 40 milliGRAM(s) IV Push two times a day  finasteride 5 milliGRAM(s) Oral daily  aspirin enteric coated 81 milliGRAM(s) Oral daily  oxyCODONE    5 mG/acetaminophen 325 mG 1 Tablet(s) Oral every 6 hours PRN  amiodarone    Tablet 200 milliGRAM(s) Oral daily  atorvastatin 40 milliGRAM(s) Oral at bedtime  docusate sodium 100 milliGRAM(s) Oral three times a day  potassium chloride    Tablet ER 20 milliEquivalent(s) Oral two times a day  pantoprazole    Tablet 40 milliGRAM(s) Oral before breakfast  milrinone Infusion 0.375 MICROgram(s)/kG/Min IV Continuous <Continuous>    Allergies:  No Known Allergies    FAMILY HISTORY:  No pertinent family history in first degree relatives    Social History:  Smoking: none  Alcohol: social  Drugs: non    Review of Systems:  REVIEW OF SYSTEMS:    CONSTITUTIONAL: No weakness, fevers or chills  EYES/ENT: No visual changes;  No dysphagia  NECK: No pain or stiffness  RESPIRATORY: No cough, wheezing, hemoptysis; No shortness of breath  CARDIOVASCULAR: No chest pain or palpitations; No lower extremity edema  GASTROINTESTINAL: No abdominal or epigastric pain. No nausea, vomiting, or hematemesis; No diarrhea or constipation. No melena or hematochezia.  BACK: No back pain  GENITOURINARY: No dysuria, frequency or hematuria  NEUROLOGICAL: No numbness or weakness  SKIN: No itching, burning, rashes, or lesions   All other review of systems is negative unless indicated above.  [x ] 10 point review of systems is otherwise negative except as mentioned above            [ ]Unable to obtain    Physical Exam:  T(C): 36.7 (08-16-17 @ 17:34), Max: 36.7 (08-16-17 @ 17:34)  HR: 76 (08-16-17 @ 17:34) (76 - 76)  BP: 148/81 (08-16-17 @ 17:34) (148/81 - 148/81)  RR: 18 (08-16-17 @ 17:34) (18 - 18)  SpO2: 92% (08-16-17 @ 17:34) (92% - 92%)  Wt(kg): -- (199lbs on dischare and now 192)  GENERAL: No acute distress, well-developed  HEAD:  Atraumatic, Normocephalic  ENT: EOMI, PERRLA, conjunctiva and sclera clear, Neck supple, No JVD, moist mucosa  CHEST/LUNG: dimished BS at bases. + Crackles  BACK: No spinal tenderness  HEART: irregular; No murmurs, rubs, or gallops  ABDOMEN: Soft, Nontender, Nondistended; Bowel sounds present  EXTREMITIES:  No clubbing, cyanosis, or edema  PSYCH: Nl behavior, nl affect  NEUROLOGY: AAOx3, non-focal, cranial nerves intact  SKIN: Normal color, No rashes or lesions      08-16 @ 07:01  -  08-16 @ 19:54  --------------------------------------------------------  IN: 240 mL / OUT: 0 mL / NET: 240 mL      Daily Height in cm: 172.72 (16 Aug 2017 18:41)    Daily     Cardiovascular Diagnostic Testing:  ECG:    Echo: 1. Mitral annular calcification. Mitral annular dilatation  with normal leaflet opening. Minimal mitral regurgitation.  2. Aortic valve not well visualized; appears to be a  calcified trileaflet valve with normal opening.. Mild  aortic regurgitation.  3. Endocardium not well visualized; moderate segmental left  ventricular systolic dysfunction. The inferior and septal  walls are severely hypokinetic. Abnormal septal motion  consistent with paced rhythm/post-operative septal motion.  Patient in atrial fibrillation; ejection fraction varies  with R-R interval. Recommend limited imaging with  intravenous echo contrast to better assess LV systolic  function if clinically indicated.  4. The right ventricle is not well visualized; right  ventricle appears normal size with decreased systolic  function. A device wire is noted in the right heart.  5. Estimated pulmonary artery systolic pressure equals 54  mm Hg, assuming right atrial pressure equals 8 mm Hg,  consistent with moderate pulmonary pressures.  ------------------------------------------------------------------------  Confirmed on  8/9/2017 - 17:15:39 by Deepali Velásquez M.D.    Stress Testing:    Cath: Cardiac Cath: 2009, 2 Vessel CAD with 60% LAD and 100% RCA. NL LV FX. NpqywSouumyc2Xsz TqgqcRdhffcm95Tqldp AgusjZxurrko41Aob VcbxjGtpwinj44Xlhok FdpyvLfxqpsx82Ter DgfikEoxfcrh62Kwqyy PuijxJwhwhvf85Zvp TgnpdOekwwlp67Krtrm NvjrwWlmzsau59Yiv PkxzsTexzvmz93Zjxjo BdprkRlnrovx42Gyl LjeebGmcrbju78Lkmwu VrpdtJnryrwj36Wqv QwiajRwqzgne73Grgpu EhxjtJntqgbw22Ehs QyelyGfrsxxn61Zweuq CfsqoYqchbtz36Emo DbcelQgdushu29Iuety SftteKdiaymf53Cqg UkgnbUvfbkbs01Cpozu QchtrDpexcwk36Azf HzhkuRmrdpaq28Lakwa DhbsyVujxnqp14Lis QycvgqvdatXwabgomi758v9je-[id number]  Cardiac Cath: 2009, 2 Vessel CAD with 60% LAD and 100% RCA. NL LV FX. QziiuFdpdeop3Vos FkyuxVfufsam21Ijbfj EdjpyJogfdqi53Jsq OjpoqGlupqwc65Hccpd NvhpfPwpeujy15Rzn MyfklAgpgpkx11Fqabf UaxkmNgaggpj07Nae MxctpXlzdyoo38Jrtlg IrrydArsdlan45Dmu XqhzjBukypec79Cxizg MxbexLpxmykf41Cut FjgpqAlckcph10Xaqnl EwqsnKqynoju29Aiy UthciGktfobb47Bgume NylikOkgpjrv88Ore HyenrIewovsm21Dpmof ScesoQtnfnzi99Pap DsbdxYinuhfo86Qlymk TxmhrWanrfij25Sly GjqnbPbahfbi96Afnvi LofzwQvdwwnc66Zck UltobQzsosjr91Duehj PfwalAhaefyt00Fmh IouwxpfajyEvscdrfr691d5ft-cq06-551c-k678-r6z797m25774FmucSmx    10/2016 (worsening SOB) DIAGNOSTIC IMPRESSIONS: 2 vessel CAD with progression in LAD since cath in  2009. Low NL LV FX with inferior hypokinesis.  DIAGNOSTIC RECOMMENDATIONS: PCI of LAD  INTERVENTIONAL IMPRESSIONS: Unsuccesful PCI of LAD    Cath: 10/14/2016: LAD:   --  Proximal LAD: There was a 90 % stenosis.  --  Mid LAD: There was a 80 % stenosis.  COMPLICATIONS: There were no complications.  INTERVENTIONAL IMPRESSIONS: Successful PCI of LAD    Cath 8/1: Impression  Diagnostic Conclusions  3 Vessel CAD with V. Fib cardiac arrest resuscitated by AICD.  RecommendationsCT Surgical consultation regarding CABG    Interpretation of Telemetry: afib, v paced    Imaging: cxr - pulmonary edema    Labs:                       11.7   11.9  )-----------( 528      ( 16 Aug 2017 20:13 )             37.3   08-17    130<L>  |  87<L>  |  23  ----------------------------<  89  3.8   |  28  |  1.27    Ca    8.7      17 Aug 2017 06:37  Mg     1.9     08-17    TPro  6.4  /  Alb  3.2<L>  /  TBili  1.0  /  DBili  x   /  AST  56<H>  /  ALT  84<H>  /  AlkPhos  107  08-17  PT/INR - ( 16 Aug 2017 20:13 )   PT: 32.0 sec;   INR: 2.87 ratio    PTT - ( 16 Aug 2017 20:13 )  PTT:39.5 sec    Thyroid Stimulating Hormone, Serum (08.17.17 @ 01:03)    Thyroid Stimulating Hormone, Serum: 6.45 uIU/mL  T4, Serum (08.17.17 @ 01:03)    T4, Serum: 6.8 ug/dL  Triiodothyronine, Total (T3 Total) (08.17.17 @ 01:03)    Triiodothyronine, Total (T3 Total): 70 ng/dL

## 2017-08-16 NOTE — H&P ADULT - HISTORY OF PRESENT ILLNESS
P/w to the CTS office with worsening FAITH regardless of activity level. Denies chest pain, endorses occasional palpitations at rest. Admitted to CTS for further evaluation and diuresis. 72 year old male with copd ,diabetes , afib (on Coumadin), AICD (2015 for Vtach ef 25%) CAD with PCI and C1L /IABP on 8/4/17 with Dr Myers.  Post op course remarkable for  transient BRITANY on CKD and post op fluid overload requiring diuresis.     P/w to the CTS office with worsening FAITH regardless of activity level. Denies chest pain, endorses occasional palpitations at rest. Admitted to CTS for further evaluation and diuresis.

## 2017-08-16 NOTE — CONSULT NOTE ADULT - ASSESSMENT
72 year old male with copd ,diabetes ,afib (on Coumadin), AICD (2015 for Vtach ef 25%) CAD with PCI w/ progression of his of his CAD w/ most recent cath revealing multivessel disease, recent CABG presenting from the CTS office with Class III/IV HF

## 2017-08-16 NOTE — H&P ADULT - PROBLEM SELECTOR PLAN 3
oxygen supplement to keep 02 >92%  bronchodilators oxygen supplement to keep 02 >92%  bronchodilators  if shortness  of breath persists would consder  CTA  chest to r/o PE

## 2017-08-16 NOTE — H&P ADULT - PROBLEM SELECTOR PLAN 1
Admit to tele CTS  2d echo to r/o effusion  CXR to eval effusion in the setting post op fluid overload  IV diuresis  strict i/s and daily weights

## 2017-08-16 NOTE — H&P ADULT - NSHPPHYSICALEXAM_GEN_ALL_CORE
Physical Exam:     GENERAL: elderly male, appears stated age,  mild FAITH notes well-developed  HEAD:  Atraumatic, Normocephalic  ENT: EOMI, PERRLA, conjunctiva and sclera clear, Neck supple, No JVD, moist mucosa, no pharyngeal erythema, no tonsillar enlargement or exudate  CHEST/LUNG: Clear to auscultation bilaterally; + fine  rales, b/l No wheeze, no rhonchis, equal breath sounds bilaterally   HEART: Regular rate and rhythm; No murmurs, rubs, or gallops  ABDOMEN: Soft, Nontender, Nondistended; Bowel sounds present, no organomegaly  EXTREMITIES:   b/l groins mild ecchymosis, no hematomas, left EVG CDI, No clubbing, cyanosis, +2BLLE edema  PSYCH: Nl behavior, nl affect  NEUROLOGY: AAOx3, non-focal, cranial nerves intact  SKIN: Midthoracic inc CDI, Normal color, No rashes or lesions

## 2017-08-16 NOTE — H&P ADULT - PSH
Arm injury  s/p surgery 2009  CAD S/P percutaneous coronary angioplasty  stent placed  Pacemaker  [employer] serial #[id number]   model #v-268  Popliteal artery injury  iliac/ popliteal angioplasty with stents 2010  S/P CABG x 3    S/P Implantation of Automatic Cardioverter/Defibrillator (AICD)    S/P Tonsillectomy Arm injury  s/p surgery 2009  CAD S/P percutaneous coronary angioplasty  stent placed  Pacemaker  [employer] serial #[id number]   model #v-268  Popliteal artery injury  iliac/ popliteal angioplasty with stents 2010  S/P CABG x 1    S/P Implantation of Automatic Cardioverter/Defibrillator (AICD)    S/P Tonsillectomy

## 2017-08-16 NOTE — H&P ADULT - NSHPREVIEWOFSYSTEMS_GEN_ALL_CORE
General:  endorses weakness and fatigue, denies fevers or chills  Skin: no itching, burning, rashes, or lesions   HEENT: no visual changes;  no headache, no vertigo, no recent colds, nasal stuffiness or sore throat   Neck: no pain, stiffness or swollen glands  Respiratory: no cough, sputum, wheezing, hemoptysis; no shortness of breath  Cardiovascular: no chest pain, dyspnea or palpitations  GI: no abdominal or epigastric pain. no heartburn, nausea, vomiting, or hematemesis; no diarrhea or constipation. no melena or hematochezia  : no dysuria, frequency or hematuria  Peripheral Vascular: endorses swelling, no intermittent claudication, leg cramps, varicose veins, redness and tenderness  Musculoskeletal:  moves all extremities 5/5 in all 4   Neuro: no changes in orientation, memory, insight or judgment, no changes in mood, attention or speech.  no dizziness, vertigo or fainting, numbness, tingling or weakness

## 2017-08-16 NOTE — H&P ADULT - ASSESSMENT
p/w worsening  SOB 72 year old male with copd ,diabetes , afib, AICD, cardiomyopathy, CAD with PCI and C1L readmitted for worsening SOB

## 2017-08-16 NOTE — PATIENT PROFILE ADULT. - LONGEST PERIOD TOBACCO-FREE
Attempted to reach patient, female VM. Unable to leave message.        
LM on VM stating Rx was changed and sent to preferred pharmacy.    
OK to switch to Omeprazole, start at 20mg one tablet daily. Change on med list as well. Thanks! Elyse Moss MD    
Patient contacted and reports that he is not having success with pantoprazole with his reflux.  He would like to switch to Omeprazole. He has tried this OTC at [facility] and it worked.    LOV 4/28/17  Pending appt: None Due back in 1 yr.    Ok to prescribe  Omeprazole, what dosing?  Patient has enough med to get thru until MD return.  
Patient has been using the pantoprazole for about a week and a half.  Would like to switch back to omeprazole unless PCP has another suggestion.  Please call on his mobile number to advise.  
quit 7 years ago

## 2017-08-16 NOTE — H&P ADULT - PMH
Alcohol abuse  in recovery  Atrial Fibrillation    Carotid Stenosis    Chronic Obstructive Pulmonary Disease (COPD)    Coronary Artery Disease    DM (diabetes mellitus)    Former smoker    Gout    H/O: Rheumatic Fever    HF (heart failure)    Hyperlipidemia    Hypertension    NSVT (nonsustained ventricular tachycardia)    Pacemaker  defibrillator model # v-268 serial # [id number]  PAD (peripheral artery disease)  with stents

## 2017-08-16 NOTE — CONSULT NOTE ADULT - ATTENDING COMMENTS
This is a very pleasant, 71 yo M, with CAD s/p CABG (LIMA to LAD 8/4/17 by Dr. Myers), prior PCI, AICD (secondary prevention), DM II, and chronic AFib who was admitted from the [facility] office for acute on chronic systolic HF. He has a remote h/o EtOH (quit > 20 y ago) and tobacco (quit 9 y ago), and carries a diagnosis of COPD in the chart, of which he is unaware. He does not use oxygen at home. 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 He received lasix 40 mg IV last night and had 2 liters urine output. He notes an improvement in his LE edema and some improvement in his respiratory status. I initiated Captopril 6.25 mg TID last night, which he has tolerated well.    His PE today is notable for diminished breath sounds b/l without rales or rhonchi, JVP < 8 cm H2O, no HJR, Irreg Irreg S1S2, no MRG, soft abdomen without tenderness, and no LE edema. He is warm and well-perfused. /70, HR 76. He is in AFib with iLBBB with demand pacing. TTE last night without an effusion. TTE from last week notable for LVEDD 4.7 cm, LVEF , diminished RV function, and mod PH (PASP 54 mmHg). He also had severe LAE and mild PABLITO. This is a very pleasant, 73 yo M, with CAD s/p CABG (LIMA to LAD 17 by Dr. Myers), prior PCI, AICD (secondary prevention), DM II, and chronic AFib who was admitted from the [facility] office for acute on chronic systolic HF. He has a remote h/o EtOH (quit > 20 y ago) and tobacco (quit 9 y ago), and carries a diagnosis of COPD in the chart, of which he is unaware. He does not use oxygen at home. 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 He received lasix 40 mg IV last night and had 2 liters urine output. He notes an improvement in his LE edema and some improvement in his respiratory status. I initiated Captopril 6.25 mg TID last night, which he has tolerated well.    His PE today is notable for diminished breath sounds b/l without rales or rhonchi, JVP < 8 cm H2O, no HJR, Irreg Irreg S1S2, no MRG, soft abdomen without tenderness, and no LE edema. He is warm and well-perfused. /70, HR 76. He is in AFib with iLBBB with demand pacing. TTE last night without an effusion. TTE from last week notable for LVEDD 4.7 cm, LVEF 20%, diminished RV function, and mod PH (PASP 54 mmHg). He also had severe LAE and mild PABLITO.    I have recommended the followin. Would not give any more lasix today given the low filling pressures on the right. Will reassess oral diuretic needs tomorrow.  2. Please increase the Captopril to 12.5 mg po TID now. If PM labs are ok and SBP > 100, will further increase the dose to 25 mg po TID at 10 pm tonight.  3. Please check BMP at 2 pm today.  4. Continue spironolactone 25 mg po daily and stop the KCl supplements.  5. Resume Toprol  mg daily.  6. Resume digoxin 0.125 mg daily.    Will follow with you. Thanks for the consult.

## 2017-08-16 NOTE — CONSULT NOTE ADULT - PROBLEM SELECTOR RECOMMENDATION 9
Likely has left HF w/o right HF component. Pt is otherwise hemodynamically stable and would benefit from afterload reduction.   - start Captopril 6.25 TID (administer at 6AM, 2PM, 10PM)   - goal K > 4.5 (check after captopril is being administer w/ AM labs)  - spironolactone 25mg  QD ( stat dose now)  - check EKG  - HF team will see patient in the morning Likely has left HF w/o right HF component. Pt is otherwise hemodynamically stable and would benefit from afterload reduction.   - start Captopril 6.25 TID (administer at 6AM, 2PM, 10PM)   - goal K > 4.5 (check after captopril is being administer w/ AM labs)  - spironolactone 25mg  QD ( stat dose now)  - keep O>I  - check EKG  - HF team will see patient in the morning

## 2017-08-17 DIAGNOSIS — I48.91 UNSPECIFIED ATRIAL FIBRILLATION: ICD-10-CM

## 2017-08-17 DIAGNOSIS — I50.23 ACUTE ON CHRONIC SYSTOLIC (CONGESTIVE) HEART FAILURE: ICD-10-CM

## 2017-08-17 LAB
ALBUMIN SERPL ELPH-MCNC: 3.2 G/DL — LOW (ref 3.3–5)
ALP SERPL-CCNC: 107 U/L — SIGNIFICANT CHANGE UP (ref 40–120)
ALT FLD-CCNC: 84 U/L RC — HIGH (ref 10–45)
ANION GAP SERPL CALC-SCNC: 12 MMOL/L — SIGNIFICANT CHANGE UP (ref 5–17)
ANION GAP SERPL CALC-SCNC: 15 MMOL/L — SIGNIFICANT CHANGE UP (ref 5–17)
AST SERPL-CCNC: 56 U/L — HIGH (ref 10–40)
BASE EXCESS BLDA CALC-SCNC: 5.8 MMOL/L — HIGH (ref -2–2)
BILIRUB SERPL-MCNC: 1 MG/DL — SIGNIFICANT CHANGE UP (ref 0.2–1.2)
BUN SERPL-MCNC: 23 MG/DL — SIGNIFICANT CHANGE UP (ref 7–23)
BUN SERPL-MCNC: 23 MG/DL — SIGNIFICANT CHANGE UP (ref 7–23)
CALCIUM SERPL-MCNC: 8.6 MG/DL — SIGNIFICANT CHANGE UP (ref 8.4–10.5)
CALCIUM SERPL-MCNC: 8.7 MG/DL — SIGNIFICANT CHANGE UP (ref 8.4–10.5)
CHLORIDE SERPL-SCNC: 87 MMOL/L — LOW (ref 96–108)
CHLORIDE SERPL-SCNC: 88 MMOL/L — LOW (ref 96–108)
CO2 BLDA-SCNC: 32 MMOL/L — HIGH (ref 22–30)
CO2 SERPL-SCNC: 28 MMOL/L — SIGNIFICANT CHANGE UP (ref 22–31)
CO2 SERPL-SCNC: 29 MMOL/L — SIGNIFICANT CHANGE UP (ref 22–31)
CREAT SERPL-MCNC: 1.27 MG/DL — SIGNIFICANT CHANGE UP (ref 0.5–1.3)
CREAT SERPL-MCNC: 1.38 MG/DL — HIGH (ref 0.5–1.3)
GLUCOSE SERPL-MCNC: 132 MG/DL — HIGH (ref 70–99)
GLUCOSE SERPL-MCNC: 89 MG/DL — SIGNIFICANT CHANGE UP (ref 70–99)
HCO3 BLDA-SCNC: 30 MMOL/L — HIGH (ref 21–29)
HCT VFR BLD CALC: 35.3 % — LOW (ref 39–50)
HGB BLD-MCNC: 11.6 G/DL — LOW (ref 13–17)
HOROWITZ INDEX BLDA+IHG-RTO: 28 — SIGNIFICANT CHANGE UP
INR BLD: 3.12 RATIO — HIGH (ref 0.88–1.16)
MAGNESIUM SERPL-MCNC: 1.9 MG/DL — SIGNIFICANT CHANGE UP (ref 1.6–2.6)
MCHC RBC-ENTMCNC: 30.9 PG — SIGNIFICANT CHANGE UP (ref 27–34)
MCHC RBC-ENTMCNC: 32.8 GM/DL — SIGNIFICANT CHANGE UP (ref 32–36)
MCV RBC AUTO: 94.4 FL — SIGNIFICANT CHANGE UP (ref 80–100)
NT-PROBNP SERPL-SCNC: HIGH PG/ML (ref 0–300)
PCO2 BLDA: 44 MMHG — SIGNIFICANT CHANGE UP (ref 32–46)
PH BLDA: 7.45 — SIGNIFICANT CHANGE UP (ref 7.35–7.45)
PLATELET # BLD AUTO: 471 K/UL — HIGH (ref 150–400)
PO2 BLDA: 80 MMHG — SIGNIFICANT CHANGE UP (ref 74–108)
POTASSIUM SERPL-MCNC: 3.8 MMOL/L — SIGNIFICANT CHANGE UP (ref 3.5–5.3)
POTASSIUM SERPL-MCNC: 4.8 MMOL/L — SIGNIFICANT CHANGE UP (ref 3.5–5.3)
POTASSIUM SERPL-SCNC: 3.8 MMOL/L — SIGNIFICANT CHANGE UP (ref 3.5–5.3)
POTASSIUM SERPL-SCNC: 4.8 MMOL/L — SIGNIFICANT CHANGE UP (ref 3.5–5.3)
PROT SERPL-MCNC: 6.4 G/DL — SIGNIFICANT CHANGE UP (ref 6–8.3)
PROTHROM AB SERPL-ACNC: 34.5 SEC — HIGH (ref 9.8–12.7)
RBC # BLD: 3.75 M/UL — LOW (ref 4.2–5.8)
RBC # FLD: 14.8 % — HIGH (ref 10.3–14.5)
SAO2 % BLDA: 96 % — SIGNIFICANT CHANGE UP (ref 92–96)
SODIUM SERPL-SCNC: 129 MMOL/L — LOW (ref 135–145)
SODIUM SERPL-SCNC: 130 MMOL/L — LOW (ref 135–145)
T3 SERPL-MCNC: 70 NG/DL — LOW (ref 80–200)
T4 AB SER-ACNC: 6.8 UG/DL — SIGNIFICANT CHANGE UP (ref 4.6–12)
TSH SERPL-MCNC: 6.45 UIU/ML — HIGH (ref 0.27–4.2)
WBC # BLD: 11.6 K/UL — HIGH (ref 3.8–10.5)
WBC # FLD AUTO: 11.6 K/UL — HIGH (ref 3.8–10.5)

## 2017-08-17 PROCEDURE — 93970 EXTREMITY STUDY: CPT | Mod: 26

## 2017-08-17 PROCEDURE — 71250 CT THORAX DX C-: CPT | Mod: 26

## 2017-08-17 PROCEDURE — 99223 1ST HOSP IP/OBS HIGH 75: CPT | Mod: GC

## 2017-08-17 RX ORDER — METOPROLOL TARTRATE 50 MG
100 TABLET ORAL DAILY
Qty: 0 | Refills: 0 | Status: DISCONTINUED | OUTPATIENT
Start: 2017-08-17 | End: 2017-08-19

## 2017-08-17 RX ORDER — BUDESONIDE AND FORMOTEROL FUMARATE DIHYDRATE 160; 4.5 UG/1; UG/1
2 AEROSOL RESPIRATORY (INHALATION)
Qty: 0 | Refills: 0 | Status: DISCONTINUED | OUTPATIENT
Start: 2017-08-17 | End: 2017-08-19

## 2017-08-17 RX ORDER — ALBUTEROL 90 UG/1
1.25 AEROSOL, METERED ORAL EVERY 6 HOURS
Qty: 0 | Refills: 0 | Status: DISCONTINUED | OUTPATIENT
Start: 2017-08-17 | End: 2017-08-19

## 2017-08-17 RX ORDER — DIGOXIN 250 MCG
0.12 TABLET ORAL DAILY
Qty: 0 | Refills: 0 | Status: DISCONTINUED | OUTPATIENT
Start: 2017-08-17 | End: 2017-08-19

## 2017-08-17 RX ORDER — CAPTOPRIL 12.5 MG/1
12.5 TABLET ORAL THREE TIMES A DAY
Qty: 0 | Refills: 0 | Status: DISCONTINUED | OUTPATIENT
Start: 2017-08-17 | End: 2017-08-18

## 2017-08-17 RX ORDER — POTASSIUM BICARBONATE 978 MG/1
25 TABLET, EFFERVESCENT ORAL ONCE
Qty: 0 | Refills: 0 | Status: COMPLETED | OUTPATIENT
Start: 2017-08-17 | End: 2017-08-17

## 2017-08-17 RX ORDER — TIOTROPIUM BROMIDE 18 UG/1
1 CAPSULE ORAL; RESPIRATORY (INHALATION) DAILY
Qty: 0 | Refills: 0 | Status: DISCONTINUED | OUTPATIENT
Start: 2017-08-17 | End: 2017-08-19

## 2017-08-17 RX ADMIN — OXYCODONE AND ACETAMINOPHEN 1 TABLET(S): 5; 325 TABLET ORAL at 23:12

## 2017-08-17 RX ADMIN — FINASTERIDE 5 MILLIGRAM(S): 5 TABLET, FILM COATED ORAL at 11:37

## 2017-08-17 RX ADMIN — SODIUM CHLORIDE 3 MILLILITER(S): 9 INJECTION INTRAMUSCULAR; INTRAVENOUS; SUBCUTANEOUS at 21:49

## 2017-08-17 RX ADMIN — Medication 100 MILLIGRAM(S): at 11:21

## 2017-08-17 RX ADMIN — PANTOPRAZOLE SODIUM 40 MILLIGRAM(S): 20 TABLET, DELAYED RELEASE ORAL at 06:21

## 2017-08-17 RX ADMIN — Medication 20 MILLIEQUIVALENT(S): at 06:18

## 2017-08-17 RX ADMIN — Medication 0.12 MILLIGRAM(S): at 11:21

## 2017-08-17 RX ADMIN — TIOTROPIUM BROMIDE 1 CAPSULE(S): 18 CAPSULE ORAL; RESPIRATORY (INHALATION) at 17:51

## 2017-08-17 RX ADMIN — AMIODARONE HYDROCHLORIDE 200 MILLIGRAM(S): 400 TABLET ORAL at 06:18

## 2017-08-17 RX ADMIN — Medication 100 MILLIGRAM(S): at 13:53

## 2017-08-17 RX ADMIN — CAPTOPRIL 6.25 MILLIGRAM(S): 12.5 TABLET ORAL at 06:19

## 2017-08-17 RX ADMIN — BUDESONIDE AND FORMOTEROL FUMARATE DIHYDRATE 2 PUFF(S): 160; 4.5 AEROSOL RESPIRATORY (INHALATION) at 17:50

## 2017-08-17 RX ADMIN — ATORVASTATIN CALCIUM 40 MILLIGRAM(S): 80 TABLET, FILM COATED ORAL at 21:50

## 2017-08-17 RX ADMIN — SODIUM CHLORIDE 3 MILLILITER(S): 9 INJECTION INTRAMUSCULAR; INTRAVENOUS; SUBCUTANEOUS at 15:56

## 2017-08-17 RX ADMIN — SODIUM CHLORIDE 3 MILLILITER(S): 9 INJECTION INTRAMUSCULAR; INTRAVENOUS; SUBCUTANEOUS at 06:22

## 2017-08-17 RX ADMIN — POTASSIUM BICARBONATE 25 MILLIEQUIVALENT(S): 978 TABLET, EFFERVESCENT ORAL at 11:38

## 2017-08-17 RX ADMIN — SPIRONOLACTONE 25 MILLIGRAM(S): 25 TABLET, FILM COATED ORAL at 06:18

## 2017-08-17 RX ADMIN — Medication 40 MILLIGRAM(S): at 06:20

## 2017-08-17 RX ADMIN — Medication 100 MILLIGRAM(S): at 21:50

## 2017-08-17 RX ADMIN — Medication 100 MILLIGRAM(S): at 06:18

## 2017-08-17 RX ADMIN — CAPTOPRIL 12.5 MILLIGRAM(S): 12.5 TABLET ORAL at 13:53

## 2017-08-17 RX ADMIN — CAPTOPRIL 12.5 MILLIGRAM(S): 12.5 TABLET ORAL at 21:50

## 2017-08-17 RX ADMIN — Medication 81 MILLIGRAM(S): at 11:21

## 2017-08-17 NOTE — CONSULT NOTE ADULT - PROBLEM SELECTOR RECOMMENDATION 9
The etiology of SOB is not very clear: There no overt signs of CHF, there is no pulmonary edema and the chest radiograph is clear! But he did get IV lasix and diuresed significantly. Would continue diuretics as per CT surgery and would do CT scan chest non contrast, as he recently had BRITANY on CKD. Check his dopplers too and start him on symbicort as well as spiriva: And albuterol PRN q 6 hours for SOB

## 2017-08-17 NOTE — PROGRESS NOTE ADULT - ASSESSMENT
72 year old male with COPD. DM, afib (on Coumadin), AICD (2015 for V-tach EF 25%) CAD with PCI and C1L/IABP on 8/4/17 with  Dr. Myers.  Post op course remarkable for transient BRITANY on CKD and post op fluid overload requiring diuresis who presented to our CTS office for follow up visit and with c/o worsening FAITH. Found to be hypoxic.  Denies chest pain, endorses occasional palpitations at rest. Readmitted for further work up and diuresis.   Hospital Course:   Hypoxemia --> requiring 02 NC   AC therapy on Coumadin for h/o afib   8/16 TTE finding: negative for pericardial effusion  Acute on Chronic systolic HF; 8/16 Primacor 0.375 mcg initiated; Lasix 40 IV  8/16 CXR finding:   CHF consult called and appreciated --> Primacor gtt D/C’d; Captopril 6.25 mg TID initiated  8/17 Pulmonary consults called and appreciated started on bronchodilators   8/17 INR 3.18 --> Coumadin on hold tonight 72 year old male with COPD. DM, afib (on Coumadin), AICD (2015 for V-tach EF 25%) CAD with PCI and C1L/IABP on 8/4/17 with  Dr. Myers.  Post op course remarkable for transient BRITANY on CKD and post op fluid overload requiring diuresis who presented to our CTS office for follow up visit and with c/o worsening FAITH. Found to be hypoxic.  Denies chest pain, endorses occasional palpitations at rest. Readmitted for further work up and diuresis.   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 Patient status post sternotomy. There is no pneumothorax. IMPRESSION: The lungs are clear.  CHF consult called and appreciated --> Primacor gtt D/C’d; Captopril 6.25 mg TID initiated  8/17 Pulmonary consults called and appreciated started on bronchodilators; Recommend Non con chest CT and B/L Venous Duplex to evaluate for DVT/ PE (likely low risk)  8/17 INR 3.18 --> Coumadin on hold tonight

## 2017-08-17 NOTE — PROGRESS NOTE ADULT - SUBJECTIVE AND OBJECTIVE BOX
VITAL SIGNS    Telemetry:  Afib / V-Paced 70-90     Vital Signs Last 24 Hrs  T(C): 36.3 (17 @ 04:58), Max: 36.7 (17 @ 17:34)  T(F): 97.4 (17 @ 04:58), Max: 98.1 (17 @ 20:15)  HR: 78 (17 @ 11:18) (76 - 78)  BP: 117/70 (17 @ 11:18) (117/70 - 148/81)  RR: 17 (17 @ 11:18) (17 - 21)  SpO2: 95% (17 @ 11:18) (87% - 95%)                @ 07:01  -   @ 07:00  --------------------------------------------------------  IN: 840 mL / OUT: 2050 mL / NET: -1210 mL     @ 07:01  -   @ 12:18  --------------------------------------------------------  IN: 120 mL / OUT: 300 mL / NET: -180 mL          Daily Height in cm: 172.72 (16 Aug 2017 18:41)    Daily Weight in k.9 (17 Aug 2017 07:42)          PHYSICAL EXAM        Neurology: alert and oriented x 3, nonfocal, no gross deficits    CV : (+) S1 and S2, No murmurs, rubs, gallops or clicks     Sternal Wound:  MSI CDI--> healed, sternum stable    Lungs: CTA B/L; Diminished at base     Abdomen: soft, nontender, nondistended, positive bowel sounds, (+) BM     : Voiding                Extremities: B/L LE Trace edema; increase activity as tolerated; (+) 2 DP palpable         finasteride 5 milliGRAM(s) Oral daily  aspirin enteric coated 81 milliGRAM(s) Oral daily  oxyCODONE    5 mG/acetaminophen 325 mG 1 Tablet(s) Oral every 6 hours PRN  amiodarone    Tablet 200 milliGRAM(s) Oral daily  atorvastatin 40 milliGRAM(s) Oral at bedtime  docusate sodium 100 milliGRAM(s) Oral three times a day  pantoprazole    Tablet 40 milliGRAM(s) Oral before breakfast  spironolactone 25 milliGRAM(s) Oral daily  captopril 12.5 milliGRAM(s) Oral three times a day  metoprolol succinate  milliGRAM(s) Oral daily  digoxin     Tablet 0.125 milliGRAM(s) Oral daily  buDESOnide 160 MICROgram(s)/formoterol 4.5 MICROgram(s) Inhaler 2 Puff(s) Inhalation two times a day  tiotropium 18 MICROgram(s) Capsule 1 Capsule(s) Inhalation daily  ALBUTerol   0.042% 1.25 milliGRAM(s) Nebulizer every 6 hours PRN        Physical Therapy Rec:   Home  [x ]   Home w/ PT  [  ]  Rehab  [  ]    Discussed with Cardiothoracic Team at AM rounds.

## 2017-08-17 NOTE — CONSULT NOTE ADULT - PROBLEM SELECTOR PROBLEM 1
Heart failure, unspecified heart failure chronicity, unspecified heart failure type
Heart failure, unspecified heart failure chronicity, unspecified heart failure type

## 2017-08-17 NOTE — CONSULT NOTE ADULT - SUBJECTIVE AND OBJECTIVE BOX
I have seen and examined the patient and history noted:   Pt says he used to take inhalers before which he did not like and had stopped using it.   he denies any wheezing at this time and has never been on home oxygen.       Patient is a 72y old  Male who presents with a chief complaint of worsening shortness of breath (16 Aug 2017 17:15)      HPI:  72 year old male with copd ,diabetes , afib (on Coumadin), AICD (2015 for Vtach ef 25%) CAD with PCI and C1L /IABP on 17 with Dr Myers.  Post op course remarkable for  transient BRITANY on CKD and post op fluid overload requiring diuresis.     P/w to the CTS office with worsening FAITH regardless of activity level. Denies chest pain, endorses occasional palpitations at rest. Admitted to CTS for further evaluation and diuresis. (16 Aug 2017 17:15)      ?FOLLOWING PRESENT  [x ] Hx of PE/DVT, [y ] Hx COPD, [x ] Hx of Asthma, [ y] Hx of Hospitalization, [x ]  Hx of BiPAP/CPAP use, [x ] Hx of LUIS ALBERTO    Allergies    No Known Allergies    Intolerances        PAST MEDICAL & SURGICAL HISTORY:  NSVT (nonsustained ventricular tachycardia)  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  DM (diabetes mellitus)  HF (heart failure)  PAD (peripheral artery disease): with stents  Former smoker  Hypertension  H/O: Rheumatic Fever  Atrial Fibrillation  Pacemaker: defibrillator model # v-268 serial # [id number]  Hyperlipidemia  Gout  Coronary Artery Disease  Chronic Obstructive Pulmonary Disease (COPD)  Carotid Stenosis  S/P CABG x 1  CAD S/P percutaneous coronary angioplasty: stent placed  Popliteal artery injury: iliac/ popliteal angioplasty with stents   Arm injury: s/p surgery   Pacemaker: [employer] serial #[id number]   model #v-268  S/P Implantation of Automatic Cardioverter/Defibrillator (AICD)  S/P Tonsillectomy      FAMILY HISTORY:  No pertinent family history in first degree relatives      Social History: [yes  ] TOBACCO: 120 pk years; quit 8 years ago                  [ ex ] ETOH   abuse                              [ x ] IVDA/DRUGS    REVIEW OF SYSTEMS      General:	x    Skin/Breast:x  	  Ophthalmologic:x  	  ENMT:	x    Respiratory and Thorax: SOB, FAITH   	  Cardiovascular:	X    Gastrointestinal:	X    Genitourinary:	X    Musculoskeletal:	X    Neurological:	X    Psychiatric:	X    Hematology/Lymphatics:	X    Endocrine:	X    Allergic/Immunologic:	X    MEDICATIONS  (STANDING):  sodium chloride 0.9% lock flush 3 milliLiter(s) IV Push every 8 hours  finasteride 5 milliGRAM(s) Oral daily  aspirin enteric coated 81 milliGRAM(s) Oral daily  amiodarone    Tablet 200 milliGRAM(s) Oral daily  atorvastatin 40 milliGRAM(s) Oral at bedtime  docusate sodium 100 milliGRAM(s) Oral three times a day  pantoprazole    Tablet 40 milliGRAM(s) Oral before breakfast  spironolactone 25 milliGRAM(s) Oral daily  potassium bicarbonate/potassium citrate 25 milliEquivalent(s) Oral once  captopril 12.5 milliGRAM(s) Oral three times a day  metoprolol succinate  milliGRAM(s) Oral daily  digoxin     Tablet 0.125 milliGRAM(s) Oral daily    MEDICATIONS  (PRN):  oxyCODONE    5 mG/acetaminophen 325 mG 1 Tablet(s) Oral every 6 hours PRN Pain       Vital Signs Last 24 Hrs  T(C): 36.3 (17 Aug 2017 04:58), Max: 36.7 (16 Aug 2017 17:34)  T(F): 97.4 (17 Aug 2017 04:58), Max: 98.1 (16 Aug 2017 20:15)  HR: 76 (17 Aug 2017 04:58) (76 - 76)  BP: 141/70 (17 Aug 2017 04:58) (141/70 - 148/81)  BP(mean): --  RR: 20 (17 Aug 2017 04:58) (18 - 21)  SpO2: 94% (17 Aug 2017 04:58) (87% - 94%)        I&O's Summary    16 Aug 2017 07:  -  17 Aug 2017 07:00  --------------------------------------------------------  IN: 840 mL / OUT: 2050 mL / NET: -1210 mL    17 Aug 2017 07:01  -  17 Aug 2017 10:52  --------------------------------------------------------  IN: 120 mL / OUT: 300 mL / NET: -180 mL        Physical Exam:   GENERAL: NAD, well-groomed, well-developed  HEENT: DG/   Atraumatic, Normocephalic  ENMT: No tonsillar erythema, exudates, or enlargement; Moist mucous membranes, Good dentition, No lesions  NECK: Supple, No JVD, Normal thyroid  CHEST/LUNG: CLEAR  CVS: Regular rate and rhythm; No murmurs, rubs, or gallops  GI: : Soft, Nontender, Nondistended; Bowel sounds present  NERVOUS SYSTEM:  Alert & Oriented X3X  EXTREMITIES:  2+ Peripheral Pulses, No clubbing, cyanosis, or edema  LYMPH: No lymphadenopathy noted  SKIN: No rashes or lesions  ENDOCRINOLOGY: No Thyromegaly  PSYCH: Appropriate    Labs:                       11.7   11.9  )-----------( 528      ( 16 Aug 2017 20:13 )             37.3         130<L>  |  87<L>  |  23  ----------------------------<  89  3.8   |  28  |  1.27      129<L>  |  87<L>  |  25<H>  ----------------------------<  118<H>  4.6   |  27  |  1.37<H>    Ca    8.7      17 Aug 2017 06:37  Ca    8.8      16 Aug 2017 20:13  Mg     1.9         TPro  6.4  /  Alb  3.2<L>  /  TBili  1.0  /  DBili  x   /  AST  56<H>  /  ALT  84<H>  /  AlkPhos  107    TPro  6.6  /  Alb  3.2<L>  /  TBili  0.9  /  DBili  x   /  AST  68<H>  /  ALT  90<H>  /  AlkPhos  112      CAPILLARY BLOOD GLUCOSE        LIVER FUNCTIONS - ( 17 Aug 2017 06:37 )  Alb: 3.2 g/dL / Pro: 6.4 g/dL / ALK PHOS: 107 U/L / ALT: 84 U/L RC / AST: 56 U/L / GGT: x           PT/INR - ( 16 Aug 2017 20:13 )   PT: 32.0 sec;   INR: 2.87 ratio         PTT - ( 16 Aug 2017 20:13 )  PTT:39.5 sec  Urinalysis Basic - ( 16 Aug 2017 20:13 )    Color: Yellow / Appearance: Clear / S.009 / pH: x  Gluc: x / Ketone: Negative  / Bili: Negative / Urobili: Negative   Blood: x / Protein: 30 mg/dL / Nitrite: Negative   Leuk Esterase: Negative / RBC: x / WBC 0-2 /HPF   Sq Epi: x / Non Sq Epi: x / Bacteria: x      D DImer  Serum Pro-Brain Natriuretic Peptide: 11972 pg/mL ( @ 06:37)    Cultures:     Studies  Chest X-RAY  CT SCAN Chest   CT Abdomen  Venous Dopplers: LE:   Others    < from: Xray Chest 1 View AP- PORTABLE-Urgent (17 @ 18:02) >  EXAM:  CHEST-PORTABLE URGENT                            PROCEDURE DATE:  2017            INTERPRETATION:  CLINICAL INFORMATION: Rule out effusion; dyspnea.    EXAM: Single view chest radiograph.    COMPARISON: Chest x-ray 2017    FINDINGS:   There is a left-sided PPM.  Patient status post sternotomy.  There is no pneumothorax.    IMPRESSION:   The lungs are clear.    CARMINE MI M.D., RADIOLOGY RESIDENT  This document has been electronically signed.  FABIOLA GARCIA M.D., ATTENDING RADIOLOGIST  This document has been electronically signed. Aug 17 2017 10:45AM    < end of copied text >

## 2017-08-17 NOTE — CONSULT NOTE ADULT - ASSESSMENT
72 year old male with copd ,diabetes , afib, AICD, cardiomyopathy, CAD with PCI and C1L readmitted for worsening SOB : Pt recently had CABG requiring IABP and diuresis for fluid overload:  He has history of COPD  but have not been on home oxygen and have not been using the bronchodilators regularly.

## 2017-08-18 ENCOUNTER — TRANSCRIPTION ENCOUNTER (OUTPATIENT)
Age: 72
End: 2017-08-18

## 2017-08-18 LAB
ANION GAP SERPL CALC-SCNC: 13 MMOL/L — SIGNIFICANT CHANGE UP (ref 5–17)
BUN SERPL-MCNC: 22 MG/DL — SIGNIFICANT CHANGE UP (ref 7–23)
CALCIUM SERPL-MCNC: 8.7 MG/DL — SIGNIFICANT CHANGE UP (ref 8.4–10.5)
CHLORIDE SERPL-SCNC: 88 MMOL/L — LOW (ref 96–108)
CO2 SERPL-SCNC: 29 MMOL/L — SIGNIFICANT CHANGE UP (ref 22–31)
CREAT SERPL-MCNC: 1.38 MG/DL — HIGH (ref 0.5–1.3)
GLUCOSE SERPL-MCNC: 90 MG/DL — SIGNIFICANT CHANGE UP (ref 70–99)
HCT VFR BLD CALC: 35 % — LOW (ref 39–50)
HGB BLD-MCNC: 11.6 G/DL — LOW (ref 13–17)
INR BLD: 3.48 RATIO — HIGH (ref 0.88–1.16)
MCHC RBC-ENTMCNC: 31.5 PG — SIGNIFICANT CHANGE UP (ref 27–34)
MCHC RBC-ENTMCNC: 33 GM/DL — SIGNIFICANT CHANGE UP (ref 32–36)
MCV RBC AUTO: 95.4 FL — SIGNIFICANT CHANGE UP (ref 80–100)
PLATELET # BLD AUTO: 479 K/UL — HIGH (ref 150–400)
POTASSIUM SERPL-MCNC: 3.8 MMOL/L — SIGNIFICANT CHANGE UP (ref 3.5–5.3)
POTASSIUM SERPL-SCNC: 3.8 MMOL/L — SIGNIFICANT CHANGE UP (ref 3.5–5.3)
PROTHROM AB SERPL-ACNC: 38.6 SEC — HIGH (ref 9.8–12.7)
RBC # BLD: 3.67 M/UL — LOW (ref 4.2–5.8)
RBC # FLD: 15.1 % — HIGH (ref 10.3–14.5)
SODIUM SERPL-SCNC: 130 MMOL/L — LOW (ref 135–145)
WBC # BLD: 10.2 K/UL — SIGNIFICANT CHANGE UP (ref 3.8–10.5)
WBC # FLD AUTO: 10.2 K/UL — SIGNIFICANT CHANGE UP (ref 3.8–10.5)

## 2017-08-18 PROCEDURE — 99233 SBSQ HOSP IP/OBS HIGH 50: CPT | Mod: GC

## 2017-08-18 RX ORDER — VALSARTAN 80 MG/1
40 TABLET ORAL EVERY 12 HOURS
Qty: 0 | Refills: 0 | Status: DISCONTINUED | OUTPATIENT
Start: 2017-08-18 | End: 2017-08-19

## 2017-08-18 RX ORDER — CAPTOPRIL 12.5 MG/1
12.5 TABLET ORAL ONCE
Qty: 0 | Refills: 0 | Status: COMPLETED | OUTPATIENT
Start: 2017-08-18 | End: 2017-08-18

## 2017-08-18 RX ORDER — FUROSEMIDE 40 MG
20 TABLET ORAL DAILY
Qty: 0 | Refills: 0 | Status: DISCONTINUED | OUTPATIENT
Start: 2017-08-18 | End: 2017-08-19

## 2017-08-18 RX ORDER — VALSARTAN 80 MG/1
40 TABLET ORAL DAILY
Qty: 0 | Refills: 0 | Status: DISCONTINUED | OUTPATIENT
Start: 2017-08-18 | End: 2017-08-18

## 2017-08-18 RX ADMIN — AMIODARONE HYDROCHLORIDE 200 MILLIGRAM(S): 400 TABLET ORAL at 07:01

## 2017-08-18 RX ADMIN — VALSARTAN 40 MILLIGRAM(S): 80 TABLET ORAL at 21:04

## 2017-08-18 RX ADMIN — Medication 20 MILLIGRAM(S): at 12:24

## 2017-08-18 RX ADMIN — SODIUM CHLORIDE 3 MILLILITER(S): 9 INJECTION INTRAMUSCULAR; INTRAVENOUS; SUBCUTANEOUS at 14:10

## 2017-08-18 RX ADMIN — FINASTERIDE 5 MILLIGRAM(S): 5 TABLET, FILM COATED ORAL at 12:25

## 2017-08-18 RX ADMIN — PANTOPRAZOLE SODIUM 40 MILLIGRAM(S): 20 TABLET, DELAYED RELEASE ORAL at 07:02

## 2017-08-18 RX ADMIN — SODIUM CHLORIDE 3 MILLILITER(S): 9 INJECTION INTRAMUSCULAR; INTRAVENOUS; SUBCUTANEOUS at 21:03

## 2017-08-18 RX ADMIN — SPIRONOLACTONE 25 MILLIGRAM(S): 25 TABLET, FILM COATED ORAL at 07:02

## 2017-08-18 RX ADMIN — CAPTOPRIL 12.5 MILLIGRAM(S): 12.5 TABLET ORAL at 07:02

## 2017-08-18 RX ADMIN — CAPTOPRIL 12.5 MILLIGRAM(S): 12.5 TABLET ORAL at 14:40

## 2017-08-18 RX ADMIN — Medication 100 MILLIGRAM(S): at 14:40

## 2017-08-18 RX ADMIN — BUDESONIDE AND FORMOTEROL FUMARATE DIHYDRATE 2 PUFF(S): 160; 4.5 AEROSOL RESPIRATORY (INHALATION) at 17:33

## 2017-08-18 RX ADMIN — ATORVASTATIN CALCIUM 40 MILLIGRAM(S): 80 TABLET, FILM COATED ORAL at 21:04

## 2017-08-18 RX ADMIN — Medication 100 MILLIGRAM(S): at 21:04

## 2017-08-18 RX ADMIN — Medication 81 MILLIGRAM(S): at 12:23

## 2017-08-18 RX ADMIN — Medication 100 MILLIGRAM(S): at 12:23

## 2017-08-18 RX ADMIN — SODIUM CHLORIDE 3 MILLILITER(S): 9 INJECTION INTRAMUSCULAR; INTRAVENOUS; SUBCUTANEOUS at 06:41

## 2017-08-18 RX ADMIN — Medication 0.12 MILLIGRAM(S): at 07:02

## 2017-08-18 RX ADMIN — TIOTROPIUM BROMIDE 1 CAPSULE(S): 18 CAPSULE ORAL; RESPIRATORY (INHALATION) at 12:27

## 2017-08-18 RX ADMIN — BUDESONIDE AND FORMOTEROL FUMARATE DIHYDRATE 2 PUFF(S): 160; 4.5 AEROSOL RESPIRATORY (INHALATION) at 07:05

## 2017-08-18 RX ADMIN — Medication 100 MILLIGRAM(S): at 07:02

## 2017-08-18 NOTE — DISCHARGE NOTE ADULT - MEDICATION SUMMARY - MEDICATIONS TO CHANGE
I will SWITCH the dose or number of times a day I take the medications listed below when I get home from the hospital:    furosemide 40 mg oral tablet  -- 1 tab(s) by mouth 2 times a day    Lasix 40 mg oral tablet  -- 1 tab(s) by mouth once a day    warfarin 2.5 mg oral tablet  -- 0.5 tab(s) by mouth once a day

## 2017-08-18 NOTE — DISCHARGE NOTE ADULT - DURABLE MEDICAL EQUIPMENT AGENCY
Transport Oxygen Tank delivered to bedside 8/18 and home Oxygen Concentrator to be delivered to home 8/19 by [facility] [employer] [phone]. Please call surgical supply company with any questions or concerns.

## 2017-08-18 NOTE — DISCHARGE NOTE ADULT - OTHER SIGNIFICANT FINDINGS
VITAL SIGNS    Telemetry:  Afib AVPace 50-80  Vital Signs Last 24 Hrs  T(C): 36.4 (17 @ 04:18), Max: 36.5 (17 @ 14:11)  T(F): 97.5 (17 @ 04:18), Max: 97.7 (17 @ 14:11)  HR: 73 (17 @ 04:18) (73 - 87)  BP: 133/75 (17 @ 04:18) (116/73 - 133/75)  RR: 18 (17 @ 04:18) (18 - 18)  SpO2: 93% (17 @ 09:50) (86% - 96%)             @ 07:01  -   @ 07:00  --------------------------------------------------------  IN: 1200 mL / OUT: 2550 mL / NET: -1350 mL     @ 07:01  -   @ 11:26  --------------------------------------------------------  IN: 360 mL / OUT: 500 mL / NET: -140 mL       Daily     Daily Weight in k.7 (19 Aug 2017 08:34)  Admit Wt: Drug Dosing Weight  Height (cm): 172.72 (16 Aug 2017 18:41)  Weight (kg): 87.2 (16 Aug 2017 18:41)  BMI (kg/m2): 29.2 (16 Aug 2017 18:41)  BSA (m2): 2.01 (16 Aug 2017 18:41)    General: WN/WD NAD  Neurology: A&Ox3, nonfocal, GARCIA x 4  Head:  Normocephalic, atraumatic  ENT:  Mucosa moist, no ulcerations  Neck:  Supple, no sinuses or palpable masses  Lymphatic:  No palpable cervical, supraclavicular, axillary or inguinal adenopathy  Respiratory: CTA B/L  CV: RRR, S1S2, no murmur  Abdominal: Soft, NT, ND no palpable mass  MSK: No edema, + peripheral pulses, FROM all 4 extremity  Incisions: intact, no erythema or drainage

## 2017-08-18 NOTE — DISCHARGE NOTE ADULT - CARE PLAN
Principal Discharge DX:	Chronic obstructive pulmonary disease (COPD)  Goal:	Recovery  Instructions for follow-up, activity and diet:	Resume activity as tolerated  Resume low cholesterol low sodium diet  Please follow up with Dr Camacho on Monday 8/21 and Wednesday 8/23 for INR check and coumadin dosing  Please follow up with Dr Shell in [facility] on 8/29 @ 3pm [phone]  Please follow up with Dr Myers in 2 weeks. Please call for an appointment.  Record daily weight.  Please follow up with Dr Owens in  3 weeks. Call for appointment  Supplemental oxygen via 2 Liters nasal cannula  Secondary Diagnosis:	HF (heart failure) Principal Discharge DX:	Chronic obstructive pulmonary disease (COPD)  Goal:	Recovery  Instructions for follow-up, activity and diet:	Resume activity as tolerated  Resume low cholesterol low sodium diet  Please follow up with Dr Camacho on Monday 8/21 and Wednesday 8/23 for INR check and coumadin dosing  Please follow up with Dr Shell in [facility] on 8/29 @ 3pm [phone]  Please follow up with Dr Myers in 2 weeks. Please call for an appointment.  Record daily weight.  Please follow up with Dr Owens in  3 weeks. Call for appointment  Supplemental oxygen via 2 Liters nasal cannula  Secondary Diagnosis:	HF (heart failure) Principal Discharge DX:	Chronic obstructive pulmonary disease (COPD)  Goal:	Recovery  Instructions for follow-up, activity and diet:	Resume activity as tolerated  Resume low cholesterol low sodium diet  Please follow up with Dr Camacho on Monday 8/21 and Wednesday 8/23 for INR check and coumadin dosing  Please follow up with Dr Shell in [facility] on 8/29 @ 3pm [phone]  Please follow up with Dr Myers in 2 weeks. Please call for an appointment.  Record daily weight.  Please follow up with Dr Owens in  3 weeks. Call for appointment  Supplemental oxygen via 2 Liters nasal cannula  Secondary Diagnosis:	HF (heart failure) Principal Discharge DX:	Chronic obstructive pulmonary disease (COPD)  Goal:	Recovery  Instructions for follow-up, activity and diet:	Resume activity as tolerated  Resume low cholesterol low sodium diet  Please follow up with Dr Camacho on Monday 8/21 and Wednesday 8/23 for INR check and coumadin dosing  Please follow up with Dr Shell in [facility] on 8/29 @ 3pm [phone]  Please follow up with Dr Myers in 2 weeks. Please call for an appointment.  Record daily weight.  Please follow up with Dr Owens in  3 weeks. Call for appointment  Supplemental oxygen via 2 Liters nasal cannula  Secondary Diagnosis:	HF (heart failure)

## 2017-08-18 NOTE — DISCHARGE NOTE ADULT - MEDICATION SUMMARY - MEDICATIONS TO STOP TAKING
I will STOP taking the medications listed below when I get home from the hospital:    potassium chloride 10 mEq oral tablet, extended release  -- 2 tab(s) by mouth once a day

## 2017-08-18 NOTE — DISCHARGE NOTE ADULT - PLAN OF CARE
Recovery Resume activity as tolerated  Resume low cholesterol low sodium diet  Please follow up with Dr Camacho on Monday 8/21 and Wednesday 8/23 for INR check and coumadin dosing  Please follow up with Dr Shell in [facility] on 8/29 @ 3pm [phone]  Please follow up with Dr Myers in 2 weeks. Please call for an appointment.  Record daily weight.  Please follow up with Dr Owens in  3 weeks. Call for appointment  Supplemental oxygen via 2 Liters nasal cannula

## 2017-08-18 NOTE — PROGRESS NOTE ADULT - SUBJECTIVE AND OBJECTIVE BOX
Patient is a 72y old  Male who presents with a chief complaint of worsening shortness of breath (16 Aug 2017 17:15)  events noted:  desats to 97% on room air  he denies any wheezing or SOB     Any change in ROS:     MEDICATIONS  (STANDING):  sodium chloride 0.9% lock flush 3 milliLiter(s) IV Push every 8 hours  finasteride 5 milliGRAM(s) Oral daily  aspirin enteric coated 81 milliGRAM(s) Oral daily  amiodarone    Tablet 200 milliGRAM(s) Oral daily  atorvastatin 40 milliGRAM(s) Oral at bedtime  docusate sodium 100 milliGRAM(s) Oral three times a day  pantoprazole    Tablet 40 milliGRAM(s) Oral before breakfast  spironolactone 25 milliGRAM(s) Oral daily  captopril 12.5 milliGRAM(s) Oral three times a day  metoprolol succinate  milliGRAM(s) Oral daily  digoxin     Tablet 0.125 milliGRAM(s) Oral daily  buDESOnide 160 MICROgram(s)/formoterol 4.5 MICROgram(s) Inhaler 2 Puff(s) Inhalation two times a day  tiotropium 18 MICROgram(s) Capsule 1 Capsule(s) Inhalation daily  furosemide    Tablet 20 milliGRAM(s) Oral daily  valsartan 40 milliGRAM(s) Oral daily    MEDICATIONS  (PRN):  oxyCODONE    5 mG/acetaminophen 325 mG 1 Tablet(s) Oral every 6 hours PRN Pain  ALBUTerol   0.042% 1.25 milliGRAM(s) Nebulizer every 6 hours PRN Shortness of Breath and/or Wheezing    Vital Signs Last 24 Hrs  T(C): 36.8 (18 Aug 2017 04:43), Max: 36.8 (18 Aug 2017 04:43)  T(F): 98.2 (18 Aug 2017 04:43), Max: 98.2 (18 Aug 2017 04:43)  HR: 87 (18 Aug 2017 12:13) (74 - 87)  BP: 116/73 (18 Aug 2017 12:13) (107/65 - 116/73)  BP(mean): --  RR: 18 (18 Aug 2017 12:13) (18 - 18)  SpO2: 92% (18 Aug 2017 12:13) (87% - 94%)    I&O's Summary    17 Aug 2017 07:01  -  18 Aug 2017 07:00  --------------------------------------------------------  IN: 560 mL / OUT: 1350 mL / NET: -790 mL    18 Aug 2017 07:01  -  18 Aug 2017 13:02  --------------------------------------------------------  IN: 0 mL / OUT: 250 mL / NET: -250 mL          Physical Exam:   GENERAL: NAD, well-groomed, well-developed  HEENT: DG/   Atraumatic, Normocephalic  ENMT: No tonsillar erythema, exudates, or enlargement; Moist mucous membranes, Good dentition, No lesions  NECK: Supple, No JVD, Normal thyroid  CHEST/LUNG: Clear to auscultaion, ; No rales, rhonchi, wheezing, or rubs  CVS: Regular rate and rhythm; No murmurs, rubs, or gallops  GI: : Soft, Nontender, Nondistended; Bowel sounds present  NERVOUS SYSTEM:  Alert & Oriented X3  EXTREMITIES:  2+ Peripheral Pulses, No clubbing, cyanosis, or edema  LYMPH: No lymphadenopathy noted  SKIN: No rashes or lesions  ENDOCRINOLOGY: No Thyromegaly  PSYCH: Appropriate    Labs:  ABG - ( 17 Aug 2017 10:53 )  pH: 7.45  /  pCO2: 44    /  pO2: 80    / HCO3: 30    / Base Excess: 5.8   /  SaO2: 96                             11.6   10.2  )-----------( 479      ( 18 Aug 2017 07:05 )             35.0                         11.6   11.6  )-----------( 471      ( 17 Aug 2017 11:05 )             35.3                         11.7   11.9  )-----------( 528      ( 16 Aug 2017 20:13 )             37.3     08-18    130<L>  |  88<L>  |  22  ----------------------------<  90  3.8   |  29  |  1.38<H>  08-17    129<L>  |  88<L>  |  23  ----------------------------<  132<H>  4.8   |  29  |  1.38<H>  08-17    130<L>  |  87<L>  |  23  ----------------------------<  89  3.8   |  28  |  1.27  08-16    129<L>  |  87<L>  |  25<H>  ----------------------------<  118<H>  4.6   |  27  |  1.37<H>    Ca    8.7      18 Aug 2017 07:05  Ca    8.6      17 Aug 2017 14:33  Ca    8.7      17 Aug 2017 06:37  Ca    8.8      16 Aug 2017 20:13  Mg     1.9         TPro  6.4  /  Alb  3.2<L>  /  TBili  1.0  /  DBili  x   /  AST  56<H>  /  ALT  84<H>  /  AlkPhos  107    TPro  6.6  /  Alb  3.2<L>  /  TBili  0.9  /  DBili  x   /  AST  68<H>  /  ALT  90<H>  /  AlkPhos  112  -    CAPILLARY BLOOD GLUCOSE          LIVER FUNCTIONS - ( 17 Aug 2017 06:37 )  Alb: 3.2 g/dL / Pro: 6.4 g/dL / ALK PHOS: 107 U/L / ALT: 84 U/L RC / AST: 56 U/L / GGT: x           PT/INR - ( 18 Aug 2017 07:05 )   PT: 38.6 sec;   INR: 3.48 ratio         PTT - ( 16 Aug 2017 20:13 )  PTT:39.5 sec  Urinalysis Basic - ( 16 Aug 2017 20:13 )    Color: Yellow / Appearance: Clear / S.009 / pH: x  Gluc: x / Ketone: Negative  / Bili: Negative / Urobili: Negative   Blood: x / Protein: 30 mg/dL / Nitrite: Negative   Leuk Esterase: Negative / RBC: x / WBC 0-2 /HPF   Sq Epi: x / Non Sq Epi: x / Bacteria: x      Serum Pro-Brain Natriuretic Peptide: 45666 pg/mL ( @ 06:37)  Cultures:     Studies  Chest X-RAY  CT SCAN Chest   Venous Dopplers: LE:   CT Abdomen  Others    < from: CT Chest No Cont (17 @ 18:34) >    IMPRESSION:   Right lower lobe opacity more likely represents atelectasis than   pneumonia. Follow-up CT to resolution recommended.    Acute fracture of the left fifth rib.                    BUNNY LIEBERMAN M.D., RADIOLOGY RESIDENT  This document has been electronically signed.  OFELIA NOGUEIRA M.D., ATTENDING RADIOLOGIST  This document has been electronically signed. Aug 18 2017 12:07PM        < end of copied text >

## 2017-08-18 NOTE — DISCHARGE NOTE ADULT - MEDICATION SUMMARY - MEDICATIONS TO TAKE
I will START or STAY ON the medications listed below when I get home from the hospital:    finasteride 5 mg oral tablet  -- 1 tab(s) by mouth once a day  -- Indication: For BPH    spironolactone 25 mg oral tablet  -- 1 tab(s) by mouth once a day  -- Indication: For Diuretic    aspirin 81 mg oral delayed release tablet  -- 1 tab(s) by mouth once a day  -- Indication: For Antiplatelet    acetaminophen 325 mg oral tablet  -- 2 tab(s) by mouth every 6 hours, As needed, Mild Pain (1 - 3)  -- Indication: For Pain management as needed    valsartan 40 mg oral tablet  -- 1 tab(s) by mouth every 12 hours  -- Indication: For Cardiovascular agent    digoxin 125 mcg (0.125 mg) oral tablet  -- 1 tab(s) by mouth once a day  -- Indication: For Cardiovascular agent    amiodarone 200 mg oral tablet  -- 1 tab(s) by mouth once a day  -- Indication: For Antiarrhythmic    warfarin 2.5 mg oral tablet  -- 1 tab(s) by mouth Monday, Wednesday, and Friday  -- Indication: For Anticoagulant    atorvastatin 40 mg oral tablet  -- 1 tab(s) by mouth once a day (at bedtime)  -- Indication: For Antihyperlipidemic    Toprol- mg oral tablet, extended release  -- 1 tab(s) by mouth once a day  -- Indication: For Cardiovascular agent    Spiriva 18 mcg inhalation capsule  -- 1 cap(s) inhaled once a day  -- Indication: For bronchodilator    budesonide-formoterol 160 mcg-4.5 mcg/inh inhalation aerosol  -- 1 puff(s) inhaled 12 times a day  -- Indication: For bronchdilator    furosemide 20 mg oral tablet  -- 1 tab(s) by mouth once a day  -- Avoid prolonged or excessive exposure to direct and/or artificial sunlight while taking this medication.  It is very important that you take or use this exactly as directed.  Do not skip doses or discontinue unless directed by your doctor.  It may be advisable to drink a full glass orange juice or eat a banana daily while taking this medication.    -- Indication: For Diuretic    docusate sodium 100 mg oral capsule  -- 1 cap(s) by mouth 3 times a day  -- Indication: For Stool softener as needed    pantoprazole 40 mg oral delayed release tablet  -- 1 tab(s) by mouth once a day (before a meal)  -- Indication: For Dyspepsia I will START or STAY ON the medications listed below when I get home from the hospital:    finasteride 5 mg oral tablet  -- 1 tab(s) by mouth once a day  -- Indication: For BPH    spironolactone 25 mg oral tablet  -- 1 tab(s) by mouth once a day  -- Indication: For Diuretic    aspirin 81 mg oral delayed release tablet  -- 1 tab(s) by mouth once a day  -- Indication: For Antiplatelet    acetaminophen 325 mg oral tablet  -- 2 tab(s) by mouth every 6 hours, As needed, Mild Pain (1 - 3)  -- Indication: For Pain management as needed    valsartan 40 mg oral tablet  -- 1 tab(s) by mouth every 12 hours  -- Indication: For Cardiovascular agent    digoxin 125 mcg (0.125 mg) oral tablet  -- 1 tab(s) by mouth once a day  -- Indication: For Cardiovascular agent    amiodarone 200 mg oral tablet  -- 1 tab(s) by mouth once a day  -- Indication: For Antiarrhythmic    warfarin 2.5 mg oral tablet  -- 1 tab(s) by mouth Monday, Wednesday, and Friday  -- Indication: For Anticoagulant    Coumadin 2 mg oral tablet  -- 1 tab(s) by mouth Tuesday, Thursday, Saturday and Sunday  -- Do not take this drug if you are pregnant.  It is very important that you take or use this exactly as directed.  Do not skip doses or discontinue unless directed by your doctor.  Obtain medical advice before taking any non-prescription drugs as some may affect the action of this medication.    -- Indication: For Anticoagulant    atorvastatin 40 mg oral tablet  -- 1 tab(s) by mouth once a day (at bedtime)  -- Indication: For Antihyperlipidemic    Toprol- mg oral tablet, extended release  -- 1 tab(s) by mouth once a day  -- Indication: For Cardiovascular agent    Spiriva 18 mcg inhalation capsule  -- 1 cap(s) inhaled once a day  -- Indication: For bronchodilator    budesonide-formoterol 160 mcg-4.5 mcg/inh inhalation aerosol  -- 1 puff(s) inhaled 12 times a day  -- Indication: For bronchdilator    furosemide 20 mg oral tablet  -- 1 tab(s) by mouth once a day  -- Avoid prolonged or excessive exposure to direct and/or artificial sunlight while taking this medication.  It is very important that you take or use this exactly as directed.  Do not skip doses or discontinue unless directed by your doctor.  It may be advisable to drink a full glass orange juice or eat a banana daily while taking this medication.    -- Indication: For Diuretic    docusate sodium 100 mg oral capsule  -- 1 cap(s) by mouth 3 times a day  -- Indication: For Stool softener as needed    pantoprazole 40 mg oral delayed release tablet  -- 1 tab(s) by mouth once a day (before a meal)  -- Indication: For Dyspepsia I will START or STAY ON the medications listed below when I get home from the hospital:    finasteride 5 mg oral tablet  -- 1 tab(s) by mouth once a day  -- Indication: For BPH    spironolactone 25 mg oral tablet  -- 1 tab(s) by mouth once a day  -- Indication: For Diuretic    aspirin 81 mg oral delayed release tablet  -- 1 tab(s) by mouth once a day  -- Indication: For Antiplatelet    acetaminophen 325 mg oral tablet  -- 2 tab(s) by mouth every 6 hours, As needed, Mild Pain (1 - 3)  -- Indication: For Pain management as needed    valsartan 40 mg oral tablet  -- 1 tab(s) by mouth every 12 hours  -- Indication: For Cardiovascular agent    digoxin 125 mcg (0.125 mg) oral tablet  -- 1 tab(s) by mouth once a day  -- Indication: For Cardiovascular agent    amiodarone 200 mg oral tablet  -- 1 tab(s) by mouth once a day  -- Indication: For Antiarrhythmic    Coumadin 2 mg oral tablet  -- 1 tab(s) by mouth Tuesday, Thursday, Saturday and Sunday  -- Do not take this drug if you are pregnant.  It is very important that you take or use this exactly as directed.  Do not skip doses or discontinue unless directed by your doctor.  Obtain medical advice before taking any non-prescription drugs as some may affect the action of this medication.    -- Indication: For Anticoagulant    atorvastatin 40 mg oral tablet  -- 1 tab(s) by mouth once a day (at bedtime)  -- Indication: For Antihyperlipidemic    Toprol- mg oral tablet, extended release  -- 1 tab(s) by mouth once a day  -- Indication: For Cardiovascular agent    Spiriva 18 mcg inhalation capsule  -- 1 cap(s) inhaled once a day  -- Indication: For bronchodilator    budesonide-formoterol 160 mcg-4.5 mcg/inh inhalation aerosol  -- 1 puff(s) inhaled 2 times a day  -- Indication: For bronchodilator    furosemide 20 mg oral tablet  -- 1 tab(s) by mouth once a day  -- Avoid prolonged or excessive exposure to direct and/or artificial sunlight while taking this medication.  It is very important that you take or use this exactly as directed.  Do not skip doses or discontinue unless directed by your doctor.  It may be advisable to drink a full glass orange juice or eat a banana daily while taking this medication.    -- Indication: For Diuretic    docusate sodium 100 mg oral capsule  -- 1 cap(s) by mouth 3 times a day  -- Indication: For Stool softener as needed    pantoprazole 40 mg oral delayed release tablet  -- 1 tab(s) by mouth once a day (before a meal)  -- Indication: For Dyspepsia

## 2017-08-18 NOTE — PROGRESS NOTE ADULT - SUBJECTIVE AND OBJECTIVE BOX
Heart Failure Progress Note    Interval History:    Medications:  sodium chloride 0.9% lock flush 3 milliLiter(s) IV Push every 8 hours  finasteride 5 milliGRAM(s) Oral daily  aspirin enteric coated 81 milliGRAM(s) Oral daily  oxyCODONE    5 mG/acetaminophen 325 mG 1 Tablet(s) Oral every 6 hours PRN  amiodarone    Tablet 200 milliGRAM(s) Oral daily  atorvastatin 40 milliGRAM(s) Oral at bedtime  docusate sodium 100 milliGRAM(s) Oral three times a day  pantoprazole    Tablet 40 milliGRAM(s) Oral before breakfast  spironolactone 25 milliGRAM(s) Oral daily  captopril 12.5 milliGRAM(s) Oral three times a day  metoprolol succinate  milliGRAM(s) Oral daily  digoxin     Tablet 0.125 milliGRAM(s) Oral daily  buDESOnide 160 MICROgram(s)/formoterol 4.5 MICROgram(s) Inhaler 2 Puff(s) Inhalation two times a day  tiotropium 18 MICROgram(s) Capsule 1 Capsule(s) Inhalation daily  ALBUTerol   0.042% 1.25 milliGRAM(s) Nebulizer every 6 hours PRN    Vitals:  T(C): 36.8 (17 @ 04:43), Max: 36.8 (17 @ 04:43)  HR: 74 (17 @ 04:43) (74 - 78)  BP: 107/65 (17 @ 04:43) (107/65 - 117/70)  BP(mean): --  ABP: --  ABP(mean): --  RR: 18 (17 @ 10:06) (17 - 18)  SpO2: 87% (17 @ 10:06) (87% - 95%)  Wt(kg): --  CVP(cm H2O): --  CO: --  CI: --  PA: --  PA(mean): --  PCWP: --  SVR: --  PVR: --    Daily     Daily Weight in k.5 (18 Aug 2017 09:00)    Weight (kg): 87.2 ( @ 18:41)    I&O's Summary    17 Aug 2017 07:01  -  18 Aug 2017 07:00  --------------------------------------------------------  IN: 560 mL / OUT: 1350 mL / NET: -790 mL    18 Aug 2017 07:01  -  18 Aug 2017 10:08  --------------------------------------------------------  IN: 0 mL / OUT: 250 mL / NET: -250 mL        Physical Exam:  Appearance: No Acute Distress  HEENT: No JVD  Cardiovascular:  S1 S2, No murmurs/rubs/gallops  Respiratory: Clear to auscultation bilaterally  Gastrointestinal: Soft, Non-tender	  Skin: No cyanosis	  Neurologic: Non-focal  Extremities: No LE edema  Psychiatry: A & O x 3, Mood & affect appropriate    LVAD Interrogation:  Pump Flow:  Pump Speed:  Pulse Index:  Pump Power:  VAD Events:   Driveline evaluation:    Programming Changes: No changes made    Labs:                        11.6   10.2  )-----------( 479      ( 18 Aug 2017 07:05 )             35.0         130<L>  |  88<L>  |  22  ----------------------------<  90  3.8   |  29  |  1.38<H>    Ca    8.7      18 Aug 2017 07:05  Mg     1.9         TPro  6.4  /  Alb  3.2<L>  /  TBili  1.0  /  DBili  x   /  AST  56<H>  /  ALT  84<H>  /  AlkPhos  107      PT/INR - ( 18 Aug 2017 07:05 )   PT: 38.6 sec;   INR: 3.48 ratio         PTT - ( 16 Aug 2017 20:13 )  PTT:39.5 sec      Serum Pro-Brain Natriuretic Peptide: 95606 pg/mL ( @ 06:37)              TELEMETRY:    [ ] Echocardiogram: Heart Failure Progress Note    Interval History:  Pt feels better today. Orthopnea improved.    Medications:  sodium chloride 0.9% lock flush 3 milliLiter(s) IV Push every 8 hours  finasteride 5 milliGRAM(s) Oral daily  aspirin enteric coated 81 milliGRAM(s) Oral daily  oxyCODONE    5 mG/acetaminophen 325 mG 1 Tablet(s) Oral every 6 hours PRN  amiodarone    Tablet 200 milliGRAM(s) Oral daily  atorvastatin 40 milliGRAM(s) Oral at bedtime  docusate sodium 100 milliGRAM(s) Oral three times a day  pantoprazole    Tablet 40 milliGRAM(s) Oral before breakfast  spironolactone 25 milliGRAM(s) Oral daily  captopril 12.5 milliGRAM(s) Oral three times a day  metoprolol succinate  milliGRAM(s) Oral daily  digoxin     Tablet 0.125 milliGRAM(s) Oral daily  buDESOnide 160 MICROgram(s)/formoterol 4.5 MICROgram(s) Inhaler 2 Puff(s) Inhalation two times a day  tiotropium 18 MICROgram(s) Capsule 1 Capsule(s) Inhalation daily  ALBUTerol   0.042% 1.25 milliGRAM(s) Nebulizer every 6 hours PRN    Vitals:  T(C): 36.8 (17 @ 04:43), Max: 36.8 (17 @ 04:43)  HR: 74 (17 @ 04:43) (74 - 78)  BP: 107/65 (17 @ 04:43) (107/65 - 117/70)  RR: 18 (17 @ 10:06) (17 - 18)  SpO2: 87% (17 @ 10:06) (87% - 95%)    Daily     Daily Weight in k.5 (18 Aug 2017 09:00)    Weight (kg): 87.2 ( @ 18:41)    I&O's Summary    17 Aug 2017 07:01  -  18 Aug 2017 07:00  --------------------------------------------------------  IN: 560 mL / OUT: 1350 mL / NET: -790 mL    18 Aug 2017 07:01  -  18 Aug 2017 10:08  --------------------------------------------------------  IN: 0 mL / OUT: 250 mL / NET: -250 mL    Physical Exam:  Appearance: No Acute Distress  HEENT: No JVD  Cardiovascular:  S1 S2 rrr  Respiratory: Clear to auscultation bilaterally with mild scattered wheeze  Gastrointestinal: Soft, Non-tender	  Skin: No cyanosis	  Neurologic: Non-focal  Extremities: trace LE edema  Psychiatry: A & O x 3, Mood & affect appropriate    Labs:                      11.6   10.2  )-----------( 479      ( 18 Aug 2017 07:05 )             35.0       130<L>  |  88<L>  |  22  ----------------------------<  90  3.8   |  29  |  1.38<H>    Ca    8.7      18 Aug 2017 07:05  Mg     1.9         TPro  6.4  /  Alb  3.2<L>  /  TBili  1.0  /  DBili  x   /  AST  56<H>  /  ALT  84<H>  /  AlkPhos  107      PT/INR - ( 18 Aug 2017 07:05 )   PT: 38.6 sec;   INR: 3.48 ratio       PTT - ( 16 Aug 2017 20:13 )  PTT:39.5 sec    Serum Pro-Brain Natriuretic Peptide: 16486 pg/mL ( @ 06:37)    TELEMETRY: V-paced 80s

## 2017-08-18 NOTE — DISCHARGE NOTE ADULT - HOME CARE AGENCY
[facility] [phone] Visiting nurse to call to arrange home care visit for 1-2 days after discharge. Please call home care agency with any questions or concerns.

## 2017-08-18 NOTE — DISCHARGE NOTE ADULT - NS AS ACTIVITY OBS
Showering allowed/Do not drive or operate machinery/Walking-Indoors allowed/Stairs allowed/Walking-Outdoors allowed

## 2017-08-18 NOTE — PROGRESS NOTE ADULT - ASSESSMENT
72 year old male with copd ,diabetes ,afib (on Coumadin), AICD (2015 for Vtach ef 25%) CAD with PCI w/ progression of his of his CAD w/ most recent cath revealing multivessel disease, recent CABG presenting from the CTS office with Class III/IV HF\      acute on chronic CHF exacerbation - currently euvolemic  -start 20mg PO furosemide daily  -d/c captopril after early PM dose  -start valsartan 40mg PO BID starting tonight

## 2017-08-18 NOTE — DISCHARGE NOTE ADULT - HOSPITAL COURSE
72 year old male with COPD. DM, afib (on Coumadin), AICD (2015 for V-tach EF 25%) CAD with PCI and C1L/IABP on 8/4/17 with  Dr. Myers.  Post op course remarkable for transient BRITANY on CKD and post op fluid overload requiring diuresis who presented to our CTS office for follow up visit and with c/o worsening FAITH. Found to be hypoxic.  Denies chest pain, endorses occasional palpitations at rest. Readmitted for further work up and diuresis.   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 Patient status post sternotomy. There is no pneumothorax. IMPRESSION: The lungs are clear.  CHF consult called and appreciated --> Primacor gtt D/C’d; Captopril 6.25 mg TID initiated  8/17 Pulmonary consults called and appreciated started on bronchodilators; Recommend Non con chest CT and B/L Venous Duplex to evaluate for DVT/ PE (likely low risk)  8/19 pt discharged home with supplemental O2 2L for O2 sat 87% Room air ambulating

## 2017-08-18 NOTE — DISCHARGE NOTE ADULT - CARE PROVIDER_API CALL
Kymberly Shell (MD; PhD), Adv Heart Fail Trnsplnt Cardio; Cardiovascular Disease; Internal Medicine  [address] [place] [address]  Phone: [phone]  Fax: [phone]    Leonel Andrade), Critical Care Medicine; Internal Medicine; Pulmonary Disease; Sleep Medicine  [address] [place] [address]  Phone: [phone]  Fax: [phone]    Lucio Camacho), Cardiovascular Disease  [address] [place] [address]  Phone: [phone]  Fax: [phone]

## 2017-08-18 NOTE — DISCHARGE NOTE ADULT - PATIENT PORTAL LINK FT
“You can access the [facility] Patient Portal, offered by [facility], by registering with the following website: [email or web address]”

## 2017-08-18 NOTE — DISCHARGE NOTE ADULT - CARE PROVIDERS DIRECT ADDRESSES
,[email or web address]

## 2017-08-18 NOTE — PROGRESS NOTE ADULT - SUBJECTIVE AND OBJECTIVE BOX
VITAL SIGNS    Telemetry:  Afib 70-80    T(C): 36.8      HR: 74      BP: 107/65      RR: 18         SpO2: 87%    room air ambulating                                                                                                                              91%    room air at rest                                                                                                                                           IN: 0 mL / OUT: 250 mL / NET: -250 mL                                                                 95% 2L nasal cannula at rest                                                                                                                              93% 2L nasal cannula ambulating    Daily Weight in k.5   Weight (kg): 87.2     PHYSICAL EXAM    Subjective: "I want to go home" Reports FAITH  Neurology: alert and oriented x 3, nonfocal, no gross deficits  CV : S1 S2  Sternal Wound :  CDI , Stable  Lungs:CTA b/l  Abdomen: soft, NT,ND, (+ )BM  :  voiding  Extremities:  -c/c/e       LABS      130<L>  |  88<L>  |  22  ----------------------------<  90  3.8   |  29  |  1.38<H>    Ca    8.7      18 Aug 2017 07:05  Mg     1.9         TPro  6.4  /  Alb  3.2<L>  /  TBili  1.0  /  DBili  x   /  AST  56<H>  /  ALT  84<H>  /  AlkPhos  107                                   11.6   10.2  )-----------( 479      ( 18 Aug 2017 07:05 )             35.0          PT/INR - ( 18 Aug 2017 07:05 )   PT: 38.6 sec;   INR: 3.48 ratio         PTT - ( 16 Aug 2017 20:13 )  PTT:39.5 sec

## 2017-08-18 NOTE — PROGRESS NOTE ADULT - ATTENDING COMMENTS
71 yo M with CAD s/p CABG (LIMA to LAD 17 by Dr. Myers), prior PCI, AICD (secondary prevention), DM II, and chronic AFib who was admitted from the [facility] office for acute on chronic systolic HF. He has a remote h/o EtOH (quit > 20 y ago) and tobacco (quit 9 y ago), and carries a diagnosis of COPD in the chart, of which he is unaware. He does not currently use oxygen at home.     He has done well with gentle diuresis and afterload reduction. He is feeling good.  His PE today is notable for diminished breath sounds b/l with mild expiratory wheezing. No rales. JVP < 8 cm H2O, no HJR, Irreg Irreg S1S2, no MRG, soft abdomen without tenderness, and no LE edema. He is warm and well-perfused. //70, HR 74-78. He is in AFib with iLBBB with demand pacing.     I have recommended the followin. OK to resume lasix 20 mg po daily today.  2. Last dose of Captopril 12.5 mg at 2 pm then please start valsartan 40 mg po BID at 10 pm tonight.  3. Likely ok for discharge home tomorrow. I would like him seen for post-discharge follow-up next week by HF NP. Please call [phone] to schedule with our NP and I will see him in 2 weeks.

## 2017-08-19 VITALS
RESPIRATION RATE: 18 BRPM | OXYGEN SATURATION: 92 % | HEART RATE: 74 BPM | TEMPERATURE: 98 F | SYSTOLIC BLOOD PRESSURE: 114 MMHG | DIASTOLIC BLOOD PRESSURE: 66 MMHG

## 2017-08-19 LAB
ANION GAP SERPL CALC-SCNC: 12 MMOL/L — SIGNIFICANT CHANGE UP (ref 5–17)
BUN SERPL-MCNC: 21 MG/DL — SIGNIFICANT CHANGE UP (ref 7–23)
CALCIUM SERPL-MCNC: 9 MG/DL — SIGNIFICANT CHANGE UP (ref 8.4–10.5)
CHLORIDE SERPL-SCNC: 92 MMOL/L — LOW (ref 96–108)
CO2 SERPL-SCNC: 29 MMOL/L — SIGNIFICANT CHANGE UP (ref 22–31)
CREAT SERPL-MCNC: 1.39 MG/DL — HIGH (ref 0.5–1.3)
GLUCOSE SERPL-MCNC: 95 MG/DL — SIGNIFICANT CHANGE UP (ref 70–99)
INR BLD: 2.52 RATIO — HIGH (ref 0.88–1.16)
POTASSIUM SERPL-MCNC: 4.4 MMOL/L — SIGNIFICANT CHANGE UP (ref 3.5–5.3)
POTASSIUM SERPL-SCNC: 4.4 MMOL/L — SIGNIFICANT CHANGE UP (ref 3.5–5.3)
PROTHROM AB SERPL-ACNC: 27.7 SEC — HIGH (ref 9.8–12.7)
SODIUM SERPL-SCNC: 133 MMOL/L — LOW (ref 135–145)

## 2017-08-19 PROCEDURE — 71250 CT THORAX DX C-: CPT

## 2017-08-19 PROCEDURE — 36600 WITHDRAWAL OF ARTERIAL BLOOD: CPT

## 2017-08-19 PROCEDURE — 71045 X-RAY EXAM CHEST 1 VIEW: CPT

## 2017-08-19 PROCEDURE — 94640 AIRWAY INHALATION TREATMENT: CPT

## 2017-08-19 PROCEDURE — 85610 PROTHROMBIN TIME: CPT

## 2017-08-19 PROCEDURE — 81001 URINALYSIS AUTO W/SCOPE: CPT

## 2017-08-19 PROCEDURE — 99232 SBSQ HOSP IP/OBS MODERATE 35: CPT | Mod: GC

## 2017-08-19 PROCEDURE — 93970 EXTREMITY STUDY: CPT

## 2017-08-19 PROCEDURE — 84443 ASSAY THYROID STIM HORMONE: CPT

## 2017-08-19 PROCEDURE — 84480 ASSAY TRIIODOTHYRONINE (T3): CPT

## 2017-08-19 PROCEDURE — 84436 ASSAY OF TOTAL THYROXINE: CPT

## 2017-08-19 PROCEDURE — 85730 THROMBOPLASTIN TIME PARTIAL: CPT

## 2017-08-19 PROCEDURE — 93308 TTE F-UP OR LMTD: CPT

## 2017-08-19 PROCEDURE — 93321 DOPPLER ECHO F-UP/LMTD STD: CPT

## 2017-08-19 PROCEDURE — 80048 BASIC METABOLIC PNL TOTAL CA: CPT

## 2017-08-19 PROCEDURE — 83735 ASSAY OF MAGNESIUM: CPT

## 2017-08-19 PROCEDURE — 85027 COMPLETE CBC AUTOMATED: CPT

## 2017-08-19 PROCEDURE — 83880 ASSAY OF NATRIURETIC PEPTIDE: CPT

## 2017-08-19 PROCEDURE — 80053 COMPREHEN METABOLIC PANEL: CPT

## 2017-08-19 PROCEDURE — 82803 BLOOD GASES ANY COMBINATION: CPT

## 2017-08-19 RX ORDER — FUROSEMIDE 40 MG
1 TABLET ORAL
Qty: 30 | Refills: 0 | OUTPATIENT
Start: 2017-08-19 | End: 2017-09-18

## 2017-08-19 RX ORDER — WARFARIN SODIUM 2.5 MG/1
1 TABLET ORAL
Qty: 13 | Refills: 0 | OUTPATIENT
Start: 2017-08-19 | End: 2017-09-18

## 2017-08-19 RX ORDER — WARFARIN SODIUM 2.5 MG/1
1 TABLET ORAL
Qty: 13 | Refills: 0
Start: 2017-08-19 | End: 2017-09-18

## 2017-08-19 RX ORDER — TIOTROPIUM BROMIDE 18 UG/1
1 CAPSULE ORAL; RESPIRATORY (INHALATION)
Qty: 1 | Refills: 0
Start: 2017-08-19 | End: 2017-09-18

## 2017-08-19 RX ORDER — VALSARTAN 80 MG/1
1 TABLET ORAL
Qty: 60 | Refills: 0
Start: 2017-08-19 | End: 2017-09-18

## 2017-08-19 RX ORDER — BUDESONIDE AND FORMOTEROL FUMARATE DIHYDRATE 160; 4.5 UG/1; UG/1
1 AEROSOL RESPIRATORY (INHALATION)
Qty: 1 | Refills: 0
Start: 2017-08-19 | End: 2017-09-18

## 2017-08-19 RX ORDER — SPIRONOLACTONE 25 MG/1
1 TABLET, FILM COATED ORAL
Qty: 30 | Refills: 0
Start: 2017-08-19 | End: 2017-09-18

## 2017-08-19 RX ORDER — METOPROLOL TARTRATE 50 MG
1 TABLET ORAL
Qty: 0 | Refills: 0 | DISCHARGE
Start: 2017-08-19

## 2017-08-19 RX ORDER — FEBUXOSTAT 40 MG/1
1 TABLET ORAL
Qty: 0 | Refills: 0 | COMMUNITY

## 2017-08-19 RX ORDER — BUDESONIDE AND FORMOTEROL FUMARATE DIHYDRATE 160; 4.5 UG/1; UG/1
1 AEROSOL RESPIRATORY (INHALATION)
Qty: 1 | Refills: 0 | OUTPATIENT
Start: 2017-08-19 | End: 2017-09-18

## 2017-08-19 RX ADMIN — FINASTERIDE 5 MILLIGRAM(S): 5 TABLET, FILM COATED ORAL at 11:57

## 2017-08-19 RX ADMIN — BUDESONIDE AND FORMOTEROL FUMARATE DIHYDRATE 2 PUFF(S): 160; 4.5 AEROSOL RESPIRATORY (INHALATION) at 06:37

## 2017-08-19 RX ADMIN — Medication 100 MILLIGRAM(S): at 06:38

## 2017-08-19 RX ADMIN — SODIUM CHLORIDE 3 MILLILITER(S): 9 INJECTION INTRAMUSCULAR; INTRAVENOUS; SUBCUTANEOUS at 12:56

## 2017-08-19 RX ADMIN — SPIRONOLACTONE 25 MILLIGRAM(S): 25 TABLET, FILM COATED ORAL at 06:38

## 2017-08-19 RX ADMIN — Medication 100 MILLIGRAM(S): at 14:56

## 2017-08-19 RX ADMIN — TIOTROPIUM BROMIDE 1 CAPSULE(S): 18 CAPSULE ORAL; RESPIRATORY (INHALATION) at 11:57

## 2017-08-19 RX ADMIN — SODIUM CHLORIDE 3 MILLILITER(S): 9 INJECTION INTRAMUSCULAR; INTRAVENOUS; SUBCUTANEOUS at 06:33

## 2017-08-19 RX ADMIN — AMIODARONE HYDROCHLORIDE 200 MILLIGRAM(S): 400 TABLET ORAL at 06:38

## 2017-08-19 RX ADMIN — Medication 0.12 MILLIGRAM(S): at 06:38

## 2017-08-19 RX ADMIN — Medication 20 MILLIGRAM(S): at 06:38

## 2017-08-19 RX ADMIN — PANTOPRAZOLE SODIUM 40 MILLIGRAM(S): 20 TABLET, DELAYED RELEASE ORAL at 06:38

## 2017-08-19 RX ADMIN — Medication 81 MILLIGRAM(S): at 11:57

## 2017-08-19 RX ADMIN — VALSARTAN 40 MILLIGRAM(S): 80 TABLET ORAL at 14:22

## 2017-08-19 NOTE — PROGRESS NOTE ADULT - PROBLEM SELECTOR PROBLEM 1
Acute on chronic systolic congestive heart failure
Chronic obstructive pulmonary disease, unspecified COPD type
Heart failure, unspecified heart failure chronicity, unspecified heart failure type
Acute on chronic systolic congestive heart failure
Chronic obstructive pulmonary disease, unspecified COPD type

## 2017-08-19 NOTE — PROGRESS NOTE ADULT - SUBJECTIVE AND OBJECTIVE BOX
24H hour events: Patient tolerating valsartan well. Plan for discharge today with close outpatient heart failure follow-up.     MEDICATIONS:  aspirin enteric coated 81 milliGRAM(s) Oral daily  amiodarone    Tablet 200 milliGRAM(s) Oral daily  spironolactone 25 milliGRAM(s) Oral daily  metoprolol succinate  milliGRAM(s) Oral daily  digoxin     Tablet 0.125 milliGRAM(s) Oral daily  furosemide    Tablet 20 milliGRAM(s) Oral daily  valsartan 40 milliGRAM(s) Oral every 12 hours  buDESOnide 160 MICROgram(s)/formoterol 4.5 MICROgram(s) Inhaler 2 Puff(s) Inhalation two times a day  tiotropium 18 MICROgram(s) Capsule 1 Capsule(s) Inhalation daily  ALBUTerol   0.042% 1.25 milliGRAM(s) Nebulizer every 6 hours PRN  oxyCODONE    5 mG/acetaminophen 325 mG 1 Tablet(s) Oral every 6 hours PRN  docusate sodium 100 milliGRAM(s) Oral three times a day  pantoprazole    Tablet 40 milliGRAM(s) Oral before breakfast  finasteride 5 milliGRAM(s) Oral daily  atorvastatin 40 milliGRAM(s) Oral at bedtime        REVIEW OF SYSTEMS:  Complete 10point ROS negative except as documented above.    PHYSICAL EXAM:  T(C): 36.4 (08-19-17 @ 04:18), Max: 36.5 (08-18-17 @ 14:11)  HR: 73 (08-19-17 @ 04:18) (73 - 87)  BP: 133/75 (08-19-17 @ 04:18) (116/73 - 133/75)  RR: 18 (08-19-17 @ 04:18) (18 - 18)  SpO2: 94% (08-19-17 @ 04:18) (87% - 96%)  Wt(kg): --  I&O's Summary    18 Aug 2017 07:01  -  19 Aug 2017 07:00  --------------------------------------------------------  IN: 1200 mL / OUT: 2550 mL / NET: -1350 mL    Appearance: No Acute Distress  HEENT: No JVD  Cardiovascular:  S1 S2 rrr  Respiratory: Clear to auscultation bilaterally with mild scattered wheeze  Gastrointestinal: Soft, Non-tender	  Skin: No cyanosis	  Neurologic: Non-focal  Extremities: trace LE edema  Psychiatry: A & O x 3, Mood & affect appropriate        LABS:	 	  CBC Full  -  ( 18 Aug 2017 07:05 )  WBC Count : 10.2 K/uL  Hemoglobin : 11.6 g/dL  Hematocrit : 35.0 %  Platelet Count - Automated : 479 K/uL    08-19    133<L>  |  92<L>  |  21  ----------------------------<  95  4.4   |  29  |  1.39<H>        TELEMETRY: 	    	    ASSESSMENT/PLAN: 	  72 year old male with reported copd ,diabetes ,afib (on Coumadin), AICD for secondary prevention, moderate LV dysfunction, CAD with PCI w/ progression of his of his CAD w/ most recent cath revealing multivessel disease, recent CABG presenting from the CTS office with NYHA Class III HF sx      1) acute on chronic systolic HF exacerbation - currently euvolemic  -c/w 20mg PO furosemide daily  -c/w valsartan 40mg PO BID starting tonight  -c/w aldadtone and toprol 100mg  -f/u with HF NP post discharge and with Dr Shell in 2 weeks            Akshat Cobos 24H hour events: Patient tolerating valsartan well. Plan for discharge today with close outpatient heart failure follow-up.     MEDICATIONS:  aspirin enteric coated 81 milliGRAM(s) Oral daily  amiodarone    Tablet 200 milliGRAM(s) Oral daily  spironolactone 25 milliGRAM(s) Oral daily  metoprolol succinate  milliGRAM(s) Oral daily  digoxin     Tablet 0.125 milliGRAM(s) Oral daily  furosemide    Tablet 20 milliGRAM(s) Oral daily  valsartan 40 milliGRAM(s) Oral every 12 hours  buDESOnide 160 MICROgram(s)/formoterol 4.5 MICROgram(s) Inhaler 2 Puff(s) Inhalation two times a day  tiotropium 18 MICROgram(s) Capsule 1 Capsule(s) Inhalation daily  ALBUTerol   0.042% 1.25 milliGRAM(s) Nebulizer every 6 hours PRN  oxyCODONE    5 mG/acetaminophen 325 mG 1 Tablet(s) Oral every 6 hours PRN  docusate sodium 100 milliGRAM(s) Oral three times a day  pantoprazole    Tablet 40 milliGRAM(s) Oral before breakfast  finasteride 5 milliGRAM(s) Oral daily  atorvastatin 40 milliGRAM(s) Oral at bedtime        REVIEW OF SYSTEMS:  Complete 10point ROS negative except as documented above.    PHYSICAL EXAM:  T(C): 36.4 (08-19-17 @ 04:18), Max: 36.5 (08-18-17 @ 14:11)  HR: 73 (08-19-17 @ 04:18) (73 - 87)  BP: 133/75 (08-19-17 @ 04:18) (116/73 - 133/75)  RR: 18 (08-19-17 @ 04:18) (18 - 18)  SpO2: 94% (08-19-17 @ 04:18) (87% - 96%)  Wt(kg): --  I&O's Summary    18 Aug 2017 07:01  -  19 Aug 2017 07:00  --------------------------------------------------------  IN: 1200 mL / OUT: 2550 mL / NET: -1350 mL    Appearance: No Acute Distress  HEENT: No JVD  Cardiovascular:  S1 S2 rrr  Respiratory: CTAB no w/r/r  Gastrointestinal: Soft, Non-tender	  Skin: No cyanosis	  Neurologic: Non-focal  Extremities: trace LE edema  Psychiatry: A & O x 3, Mood & affect appropriate        LABS:	 	  CBC Full  -  ( 18 Aug 2017 07:05 )  WBC Count : 10.2 K/uL  Hemoglobin : 11.6 g/dL  Hematocrit : 35.0 %  Platelet Count - Automated : 479 K/uL    08-19    133<L>  |  92<L>  |  21  ----------------------------<  95  4.4   |  29  |  1.39<H>        TELEMETRY: 	    	    ASSESSMENT/PLAN: 	  72 year old male with reported copd ,diabetes ,afib (on Coumadin), AICD for secondary prevention, moderate LV dysfunction, CAD with PCI w/ progression of his of his CAD w/ most recent cath revealing multivessel disease, recent CABG presenting from the CTS office with NYHA Class III HF sx      1) acute on chronic systolic HF exacerbation - currently euvolemic  -c/w 20mg PO furosemide daily  -c/w valsartan 40mg PO BID starting tonight  -c/w aldadtone and toprol 100mg  -f/u with HF NP post discharge and with Dr Shell in 2 weeks            Akshat Cobos

## 2017-08-19 NOTE — PROGRESS NOTE ADULT - ATTENDING COMMENTS
73 yo M with CAD s/p CABG (LIMA to LAD 17 by Dr. Myers), prior PCI, AICD (secondary prevention), DM II, and chronic AFib who was admitted from the [facility] office for acute on chronic systolic HF. He has a remote h/o EtOH (quit > 20 y ago) and tobacco (quit 9 y ago), and carries a diagnosis of COPD in the chart, of which he is unaware. He does not currently use oxygen at home.     PE today is notable for diminished breath sounds b/l with mild expiratory wheezing. No rales. JVP < 8 cm H2O, no HJR, Irreg Irreg S1S2, no MRG, soft abdomen without tenderness, and no LE edema. He is warm and well-perfused. //75, HR 73-87 in AFib with iLBBB with demand pacing.     I have recommended the followin. OK to resume lasix 20 mg po daily today.  2. Last dose of Captopril 12.5 mg at 2 pm then please start valsartan 40 mg po BID at 10 pm tonight.  3. Likely ok for discharge home tomorrow. I would like him seen for post-discharge follow-up next week by HF NP. Please call [phone] to schedule with our NP and I will see him in 2 weeks. 71 yo M with CAD s/p CABG (LIMA to LAD 17 by Dr. Myers), prior PCI, AICD (secondary prevention), DM II, and chronic AFib who was admitted from the [facility] office for acute on chronic systolic HF. He has a remote h/o EtOH (quit > 20 y ago) and tobacco (quit 9 y ago), and carries a diagnosis of COPD in the chart, of which he is unaware. He does not currently use oxygen at home.     PE today is notable for clear lungs without wheezing or rales. JVP < 8 cm H2O, no HJR, Irreg Irreg S1S2, no MRG, soft abdomen without tenderness, and no LE edema. He is warm and well-perfused. //75, HR 73-87 in AFib with iLBBB with demand pacing. He feels great. INR is therapeutic.    I have recommended the followin. OK for discharge home from my perspective. Please record a discharge weight.  2. He should be seen in HF NP clinic next week and by me in 2 weeks.  3. Please keep him on the current HF medications for discharge and we will further adjust as outpatient.

## 2017-08-19 NOTE — PROGRESS NOTE ADULT - SUBJECTIVE AND OBJECTIVE BOX
Patient is a 72y old  Male who presents with a chief complaint of worsening shortness of breath (18 Aug 2017 16:08)    do not feel SOB  no cough  no phlegm  no chest pain         Any change in ROS:     MEDICATIONS  (STANDING):  sodium chloride 0.9% lock flush 3 milliLiter(s) IV Push every 8 hours  finasteride 5 milliGRAM(s) Oral daily  aspirin enteric coated 81 milliGRAM(s) Oral daily  amiodarone    Tablet 200 milliGRAM(s) Oral daily  atorvastatin 40 milliGRAM(s) Oral at bedtime  docusate sodium 100 milliGRAM(s) Oral three times a day  pantoprazole    Tablet 40 milliGRAM(s) Oral before breakfast  spironolactone 25 milliGRAM(s) Oral daily  metoprolol succinate  milliGRAM(s) Oral daily  digoxin     Tablet 0.125 milliGRAM(s) Oral daily  buDESOnide 160 MICROgram(s)/formoterol 4.5 MICROgram(s) Inhaler 2 Puff(s) Inhalation two times a day  tiotropium 18 MICROgram(s) Capsule 1 Capsule(s) Inhalation daily  furosemide    Tablet 20 milliGRAM(s) Oral daily  valsartan 40 milliGRAM(s) Oral every 12 hours    MEDICATIONS  (PRN):  oxyCODONE    5 mG/acetaminophen 325 mG 1 Tablet(s) Oral every 6 hours PRN Pain  ALBUTerol   0.042% 1.25 milliGRAM(s) Nebulizer every 6 hours PRN Shortness of Breath and/or Wheezing    Vital Signs Last 24 Hrs  T(C): 36.4 (19 Aug 2017 04:18), Max: 36.5 (18 Aug 2017 14:11)  T(F): 97.5 (19 Aug 2017 04:18), Max: 97.7 (18 Aug 2017 14:11)  HR: 73 (19 Aug 2017 04:18) (73 - 87)  BP: 133/75 (19 Aug 2017 04:18) (116/73 - 133/75)  BP(mean): --  RR: 18 (19 Aug 2017 04:18) (18 - 18)  SpO2: 93% (19 Aug 2017 09:50) (86% - 96%)    I&O's Summary    18 Aug 2017 07:01  -  19 Aug 2017 07:00  --------------------------------------------------------  IN: 1200 mL / OUT: 2550 mL / NET: -1350 mL    19 Aug 2017 07:01  -  19 Aug 2017 10:59  --------------------------------------------------------  IN: 360 mL / OUT: 500 mL / NET: -140 mL          Physical Exam:   GENERAL: NAD, well-groomed, well-developed  HEENT: DG/   Atraumatic, Normocephalic  ENMT: No tonsillar erythema, exudates, or enlargement; Moist mucous membranes, Good dentition, No lesions  NECK: Supple, No JVD, Normal thyroid  CHEST/LUNG: Clear to auscultaion, ; No rales, rhonchi, wheezing, or rubs  CVS: Regular rate and rhythm; No murmurs, rubs, or gallops  GI: : Soft, Nontender, Nondistended; Bowel sounds present  NERVOUS SYSTEM:  Alert & Oriented X3  EXTREMITIES:  2+ Peripheral Pulses, No clubbing, cyanosis, or edema  LYMPH: No lymphadenopathy noted  SKIN: No rashes or lesions  ENDOCRINOLOGY: No Thyromegaly  PSYCH: Appropriate    Labs:                              11.6   10.2  )-----------( 479      ( 18 Aug 2017 07:05 )             35.0                         11.6   11.6  )-----------( 471      ( 17 Aug 2017 11:05 )             35.3                         11.7   11.9  )-----------( 528      ( 16 Aug 2017 20:13 )             37.3     08-19    133<L>  |  92<L>  |  21  ----------------------------<  95  4.4   |  29  |  1.39<H>  08-18    130<L>  |  88<L>  |  22  ----------------------------<  90  3.8   |  29  |  1.38<H>  08-17    129<L>  |  88<L>  |  23  ----------------------------<  132<H>  4.8   |  29  |  1.38<H>  08-17    130<L>  |  87<L>  |  23  ----------------------------<  89  3.8   |  28  |  1.27  08-16    129<L>  |  87<L>  |  25<H>  ----------------------------<  118<H>  4.6   |  27  |  1.37<H>    Ca    9.0      19 Aug 2017 05:29  Ca    8.7      18 Aug 2017 07:05  Ca    8.6      17 Aug 2017 14:33    TPro  6.4  /  Alb  3.2<L>  /  TBili  1.0  /  DBili  x   /  AST  56<H>  /  ALT  84<H>  /  AlkPhos  107  08-17  TPro  6.6  /  Alb  3.2<L>  /  TBili  0.9  /  DBili  x   /  AST  68<H>  /  ALT  90<H>  /  AlkPhos  112  08-16    CAPILLARY BLOOD GLUCOSE            PT/INR - ( 19 Aug 2017 10:30 )   PT: 27.7 sec;   INR: 2.52 ratio             Serum Pro-Brain Natriuretic Peptide: 74277 pg/mL (08-17 @ 06:37)  Cultures:     Studies  Chest X-RAY  CT SCAN Chest   Venous Dopplers: LE:   CT Abdomen  Others    < from: CT Chest No Cont (08.17.17 @ 18:34) >  IMPRESSION:   Right lower lobe opacity more likely represents atelectasis than   pneumonia. Follow-up CT to resolution recommended.    Acute fracture of the left fifth rib.                    BUNNY LIEBERMAN M.D., RADIOLOGY RESIDENT  This document has been electronically signed.  OFELIA NOGUEIRA M.D., ATTENDING RADIOLOGIST  This document has been electronically signed. Aug 18 2017 12:07PM    < end of copied text >

## 2017-08-19 NOTE — PROGRESS NOTE ADULT - PROBLEM SELECTOR PROBLEM 2
Chronic obstructive pulmonary disease, unspecified COPD type
Heart failure, unspecified heart failure chronicity, unspecified heart failure type
Heart failure, unspecified heart failure chronicity, unspecified heart failure type
Chronic obstructive pulmonary disease, unspecified COPD type

## 2017-08-19 NOTE — PROGRESS NOTE ADULT - PROBLEM SELECTOR PLAN 2
1. Pulmonary Consult called and appreciated   2. Start buDESOnide 160 MICROgram(s)/formoterol 4.5 MICROgram(s) Inhaler 2 Puff(s) Inhalation two times a day  3. Start tiotropium 18 MICROgram(s) Capsule 1 Capsule(s) Inhalation daily  4.Start  ALBUTerol   0.042% 1.25 milliGRAM(s) Nebulizer every 6 hours PRN  5. Non con Chest CT   6. B/L LE venous duplex study r/o DVT   7. Room Air ABG
Continue current care: on diuretics
1. Pulmonary Consult called and appreciated   2. Start buDESOnide 160 MICROgram(s)/formoterol 4.5 MICROgram(s) Inhaler 2 Puff(s) Inhalation two times a day  3. Start tiotropium 18 MICROgram(s) Capsule 1 Capsule(s) Inhalation daily  4.Start  ALBUTerol   0.042% 1.25 milliGRAM(s) Nebulizer every 6 hours PRN  5. Non con Chest CT   6. B/L LE venous duplex study r/o DVT   7. Room Air ABG   8. Wean of 02 NC as tolerated; Maintain 02 Sat > 90%
Continue current care: on diuretics

## 2017-08-19 NOTE — PROGRESS NOTE ADULT - PROBLEM SELECTOR PLAN 3
1. Continue with Amio / Toprol / Digoxin for Heart rate control   2. AC therapy continue on coumadin for a therapeutic INR 2-3   3. Hold Coumadin tonight INR 3.12
S/P CABG: doing ok   Continue current care
1. Continue with Amio / Toprol / Digoxin for Heart rate control   2. AC therapy continue on coumadin for a therapeutic INR 2-3   3. Hold Coumadin tonight INR 3.12
S/P CABG: doing ok   Continue current care

## 2017-08-19 NOTE — PROGRESS NOTE ADULT - PROBLEM SELECTOR PROBLEM 3
Coronary artery disease involving native coronary artery of native heart without angina pectoris
Atrial Fibrillation
Coronary artery disease involving native coronary artery of native heart without angina pectoris
Atrial Fibrillation

## 2017-08-21 DIAGNOSIS — R69 ILLNESS, UNSPECIFIED: ICD-10-CM

## 2017-08-28 ENCOUNTER — APPOINTMENT (OUTPATIENT)
Dept: CARDIOLOGY | Facility: CLINIC | Age: 72
End: 2017-08-28
Payer: MEDICARE

## 2017-08-28 VITALS
DIASTOLIC BLOOD PRESSURE: 82 MMHG | RESPIRATION RATE: 16 BRPM | SYSTOLIC BLOOD PRESSURE: 127 MMHG | OXYGEN SATURATION: 91 % | WEIGHT: 183 LBS | BODY MASS INDEX: 29.41 KG/M2 | HEART RATE: 72 BPM | HEIGHT: 66 IN

## 2017-08-28 PROCEDURE — 99214 OFFICE O/P EST MOD 30 MIN: CPT | Mod: 25

## 2017-08-28 PROCEDURE — 94620 PULMONARY STRESS TESTING SIMPLE: CPT

## 2017-08-28 PROCEDURE — 93000 ELECTROCARDIOGRAM COMPLETE: CPT | Mod: 59

## 2017-08-29 LAB
ALBUMIN SERPL ELPH-MCNC: 3.6 G/DL
ALP BLD-CCNC: 125 U/L
ALT SERPL-CCNC: 80 U/L
ANION GAP SERPL CALC-SCNC: 12 MMOL/L
AST SERPL-CCNC: 50 U/L
BILIRUB SERPL-MCNC: 0.7 MG/DL
BUN SERPL-MCNC: 21 MG/DL
CALCIUM SERPL-MCNC: 9.4 MG/DL
CHLORIDE SERPL-SCNC: 93 MMOL/L
CO2 SERPL-SCNC: 26 MMOL/L
CREAT SERPL-MCNC: 1.5 MG/DL
GLUCOSE SERPL-MCNC: 104 MG/DL
POTASSIUM SERPL-SCNC: 4.7 MMOL/L
PROT SERPL-MCNC: 7.6 G/DL
SODIUM SERPL-SCNC: 131 MMOL/L

## 2017-09-06 ENCOUNTER — APPOINTMENT (OUTPATIENT)
Dept: CARDIOLOGY | Facility: CLINIC | Age: 72
End: 2017-09-06
Payer: MEDICARE

## 2017-09-06 ENCOUNTER — RECORD ABSTRACTING (OUTPATIENT)
Age: 72
End: 2017-09-06

## 2017-09-06 ENCOUNTER — NON-APPOINTMENT (OUTPATIENT)
Age: 72
End: 2017-09-06

## 2017-09-06 VITALS
SYSTOLIC BLOOD PRESSURE: 118 MMHG | DIASTOLIC BLOOD PRESSURE: 71 MMHG | BODY MASS INDEX: 28.93 KG/M2 | HEIGHT: 66 IN | HEART RATE: 72 BPM | OXYGEN SATURATION: 95 % | WEIGHT: 180 LBS

## 2017-09-06 DIAGNOSIS — I50.20 UNSPECIFIED SYSTOLIC (CONGESTIVE) HEART FAILURE: ICD-10-CM

## 2017-09-06 DIAGNOSIS — Z87.898 PERSONAL HISTORY OF OTHER SPECIFIED CONDITIONS: ICD-10-CM

## 2017-09-06 PROCEDURE — 93000 ELECTROCARDIOGRAM COMPLETE: CPT

## 2017-09-06 PROCEDURE — 99214 OFFICE O/P EST MOD 30 MIN: CPT | Mod: 25

## 2017-09-06 RX ORDER — DOCUSATE SODIUM 100 MG/1
100 CAPSULE, LIQUID FILLED ORAL 3 TIMES DAILY
Qty: 90 | Refills: 1 | Status: COMPLETED | COMMUNITY

## 2017-09-06 RX ORDER — SPIRONOLACTONE 25 MG/1
25 TABLET ORAL DAILY
Qty: 90 | Refills: 2 | Status: COMPLETED | COMMUNITY
Start: 2017-08-28

## 2017-09-07 ENCOUNTER — RECORD ABSTRACTING (OUTPATIENT)
Age: 72
End: 2017-09-07

## 2017-09-08 ENCOUNTER — APPOINTMENT (OUTPATIENT)
Dept: CARDIOTHORACIC SURGERY | Facility: CLINIC | Age: 72
End: 2017-09-08

## 2017-09-08 VITALS
OXYGEN SATURATION: 97 % | BODY MASS INDEX: 26.98 KG/M2 | DIASTOLIC BLOOD PRESSURE: 73 MMHG | SYSTOLIC BLOOD PRESSURE: 113 MMHG | WEIGHT: 178 LBS | RESPIRATION RATE: 15 BRPM | TEMPERATURE: 97.8 F | HEART RATE: 72 BPM | HEIGHT: 68 IN

## 2017-09-08 VITALS — SYSTOLIC BLOOD PRESSURE: 112 MMHG | DIASTOLIC BLOOD PRESSURE: 70 MMHG

## 2017-09-08 PROBLEM — Z87.898 HISTORY OF SHORTNESS OF BREATH: Status: RESOLVED | Noted: 2017-08-16 | Resolved: 2017-09-08

## 2017-09-08 PROBLEM — I50.20 SYSTOLIC HEART FAILURE: Status: RESOLVED | Noted: 2017-08-28 | Resolved: 2017-09-08

## 2017-09-08 RX ORDER — AMIODARONE HYDROCHLORIDE 200 MG/1
200 TABLET ORAL
Qty: 90 | Refills: 1 | Status: DISCONTINUED | COMMUNITY
End: 2017-09-08

## 2017-09-08 RX ORDER — DOCUSATE SODIUM 100 MG/1
100 CAPSULE, LIQUID FILLED ORAL 3 TIMES DAILY
Refills: 0 | Status: DISCONTINUED | COMMUNITY
End: 2017-09-08

## 2017-09-12 NOTE — ED ADULT NURSE NOTE - NS ED NURSE LEVEL OF CONSCIOUSNESS AFFECT
Calm Quality 431: Preventive Care And Screening: Unhealthy Alcohol Use - Screening: Patient screened for unhealthy alcohol use using a single question and scores 2 or greater episodes per year and brief intervention occurred Detail Level: Detailed Appropriate/Calm

## 2017-09-14 ENCOUNTER — MEDICATION RENEWAL (OUTPATIENT)
Age: 72
End: 2017-09-14

## 2017-09-21 ENCOUNTER — APPOINTMENT (OUTPATIENT)
Dept: CARDIOLOGY | Facility: CLINIC | Age: 72
End: 2017-09-21

## 2017-10-04 ENCOUNTER — APPOINTMENT (OUTPATIENT)
Dept: CARDIOLOGY | Facility: CLINIC | Age: 72
End: 2017-10-04
Payer: MEDICARE

## 2017-10-04 ENCOUNTER — NON-APPOINTMENT (OUTPATIENT)
Age: 72
End: 2017-10-04

## 2017-10-04 VITALS
SYSTOLIC BLOOD PRESSURE: 111 MMHG | DIASTOLIC BLOOD PRESSURE: 68 MMHG | HEIGHT: 68 IN | WEIGHT: 182 LBS | HEART RATE: 69 BPM | OXYGEN SATURATION: 94 % | BODY MASS INDEX: 27.58 KG/M2

## 2017-10-04 DIAGNOSIS — R73.09 OTHER ABNORMAL GLUCOSE: ICD-10-CM

## 2017-10-04 DIAGNOSIS — N18.3 CHRONIC KIDNEY DISEASE, STAGE 3 (MODERATE): ICD-10-CM

## 2017-10-04 DIAGNOSIS — Z87.39 PERSONAL HISTORY OF OTHER DISEASES OF THE MUSCULOSKELETAL SYSTEM AND CONNECTIVE TISSUE: ICD-10-CM

## 2017-10-04 PROCEDURE — 93000 ELECTROCARDIOGRAM COMPLETE: CPT

## 2017-10-04 PROCEDURE — 99214 OFFICE O/P EST MOD 30 MIN: CPT | Mod: 25

## 2017-10-13 NOTE — PROGRESS NOTE ADULT - PROBLEM SELECTOR PLAN 1
1. Continue with ASA 81 mg PO daily   2. Increase Captopril 12.5 mg PO TID; if BP tolerates increase to 25 mg TID this afternoon as per CHF Team   3. Restart Toprol 100 mg PO daily   4. D/C aldactone. Monitor sodium  5. Resume Digoxin .125 mg PO daily   6. will arrange for home O2  7. Daily Shower   8. Increase activity as tolerated   9. AC therapy; continue with coumadin for h/o Afib; PT/ INR goal 2-3  10. Daily PT/INR  11. Repeat BMP this afternoon at 2:00 pm   12. Continue on Amiodarone 200 mg PO daily   13. Supplement K+ PRN; Maintain K+ > 4.0  14. D/C plan home once medically cleared   Plan of care discussed with attending.
The ct scan demonstrates emphysema. He also has some right lower lobe opactiy which is likely atelectasis: Plus there is acute left 5th rib fracture: Patient had surgery done approx two weeks ago: His oxygenation is por on Room air: he would need mariela oxygen. Would continue symbicort and spiriva.: awaiting oxygen delivery at home.
1. Continue with ASA 81 mg PO daily   2. Increase Captopril 12.5 mg PO TID; if BP tolerates increase to 25 mg TID this afternoon as per CHF Team   3. Restart Toprol 100 mg PO daily   4. D/C Lasix (-2L fluid balance in the last 24 hours)  5. Resume Digoxin .125 mg PO daily   6. Wean off 02 NC as tolerated   7. Daily Shower   8. Increase activity as tolerated   9. AC therapy; continue with coumadin for h/o Afib; PT/ INR goal 2-3  10. Daily PT/INR  11. Repeat BMP this afternoon at 2:00 pm   12. Continue on Amiodarone 200 mg PO daily   13. Supplement K+ PRN; Maintain K+ > 4.0  14. D/C plan home once medically cleared   Plan of care discussed with attending.
[id number]
The ct scan demonstrates emphysema. He also has some right lower lobe opactiy which is likely atelectasis: Plus there is acute left 5th rib fracture: Patient had surgery done approx two weeks ago: His oxygenation is por on Room air: he would need mariela oxygen. Would continue symbicort and spiriva.

## 2017-10-16 ENCOUNTER — APPOINTMENT (OUTPATIENT)
Dept: CARDIOLOGY | Facility: CLINIC | Age: 72
End: 2017-10-16

## 2017-10-18 ENCOUNTER — APPOINTMENT (OUTPATIENT)
Dept: CARDIOLOGY | Facility: CLINIC | Age: 72
End: 2017-10-18

## 2017-10-24 NOTE — DIETITIAN INITIAL EVALUATION ADULT. - DOB: +DATEOFBIRTH
Chief Complaint   Patient presents with     Surgical Followup     Staple removal        Initial /76 (BP Location: Left arm, Patient Position: Chair, Cuff Size: Adult Regular)  Pulse 81  Resp 20  Ht 5' (1.524 m)  LMP 01/02/2017  SpO2 98% Estimated body mass index is 30.47 kg/(m^2) as calculated from the following:    Height as of 10/19/17: 5' (1.524 m).    Weight as of 10/19/17: 156 lb (70.8 kg).  Medication Reconciliation: alexa Rodriguez      
Statement Selected

## 2017-10-25 ENCOUNTER — APPOINTMENT (OUTPATIENT)
Dept: CARDIOLOGY | Facility: CLINIC | Age: 72
End: 2017-10-25

## 2017-10-31 ENCOUNTER — APPOINTMENT (OUTPATIENT)
Dept: ELECTROPHYSIOLOGY | Facility: CLINIC | Age: 72
End: 2017-10-31

## 2017-11-22 ENCOUNTER — APPOINTMENT (OUTPATIENT)
Dept: CARDIOLOGY | Facility: CLINIC | Age: 72
End: 2017-11-22

## 2017-11-30 ENCOUNTER — APPOINTMENT (OUTPATIENT)
Dept: CARDIOLOGY | Facility: CLINIC | Age: 72
End: 2017-11-30
Payer: MEDICARE

## 2017-11-30 ENCOUNTER — NON-APPOINTMENT (OUTPATIENT)
Age: 72
End: 2017-11-30

## 2017-11-30 VITALS
SYSTOLIC BLOOD PRESSURE: 124 MMHG | BODY MASS INDEX: 26.07 KG/M2 | WEIGHT: 172 LBS | OXYGEN SATURATION: 99 % | HEART RATE: 60 BPM | DIASTOLIC BLOOD PRESSURE: 69 MMHG | HEIGHT: 68 IN

## 2017-11-30 PROCEDURE — 99215 OFFICE O/P EST HI 40 MIN: CPT | Mod: 25

## 2017-11-30 PROCEDURE — 93000 ELECTROCARDIOGRAM COMPLETE: CPT

## 2017-11-30 RX ORDER — FEBUXOSTAT 40 MG/1
40 TABLET ORAL DAILY
Refills: 0 | Status: DISCONTINUED | COMMUNITY
End: 2017-11-30

## 2017-11-30 RX ORDER — PANTOPRAZOLE 40 MG/1
40 TABLET, DELAYED RELEASE ORAL DAILY
Qty: 30 | Refills: 3 | Status: DISCONTINUED | COMMUNITY
End: 2017-11-30

## 2017-11-30 RX ORDER — FLUOXETINE HYDROCHLORIDE 10 MG/1
10 CAPSULE ORAL
Qty: 30 | Refills: 0 | Status: ACTIVE | COMMUNITY
Start: 2017-11-24

## 2017-11-30 RX ORDER — METOPROLOL SUCCINATE 100 MG/1
100 TABLET, EXTENDED RELEASE ORAL DAILY
Refills: 0 | Status: DISCONTINUED | COMMUNITY
End: 2017-11-30

## 2017-11-30 RX ORDER — ACETAMINOPHEN 325 MG/1
325 TABLET ORAL
Refills: 0 | Status: DISCONTINUED | COMMUNITY
End: 2017-11-30

## 2017-11-30 RX ORDER — SPIRONOLACTONE 25 MG/1
25 TABLET ORAL DAILY
Qty: 90 | Refills: 2 | Status: DISCONTINUED | COMMUNITY
End: 2017-11-30

## 2017-11-30 RX ORDER — AMIODARONE HYDROCHLORIDE 200 MG/1
200 TABLET ORAL DAILY
Refills: 0 | Status: DISCONTINUED | COMMUNITY
End: 2017-11-30

## 2017-11-30 RX ORDER — VALSARTAN 80 MG/1
80 TABLET, COATED ORAL TWICE DAILY
Qty: 180 | Refills: 0 | Status: DISCONTINUED | COMMUNITY
Start: 2017-08-28 | End: 2017-11-30

## 2017-12-01 ENCOUNTER — RESULT REVIEW (OUTPATIENT)
Age: 72
End: 2017-12-01

## 2017-12-01 LAB
INR PPP: 2.3 RATIO
POCT-PROTHROMBIN TIME: 28.1 SECS
QUALITY CONTROL: YES

## 2017-12-15 ENCOUNTER — LABORATORY RESULT (OUTPATIENT)
Age: 72
End: 2017-12-15

## 2017-12-18 ENCOUNTER — RESULT REVIEW (OUTPATIENT)
Age: 72
End: 2017-12-18

## 2017-12-21 ENCOUNTER — LABORATORY RESULT (OUTPATIENT)
Age: 72
End: 2017-12-21

## 2017-12-22 ENCOUNTER — OTHER (OUTPATIENT)
Age: 72
End: 2017-12-22

## 2017-12-27 ENCOUNTER — APPOINTMENT (OUTPATIENT)
Dept: NEUROLOGY | Facility: CLINIC | Age: 72
End: 2017-12-27
Payer: MEDICARE

## 2017-12-27 PROCEDURE — 99204 OFFICE O/P NEW MOD 45 MIN: CPT

## 2017-12-28 ENCOUNTER — LABORATORY RESULT (OUTPATIENT)
Age: 72
End: 2017-12-28

## 2018-01-05 ENCOUNTER — OTHER (OUTPATIENT)
Age: 73
End: 2018-01-05

## 2018-01-05 ENCOUNTER — MEDICATION RENEWAL (OUTPATIENT)
Age: 73
End: 2018-01-05

## 2018-01-05 RX ORDER — WARFARIN 1 MG/1
1 TABLET ORAL DAILY
Qty: 90 | Refills: 0 | Status: DISCONTINUED | COMMUNITY
Start: 2017-12-18 | End: 2018-01-05

## 2018-02-07 ENCOUNTER — APPOINTMENT (OUTPATIENT)
Dept: CARDIOLOGY | Facility: CLINIC | Age: 73
End: 2018-02-07

## 2018-02-14 ENCOUNTER — APPOINTMENT (OUTPATIENT)
Dept: CARDIOLOGY | Facility: CLINIC | Age: 73
End: 2018-02-14
Payer: MEDICARE

## 2018-02-14 ENCOUNTER — NON-APPOINTMENT (OUTPATIENT)
Age: 73
End: 2018-02-14

## 2018-02-14 VITALS
WEIGHT: 162 LBS | OXYGEN SATURATION: 90 % | HEART RATE: 93 BPM | HEIGHT: 68 IN | DIASTOLIC BLOOD PRESSURE: 102 MMHG | BODY MASS INDEX: 24.55 KG/M2 | SYSTOLIC BLOOD PRESSURE: 166 MMHG

## 2018-02-14 DIAGNOSIS — Z87.19 PERSONAL HISTORY OF OTHER DISEASES OF THE DIGESTIVE SYSTEM: ICD-10-CM

## 2018-02-14 PROCEDURE — 99215 OFFICE O/P EST HI 40 MIN: CPT | Mod: 25

## 2018-02-14 PROCEDURE — 93000 ELECTROCARDIOGRAM COMPLETE: CPT

## 2018-02-14 RX ORDER — ASCORBIC ACID 500 MG
500 TABLET ORAL DAILY
Refills: 0 | Status: ACTIVE | COMMUNITY

## 2018-02-14 RX ORDER — GLUCOSAMINE HCL/CHONDROITIN SU 500-400 MG
3 CAPSULE ORAL AT BEDTIME
Refills: 0 | Status: ACTIVE | COMMUNITY

## 2018-02-19 ENCOUNTER — INPATIENT (INPATIENT)
Facility: HOSPITAL | Age: 73
LOS: 1 days | Discharge: HOME HEALTH | DRG: 190 | End: 2018-02-21
Attending: FAMILY MEDICINE | Admitting: INTERNAL MEDICINE
Payer: COMMERCIAL

## 2018-02-19 VITALS
HEIGHT: 68 IN | RESPIRATION RATE: 19 BRPM | SYSTOLIC BLOOD PRESSURE: 146 MMHG | OXYGEN SATURATION: 97 % | HEART RATE: 80 BPM | DIASTOLIC BLOOD PRESSURE: 88 MMHG | WEIGHT: 169.76 LBS | TEMPERATURE: 98 F

## 2018-02-19 DIAGNOSIS — I25.10 ATHEROSCLEROTIC HEART DISEASE OF NATIVE CORONARY ARTERY WITHOUT ANGINA PECTORIS: ICD-10-CM

## 2018-02-19 DIAGNOSIS — I48.91 UNSPECIFIED ATRIAL FIBRILLATION: ICD-10-CM

## 2018-02-19 DIAGNOSIS — R79.89 OTHER SPECIFIED ABNORMAL FINDINGS OF BLOOD CHEMISTRY: ICD-10-CM

## 2018-02-19 DIAGNOSIS — I50.9 HEART FAILURE, UNSPECIFIED: ICD-10-CM

## 2018-02-19 DIAGNOSIS — Z29.9 ENCOUNTER FOR PROPHYLACTIC MEASURES, UNSPECIFIED: ICD-10-CM

## 2018-02-19 DIAGNOSIS — Z86.2 PERSONAL HISTORY OF DISEASES OF THE BLOOD AND BLOOD-FORMING ORGANS AND CERTAIN DISORDERS INVOLVING THE IMMUNE MECHANISM: ICD-10-CM

## 2018-02-19 DIAGNOSIS — I47.2 VENTRICULAR TACHYCARDIA: ICD-10-CM

## 2018-02-19 DIAGNOSIS — I10 ESSENTIAL (PRIMARY) HYPERTENSION: ICD-10-CM

## 2018-02-19 DIAGNOSIS — S85.009A UNSPECIFIED INJURY OF POPLITEAL ARTERY, UNSPECIFIED LEG, INITIAL ENCOUNTER: Chronic | ICD-10-CM

## 2018-02-19 DIAGNOSIS — S49.90XA UNSPECIFIED INJURY OF SHOULDER AND UPPER ARM, UNSPECIFIED ARM, INITIAL ENCOUNTER: Chronic | ICD-10-CM

## 2018-02-19 DIAGNOSIS — I69.920 APHASIA FOLLOWING UNSPECIFIED CEREBROVASCULAR DISEASE: ICD-10-CM

## 2018-02-19 DIAGNOSIS — I25.10 ATHEROSCLEROTIC HEART DISEASE OF NATIVE CORONARY ARTERY WITHOUT ANGINA PECTORIS: Chronic | ICD-10-CM

## 2018-02-19 DIAGNOSIS — J44.1 CHRONIC OBSTRUCTIVE PULMONARY DISEASE WITH (ACUTE) EXACERBATION: ICD-10-CM

## 2018-02-19 DIAGNOSIS — Z95.1 PRESENCE OF AORTOCORONARY BYPASS GRAFT: Chronic | ICD-10-CM

## 2018-02-19 LAB
ALBUMIN SERPL ELPH-MCNC: 3 G/DL — LOW (ref 3.3–5)
ALP SERPL-CCNC: 90 U/L — SIGNIFICANT CHANGE UP (ref 40–120)
ALT FLD-CCNC: 22 U/L — SIGNIFICANT CHANGE UP (ref 12–78)
ANION GAP SERPL CALC-SCNC: 9 MMOL/L — SIGNIFICANT CHANGE UP (ref 5–17)
ANISOCYTOSIS BLD QL: SLIGHT — SIGNIFICANT CHANGE UP
APTT BLD: 49.8 SEC — HIGH (ref 27.5–37.4)
AST SERPL-CCNC: 22 U/L — SIGNIFICANT CHANGE UP (ref 15–37)
BASE EXCESS BLDA CALC-SCNC: 1 MMOL/L — SIGNIFICANT CHANGE UP (ref -2–2)
BILIRUB SERPL-MCNC: 0.7 MG/DL — SIGNIFICANT CHANGE UP (ref 0.2–1.2)
BLOOD GAS COMMENTS ARTERIAL: SIGNIFICANT CHANGE UP
BUN SERPL-MCNC: 17 MG/DL — SIGNIFICANT CHANGE UP (ref 7–23)
CALCIUM SERPL-MCNC: 8.8 MG/DL — SIGNIFICANT CHANGE UP (ref 8.5–10.1)
CHLORIDE SERPL-SCNC: 98 MMOL/L — SIGNIFICANT CHANGE UP (ref 96–108)
CO2 SERPL-SCNC: 30 MMOL/L — SIGNIFICANT CHANGE UP (ref 22–31)
CREAT SERPL-MCNC: 1.4 MG/DL — HIGH (ref 0.5–1.3)
DIGOXIN SERPL-MCNC: 1 NG/ML — SIGNIFICANT CHANGE UP (ref 0.8–2)
ELLIPTOCYTES BLD QL SMEAR: SLIGHT — SIGNIFICANT CHANGE UP
EOSINOPHIL NFR BLD AUTO: 3 % — SIGNIFICANT CHANGE UP (ref 0–6)
GIANT PLATELETS BLD QL SMEAR: PRESENT — SIGNIFICANT CHANGE UP
GLUCOSE SERPL-MCNC: 94 MG/DL — SIGNIFICANT CHANGE UP (ref 70–99)
HCO3 BLDA-SCNC: 25 MMOL/L — SIGNIFICANT CHANGE UP (ref 23–27)
HCT VFR BLD CALC: 44.6 % — SIGNIFICANT CHANGE UP (ref 39–50)
HGB BLD-MCNC: 13.8 G/DL — SIGNIFICANT CHANGE UP (ref 13–17)
HOROWITZ INDEX BLDA+IHG-RTO: 21 — SIGNIFICANT CHANGE UP
INR BLD: 2.02 RATIO — HIGH (ref 0.88–1.16)
LACTATE SERPL-SCNC: 1.3 MMOL/L — SIGNIFICANT CHANGE UP (ref 0.7–2)
LG PLATELETS BLD QL AUTO: SLIGHT — SIGNIFICANT CHANGE UP
LYMPHOCYTES # BLD AUTO: 13 % — SIGNIFICANT CHANGE UP (ref 13–44)
MCHC RBC-ENTMCNC: 27 PG — SIGNIFICANT CHANGE UP (ref 27–34)
MCHC RBC-ENTMCNC: 31 GM/DL — LOW (ref 32–36)
MCV RBC AUTO: 87.1 FL — SIGNIFICANT CHANGE UP (ref 80–100)
MONOCYTES NFR BLD AUTO: 10 % — HIGH (ref 1–9)
NEUTROPHILS NFR BLD AUTO: 63 % — SIGNIFICANT CHANGE UP (ref 43–77)
NEUTS BAND # BLD: 10 % — HIGH (ref 0–8)
NT-PROBNP SERPL-SCNC: 6583 PG/ML — HIGH (ref 0–450)
PCO2 BLDA: 35 MMHG — SIGNIFICANT CHANGE UP (ref 32–46)
PH BLDA: 7.46 — HIGH (ref 7.35–7.45)
PLAT MORPH BLD: NORMAL — SIGNIFICANT CHANGE UP
PLATELET # BLD AUTO: 270 K/UL — SIGNIFICANT CHANGE UP (ref 150–400)
PO2 BLDA: 54 MMHG — LOW (ref 74–108)
POIKILOCYTOSIS BLD QL AUTO: SLIGHT — SIGNIFICANT CHANGE UP
POLYCHROMASIA BLD QL SMEAR: SLIGHT — SIGNIFICANT CHANGE UP
POTASSIUM SERPL-MCNC: 3.6 MMOL/L — SIGNIFICANT CHANGE UP (ref 3.5–5.3)
POTASSIUM SERPL-SCNC: 3.6 MMOL/L — SIGNIFICANT CHANGE UP (ref 3.5–5.3)
PROCALCITONIN SERPL-MCNC: <0.05 — SIGNIFICANT CHANGE UP (ref 0–0.04)
PROT SERPL-MCNC: 7.9 G/DL — SIGNIFICANT CHANGE UP (ref 6–8.3)
PROTHROM AB SERPL-ACNC: 22.3 SEC — HIGH (ref 9.8–12.7)
RAPID RVP RESULT: SIGNIFICANT CHANGE UP
RBC # BLD: 5.12 M/UL — SIGNIFICANT CHANGE UP (ref 4.2–5.8)
RBC # FLD: 18.7 % — HIGH (ref 10.3–14.5)
RBC BLD AUTO: SIGNIFICANT CHANGE UP
SAO2 % BLDA: 88 % — LOW (ref 92–96)
SODIUM SERPL-SCNC: 137 MMOL/L — SIGNIFICANT CHANGE UP (ref 135–145)
TOXIC GRANULES BLD QL SMEAR: PRESENT — SIGNIFICANT CHANGE UP
VARIANT LYMPHS # BLD: 1 % — SIGNIFICANT CHANGE UP (ref 0–6)
WBC # BLD: 5.3 K/UL — SIGNIFICANT CHANGE UP (ref 3.8–10.5)
WBC # FLD AUTO: 5.3 K/UL — SIGNIFICANT CHANGE UP (ref 3.8–10.5)

## 2018-02-19 PROCEDURE — 71046 X-RAY EXAM CHEST 2 VIEWS: CPT | Mod: 26

## 2018-02-19 PROCEDURE — 99285 EMERGENCY DEPT VISIT HI MDM: CPT

## 2018-02-19 PROCEDURE — 93010 ELECTROCARDIOGRAM REPORT: CPT

## 2018-02-19 PROCEDURE — 99223 1ST HOSP IP/OBS HIGH 75: CPT | Mod: AI,GC

## 2018-02-19 PROCEDURE — 99223 1ST HOSP IP/OBS HIGH 75: CPT

## 2018-02-19 PROCEDURE — 71045 X-RAY EXAM CHEST 1 VIEW: CPT | Mod: 26

## 2018-02-19 RX ORDER — ASCORBIC ACID 60 MG
500 TABLET,CHEWABLE ORAL DAILY
Qty: 0 | Refills: 0 | Status: DISCONTINUED | OUTPATIENT
Start: 2018-02-19 | End: 2018-02-21

## 2018-02-19 RX ORDER — ASPIRIN/CALCIUM CARB/MAGNESIUM 324 MG
81 TABLET ORAL DAILY
Qty: 0 | Refills: 0 | Status: DISCONTINUED | OUTPATIENT
Start: 2018-02-19 | End: 2018-02-21

## 2018-02-19 RX ORDER — IPRATROPIUM/ALBUTEROL SULFATE 18-103MCG
3 AEROSOL WITH ADAPTER (GRAM) INHALATION ONCE
Qty: 0 | Refills: 0 | Status: COMPLETED | OUTPATIENT
Start: 2018-02-19 | End: 2018-02-19

## 2018-02-19 RX ORDER — DOCUSATE SODIUM 100 MG
100 CAPSULE ORAL THREE TIMES A DAY
Qty: 0 | Refills: 0 | Status: DISCONTINUED | OUTPATIENT
Start: 2018-02-19 | End: 2018-02-21

## 2018-02-19 RX ORDER — RIVAROXABAN 15 MG-20MG
20 KIT ORAL EVERY 24 HOURS
Qty: 0 | Refills: 0 | Status: DISCONTINUED | OUTPATIENT
Start: 2018-02-19 | End: 2018-02-21

## 2018-02-19 RX ORDER — ACETAMINOPHEN 500 MG
650 TABLET ORAL EVERY 6 HOURS
Qty: 0 | Refills: 0 | Status: DISCONTINUED | OUTPATIENT
Start: 2018-02-19 | End: 2018-02-19

## 2018-02-19 RX ORDER — SPIRONOLACTONE 25 MG/1
25 TABLET, FILM COATED ORAL DAILY
Qty: 0 | Refills: 0 | Status: DISCONTINUED | OUTPATIENT
Start: 2018-02-19 | End: 2018-02-19

## 2018-02-19 RX ORDER — BUDESONIDE AND FORMOTEROL FUMARATE DIHYDRATE 160; 4.5 UG/1; UG/1
2 AEROSOL RESPIRATORY (INHALATION)
Qty: 0 | Refills: 0 | Status: DISCONTINUED | OUTPATIENT
Start: 2018-02-19 | End: 2018-02-21

## 2018-02-19 RX ORDER — FUROSEMIDE 40 MG
40 TABLET ORAL DAILY
Qty: 0 | Refills: 0 | Status: DISCONTINUED | OUTPATIENT
Start: 2018-02-19 | End: 2018-02-21

## 2018-02-19 RX ORDER — AMIODARONE HYDROCHLORIDE 400 MG/1
200 TABLET ORAL DAILY
Qty: 0 | Refills: 0 | Status: DISCONTINUED | OUTPATIENT
Start: 2018-02-19 | End: 2018-02-19

## 2018-02-19 RX ORDER — TAMSULOSIN HYDROCHLORIDE 0.4 MG/1
0.4 CAPSULE ORAL AT BEDTIME
Qty: 0 | Refills: 0 | Status: DISCONTINUED | OUTPATIENT
Start: 2018-02-19 | End: 2018-02-21

## 2018-02-19 RX ORDER — METOPROLOL TARTRATE 50 MG
50 TABLET ORAL DAILY
Qty: 0 | Refills: 0 | Status: DISCONTINUED | OUTPATIENT
Start: 2018-02-19 | End: 2018-02-21

## 2018-02-19 RX ORDER — FUROSEMIDE 40 MG
20 TABLET ORAL DAILY
Qty: 0 | Refills: 0 | Status: DISCONTINUED | OUTPATIENT
Start: 2018-02-19 | End: 2018-02-19

## 2018-02-19 RX ORDER — CLOPIDOGREL BISULFATE 75 MG/1
75 TABLET, FILM COATED ORAL DAILY
Qty: 0 | Refills: 0 | Status: DISCONTINUED | OUTPATIENT
Start: 2018-02-19 | End: 2018-02-19

## 2018-02-19 RX ORDER — METOPROLOL TARTRATE 50 MG
100 TABLET ORAL DAILY
Qty: 0 | Refills: 0 | Status: DISCONTINUED | OUTPATIENT
Start: 2018-02-19 | End: 2018-02-19

## 2018-02-19 RX ORDER — FERROUS SULFATE 325(65) MG
325 TABLET ORAL DAILY
Qty: 0 | Refills: 0 | Status: DISCONTINUED | OUTPATIENT
Start: 2018-02-19 | End: 2018-02-21

## 2018-02-19 RX ORDER — ATORVASTATIN CALCIUM 80 MG/1
40 TABLET, FILM COATED ORAL AT BEDTIME
Qty: 0 | Refills: 0 | Status: DISCONTINUED | OUTPATIENT
Start: 2018-02-19 | End: 2018-02-21

## 2018-02-19 RX ORDER — BUDESONIDE AND FORMOTEROL FUMARATE DIHYDRATE 160; 4.5 UG/1; UG/1
2 AEROSOL RESPIRATORY (INHALATION)
Qty: 0 | Refills: 0 | Status: DISCONTINUED | OUTPATIENT
Start: 2018-02-19 | End: 2018-02-19

## 2018-02-19 RX ORDER — PANTOPRAZOLE SODIUM 20 MG/1
40 TABLET, DELAYED RELEASE ORAL
Qty: 0 | Refills: 0 | Status: DISCONTINUED | OUTPATIENT
Start: 2018-02-19 | End: 2018-02-19

## 2018-02-19 RX ORDER — VALSARTAN 80 MG/1
40 TABLET ORAL DAILY
Qty: 0 | Refills: 0 | Status: DISCONTINUED | OUTPATIENT
Start: 2018-02-19 | End: 2018-02-19

## 2018-02-19 RX ORDER — FLUOXETINE HCL 10 MG
10 CAPSULE ORAL DAILY
Qty: 0 | Refills: 0 | Status: DISCONTINUED | OUTPATIENT
Start: 2018-02-19 | End: 2018-02-21

## 2018-02-19 RX ORDER — FUROSEMIDE 40 MG
40 TABLET ORAL ONCE
Qty: 0 | Refills: 0 | Status: COMPLETED | OUTPATIENT
Start: 2018-02-19 | End: 2018-02-19

## 2018-02-19 RX ORDER — FAMOTIDINE 10 MG/ML
20 INJECTION INTRAVENOUS DAILY
Qty: 0 | Refills: 0 | Status: DISCONTINUED | OUTPATIENT
Start: 2018-02-19 | End: 2018-02-21

## 2018-02-19 RX ORDER — WARFARIN SODIUM 2.5 MG/1
2 TABLET ORAL ONCE
Qty: 0 | Refills: 0 | Status: DISCONTINUED | OUTPATIENT
Start: 2018-02-19 | End: 2018-02-19

## 2018-02-19 RX ORDER — DIGOXIN 250 MCG
0.12 TABLET ORAL DAILY
Qty: 0 | Refills: 0 | Status: DISCONTINUED | OUTPATIENT
Start: 2018-02-19 | End: 2018-02-21

## 2018-02-19 RX ORDER — LANOLIN ALCOHOL/MO/W.PET/CERES
3 CREAM (GRAM) TOPICAL AT BEDTIME
Qty: 0 | Refills: 0 | Status: DISCONTINUED | OUTPATIENT
Start: 2018-02-19 | End: 2018-02-21

## 2018-02-19 RX ORDER — TIOTROPIUM BROMIDE 18 UG/1
1 CAPSULE ORAL; RESPIRATORY (INHALATION) DAILY
Qty: 0 | Refills: 0 | Status: DISCONTINUED | OUTPATIENT
Start: 2018-02-19 | End: 2018-02-19

## 2018-02-19 RX ORDER — IPRATROPIUM/ALBUTEROL SULFATE 18-103MCG
3 AEROSOL WITH ADAPTER (GRAM) INHALATION
Qty: 0 | Refills: 0 | Status: DISCONTINUED | OUTPATIENT
Start: 2018-02-19 | End: 2018-02-21

## 2018-02-19 RX ORDER — SODIUM CHLORIDE 9 MG/ML
3 INJECTION INTRAMUSCULAR; INTRAVENOUS; SUBCUTANEOUS ONCE
Qty: 0 | Refills: 0 | Status: COMPLETED | OUTPATIENT
Start: 2018-02-19 | End: 2018-02-19

## 2018-02-19 RX ORDER — FINASTERIDE 5 MG/1
5 TABLET, FILM COATED ORAL DAILY
Qty: 0 | Refills: 0 | Status: DISCONTINUED | OUTPATIENT
Start: 2018-02-19 | End: 2018-02-21

## 2018-02-19 RX ADMIN — Medication 125 MILLIGRAM(S): at 14:55

## 2018-02-19 RX ADMIN — Medication 3 MILLILITER(S): at 21:09

## 2018-02-19 RX ADMIN — BUDESONIDE AND FORMOTEROL FUMARATE DIHYDRATE 2 PUFF(S): 160; 4.5 AEROSOL RESPIRATORY (INHALATION) at 22:53

## 2018-02-19 RX ADMIN — Medication 3 MILLILITER(S): at 14:56

## 2018-02-19 RX ADMIN — Medication 3 MILLILITER(S): at 13:43

## 2018-02-19 RX ADMIN — Medication 40 MILLIGRAM(S): at 14:55

## 2018-02-19 RX ADMIN — Medication 100 MILLIGRAM(S): at 22:53

## 2018-02-19 RX ADMIN — TAMSULOSIN HYDROCHLORIDE 0.4 MILLIGRAM(S): 0.4 CAPSULE ORAL at 22:53

## 2018-02-19 RX ADMIN — SODIUM CHLORIDE 3 MILLILITER(S): 9 INJECTION INTRAMUSCULAR; INTRAVENOUS; SUBCUTANEOUS at 13:25

## 2018-02-19 RX ADMIN — ATORVASTATIN CALCIUM 40 MILLIGRAM(S): 80 TABLET, FILM COATED ORAL at 22:53

## 2018-02-19 RX ADMIN — RIVAROXABAN 20 MILLIGRAM(S): KIT at 19:46

## 2018-02-19 NOTE — H&P ADULT - NSHPREVIEWOFSYSTEMS_GEN_ALL_CORE
Constitutional: Denies fever, chills, general malaise   HEENT: Denies sore throat, runny nose, dysphagia  Respiratory: +Cough, now producing yellow-brown sputum after Duoneb, Denies shortness of breath  Cardiovascular: Denies chest pain, palpitations  Gastrointestinal: Denies nausea, vomiting, diarrhea, constipation, abdominal pain  Genitourinary: Denies dysuria, hematuria, frequency, urgency, incontinence  Skin/Breast: Denies rash, hives, itching  Musculoskeletal: Denies muscle pains, muscle weakness, joint pain or swelling  Neurologic: Denies syncope, loss of consciousness, headache, weakness   Psychiatric: Denies feeling anxious, depressed, suicidal, or homicidal thoughts  Endocrine: Denies cold or heat intolerance, polydipsia, polyphagia   Hematology/Oncology: Denies abnormal bruising, tender or enlarged lymph nodes   ROS negative except as noted above

## 2018-02-19 NOTE — H&P ADULT - HISTORY OF PRESENT ILLNESS
73M PMHx  ??aphasia 2/2 CVA ??, COPD, CAD s/p stent 2016, CABG 8/2017, HTN, HLD, chronic systolic HF (Last Echo 8/17 showing moderate segmental LV systolic dysfunction, severely hypokinetic inferior and septal walls, variable EF, moderate pulmonary HTN), NSVT,  ICD([facility] 2015)/PPM, rheumatic fever in childhood, A Fib -on warfarin, carotid stenosis, former smoker, DM borderline, obese, former ETOH abuse -in recovery x 25 yrs, gout, PAD with bilateral lower extremity stents presented with cough x 2 weeks. Patient states completed 10 day course of Levaquin, seen by PMD and sent to ER. Friday seen by physiatry and noted to be hypoxic. Denies fever, chills, SOB    In the ED T 97.8F, Hr 80, /88, O2 97% on RA, RR 19. INR 2.02, K+ 3.6, Cr 1.4, proBNP 6583, albumin 3.0, lactate 1.3, Digoxin level therapeutic, RVP negative. CXR showing mild basilar congestion. 73M PMHx 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 2015)/PPM, rheumatic fever in childhood, A Fib -on Xarelto, carotid stenosis, former smoker, DM borderline, former ETOH abuse -in recovery x 25 yrs, gout, PAD with bilateral lower extremity stents presented with cough x 2 weeks. Patient states completed 10 day course of Levaquin, but congestion has persisted. Friday seen by physiatry and noted to be hypoxic. Patient has occasional non-productive cough, but sister and friend have noted course breath sounds and congestion worsening. Denies fever, chills, SOB, chest pain.   Per sister at bedside, due to aphasia patient often says yes when he means no and vice versa.     In the ED T 97.8F, Hr 80, /88, O2 97% on RA, RR 19. INR 2.02, K+ 3.6, Cr 1.4, proBNP 6583, albumin 3.0, lactate 1.3, Digoxin level therapeutic, RVP negative. CXR showing mild basilar congestion. Noted to be hypoxic to 86-88% on RA, started 4L NC, Given Solumedrol 125, IV Lasix 40, Duonebs x2.

## 2018-02-19 NOTE — H&P ADULT - NSHPPHYSICALEXAM_GEN_ALL_CORE
Vital Signs  T(C): 36.6 (19 Feb 2018 13:04), Max: 36.6 (19 Feb 2018 13:04)  T(F): 97.8 (19 Feb 2018 13:04), Max: 97.8 (19 Feb 2018 13:04)  HR: 76 (19 Feb 2018 15:00) (76 - 80)  BP: 166/79 (19 Feb 2018 15:00) (146/88 - 166/79)  RR: 18 (19 Feb 2018 15:00) (18 - 19)  SpO2: 92% (19 Feb 2018 15:00) (92% - 97%) Vital Signs  T(C): 36.6 (19 Feb 2018 13:04), Max: 36.6 (19 Feb 2018 13:04)  T(F): 97.8 (19 Feb 2018 13:04), Max: 97.8 (19 Feb 2018 13:04)  HR: 76 (19 Feb 2018 15:00) (76 - 80)  BP: 166/79 (19 Feb 2018 15:00) (146/88 - 166/79)  RR: 18 (19 Feb 2018 15:00) (18 - 19)  SpO2: 92% (19 Feb 2018 15:00) (92% - 97%)    Physical Exam:  General: Well developed, well nourished, No Acute Distress  HEENT: Normocephallic Atraumatic, PERRLA, EOMI bl, moist mucous membranes   Neck: Supple, nontender, no mass  Neurology: Expressive aphasia, clear speech but difficulty word finding, assymetric smile; AAOx3 if given multiple choice options; CN II-XII grossly intact, sensation intact  Respiratory: +Course breath sounds diffusely, decreased air movement, mild expiratory wheezing, No use of accessory muscles of breathing  CV: Regular Rate and Rhythm, +S1/S2, no murmurs, rubs or gallops  Abdominal: Soft, Non-Tender, Non-Distended +Bowel Soundsx4  Extremities: No Clubbing, cyanosis or edema, + peripheral pulses  Musculoskeletal: Normal Range of motion, no joint erythema or warmth, no joint swelling   Skin: warm, dry, normal color, no rash or abnormal lesions Vital Signs  T(C): 36.6 (19 Feb 2018 13:04), Max: 36.6 (19 Feb 2018 13:04)  T(F): 97.8 (19 Feb 2018 13:04), Max: 97.8 (19 Feb 2018 13:04)  HR: 76 (19 Feb 2018 15:00) (76 - 80)  BP: 166/79 (19 Feb 2018 15:00) (146/88 - 166/79)  RR: 18 (19 Feb 2018 15:00) (18 - 19)  SpO2: 92% (19 Feb 2018 15:00) (92% - 97%)    Physical Exam:  General: Well developed, well nourished, No Acute Distress  HEENT: Normocephalic Atraumatic, PERRLA, EOMI bl, moist mucous membranes   Neck: Supple, nontender, no mass  Neurology: Expressive aphasia, clear speech but difficulty word finding, assymetric smile; AAOx3 if given multiple choice options; CN II-XII grossly intact, sensation intact  Respiratory: +Course breath sounds diffusely, decreased air movement, mild expiratory wheezing, No use of accessory muscles of breathing  CV: Regular Rate and Rhythm, +S1/S2, no murmurs, rubs or gallops  Abdominal: Soft, Non-Tender, Non-Distended +Bowel Soundsx4  Extremities: No Clubbing, cyanosis or edema, + peripheral pulses  Musculoskeletal: Normal Range of motion, no joint erythema or warmth, no joint swelling   Skin: warm, dry, normal color, no rash or abnormal lesions

## 2018-02-19 NOTE — ED PROVIDER NOTE - ENMT, MLM
Airway patent, Nasal mucosa clear. Mouth with normal mucosa. Throat has no vesicles, no oropharyngeal exudates and uvula is midline. (slight facial weakness on r)

## 2018-02-19 NOTE — H&P ADULT - ASSESSMENT
73M PMHx CVA 10/2017 with residual aphasia mild facial assymetry, COPD not on home O2, CAD s/p stent 2016, CABG 8/2017, HTN, HLD, chronic systolic HF, NSVT,  ICD([facility] 2015)/PPM, rheumatic fever in childhood, A Fib -on Xarelto, carotid stenosis, former smoker, DM borderline, former ETOH abuse -in recovery x 25 yrs, gout, PAD with bilateral lower extremity stents admitted with hypoxia 2/2 likely COPD exacerbation.

## 2018-02-19 NOTE — ED PROVIDER NOTE - MEDICAL DECISION MAKING DETAILS
cough in elderly pt despite abx for 10 days hx cad chf afib on coumadin and dig ro chf ro aspiration pn given cva and weakness

## 2018-02-19 NOTE — H&P ADULT - PMH
Alcohol abuse  in recovery  Atrial Fibrillation    Carotid Stenosis    Chronic Obstructive Pulmonary Disease (COPD)    Coronary Artery Disease    DM (diabetes mellitus)    Former smoker    Gout    H/O: Rheumatic Fever    HF (heart failure)    Hyperlipidemia    Hypertension    NSVT (nonsustained ventricular tachycardia)    Pacemaker  defibrillator model # v-268 serial # [id number]  PAD (peripheral artery disease)  with stents Alcohol abuse  in recovery  Aphasia S/P CVA    Atrial Fibrillation    Carotid Stenosis    Chronic Obstructive Pulmonary Disease (COPD)    Coronary Artery Disease    DM (diabetes mellitus)    Former smoker    Gout    H/O: Rheumatic Fever    HF (heart failure)    Hyperlipidemia    Hypertension    NSVT (nonsustained ventricular tachycardia)    Pacemaker  defibrillator model # v-268 serial # [id number]  PAD (peripheral artery disease)  with stents

## 2018-02-19 NOTE — H&P ADULT - PROBLEM SELECTOR PLAN 9
Per sister patient had history of anemia in October, given 2 units of Plasma, started on Iron and Vitamin C, told to follow-up with GI for EGD which has not yet scheduled  -Continue Iron and Vitamin C  -Hemoglobin within normal limits, continue to monitor  -Recommend follow up with GI outpatient

## 2018-02-19 NOTE — ED PROVIDER NOTE - NEUROLOGICAL, MLM
Alert and oriented, no focal deficits, no motor or sensory deficits other htan r  facial weakness and difficulty with word finding speech is clear

## 2018-02-19 NOTE — ED PROVIDER NOTE - CONSTITUTIONAL, MLM
normal... Well appearing, well nourished, awake, alert, oriented to person, place, time/situation and in no apparent distress. non prod wet sounding cough

## 2018-02-19 NOTE — ED PROVIDER NOTE - PSH
Arm injury  s/p surgery 2009  CAD S/P percutaneous coronary angioplasty  stent placed  Pacemaker  [employer] serial #[id number]   model #v-268  Popliteal artery injury  iliac/ popliteal angioplasty with stents 2010  S/P CABG x 1    S/P Implantation of Automatic Cardioverter/Defibrillator (AICD)    S/P Tonsillectomy

## 2018-02-19 NOTE — CONSULT NOTE ADULT - ASSESSMENT
MAO WALKER [facility]    ALLERGY nka   CONTACT    REASON FOR VISIT   Initial evaluation/Pulmonary consultation requested  2/19/2018   Patient examined chart reviewed  HOSPITAL ADMISSION [facility]    PATIENT CAME  FROM (if information available)    PAST MEDICAL & SURGICAL HISTORY:   Past Medical History:  Alcohol abuse  in recovery  Atrial Fibrillation    Carotid Stenosis    Chronic Obstructive Pulmonary Disease (COPD)    Coronary Artery Disease    DM (diabetes mellitus)    Former smoker    Gout    H/O: Rheumatic Fever    HF (heart failure)    Hyperlipidemia    Hypertension    NSVT (nonsustained ventricular tachycardia)    Pacemaker  defibrillator model # v-268 serial # [id number]  PAD (peripheral artery disease)  with stents.     Past Surgical History:  Arm injury  s/p surgery 2009  CAD S/P percutaneous coronary angioplasty  stent placed  Pacemaker  [employer] serial #[id number]   model #v-268  Popliteal artery injury  iliac/ popliteal angioplasty with stents 2010  S/P CABG x 1    S/P Implantation of Automatic Cardioverter/Defibrillator (AICD)    S/P Tonsillectomy.  VITALS/LABS  2/19/2018 W 5.3 Hb 13.8 Plt 270 INR 2 Na 137 K 3.6 Na 137 K 3.6 CO2 30 Cr 1.4   2/19/2018 LA 1.3   2/19/2018 Dig 1   2/19/2018 RVP n   2/19 CXR Mild basal congestion  REVIEW OF SYMPTOMS    Able to give ROS  Yes     RELIABLE No   CONSTITUTIONAL Weakness Yes  Chills No Vision changes No  ENDOCRINE No unexplained hair loss No heat or cold intolerance    ALLERGY No hives  Sore throat No   RESP Coughing blood no  Shortness of breath YES   NEURO No Headache  Confusion Pain neck No   CARDIAC No Chest pain No Palpitations   GI No Pain abdomen NO   Vomiting NO   PHYSICAL EXAM    HEENT Unremarkable PERRLA atraumatic   RESP Fair air entry EXP prolonged    Harsh breath sound Resp distres mild   CARDIAC S1 S2 No S3     NO JVD    ABDOMEN SOFT BS PRESENT NOT DISTENDED No hepatosplenomegaly PEDAL EDEMA present No calf tenderness  NO rash   GENERAL Not TOXIC looking  GLOBAL ISSUE/BEST PRACTICE:        PROBLEM: Analgesia:     na                        PROBLEM: Sedation:     na               PROBLEM: HOB elevation:   y            PROBLEM: Stress ulcer proph:    protonix 40 (2/19)                        PROBLEM: VTE prophylaxis:      coumadoin poa (2/19)                 PROBLEM: Glycemic control:    na  PROBLEM: Nutrition:    na          PROBLEM: Advanced directive: na     PROBLEM: Allergies:  na  PATIENT DESCRIPTION  2/19/2018 ADMISSION   [facility]      73 m with a fib aphasia 2/2 embolic CVA Stroke 2017 [facility] COPD, CAD s/p stent 2016, CABG 8/2017, HTN, HLD, chronic systolic HF (Last Echo 8/17 showing moderate segmental LV systolic dysfunction, severely hypokinetic inferior and septal walls, variable EF, moderate pulmonary HTN), NSVT,  ICD([facility] 2015)/PPM, rheumatic fever in childhood, A Fib -on warfarin, carotid stenosis, former smoker, DM borderline, obese, former ETOH abuse -in recovery x 25 yrs, gout, PAD with bilateral lower extremity stents presented with cough x 2 weeks admitted [facility] 2/19/2018 as ac bronchitis sp course of levaquin x 10 d given as outpt referred from [facility] Pulm consulted 2/19/2018   HOME MEDS 2/19/2018 Coumadin 2 Lasix 20 Spironolact 25 spiriva toprol 100 valsartan 40.2 symbicort amiodarone 200 protonix 40 atorvastat 40 dig 125 finasteride 5 asa 81   ER Vitals 2/19 afeb 80 146/88 97%   PROBLEMS  PREEXISTING  COPD        A FIB ON COUMADIN POA   CAD CABG 8/2017   CHF Echo 8/17 showing moderate segmental LV systolic dysfunction, severely hypokinetic inferior and septal walls, variable EF, moderate pulmonary HTN)  PAD   APHASIA SEC EMBOLIC CVA 2017 POA   former ETOH abuse -in recovery x 25 yrs  PROBLEMS   CURRENT  RESP DISTRESS  AC BRONCHITIS COPD EX   PROBLEM SPECIFIC HOSPITAL COURSE   ADMISSION       RESP  2/19 Monitor po Target 90-95% HOB elevation  COPD EX  Symbicort 160 (2/19)   A FIB   Coumadin (2/19)   ASA 81 (2/19)   Amiodarone 200 (2/19)   Dig 125 (2/19)   Metoprolol 100 (2/19)   CHF  lasix 20 (2/19)   Spironolactone 25 (2/19)   Valsartan 40 (2/19)   BPH  Finasteride 5 (2/19)   HOSPITAL COURSE   2/19 Admitted as ac bronchitis poss CHF decompensation   Abio deferred    ASSESSMENT PLAN  RESP O2 to keep PO 90-95%  COPD EX BD Steroids   AF On rate rhythm and ac agents   CHF On dieretics arb     TIME SPENT Over 55 minutes aggregate care time spent on encounter; activities included   direct patient care, counseling and/or coordinating care reviewing notes, lab data/ imaging , discussion with multidisciplinary team/ patient  /family. Risks, benefits, alternatives  discussed in detail. Proper antibiotic use issues addressed Questions/concerns  were addressed  as  best as possible
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 2015), A Fib -on Xarelto,  DM, former ETOH abuse, PAD with b/l PCI presented with cough .  - Likely with viral infection with mild ADHF. I would cont Lasix 40gm IV Qday for one more day  - Cont pulm rx with nebs and steroids. F/U pulm recs  - Cont ASA. Given that >1 month post CABG and on AC, I would stop Plavix. Cont Xarelto  - AF controlled on Amio, Toprol and dig. check dig level  - Cont aldactone  -  Cont valsartan 40mg Qday  - Monitor and replete electrolytes. Keep K>4.0 and Mg>2.0.   - Further cardiac workup will depend on clinical course.   - All other workup per primary team. Will followup.

## 2018-02-19 NOTE — ED PROVIDER NOTE - PROGRESS NOTE DETAILS
pt coughing less after the nebs lugns good aeration mild faint advential sounds now wife reprots he has hx of copd from smoking too I went to re assess pt on ra his oxygen sat after nebs and lasix was 86-88% ra he denies sob but is aware of his persistent low sats need for admission for oxygen therapy   I spoke with dr Pérez on call hospitlaist who accepts pt

## 2018-02-19 NOTE — H&P ADULT - PROBLEM SELECTOR PLAN 1
w/ hypoxia  completed 10 day course of Levaquin, now s/p Solumedrol 125 and Duonebs x2  -Continue Duonebs 4x/day  -Dr Granger (Pul) consulted  -Per Pulm Continue Solumedrol 40qd  -NC, Keep O2 above 88% w/ hypoxia, likely viral, procalcitonin negative, completed 10 day course of Levaquin, now s/p Solumedrol 125 and Duonebs x2  -Continue Duonebs 4x/day  -Dr Granger (Pulm) consulted  -Per Pulm Continue Solumedrol 40qd  -NC, Keep O2 above 88%  -Ambulate with assist  -DASH/TLC diet w/ hypoxia, likely viral, procalcitonin negative, completed 10 day course of Levaquin, now s/p Solumedrol 125 and Duonebs x2  -Continue Duonebs 4x/day  -Dr Granger (Pulm) consulted  -Per Pulm Continue Solumedrol 40qd  -patient has not taken Symbicort in some time, will restart for now, to discuss with Pulm if necessary  -NC, Keep O2 above 88%  -Ambulate with assist  -DASH/TLC diet

## 2018-02-19 NOTE — H&P ADULT - PSH
Arm injury  s/p surgery 2009  CAD S/P percutaneous coronary angioplasty  stent placed  Pacemaker  [employer] serial #[id number]   model #v-268  Popliteal artery injury  iliac/ popliteal angioplasty with stents 2010  S/P CABG x 1    S/P Implantation of Automatic Cardioverter/Defibrillator (AICD)    S/P Tonsillectomy

## 2018-02-19 NOTE — ED ADULT NURSE NOTE - PMH
Alcohol abuse  in recovery  Atrial Fibrillation    Carotid Stenosis    Chronic Obstructive Pulmonary Disease (COPD)    Coronary Artery Disease    DM (diabetes mellitus)    Former smoker    Gout    H/O: Rheumatic Fever    HF (heart failure)    Hyperlipidemia    Hypertension    NSVT (nonsustained ventricular tachycardia)    Pacemaker  defibrillator model # v-268 serial # [id number]  PAD (peripheral artery disease)  with stents

## 2018-02-19 NOTE — ED ADULT NURSE NOTE - OBJECTIVE STATEMENT
Pt presents to the ED c/o cough, after completing a course of antibiotics. Pt presents to the ED c/o cough, after completing a course of antibiotics. Pt has a CVA in the past, has expressive aphasia.

## 2018-02-19 NOTE — H&P ADULT - ATTENDING COMMENTS
73M PMHx CVA 10/2017 with residual aphasia mild facial assymetry, COPD not on home O2, CAD s/p stent 2016, CABG 8/2017, HTN, HLD, chronic systolic HF, NSVT,  ICD([facility] 2015)/PPM, rheumatic fever in childhood, A Fib -on Xarelto, carotid stenosis, former smoker, DM borderline, former ETOH abuse -in recovery x 25 yrs, gout, PAD with bilateral lower extremity stents admitted with hypoxia 2/2 likely COPD exacerbation.   Pt has hypoxia, now on O2 NC. May switch to po steroids.    Indu Boone was informed.

## 2018-02-19 NOTE — ED PROVIDER NOTE - OBJECTIVE STATEMENT
pt is a 74 yo male who has aphasia sp cva is a vasculopath with cad sp cabg aicd ppm.  he has had cough for over 15 days, sp levaquin 750 x 10 days  still coughing  seen by pmd at [facility] and sent to er for evaluatio of the cough.  pt was noted to be hypoxic by a physiatrist on exam friday. not today  no fever no chills no cp no sob  former smoker

## 2018-02-19 NOTE — H&P ADULT - PROBLEM SELECTOR PLAN 2
-bibasilar crackles with mildly elevated proBNP  -continue Lasix 20  -unclear why patient not on ACEi/ARB, currently Cr elevated -bibasilar crackles with mildly elevated proBNP  -Lasix 40 IV daily per cardio  -unclear why patient not on ACEi/ARB, currently Cr elevated -bibasilar crackles with mildly elevated proBNP  -Lasix 40 IV daily per cardio  -unclear why patient not on ACEi/ARB, currently Cr elevated, patient was on Valsartan in the past

## 2018-02-19 NOTE — H&P ADULT - PROBLEM SELECTOR PLAN 10
IMPROVE VTE Individual Risk Assessment          RISK                                                          Points  [  ] Previous VTE                                                3  [  ] Thrombophilia                                             2  [  ] Lower limb paralysis                                   2        (unable to hold up >15 seconds)    [  ] Current Cancer                                             2         (within 6 months)  [  ] Immobilization > 24 hrs                              1  [  ] ICU/CCU stay > 24 hours                             1  [1 ] Age > 60                                                         1    IMPROVE VTE Score: 1    Patient on Xarelto

## 2018-02-19 NOTE — H&P ADULT - NSHPOUTPATIENTPROVIDERS_GEN_ALL_CORE
PMD: Dr. Ruiz, Cardio: Dr. Camacho, EP: Dr. Eulogio Dominguez PMD: Dr. Ruiz, Cardio: Dr. Camacho, EP: Dr. Eulogio Dominguez, Pulm: Dr. Granger

## 2018-02-19 NOTE — CONSULT NOTE ADULT - SUBJECTIVE AND OBJECTIVE BOX
CHIEF COMPLAINT: Patient is a 73y old  Male who presents with a chief complaint of cough (19 Feb 2018 16:23)      HPI:  73M PMHx CVA 10/2017 with residual aphasia mild facial asymmetry, COPD not on home O2, CAD s/p stent 2016, CABG 8/2017, HTN, HLD, chronic systolic HF (Last Echo 8/17 showing moderate segmental LV systolic dysfunction, severely hypokinetic inferior and septal walls, variable EF, moderate pulmonary HTN), NSVT,  ICD([facility] 2015), A Fib -on Xarelto,  DM borderline, former ETOH abuse, PAD with bilateral lower extremity stents presented with cough x 2 weeks.   HPI Limited 2/2 comorbidities   Patient  completed 10 day course of Levaquin, but congestion has persisted. Friday seen by physiatry and noted to be hypoxic. Patient has occasional non-productive cough, but sister and friend have noted course breath sounds and congestion worsening. Denies fever, chills, SOB, chest pain. No reported orthopnea or PND       In the ED T 97.8F, Hr 80, /88, O2 97% on RA, RR 19. INR 2.02, K+ 3.6, Cr 1.4, proBNP 6583, albumin 3.0, lactate 1.3, Digoxin level therapeutic, RVP negative. CXR showing mild basilar congestion. Noted to be hypoxic to 86-88% on RA, started 4L NC, Given Solumedrol 125, IV Lasix 40, Duonebs x2.     Per sister pt improved after above treatment.       EKG: AF     REVIEW OF SYSTEMS:   All other review of systems are negative unless indicated above    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  DM (diabetes mellitus)  HF (heart failure)  PAD (peripheral artery disease): with stents  Former smoker  Hypertension  H/O: Rheumatic Fever  Atrial Fibrillation  Pacemaker: defibrillator model # v-268 serial # [id number]  Hyperlipidemia  Gout  Coronary Artery Disease  Chronic Obstructive Pulmonary Disease (COPD)  Carotid Stenosis  S/P CABG x 1  CAD S/P percutaneous coronary angioplasty: stent placed  Popliteal artery injury: iliac/ popliteal angioplasty with stents 2010  Arm injury: s/p surgery 2009  Pacemaker: [employer] serial #[id number]   model #v-686  S/P Implantation of Automatic Cardioverter/Defibrillator (AICD)  S/P Tonsillectomy      SOCIAL HISTORY:  No tobacco, ethanol, or drug abuse.    FAMILY HISTORY:  No pertinent family history in first degree relatives    No family history of acute MI or sudden cardiac death.    MEDICATIONS  (STANDING):  ALBUTerol/ipratropium for Nebulization 3 milliLiter(s) Nebulizer four times a day  amiodarone    Tablet 200 milliGRAM(s) Oral daily  aspirin enteric coated 81 milliGRAM(s) Oral daily  atorvastatin 40 milliGRAM(s) Oral at bedtime  buDESOnide 160 MICROgram(s)/formoterol 4.5 MICROgram(s) Inhaler 2 Puff(s) Inhalation two times a day  digoxin     Tablet 0.125 milliGRAM(s) Oral daily  docusate sodium 100 milliGRAM(s) Oral three times a day  finasteride 5 milliGRAM(s) Oral daily  furosemide    Tablet 20 milliGRAM(s) Oral daily  methylPREDNISolone sodium succinate Injectable 40 milliGRAM(s) IV Push daily  metoprolol succinate  milliGRAM(s) Oral daily  pantoprazole    Tablet 40 milliGRAM(s) Oral before breakfast  spironolactone 25 milliGRAM(s) Oral daily  tiotropium 18 MICROgram(s) Capsule 1 Capsule(s) Inhalation daily  valsartan 40 milliGRAM(s) Oral daily    MEDICATIONS  (PRN):  acetaminophen   Tablet 650 milliGRAM(s) Oral every 6 hours PRN Mild Pain (1 - 3)      Allergies    No Known Allergies    Intolerances        Home meds:  Home Medications:  aspirin 81 mg oral delayed release tablet: 1 tab(s) orally once a day (19 Feb 2018 17:45)  atorvastatin 40 mg oral tablet: 1 tab(s) orally once a day (at bedtime) (19 Feb 2018 17:45)  clopidogrel 75 mg oral tablet: 1 tab(s) orally once a day (19 Feb 2018 17:45)  digoxin 125 mcg (0.125 mg) oral tablet: 1 tab(s) orally once a day (19 Feb 2018 17:45)  famotidine 20 mg oral tablet: 1 tab(s) orally once a day (19 Feb 2018 17:45)  ferrous sulfate 325 mg (65 mg elemental iron) oral delayed release tablet: 1 tab(s) orally once a day (19 Feb 2018 17:45)  finasteride 5 mg oral tablet: 1 tab(s) orally once a day (19 Feb 2018 17:45)  FLUoxetine 10 mg oral capsule: 1 cap(s) orally once a day (19 Feb 2018 17:45)  Melatonin 3 mg oral tablet: 1 tab(s) orally once (at bedtime) (19 Feb 2018 17:45)  Metoprolol Succinate ER 50 mg oral tablet, extended release: 1 tab(s) orally once a day (19 Feb 2018 17:45)  tamsulosin 0.4 mg oral capsule: 1 cap(s) orally once a day (19 Feb 2018 17:45)  Vitamin C 250 mg oral tablet: 1 tab(s) orally once a day (19 Feb 2018 17:45)  Xarelto 20 mg oral tablet: 1 tab(s) orally once a day (in the evening) (19 Feb 2018 17:45)        VITAL SIGNS:   Vital Signs Last 24 Hrs  T(C): 36.6 (19 Feb 2018 13:04), Max: 36.6 (19 Feb 2018 13:04)  T(F): 97.8 (19 Feb 2018 13:04), Max: 97.8 (19 Feb 2018 13:04)  HR: 78 (19 Feb 2018 17:11) (76 - 80)  BP: 146/79 (19 Feb 2018 17:11) (146/79 - 166/79)  BP(mean): --  RR: 18 (19 Feb 2018 17:11) (18 - 19)  SpO2: 97% (19 Feb 2018 17:11) (92% - 97%)    I&O's Summary      On Exam:     Constitutional: NAD, awake and alertt  HEENT: Moist Mucous Membranes, Anicteric  Pulmonary: Decreased breath sounds b/l. coarse bs.   Cardiovascular: Regular, S1 and S2, No murmurs, rubs, gallops or clicks  Gastrointestinal: Bowel Sounds present, soft, nontender.   Lymph: No peripheral edema. No lymphadenopathy.  Skin: No visible rashes or ulcers.  Psych:  flat affect depressed mood    LABS: All Labs Reviewed:                        13.8   5.3   )-----------( 270      ( 19 Feb 2018 13:46 )             44.6     19 Feb 2018 13:46    137    |  98     |  17     ----------------------------<  94     3.6     |  30     |  1.40     Ca    8.8        19 Feb 2018 13:46    TPro  7.9    /  Alb  3.0    /  TBili  0.7    /  DBili  x      /  AST  22     /  ALT  22     /  AlkPhos  90     19 Feb 2018 13:46    PT/INR - ( 19 Feb 2018 13:46 )   PT: 22.3 sec;   INR: 2.02 ratio         PTT - ( 19 Feb 2018 13:46 )  PTT:49.8 sec      Blood Culture:   02-19 @ 13:46  Pro Bnp 6583        RADIOLOGY:       < from: Xray Chest 2 Views PA/Lat (02.19.18 @ 14:05) >    EXAM:  XR CHEST PA LAT 2V                            PROCEDURE DATE:  02/19/2018          INTERPRETATION:  Chest one view    HISTORY: Cough    COMPARISON STUDY: 8/16/2017    Frontal expiratory view of the chest shows the heart to be similar in   size. The lungs show mild basilar congestion and there is no evidence of   pneumothorax nor pleural effusion. Left defibrillator and sternal wires   are again noted.    IMPRESSION:  Mild basilar congestion.    Thank you for the courtesy of this referral.                ANTOLIN XIE M.D., ATTENDING RADIOLOGIST  This document has been electronically signed. Feb 19 2018  2:21PM                < end of copied text >      < from: Transthoracic Echocardiogram (08.09.17 @ 16:00) >    Patient name: VALENTINA CAVANAUGH  YOB: 1945   Age: 72 (M)   MR#: [id number]  Study Date: 8/9/2017  Location: [address] [place], [place] [id number]: Daryn Shetty RDCS  Study quality: Technically fair  Referring Physician: Murali Myers MD  Blood Pressure: 140/80 mmHg  Height: 173 cm  Weight: 88 kg  BSA: 2 m2  ------------------------------------------------------------------------  PROCEDURE: Transthoracic echocardiogram with 2-D, M-Mode  and complete spectral and color flow Doppler.  INDICATION: Atherosclerotic heart disease of native  coronary artery without angina pectoris (I25.10)  ------------------------------------------------------------------------  Dimensions:    Normal Values:  LA:     3.8    2.0 - 4.0 cm  Ao:     2.9    2.0 - 3.8 cm  SEPTUM: 0.8    0.6 - 1.2 cm  PWT:    0.9    0.6 - 1.1 cm  LVIDd:  4.7    3.0 - 5.6 cm  LVIDs:  3.5    1.8 - 4.0 cm  Derived variables:  LVMI: 66 g/m2  RWT: 0.38  ------------------------------------------------------------------------  Observations:  Mitral Valve: Mitral annular calcification. Mitral annular  dilatation with normal leaflet opening. Minimal mitral  regurgitation.  Aortic Valve/Aorta: Aortic valve not well visualized;  appears to be a calcified trileaflet valve with normal  opening.. Mild aortic regurgitation.  Aortic Root: 2.9 cm.  Left Atrium: Severely dilated left atrium.  LA volume index  = 57 cc/m2.  Left Ventricle: Endocardium not well visualized; moderate  segmental left ventricular systolic dysfunction. The  inferior and septal walls are severely hypokinetic.  Abnormal septal motion consistent with paced  rhythm/post-operative septal motion. Patient in atrial  fibrillation; ejection fraction varies with R-R interval.  Recommend limited imaging with intravenous echo contrast to  better assess LV systolic function if clinically indicated.  Normal left ventricular internal dimensions and wall  thicknesses.  Right Heart: Mild right atrial enlargement. The right  ventricle is not well visualized; right ventricle appears  normal size with decreased systolic function. A device wire  is noted in the right heart. Tricuspid valve not well  visualized, probably normal. Mild tricuspid regurgitation.  Normal pulmonic valve.  Pericardium/Pleura: Normal pericardium with no pericardial  effusion.  Hemodynamic: Estimated right atrial pressure is 8 mm Hg.  Estimated right ventricular systolic pressure equals 54 mm  Hg, assuming right atrial pressure equals 8 mm Hg,  consistent with moderate pulmonary hypertension.  ------------------------------------------------------------------------  Conclusions:  1. Mitral annular calcification. Mitral annular dilatation  with normal leaflet opening. Minimal mitral regurgitation.  2. Aortic valve not well visualized; appears cyrus a  calcified trileaflet valve with normal opening.. Mild  aortic regurgitation.  3. Endocardium not well visualized; moderate segmental left  ventricular systolic dysfunction. The inferior and septal  walls are severely hypokinetic. Abnormal septal motion  consistent with paced rhythm/post-operative septal motion.  Patient in atrial fibrillation; ejection fraction varies  with R-R interval. Recommend limited imaging with  intravenous echo contrast to better assess LV systolic  function if clinically indicated.  4. The right ventricle is not well visualized; right  ventricle appears normal size with decreased systolic  function. A device wire is noted in the right heart.  5. Estimated pulmonary artery systolic pressure equals 54  mm Hg, assuming right atrial pressure equals 8 mm Hg,  consistent with moderate pulmonary pressures.  ------------------------------------------------------------------------  Confirmed on  8/9/2017 - 17:15:39 by Deepali Velásquez M.D.  ------------------------------------------------------------------------    < end of copied text >
pl see under assessment

## 2018-02-20 LAB
ALBUMIN SERPL ELPH-MCNC: 2.8 G/DL — LOW (ref 3.3–5)
ALP SERPL-CCNC: 77 U/L — SIGNIFICANT CHANGE UP (ref 40–120)
ALT FLD-CCNC: 19 U/L — SIGNIFICANT CHANGE UP (ref 12–78)
ANION GAP SERPL CALC-SCNC: 10 MMOL/L — SIGNIFICANT CHANGE UP (ref 5–17)
AST SERPL-CCNC: 20 U/L — SIGNIFICANT CHANGE UP (ref 15–37)
BASE EXCESS BLDV CALC-SCNC: 2.5 MMOL/L — HIGH (ref -2–2)
BILIRUB SERPL-MCNC: 0.6 MG/DL — SIGNIFICANT CHANGE UP (ref 0.2–1.2)
BUN SERPL-MCNC: 21 MG/DL — SIGNIFICANT CHANGE UP (ref 7–23)
CALCIUM SERPL-MCNC: 8.9 MG/DL — SIGNIFICANT CHANGE UP (ref 8.5–10.1)
CHLORIDE SERPL-SCNC: 101 MMOL/L — SIGNIFICANT CHANGE UP (ref 96–108)
CO2 SERPL-SCNC: 26 MMOL/L — SIGNIFICANT CHANGE UP (ref 22–31)
CREAT SERPL-MCNC: 1.4 MG/DL — HIGH (ref 0.5–1.3)
GLUCOSE SERPL-MCNC: 145 MG/DL — HIGH (ref 70–99)
HCO3 BLDV-SCNC: 27 MMOL/L — SIGNIFICANT CHANGE UP (ref 21–29)
HCT VFR BLD CALC: 40.5 % — SIGNIFICANT CHANGE UP (ref 39–50)
HGB BLD-MCNC: 12.7 G/DL — LOW (ref 13–17)
HOROWITZ INDEX BLDV+IHG-RTO: 21 — SIGNIFICANT CHANGE UP
INR BLD: 2.42 RATIO — HIGH (ref 0.88–1.16)
MAGNESIUM SERPL-MCNC: 1.7 MG/DL — SIGNIFICANT CHANGE UP (ref 1.6–2.6)
MCHC RBC-ENTMCNC: 26.7 PG — LOW (ref 27–34)
MCHC RBC-ENTMCNC: 31.4 GM/DL — LOW (ref 32–36)
MCV RBC AUTO: 85.1 FL — SIGNIFICANT CHANGE UP (ref 80–100)
PCO2 BLDV: 36 MMHG — SIGNIFICANT CHANGE UP (ref 35–50)
PH BLDV: 7.48 — HIGH (ref 7.35–7.45)
PHOSPHATE SERPL-MCNC: 4 MG/DL — SIGNIFICANT CHANGE UP (ref 2.5–4.5)
PLATELET # BLD AUTO: 259 K/UL — SIGNIFICANT CHANGE UP (ref 150–400)
PO2 BLDV: 60 MMHG — HIGH (ref 25–45)
POTASSIUM SERPL-MCNC: 3.4 MMOL/L — LOW (ref 3.5–5.3)
POTASSIUM SERPL-SCNC: 3.4 MMOL/L — LOW (ref 3.5–5.3)
PROT SERPL-MCNC: 7.3 G/DL — SIGNIFICANT CHANGE UP (ref 6–8.3)
PROTHROM AB SERPL-ACNC: 26.9 SEC — HIGH (ref 9.8–12.7)
RBC # BLD: 4.76 M/UL — SIGNIFICANT CHANGE UP (ref 4.2–5.8)
RBC # FLD: 18.4 % — HIGH (ref 10.3–14.5)
SAO2 % BLDV: 93 % — HIGH (ref 67–88)
SODIUM SERPL-SCNC: 137 MMOL/L — SIGNIFICANT CHANGE UP (ref 135–145)
WBC # BLD: 3.2 K/UL — LOW (ref 3.8–10.5)
WBC # FLD AUTO: 3.2 K/UL — LOW (ref 3.8–10.5)

## 2018-02-20 PROCEDURE — 99233 SBSQ HOSP IP/OBS HIGH 50: CPT | Mod: GC

## 2018-02-20 PROCEDURE — 99232 SBSQ HOSP IP/OBS MODERATE 35: CPT

## 2018-02-20 RX ORDER — CLOPIDOGREL BISULFATE 75 MG/1
75 TABLET, FILM COATED ORAL DAILY
Qty: 0 | Refills: 0 | Status: DISCONTINUED | OUTPATIENT
Start: 2018-02-20 | End: 2018-02-21

## 2018-02-20 RX ORDER — POTASSIUM CHLORIDE 20 MEQ
40 PACKET (EA) ORAL ONCE
Qty: 0 | Refills: 0 | Status: COMPLETED | OUTPATIENT
Start: 2018-02-20 | End: 2018-02-20

## 2018-02-20 RX ORDER — METOPROLOL TARTRATE 50 MG
25 TABLET ORAL ONCE
Qty: 0 | Refills: 0 | Status: COMPLETED | OUTPATIENT
Start: 2018-02-20 | End: 2018-02-20

## 2018-02-20 RX ADMIN — Medication 3 MILLILITER(S): at 07:46

## 2018-02-20 RX ADMIN — Medication 25 MILLIGRAM(S): at 22:53

## 2018-02-20 RX ADMIN — FINASTERIDE 5 MILLIGRAM(S): 5 TABLET, FILM COATED ORAL at 22:53

## 2018-02-20 RX ADMIN — Medication 100 MILLIGRAM(S): at 05:57

## 2018-02-20 RX ADMIN — TAMSULOSIN HYDROCHLORIDE 0.4 MILLIGRAM(S): 0.4 CAPSULE ORAL at 22:53

## 2018-02-20 RX ADMIN — Medication 81 MILLIGRAM(S): at 11:16

## 2018-02-20 RX ADMIN — FAMOTIDINE 20 MILLIGRAM(S): 10 INJECTION INTRAVENOUS at 11:15

## 2018-02-20 RX ADMIN — Medication 40 MILLIEQUIVALENT(S): at 14:09

## 2018-02-20 RX ADMIN — Medication 100 MILLIGRAM(S): at 22:53

## 2018-02-20 RX ADMIN — Medication 3 MILLILITER(S): at 21:55

## 2018-02-20 RX ADMIN — Medication 3 MILLIGRAM(S): at 23:08

## 2018-02-20 RX ADMIN — ATORVASTATIN CALCIUM 40 MILLIGRAM(S): 80 TABLET, FILM COATED ORAL at 22:53

## 2018-02-20 RX ADMIN — Medication 3 MILLILITER(S): at 15:44

## 2018-02-20 RX ADMIN — Medication 100 MILLIGRAM(S): at 11:15

## 2018-02-20 RX ADMIN — Medication 40 MILLIGRAM(S): at 05:57

## 2018-02-20 RX ADMIN — Medication 10 MILLIGRAM(S): at 11:15

## 2018-02-20 RX ADMIN — Medication 3 MILLILITER(S): at 11:46

## 2018-02-20 RX ADMIN — Medication 325 MILLIGRAM(S): at 11:16

## 2018-02-20 RX ADMIN — Medication 500 MILLIGRAM(S): at 11:16

## 2018-02-20 RX ADMIN — BUDESONIDE AND FORMOTEROL FUMARATE DIHYDRATE 2 PUFF(S): 160; 4.5 AEROSOL RESPIRATORY (INHALATION) at 05:57

## 2018-02-20 RX ADMIN — Medication 0.12 MILLIGRAM(S): at 05:57

## 2018-02-20 NOTE — PROGRESS NOTE ADULT - ASSESSMENT
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 2015), A Fib -on Xarelto,  DM, former ETOH abuse, PAD with b/l PCI presented with cough .    - Likely with viral infection with mild ADHF. I would cont Lasix 40gm IV Qday for one more day. His creatinine is unchanged.  - CXR with mild pulmonary congestion.  - Cont pulm rx with nebs and steroids.  - Cont ASA. Given that >1 month post CABG and on AC, I would stop Plavix. Cont Xarelto, noting poor renal function  - AF controlled on Amio, Toprol and dig.  Check dig level  - Cont aldactone, watching ins and outs.  - He has expressive aphasia, which has been documented in the past.  - Cont valsartan 40mg Qday  - Monitor and replete electrolytes. Keep K>4.0 and Mg>2.0.   - Further cardiac workup will depend on clinical course.   - All other workup per primary team. Will followup.

## 2018-02-20 NOTE — PROGRESS NOTE ADULT - SUBJECTIVE AND OBJECTIVE BOX
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 2015)/PPM, rheumatic fever in childhood, A Fib -on Xarelto, carotid stenosis, former smoker, DM borderline, former ETOH abuse -in recovery x 25 yrs, gout, PAD with bilateral lower extremity stents presented with cough x 2 weeks. Patient states completed 10 day course of Levaquin, but congestion has persisted. Friday seen by physiatry and noted to be hypoxic. Patient has occasional non-productive cough, but sister and friend have noted course breath sounds and congestion worsening. Denies fever, chills, SOB, chest pain.   Per sister at bedside, due to aphasia patient often says yes when he means no and vice versa.     In the ED T 97.8F, Hr 80, /88, O2 97% on RA, RR 19. INR 2.02, K+ 3.6, Cr 1.4, proBNP 6583, albumin 3.0, lactate 1.3, Digoxin level therapeutic, RVP negative. CXR showing mild basilar congestion. Noted to be hypoxic to 86-88% on RA, started 4L NC, Given Solumedrol 125, IV Lasix 40, Duonebs x2. (19 Feb 2018 16:23)      INTERVAL HPI/OVERNIGHT EVENTS: Pt had episode of desaturation overnight. PGY-1 assessed pt and oxygen went back up to 92% on 3L NC with no further episodes of desautration. Pt seen and examined at bedside. Appears comfortable, unable to answer question appropriately 2/2 residual expressive aphasia s/p CVA in October 2017.       T(C): 36.8 (02-20-18 @ 07:52), Max: 36.8 (02-19-18 @ 22:57)  HR: 93 (02-20-18 @ 07:52) (74 - 94)  BP: 129/75 (02-20-18 @ 07:52) (106/65 - 166/79)  RR: 17 (02-20-18 @ 07:52) (16 - 19)  SpO2: 95% (02-20-18 @ 07:52) (92% - 97%)  Wt(kg): --  I&O's Summary    19 Feb 2018 07:01  -  20 Feb 2018 07:00  --------------------------------------------------------  IN: 0 mL / OUT: 600 mL / NET: -600 mL        REVIEW OF SYSTEMS:  Unable to obtain 2/2 expressive aphasia.     PHYSICAL EXAM:  GENERAL: NAD, well-groomed, well-developed  HEAD:  Atraumatic, Normocephalic  EYES: EOMI, PERRLA, conjunctiva and sclera clear  NECK: Supple, No JVD, Normal thyroid  NERVOUS SYSTEM:  Alert, awake, Good concentration; Motor Strength 5/5 B/L upper and lower extremities  CHEST/LUNG: Mildly decreased bs b/l bases. No rales, +mild coarse breath sounds likely transmitted from upper airways  HEART: Regular rate and rhythm; No murmurs, rubs, or gallops  ABDOMEN: Soft, Nontender, Nondistended; Bowel sounds present  EXTREMITIES:  2+ Peripheral Pulses, No clubbing, cyanosis, or edema  LYMPH: No lymphadenopathy noted  SKIN: Warm, dry    MEDICATIONS  (STANDING):  ALBUTerol/ipratropium for Nebulization 3 milliLiter(s) Nebulizer four times a day  ascorbic acid 500 milliGRAM(s) Oral daily  aspirin enteric coated 81 milliGRAM(s) Oral daily  atorvastatin 40 milliGRAM(s) Oral at bedtime  buDESOnide 160 MICROgram(s)/formoterol 4.5 MICROgram(s) Inhaler 2 Puff(s) Inhalation two times a day  digoxin     Tablet 0.125 milliGRAM(s) Oral daily  docusate sodium 100 milliGRAM(s) Oral three times a day  famotidine    Tablet 20 milliGRAM(s) Oral daily  ferrous    sulfate 325 milliGRAM(s) Oral daily  finasteride 5 milliGRAM(s) Oral daily  FLUoxetine 10 milliGRAM(s) Oral daily  furosemide   Injectable 40 milliGRAM(s) IV Push daily  methylPREDNISolone sodium succinate Injectable 40 milliGRAM(s) IV Push daily  metoprolol succinate ER 50 milliGRAM(s) Oral daily  potassium chloride    Tablet ER 40 milliEquivalent(s) Oral once  rivaroxaban 20 milliGRAM(s) Oral every 24 hours  tamsulosin 0.4 milliGRAM(s) Oral at bedtime    MEDICATIONS  (PRN):  melatonin 3 milliGRAM(s) Oral at bedtime PRN Insomnia      LABS:                        12.7   3.2   )-----------( 259      ( 20 Feb 2018 06:03 )             40.5     02-20    137  |  101  |  21  ----------------------------<  145<H>  3.4<L>   |  26  |  1.40<H>    Ca    8.9      20 Feb 2018 06:03  Phos  4.0     02-20  Mg     1.7     02-20    TPro  7.3  /  Alb  2.8<L>  /  TBili  0.6  /  DBili  x   /  AST  20  /  ALT  19  /  AlkPhos  77  02-20    PT/INR - ( 20 Feb 2018 06:03 )   PT: 26.9 sec;   INR: 2.42 ratio         PTT - ( 19 Feb 2018 13:46 )  PTT:49.8 sec    CAPILLARY BLOOD GLUCOSE        ABG - ( 19 Feb 2018 21:37 )  pH: 7.46  /  pCO2: 35    /  pO2: 54    / HCO3: 25    / Base Excess: 1.0   /  SaO2: 88                        RADIOLOGY & ADDITIONAL TESTS:    Imaging Personally Reviewed:       Advance Directives:      Palliative Care: Patient is a 73y old  Male who presents with a chief complaint of cough (19 Feb 2018 16:23)      HPI:  73M PMHx 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 2015)/PPM, rheumatic fever in childhood, A Fib -on Xarelto, carotid stenosis, former smoker, DM borderline, former ETOH abuse -in recovery x 25 yrs, gout, PAD with bilateral lower extremity stents presented with cough x 2 weeks. Patient states completed 10 day course of Levaquin, but congestion has persisted. Friday seen by physiatry and noted to be hypoxic. Patient has occasional non-productive cough, but sister and friend have noted course breath sounds and congestion worsening. Denies fever, chills, SOB, chest pain.   Per sister at bedside, due to aphasia patient often says yes when he means no and vice versa.     admitted with hypoxia 2/2 likely COPD exacerbation.     INTERVAL HPI/OVERNIGHT EVENTS: Pt had episode of desaturation overnight. PGY-1 assessed pt and oxygen went back up to 92% on 3L NC with no further episodes of desautration. Pt seen and examined at bedside. Appears comfortable, unable to answer question appropriately 2/2 residual expressive aphasia s/p CVA in October 2017.       T(C): 36.8 (02-20-18 @ 07:52), Max: 36.8 (02-19-18 @ 22:57)  HR: 93 (02-20-18 @ 07:52) (74 - 94)  BP: 129/75 (02-20-18 @ 07:52) (106/65 - 166/79)  RR: 17 (02-20-18 @ 07:52) (16 - 19)  SpO2: 95% (02-20-18 @ 07:52) (92% - 97%)  Wt(kg): --  I&O's Summary    19 Feb 2018 07:01  -  20 Feb 2018 07:00  --------------------------------------------------------  IN: 0 mL / OUT: 600 mL / NET: -600 mL        REVIEW OF SYSTEMS:  Unable to obtain 2/2 expressive aphasia.     PHYSICAL EXAM:  GENERAL: NAD, well-groomed, well-developed  HEAD:  Atraumatic, Normocephalic  EYES: EOMI, PERRLA, conjunctiva and sclera clear  NECK: Supple, No JVD,  NERVOUS SYSTEM:  Alert, awake, Good concentration; Motor Strength 5/5 B/L upper and lower extremities  CHEST/LUNG: Mildly decreased bs b/l bases. No rales, +mild coarse breath sounds likely transmitted from upper airways  HEART: Regular rate and rhythm; No murmurs, rubs, or gallops  ABDOMEN: Soft, Nontender, Nondistended; Bowel sounds present  EXTREMITIES:  2+ Peripheral Pulses, No clubbing, cyanosis, or edema    SKIN: Warm, dry    MEDICATIONS  (STANDING):  ALBUTerol/ipratropium for Nebulization 3 milliLiter(s) Nebulizer four times a day  ascorbic acid 500 milliGRAM(s) Oral daily  aspirin enteric coated 81 milliGRAM(s) Oral daily  atorvastatin 40 milliGRAM(s) Oral at bedtime  buDESOnide 160 MICROgram(s)/formoterol 4.5 MICROgram(s) Inhaler 2 Puff(s) Inhalation two times a day  digoxin     Tablet 0.125 milliGRAM(s) Oral daily  docusate sodium 100 milliGRAM(s) Oral three times a day  famotidine    Tablet 20 milliGRAM(s) Oral daily  ferrous    sulfate 325 milliGRAM(s) Oral daily  finasteride 5 milliGRAM(s) Oral daily  FLUoxetine 10 milliGRAM(s) Oral daily  furosemide   Injectable 40 milliGRAM(s) IV Push daily  methylPREDNISolone sodium succinate Injectable 40 milliGRAM(s) IV Push daily  metoprolol succinate ER 50 milliGRAM(s) Oral daily  potassium chloride    Tablet ER 40 milliEquivalent(s) Oral once  rivaroxaban 20 milliGRAM(s) Oral every 24 hours  tamsulosin 0.4 milliGRAM(s) Oral at bedtime    MEDICATIONS  (PRN):  melatonin 3 milliGRAM(s) Oral at bedtime PRN Insomnia      LABS:                        12.7   3.2   )-----------( 259      ( 20 Feb 2018 06:03 )             40.5     02-20    137  |  101  |  21  ----------------------------<  145<H>  3.4<L>   |  26  |  1.40<H>    Ca    8.9      20 Feb 2018 06:03  Phos  4.0     02-20  Mg     1.7     02-20    TPro  7.3  /  Alb  2.8<L>  /  TBili  0.6  /  DBili  x   /  AST  20  /  ALT  19  /  AlkPhos  77  02-20    PT/INR - ( 20 Feb 2018 06:03 )   PT: 26.9 sec;   INR: 2.42 ratio         PTT - ( 19 Feb 2018 13:46 )  PTT:49.8 sec    CAPILLARY BLOOD GLUCOSE        ABG - ( 19 Feb 2018 21:37 )  pH: 7.46  /  pCO2: 35    /  pO2: 54    / HCO3: 25    / Base Excess: 1.0   /  SaO2: 88                        RADIOLOGY & ADDITIONAL TESTS:    Imaging Personally Reviewed:   no new test     Advance Directives:  full code

## 2018-02-20 NOTE — CHART NOTE - NSCHARTNOTEFT_GEN_A_CORE
Called by RN to evaluate Pt with 13 beats VT.  Pt seen resting comfortably in bed.  Displays expressive aphasia.  Denies chest pain, palpitations or SOB.    Vital Signs Last 24 Hrs  T(C): 36.9 (20 Feb 2018 20:51), Max: 36.9 (20 Feb 2018 20:51)  T(F): 98.4 (20 Feb 2018 20:51), Max: 98.4 (20 Feb 2018 20:51)  HR: 94 (20 Feb 2018 20:51) (74 - 94)  BP: 112/67 (20 Feb 2018 20:51) (92/55 - 135/63)  BP(mean): --  RR: 19 (20 Feb 2018 20:51) (16 - 19)  SpO2: 94% (20 Feb 2018 20:51) (94% - 97%)    General: NAD   Neurology: Expressive aphasia  Respiratory: CTA B/L, No W/R/R  CV: RRR, +S1/S2, no murmurs, rubs or gallops  Abdominal: Soft, NT, ND +BSx4  Extremities: No C/C/E, + peripheral pulses  Skin: warm, dry, normal color    A/P:  13 beats VT, Asx  -Mg and Phos ordered for AM  -Toprol XL 25mg x1 dose  -Cardio Dr. Steel informed and agrees with plan  -RN to notify if any further changes

## 2018-02-20 NOTE — PROGRESS NOTE ADULT - SUBJECTIVE AND OBJECTIVE BOX
[facility] - Javon Hernandez, Nneka, Neil, Valente, Damián Bacon  Office Number:  [phone]    Patient resting comfortably in bed in NAD. Decreased oxygen noted overnight. Expressive aphasia    ROS: negative unless otherwise mentioned.    Telemetry:  AF 90's    MEDICATIONS  (STANDING):  ALBUTerol/ipratropium for Nebulization 3 milliLiter(s) Nebulizer four times a day  ascorbic acid 500 milliGRAM(s) Oral daily  aspirin enteric coated 81 milliGRAM(s) Oral daily  atorvastatin 40 milliGRAM(s) Oral at bedtime  buDESOnide 160 MICROgram(s)/formoterol 4.5 MICROgram(s) Inhaler 2 Puff(s) Inhalation two times a day  digoxin     Tablet 0.125 milliGRAM(s) Oral daily  docusate sodium 100 milliGRAM(s) Oral three times a day  famotidine    Tablet 20 milliGRAM(s) Oral daily  ferrous    sulfate 325 milliGRAM(s) Oral daily  finasteride 5 milliGRAM(s) Oral daily  FLUoxetine 10 milliGRAM(s) Oral daily  furosemide   Injectable 40 milliGRAM(s) IV Push daily  methylPREDNISolone sodium succinate Injectable 40 milliGRAM(s) IV Push daily  metoprolol succinate ER 50 milliGRAM(s) Oral daily  potassium chloride    Tablet ER 40 milliEquivalent(s) Oral once  rivaroxaban 20 milliGRAM(s) Oral every 24 hours  tamsulosin 0.4 milliGRAM(s) Oral at bedtime    MEDICATIONS  (PRN):  melatonin 3 milliGRAM(s) Oral at bedtime PRN Insomnia      Allergies    No Known Allergies    Intolerances        Vital Signs Last 24 Hrs  T(C): 36.8 (20 Feb 2018 07:52), Max: 36.8 (19 Feb 2018 22:57)  T(F): 98.3 (20 Feb 2018 07:52), Max: 98.3 (19 Feb 2018 22:57)  HR: 93 (20 Feb 2018 07:52) (74 - 94)  BP: 129/75 (20 Feb 2018 07:52) (106/65 - 166/79)  BP(mean): --  RR: 17 (20 Feb 2018 07:52) (16 - 19)  SpO2: 95% (20 Feb 2018 07:52) (92% - 97%)    I&O's Summary    19 Feb 2018 07:01  -  20 Feb 2018 07:00  --------------------------------------------------------  IN: 0 mL / OUT: 600 mL / NET: -600 mL        ON EXAM:    Constitutional: NAD, awake and alertt  HEENT: Moist Mucous Membranes, Anicteric  Pulmonary: Decreased breath sounds b/l. coarse bs.   Cardiovascular: Regular, S1 and S2, No murmurs, rubs, gallops or clicks  Gastrointestinal: Bowel Sounds present, soft, nontender.   Lymph: No peripheral edema. No lymphadenopathy.  Skin: No visible rashes or ulcers.  Psych:  flat affect depressed mood,   Neuro: expressive aphasia    LABS: All Labs Reviewed:                        12.7   3.2   )-----------( 259      ( 20 Feb 2018 06:03 )             40.5                         13.8   5.3   )-----------( 270      ( 19 Feb 2018 13:46 )             44.6     20 Feb 2018 06:03    137    |  101    |  21     ----------------------------<  145    3.4     |  26     |  1.40   19 Feb 2018 13:46    137    |  98     |  17     ----------------------------<  94     3.6     |  30     |  1.40     Ca    8.9        20 Feb 2018 06:03  Ca    8.8        19 Feb 2018 13:46  Phos  4.0       20 Feb 2018 06:03  Mg     1.7       20 Feb 2018 06:03    TPro  7.3    /  Alb  2.8    /  TBili  0.6    /  DBili  x      /  AST  20     /  ALT  19     /  AlkPhos  77     20 Feb 2018 06:03  TPro  7.9    /  Alb  3.0    /  TBili  0.7    /  DBili  x      /  AST  22     /  ALT  22     /  AlkPhos  90     19 Feb 2018 13:46    PT/INR - ( 20 Feb 2018 06:03 )   PT: 26.9 sec;   INR: 2.42 ratio         PTT - ( 19 Feb 2018 13:46 )  PTT:49.8 sec      Blood Culture:   02-19 @ 13:46  Pro Bnp 6583

## 2018-02-20 NOTE — CHART NOTE - NSCHARTNOTEFT_GEN_A_CORE
Called by RN to evaluate Pt desaturating to mid 80's.  Pt seen resting comfortably in bed.  Denies fever or chills, chest pain or SOB.    Vital Signs Last 24 Hrs  T(C): 36.8 (20 Feb 2018 03:19), Max: 36.8 (19 Feb 2018 22:57)  T(F): 98.2 (20 Feb 2018 03:19), Max: 98.3 (19 Feb 2018 22:57)  HR: 94 (20 Feb 2018 03:19) (75 - 94)  BP: 128/76 (20 Feb 2018 03:19) (112/77 - 166/79)  BP(mean): --  RR: 17 (20 Feb 2018 03:19) (17 - 19)  SpO2: 94% (20 Feb 2018 03:19) (92% - 97%)    General: NAD  Neurology: +Expressive aphasia, difficulty word finding  Respiratory: CTA B/L, No W/R/R  CV: RRR, +S1/S2, no murmurs, rubs or gallops  Abdominal: Soft, NT, ND +BSx4  Extremities: No C/C/E, + peripheral pulses  Skin: warm, dry, normal color    A/P:  -Desaturating likely 2/2 to chronic COPD state, does not appear to be fluid overloaded  -Keep O2 >88 on NC and titrate down as appropriate, now 92%  -Pt to receive Lasix, Solumedrol, Duoneb in AM  -RN to notify if any further changes

## 2018-02-20 NOTE — PROGRESS NOTE ADULT - PROBLEM SELECTOR PLAN 4
-Per cardio recs will hold Plavix as patient 1 month post CABG and on Xarelto  -Continue ASA and Xarelto -Per cardio  . as patient 1 month post CABG and on Xarelto  -Continue ASA and Xarelto

## 2018-02-20 NOTE — PROGRESS NOTE ADULT - SUBJECTIVE AND OBJECTIVE BOX
VITALS/LABS  2/20/2018 afeb 93 108/68 17 97%   2/20/2018 W 3.2 Hb 12.7 Plt 259  Na 137 K 3.4 CO2 26 Cr 1.4   REVIEW OF SYMPTOMS    Able to give ROS  Yes     RELIABLE No   CONSTITUTIONAL Weakness Yes  Chills No Vision changes No  ENDOCRINE No unexplained hair loss No heat or cold intolerance    ALLERGY No hives  Sore throat No   RESP Coughing blood no  Shortness of breath YES   NEURO No Headache  Confusion Pain neck No   CARDIAC No Chest pain No Palpitations   GI No Pain abdomen NO   Vomiting NO   PHYSICAL EXAM    HEENT Unremarkable PERRLA atraumatic   RESP Fair air entry EXP prolonged    Harsh breath sound Resp distres mild   CARDIAC S1 S2 No S3     NO JVD    ABDOMEN SOFT BS PRESENT NOT DISTENDED No hepatosplenomegaly PEDAL EDEMA present No calf tenderness  NO rash   GENERAL Not TOXIC looking  GLOBAL ISSUE/BEST PRACTICE:        PROBLEM: Analgesia:     na                        PROBLEM: Sedation:     na               PROBLEM: HOB elevation:   y            PROBLEM: Stress ulcer proph:    protonix 40 (2/19)                        PROBLEM: VTE prophylaxis:      coumadoin poa (2/19) --> Rivaroxaban 20 (2/19) (nonvalv a fib)                  PROBLEM: Glycemic control:    na  PROBLEM: Nutrition:    byrd (2/19)           PROBLEM: Advanced directive: na     PROBLEM: Allergies:  na  PATIENT DESCRIPTION  2/19/2018 ADMISSION   [facility]      73 m with a fib aphasia 2/2 embolic CVA Stroke 2017 [facility]  [facility] COPD, CAD s/p stent 2016, CABG 8/2017, HTN, HLD, chronic systolic HF (Last Echo 8/17 showing moderate segmental LV systolic dysfunction, severely hypokinetic inferior and septal walls, variable EF, moderate pulmonary HTN), NSVT,  ICD([facility] 2015)/PPM, rheumatic fever in childhood, A Fib -on warfarin, carotid stenosis, former smoker, DM borderline, obese, former ETOH abuse -in recovery x 25 yrs, gout, PAD with bilateral lower extremity stents presented with cough x 2 weeks admitted [facility] 2/19/2018 as ac bronchitis sp course of levaquin x 10 d given as outpt referred from [facility] Pulm consulted 2/19/2018   HOME MEDS 2/19/2018 Coumadin 2 Lasix 20 Spironolact 25 spiriva toprol 100 valsartan 40.2 symbicort amiodarone 200 protonix 40 atorvastat 40 dig 125 finasteride 5 asa 81   ER Vitals 2/19 afeb 80 146/88 97%   PROBLEMS  PREEXISTING  COPD        A FIB ON COUMADIN POA   CAD CABG 8/2017   CHF Echo 8/17 showing moderate segmental LV systolic dysfunction, severely hypokinetic inferior and septal walls, variable EF, moderate pulmonary HTN)  PAD   APHASIA SEC EMBOLIC CVA 2017 POA   former ETOH abuse -in recovery x 25 yrs  PROBLEMS   CURRENT  RESP DISTRESS  AC BRONCHITIS COPD EX BD steroids  POSSIBLE DYSPHAGIA AS CAUSE OF COUGH   2/20 Speeche rosario requested   RESP TRACT INFECTION 2/19 rvp n   HYPOXIA   AF Coumadin --> Xarelto (2/19) asa 81 amiodarone 200 dig 125 metoprolol   HOSPITAL COURSE   2/19 Admitted as ac bronchitis poss CHF decompensation   Abio deferred    ASSESSMENT PLAN  RESP O2 to keep PO 90-95%  COPD EX BD Steroids   RESP TRACT INFECTION pct and RVP were neg 2/19 Bact infection unlikely   AF On rate rhythm and ac agents Coumadin changed to xarelto on 2/19 by hospitalist  CHF On dieretics arb   POSSIBLE DYSPHAGIA AS CAUSE OF COUGH   2/20 Speeche rosario requested    TIME SPENT Over 25 minutes aggregate care time spent on encounter; activities included   direct patient care, counseling and/or coordinating care reviewing notes, lab data/ imaging , discussion with multidisciplinary team/ patient  /family. Risks, benefits, alternatives  discussed in detail. Proper antibiotic use issues addressed Questions/concerns  were addressed  as  best as possible

## 2018-02-20 NOTE — PROGRESS NOTE ADULT - PROBLEM SELECTOR PLAN 3
Patient with history of elevated serum Cr ranging 1.2-1.7  -Hold arb  -continue to monitor  -Hypokalemia repleted Patient with history of elevated serum Cr ranging 1.2-1.7  -Continue to monitor  -Hypokalemia repleted -Hypokalemia repleted

## 2018-02-20 NOTE — PROGRESS NOTE ADULT - PROBLEM SELECTOR PLAN 2
-Mildly elevated proBNP  -Dr. Steel cardio following, appreciate recs  -Lasix 40 IV x1 more day per cardio - acute on chr  sytolic   40  ef  2017   -Dr. Steel cardio following,   -Lasix 40 IV x1 more day per cardio

## 2018-02-20 NOTE — PROGRESS NOTE ADULT - PROBLEM SELECTOR PLAN 1
w/ hypoxia, s/p 10 day course of Levaquin, procalcitonin negative  -RVP negative  -Continue Duonebs 4x/day  -Dr Granger (Pulm) consulted, appreciate recs  -Continue Solumedrol 40qd  -Symbicort re-started   -NC, Keep O2 above 88%  -Ambulate with assist , s/p 10 day course of Levaquin finished , procalcitonin negative  -RVP negative  -Continue Duonebs 4x/day  -Dr Granger (Pulm) consulted, appreciate recs  -Continue Solumedrol 40qd  -Symbicort re-started   -NC, Keep O2 above 88%  -Ambulate with assist

## 2018-02-20 NOTE — DIETITIAN INITIAL EVALUATION ADULT. - PROBLEM SELECTOR PLAN 1
w/ hypoxia, likely viral, procalcitonin negative, completed 10 day course of Levaquin, now s/p Solumedrol 125 and Duonebs x2  -Continue Duonebs 4x/day  -Dr Granger (Pulm) consulted  -Per Pulm Continue Solumedrol 40qd  -patient has not taken Symbicort in some time, will restart for now, to discuss with Pulm if necessary  -NC, Keep O2 above 88%  -Ambulate with assist  -DASH/TLC diet

## 2018-02-20 NOTE — DIETITIAN INITIAL EVALUATION ADULT. - PROBLEM SELECTOR PLAN 2
-bibasilar crackles with mildly elevated proBNP  -Lasix 40 IV daily per cardio  -unclear why patient not on ACEi/ARB, currently Cr elevated, patient was on Valsartan in the past

## 2018-02-20 NOTE — PROGRESS NOTE ADULT - PROBLEM SELECTOR PLAN 9
Per sister patient had history of anemia in October, given 2 units of Plasma, started on Iron and Vitamin C, told to follow-up with GI for EGD which has not yet scheduled  -Continue Iron and Vitamin C  -Hemoglobin within normal limits, continue to monitor  -GI outpatient follow up

## 2018-02-21 ENCOUNTER — TRANSCRIPTION ENCOUNTER (OUTPATIENT)
Age: 73
End: 2018-02-21

## 2018-02-21 VITALS — OXYGEN SATURATION: 95 %

## 2018-02-21 LAB
ANION GAP SERPL CALC-SCNC: 9 MMOL/L — SIGNIFICANT CHANGE UP (ref 5–17)
APTT BLD: 35.4 SEC — SIGNIFICANT CHANGE UP (ref 27.5–37.4)
BASOPHILS # BLD AUTO: 0 K/UL — SIGNIFICANT CHANGE UP (ref 0–0.2)
BASOPHILS NFR BLD AUTO: 0.2 % — SIGNIFICANT CHANGE UP (ref 0–2)
BUN SERPL-MCNC: 35 MG/DL — HIGH (ref 7–23)
CALCIUM SERPL-MCNC: 9.1 MG/DL — SIGNIFICANT CHANGE UP (ref 8.5–10.1)
CHLORIDE SERPL-SCNC: 100 MMOL/L — SIGNIFICANT CHANGE UP (ref 96–108)
CO2 SERPL-SCNC: 27 MMOL/L — SIGNIFICANT CHANGE UP (ref 22–31)
CREAT SERPL-MCNC: 1.7 MG/DL — HIGH (ref 0.5–1.3)
EOSINOPHIL # BLD AUTO: 0 K/UL — SIGNIFICANT CHANGE UP (ref 0–0.5)
EOSINOPHIL NFR BLD AUTO: 0.1 % — SIGNIFICANT CHANGE UP (ref 0–6)
GLUCOSE SERPL-MCNC: 92 MG/DL — SIGNIFICANT CHANGE UP (ref 70–99)
HCT VFR BLD CALC: 39.6 % — SIGNIFICANT CHANGE UP (ref 39–50)
HGB BLD-MCNC: 12.6 G/DL — LOW (ref 13–17)
INR BLD: 1.4 RATIO — HIGH (ref 0.88–1.16)
LYMPHOCYTES # BLD AUTO: 0.9 K/UL — LOW (ref 1–3.3)
LYMPHOCYTES # BLD AUTO: 7.6 % — LOW (ref 13–44)
MAGNESIUM SERPL-MCNC: 2.1 MG/DL — SIGNIFICANT CHANGE UP (ref 1.6–2.6)
MCHC RBC-ENTMCNC: 27.6 PG — SIGNIFICANT CHANGE UP (ref 27–34)
MCHC RBC-ENTMCNC: 31.7 GM/DL — LOW (ref 32–36)
MCV RBC AUTO: 87.1 FL — SIGNIFICANT CHANGE UP (ref 80–100)
MONOCYTES # BLD AUTO: 1.3 K/UL — HIGH (ref 0–0.9)
MONOCYTES NFR BLD AUTO: 10.6 % — HIGH (ref 1–9)
NEUTROPHILS # BLD AUTO: 9.8 K/UL — HIGH (ref 1.8–7.4)
NEUTROPHILS NFR BLD AUTO: 81.6 % — HIGH (ref 43–77)
PHOSPHATE SERPL-MCNC: 3.8 MG/DL — SIGNIFICANT CHANGE UP (ref 2.5–4.5)
PLATELET # BLD AUTO: 286 K/UL — SIGNIFICANT CHANGE UP (ref 150–400)
POTASSIUM SERPL-MCNC: 3.5 MMOL/L — SIGNIFICANT CHANGE UP (ref 3.5–5.3)
POTASSIUM SERPL-SCNC: 3.5 MMOL/L — SIGNIFICANT CHANGE UP (ref 3.5–5.3)
PROTHROM AB SERPL-ACNC: 15.4 SEC — HIGH (ref 9.8–12.7)
RBC # BLD: 4.55 M/UL — SIGNIFICANT CHANGE UP (ref 4.2–5.8)
RBC # FLD: 18.7 % — HIGH (ref 10.3–14.5)
SODIUM SERPL-SCNC: 136 MMOL/L — SIGNIFICANT CHANGE UP (ref 135–145)
WBC # BLD: 12.1 K/UL — HIGH (ref 3.8–10.5)
WBC # FLD AUTO: 12.1 K/UL — HIGH (ref 3.8–10.5)

## 2018-02-21 PROCEDURE — 84145 PROCALCITONIN (PCT): CPT

## 2018-02-21 PROCEDURE — 85610 PROTHROMBIN TIME: CPT

## 2018-02-21 PROCEDURE — 83735 ASSAY OF MAGNESIUM: CPT

## 2018-02-21 PROCEDURE — 87486 CHLMYD PNEUM DNA AMP PROBE: CPT

## 2018-02-21 PROCEDURE — 87633 RESP VIRUS 12-25 TARGETS: CPT

## 2018-02-21 PROCEDURE — 83605 ASSAY OF LACTIC ACID: CPT

## 2018-02-21 PROCEDURE — 87581 M.PNEUMON DNA AMP PROBE: CPT

## 2018-02-21 PROCEDURE — 80048 BASIC METABOLIC PNL TOTAL CA: CPT

## 2018-02-21 PROCEDURE — 82803 BLOOD GASES ANY COMBINATION: CPT

## 2018-02-21 PROCEDURE — 97162 PT EVAL MOD COMPLEX 30 MIN: CPT

## 2018-02-21 PROCEDURE — 83880 ASSAY OF NATRIURETIC PEPTIDE: CPT

## 2018-02-21 PROCEDURE — 87040 BLOOD CULTURE FOR BACTERIA: CPT

## 2018-02-21 PROCEDURE — 71046 X-RAY EXAM CHEST 2 VIEWS: CPT

## 2018-02-21 PROCEDURE — 84100 ASSAY OF PHOSPHORUS: CPT

## 2018-02-21 PROCEDURE — 99232 SBSQ HOSP IP/OBS MODERATE 35: CPT

## 2018-02-21 PROCEDURE — 99231 SBSQ HOSP IP/OBS SF/LOW 25: CPT

## 2018-02-21 PROCEDURE — 96375 TX/PRO/DX INJ NEW DRUG ADDON: CPT

## 2018-02-21 PROCEDURE — 94760 N-INVAS EAR/PLS OXIMETRY 1: CPT

## 2018-02-21 PROCEDURE — 85027 COMPLETE CBC AUTOMATED: CPT

## 2018-02-21 PROCEDURE — 96374 THER/PROPH/DIAG INJ IV PUSH: CPT

## 2018-02-21 PROCEDURE — 80162 ASSAY OF DIGOXIN TOTAL: CPT

## 2018-02-21 PROCEDURE — 94640 AIRWAY INHALATION TREATMENT: CPT

## 2018-02-21 PROCEDURE — 87798 DETECT AGENT NOS DNA AMP: CPT

## 2018-02-21 PROCEDURE — 99239 HOSP IP/OBS DSCHRG MGMT >30: CPT

## 2018-02-21 PROCEDURE — 99285 EMERGENCY DEPT VISIT HI MDM: CPT | Mod: 25

## 2018-02-21 PROCEDURE — 93005 ELECTROCARDIOGRAM TRACING: CPT

## 2018-02-21 PROCEDURE — 99221 1ST HOSP IP/OBS SF/LOW 40: CPT

## 2018-02-21 PROCEDURE — 80053 COMPREHEN METABOLIC PANEL: CPT

## 2018-02-21 PROCEDURE — 85730 THROMBOPLASTIN TIME PARTIAL: CPT

## 2018-02-21 RX ORDER — RIVAROXABAN 15 MG-20MG
1 KIT ORAL
Qty: 0 | Refills: 0 | COMMUNITY

## 2018-02-21 RX ORDER — ALBUTEROL 90 UG/1
2 AEROSOL, METERED ORAL
Qty: 1 | Refills: 0
Start: 2018-02-21 | End: 2018-03-22

## 2018-02-21 RX ORDER — DIGOXIN 250 MCG
1 TABLET ORAL
Qty: 0 | Refills: 0 | DISCHARGE
Start: 2018-02-21

## 2018-02-21 RX ORDER — METOPROLOL TARTRATE 50 MG
1 TABLET ORAL
Qty: 0 | Refills: 0 | DISCHARGE
Start: 2018-02-21

## 2018-02-21 RX ORDER — TAMSULOSIN HYDROCHLORIDE 0.4 MG/1
1 CAPSULE ORAL
Qty: 0 | Refills: 0 | DISCHARGE
Start: 2018-02-21

## 2018-02-21 RX ORDER — LANOLIN ALCOHOL/MO/W.PET/CERES
1 CREAM (GRAM) TOPICAL
Qty: 0 | Refills: 0 | COMMUNITY

## 2018-02-21 RX ORDER — LANOLIN ALCOHOL/MO/W.PET/CERES
1 CREAM (GRAM) TOPICAL
Qty: 0 | Refills: 0 | DISCHARGE
Start: 2018-02-21

## 2018-02-21 RX ORDER — ASCORBIC ACID 60 MG
1 TABLET,CHEWABLE ORAL
Qty: 0 | Refills: 0 | COMMUNITY

## 2018-02-21 RX ORDER — ATORVASTATIN CALCIUM 80 MG/1
1 TABLET, FILM COATED ORAL
Qty: 0 | Refills: 0 | DISCHARGE
Start: 2018-02-21

## 2018-02-21 RX ORDER — FAMOTIDINE 10 MG/ML
1 INJECTION INTRAVENOUS
Qty: 0 | Refills: 0 | COMMUNITY

## 2018-02-21 RX ORDER — FAMOTIDINE 10 MG/ML
1 INJECTION INTRAVENOUS
Qty: 0 | Refills: 0 | DISCHARGE
Start: 2018-02-21

## 2018-02-21 RX ORDER — BUDESONIDE AND FORMOTEROL FUMARATE DIHYDRATE 160; 4.5 UG/1; UG/1
2 AEROSOL RESPIRATORY (INHALATION)
Qty: 1 | Refills: 0
Start: 2018-02-21 | End: 2018-03-02

## 2018-02-21 RX ORDER — ASCORBIC ACID 60 MG
1 TABLET,CHEWABLE ORAL
Qty: 0 | Refills: 0 | DISCHARGE
Start: 2018-02-21

## 2018-02-21 RX ORDER — FLUOXETINE HCL 10 MG
1 CAPSULE ORAL
Qty: 0 | Refills: 0 | COMMUNITY

## 2018-02-21 RX ORDER — FUROSEMIDE 40 MG
1 TABLET ORAL
Qty: 0 | Refills: 0 | DISCHARGE
Start: 2018-02-21 | End: 2018-03-02

## 2018-02-21 RX ORDER — FINASTERIDE 5 MG/1
1 TABLET, FILM COATED ORAL
Qty: 0 | Refills: 0 | DISCHARGE
Start: 2018-02-21

## 2018-02-21 RX ORDER — BUDESONIDE AND FORMOTEROL FUMARATE DIHYDRATE 160; 4.5 UG/1; UG/1
2 AEROSOL RESPIRATORY (INHALATION)
Qty: 1 | Refills: 0 | OUTPATIENT
Start: 2018-02-21 | End: 2018-03-02

## 2018-02-21 RX ORDER — FLUOXETINE HCL 10 MG
1 CAPSULE ORAL
Qty: 0 | Refills: 0 | DISCHARGE
Start: 2018-02-21

## 2018-02-21 RX ORDER — TAMSULOSIN HYDROCHLORIDE 0.4 MG/1
1 CAPSULE ORAL
Qty: 0 | Refills: 0 | COMMUNITY

## 2018-02-21 RX ORDER — TAMSULOSIN HYDROCHLORIDE 0.4 MG/1
2 CAPSULE ORAL
Qty: 0 | Refills: 0 | DISCHARGE
Start: 2018-02-21

## 2018-02-21 RX ORDER — BUDESONIDE AND FORMOTEROL FUMARATE DIHYDRATE 160; 4.5 UG/1; UG/1
2 AEROSOL RESPIRATORY (INHALATION)
Qty: 0 | Refills: 0 | COMMUNITY
Start: 2018-02-21

## 2018-02-21 RX ORDER — ASPIRIN/CALCIUM CARB/MAGNESIUM 324 MG
1 TABLET ORAL
Qty: 0 | Refills: 0 | DISCHARGE
Start: 2018-02-21

## 2018-02-21 RX ORDER — BUDESONIDE AND FORMOTEROL FUMARATE DIHYDRATE 160; 4.5 UG/1; UG/1
2 AEROSOL RESPIRATORY (INHALATION)
Qty: 1 | Refills: 0
Start: 2018-02-21 | End: 2018-03-22

## 2018-02-21 RX ORDER — METOPROLOL TARTRATE 50 MG
1 TABLET ORAL
Qty: 0 | Refills: 0 | COMMUNITY

## 2018-02-21 RX ORDER — FUROSEMIDE 40 MG
1 TABLET ORAL
Qty: 5 | Refills: 0
Start: 2018-02-21 | End: 2018-03-02

## 2018-02-21 RX ORDER — RIVAROXABAN 15 MG-20MG
1 KIT ORAL
Qty: 0 | Refills: 0 | DISCHARGE
Start: 2018-02-21

## 2018-02-21 RX ADMIN — Medication 3 MILLILITER(S): at 16:23

## 2018-02-21 RX ADMIN — Medication 10 MILLIGRAM(S): at 12:23

## 2018-02-21 RX ADMIN — BUDESONIDE AND FORMOTEROL FUMARATE DIHYDRATE 2 PUFF(S): 160; 4.5 AEROSOL RESPIRATORY (INHALATION) at 05:50

## 2018-02-21 RX ADMIN — FAMOTIDINE 20 MILLIGRAM(S): 10 INJECTION INTRAVENOUS at 12:24

## 2018-02-21 RX ADMIN — Medication 81 MILLIGRAM(S): at 12:24

## 2018-02-21 RX ADMIN — Medication 325 MILLIGRAM(S): at 12:24

## 2018-02-21 RX ADMIN — Medication 40 MILLIGRAM(S): at 05:48

## 2018-02-21 RX ADMIN — Medication 100 MILLIGRAM(S): at 13:58

## 2018-02-21 RX ADMIN — Medication 100 MILLIGRAM(S): at 05:49

## 2018-02-21 RX ADMIN — Medication 500 MILLIGRAM(S): at 12:23

## 2018-02-21 RX ADMIN — Medication 3 MILLILITER(S): at 12:08

## 2018-02-21 RX ADMIN — Medication 3 MILLILITER(S): at 07:48

## 2018-02-21 RX ADMIN — Medication 0.12 MILLIGRAM(S): at 05:49

## 2018-02-21 RX ADMIN — Medication 40 MILLIGRAM(S): at 05:49

## 2018-02-21 RX ADMIN — Medication 50 MILLIGRAM(S): at 05:49

## 2018-02-21 RX ADMIN — FINASTERIDE 5 MILLIGRAM(S): 5 TABLET, FILM COATED ORAL at 12:24

## 2018-02-21 NOTE — DISCHARGE NOTE ADULT - ADDITIONAL INSTRUCTIONS
Please follow up with your PCP within 1 week of discharge. Please follow up with joseph cardiologist within Please follow up with your PCP within 3-4 days of discharge. Please follow up with your cardiologist within 1 week of discharge. Please follow up with Pulmonologist within 1 week of discharge. Please follow up with your PCP within 3-4 days of dischargepmd   dr fermin ribera notified dc plan . Please follow up with your cardiologist within 1 week of discharge dr timmons office . Please follow up with Pulmonologist within 1 week of discharge. pt is on new home o2 for copd. pt fu electrolyte  out pt  cr 1.7  at dc fu with lasix closely  .

## 2018-02-21 NOTE — DISCHARGE NOTE ADULT - PROVIDER TOKENS
TOKEN:'[id number]' TOKEN:'[id number]',TOKEN:'[id number]',FREE:[LAST:[annemarie],FIRST:[fermin],PHONE:[phone]],FAX:[(   )    -],ADDRESS:[[address] [place] [phone]]]

## 2018-02-21 NOTE — DISCHARGE NOTE ADULT - CARE PROVIDERS DIRECT ADDRESSES
,[email or web address]

## 2018-02-21 NOTE — PROGRESS NOTE ADULT - SUBJECTIVE AND OBJECTIVE BOX
VITALS/LABS  2/21/2018 afeb 71 120/80 17 97%   2/21/2018 W 12.1 Hb 12.6 Plt 286  Na 136 K 3.5 CO2 27 Cr 1.7   REVIEW OF SYMPTOMS    Able to give ROS  Yes     RELIABLE No   CONSTITUTIONAL Weakness Yes  Chills No Vision changes No  ENDOCRINE No unexplained hair loss No heat or cold intolerance    ALLERGY No hives  Sore throat No   RESP Coughing blood no  Shortness of breath YES   NEURO No Headache  Confusion Pain neck No   CARDIAC No Chest pain No Palpitations   GI No Pain abdomen NO   Vomiting NO   PHYSICAL EXAM    HEENT Unremarkable PERRLA atraumatic   RESP Fair air entry EXP prolonged    Harsh breath sound Resp distres mild   CARDIAC S1 S2 No S3     NO JVD    ABDOMEN SOFT BS PRESENT NOT DISTENDED No hepatosplenomegaly PEDAL EDEMA present No calf tenderness  NO rash   GENERAL Not TOXIC looking  GLOBAL ISSUE/BEST PRACTICE:        PROBLEM: Analgesia:     na                        PROBLEM: Sedation:     na               PROBLEM: HOB elevation:   y            PROBLEM: Stress ulcer proph:    protonix 40 (2/19)                        PROBLEM: VTE prophylaxis:      coumadoin poa (2/19) --> Rivaroxaban 20 (2/19) (nonvalv a fib)                  PROBLEM: Glycemic control:    na  PROBLEM: Nutrition:    byrd (2/19)           PROBLEM: Advanced directive: na     PROBLEM: Allergies:  na  HOSPITAL COURSE   2/19 Admitted as ac bronchitis poss CHF decompensation   Abio deferred    ASSESSMENT PLAN  RESP O2 to keep PO 90-95% 2/20 vbg pH 748 2/19 ra 745/35/54   COPD EX BD Steroids   RESP TRACT INFECTION pct and RVP were neg 2/19 Bact infection unlikely 2/19 bc n   AF On rate rhythm and ac agents Coumadin changed to xarelto on 2/19 by hospitalist  CHF On dieretics arb   POSSIBLE DYSPHAGIA AS CAUSE OF COUGH   2/20 Speeche rosario requested 2/21 Speech shaun reg consistency thin liq   NEED FOR HOME O2 2/21 RA PO dropped to 87% on ambulation     TIME SPENT Over 25 minutes aggregate care time spent on encounter; activities included   direct patient care, counseling and/or coordinating care reviewing notes, lab data/ imaging , discussion with multidisciplinary team/ patient  /family. Risks, benefits, alternatives  discussed in detail. Proper antibiotic use issues addressed Questions/concerns  were addressed  as  best as possible

## 2018-02-21 NOTE — DISCHARGE NOTE ADULT - CARE PROVIDER_API CALL
Aisha Bacon), Internal Medicine  [address] [place] [address]  Phone: [phone]  Fax: [phone] Gildardo Granger), Critical Care Medicine; Internal Medicine; Pulmonary Disease  [address] [place] [address]  Phone: [phone]  Fax: [phone]    Lucio Camacho), Cardiovascular Disease  [address] [place] [address]  Phone: [phone]  Fax: [phone]    fermin sharpe  [address] [place] [id number]  Phone: [phone]  Fax: (   )    -

## 2018-02-21 NOTE — PROGRESS NOTE ADULT - ASSESSMENT
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 2015), A Fib -on Xarelto,  DM, former ETOH abuse, PAD with b/l PCI presented with cough .    - Likely with viral infection with mild ADHF. I would cont Lasix 40gm IV Qday for one more day. His creatinine is unchanged.  - CXR with mild pulmonary congestion.  - Cont pulm rx with nebs and steroids.  - Cont ASA. Given that >1 month post CABG and on AC, I would stop Plavix. Cont Xarelto, noting poor renal function  - AF controlled on Amiodarone , Toprol and dig.  Check dig level  - Cont aldactone, watching ins and outs.  - He has expressive aphasia, which has been documented in the past.  - Cont valsartan 40mg Qday  - Monitor and replete electrolytes. Keep K>4.0 and Mg>2.0.   - Further cardiac workup will depend on clinical course.   - All other workup per primary team.   - Will continue to follow up. 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 2015), A Fib -on Xarelto,  DM, former ETOH abuse, PAD with b/l PCI presented with cough .    - Likely with viral infection with mild ADHF, continue valsartan 40mg daily    - Lasix 40mg discontinued creatinine now 1.7; monitor BMP     - CXR with mild pulmonary congestion.  - Cont pulm rx with nebs and steroids.  - Plavix discontinued, Cont ASA, Given that >1 month post CABG and on AC, Cont Xarelto, noting poor renal function  - AF controlled on Amiodarone, Toprol and dig;  Dig level 1.0  - Cont aldactone, watching ins and outs.  - He has expressive aphasia, which has been documented in the past.  - Monitor and replete electrolytes. Keep K>4.0 and Mg>2.0.   - Further cardiac workup will depend on clinical course.   - All other workup per primary team.   - Will continue to follow up. 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 2015), A Fib -on Xarelto,  DM, former ETOH abuse, PAD with b/l PCI presented with cough .    - Likely with viral infection with mild ADHF, continue valsartan 40mg daily    - Lasix 40mg discontinued creatinine now 1.7; monitor BMP     - Appears compensated from HF POV.   - Cont pulm rx with nebs and steroids.  - Plavix discontinued, Cont ASA, Given that >1 month post CABG and on AC, Cont Xarelto, noting poor renal function  - AF controlled on Amiodarone, Toprol and dig;  Dig level 1.0  - Cont aldactone, watching ins and outs.  - He has expressive aphasia, which has been documented in the past.  - Monitor and replete electrolytes. Keep K>4.0 and Mg>2.0.   - Further cardiac workup will depend on clinical course.   - All other workup per primary team.   - Will continue to follow up.

## 2018-02-21 NOTE — DISCHARGE NOTE ADULT - MEDICATION SUMMARY - MEDICATIONS TO TAKE
I will START or STAY ON the medications listed below when I get home from the hospital:    02 2L/min continuous via Nasal cannula portable  and concentrator  -- once   -- Indication: For COPD exacerbation    finasteride 5 mg oral tablet  -- 1 tab(s) by mouth once a day  -- Indication: For bph     predniSONE 10 mg oral tablet  -- 3 tab(s) by mouth once a day for 3 days, then take 2 tabs daily for 3 days, then 1 tab daily for 3 days, then stop.  -- It is very important that you take or use this exactly as directed.  Do not skip doses or discontinue unless directed by your doctor.  Obtain medical advice before taking any non-prescription drugs as some may affect the action of this medication.  Take with food or milk.    -- Indication: For COPD exacerbation    aspirin 81 mg oral delayed release tablet  -- 1 tab(s) by mouth once a day  -- Indication: For Cad     tamsulosin 0.4 mg oral capsule  -- 1 cap(s) by mouth once a day (at bedtime)  -- Indication: For bph     digoxin 125 mcg (0.125 mg) oral tablet  -- 1 tab(s) by mouth once a day  -- Indication: For Afib     rivaroxaban 20 mg oral tablet  -- 1 tab(s) by mouth every 24 hours  -- Indication: For Afib     FLUoxetine 10 mg oral capsule  -- 1 cap(s) by mouth once a day  -- Indication: For mood dis     atorvastatin 40 mg oral tablet  -- 1 tab(s) by mouth once a day (at bedtime)  -- Indication: For Hld     clopidogrel 75 mg oral tablet  -- 1 tab(s) by mouth once a day  -- Indication: For Pvd     metoprolol succinate 50 mg oral tablet, extended release  -- 1 tab(s) by mouth once a day  -- Indication: For Htn    albuterol 90 mcg/inh inhalation powder  -- 2 puff(s) inhaled every 4 hours, As Needed   -- For inhalation only.  It is very important that you take or use this exactly as directed.  Do not skip doses or discontinue unless directed by your doctor.  Obtain medical advice before taking any non-prescription drugs as some may affect the action of this medication.    -- Indication: For COPD exacerbation    budesonide-formoterol 160 mcg-4.5 mcg/inh inhalation aerosol  -- 2 puff(s) inhaled 2 times a day  -- Indication: For COPD exacerbation    Symbicort 160 mcg-4.5 mcg/inh inhalation aerosol  -- 2 puff(s) inhaled 2 times a day   -- Check with your doctor before becoming pregnant.  For inhalation only.  Rinse mouth thoroughly after use.    -- Indication: For COPD exacerbation    furosemide 20 mg oral tablet  -- 1 tab(s) by mouth every other day x 10 days   -- Avoid prolonged or excessive exposure to direct and/or artificial sunlight while taking this medication.  It is very important that you take or use this exactly as directed.  Do not skip doses or discontinue unless directed by your doctor.  It may be advisable to drink a full glass orange juice or eat a banana daily while taking this medication.    -- Indication: For CHF exacerbation    famotidine 20 mg oral tablet  -- 1 tab(s) by mouth once a day  -- Indication: For gi prophy     ferrous sulfate 325 mg (65 mg elemental iron) oral delayed release tablet  -- 1 tab(s) by mouth once a day  -- Indication: For Need for prophylactic measure    melatonin 3 mg oral tablet  -- 1 tab(s) by mouth once a day (at bedtime), As needed, Insomnia  -- Indication: For insmonni a    ascorbic acid 500 mg oral tablet  -- 1 tab(s) by mouth once a day  -- Indication: For Need for prophylactic measure

## 2018-02-21 NOTE — DISCHARGE NOTE ADULT - SECONDARY DIAGNOSIS.
Atrial Fibrillation CHF exacerbation Coronary Artery Disease PAD (peripheral artery disease) Acute on chronic systolic (congestive) heart failure

## 2018-02-21 NOTE — PROGRESS NOTE ADULT - ATTENDING COMMENTS
I was physically present for the key portions of the evaluation and management (E/M) service provided.  I agree with the above history, physical, and plan which I have reviewed and edited where appropriate.
pt seen / examine see above - inr high sec to possible sec to  passive congestion  sec to chf and xarelto  , fu inr closely .  hx cva  residual aphagia pt evaluation  on full ac . hx cad and cabg alex  asa / xarelto , hx pvd with  bl stent with plavix .

## 2018-02-21 NOTE — DISCHARGE NOTE ADULT - PLAN OF CARE
resolution of symptoms The acute part of the patient's COPd has been resolved. Pt is in a chronic, stable state and will need Oxygen for long term use for his COPD. Please follow up with your PCP within 1 week of discharge. Please follow up with your PCP within 1 week of discharge. The acute part of the patient's COPD has been resolved. Pt is in a chronic, stable state and will need Oxygen for long term use for his COPD. Please follow up with your PCP within 3-4 days of discharge. please take steroids at home as directed. please follow up with your Pulmonologist within 1 week of discharge. Please follow up with your PCP within 3-4 days of discharge. Please f/u with your Cardiologist within 1 week of discharge. Continue Xarelto 20 mg daily, and see you PCP within3-4 days of discharge to monitor kidney function and to decide whether to adjust dose. Continue on Amiodarone, Toprol and digoxin home meds Please follow up with your PCP within 1 week of discharge. Continue your aspirin and Xarelto, we are holding your Plavix for now.  Please follow up with your cardiologist within a week and they will decide whether or not to continue. Please follow up with your PCP within 3-4 days of discharge. Please f/u with your Cardiologist within 1 week of discharge. Please continue your home meds Please follow up with your PCP within 3-4 days of discharge. Please f/u with your Cardiologist within 1 week of discharge.  Please take medicine as directed. You will be on Lasix 20 mg every other day. Your PCP will check your kidney function within 3-4 days of discharge. resolved taper steriod cont neb tt ,  will need  new Oxygen for long term use for his COPD. Please follow up with your PCP within 3-4 days of discharge. please take steroids at home as directed. please follow up with your Pulmonologist within 1 week of discharge. stable on Plavix , statin Please follow up with your PCP within 3-4 days of discharge. Please f/u with your Cardiologist within 1   You will be on Lasix 20 mg every other day. Your PCP will check your kidney function within 3-4 days of discharge.

## 2018-02-21 NOTE — DISCHARGE NOTE ADULT - MEDICATION SUMMARY - MEDICATIONS TO STOP TAKING
I will STOP taking the medications listed below when I get home from the hospital:    valsartan 40 mg oral tablet  -- 1 tab(s) by mouth every 12 hours

## 2018-02-21 NOTE — SWALLOW BEDSIDE ASSESSMENT ADULT - COMMENTS
Pt is 74 y/o male w/hx CVA with residual weakness and aphasia. Difficulty following commands. Pt admitted to [facility] secondary to cough x2 weeks w/hyopxia. + for COPD exacerbation. CXR indicative of mild pulmonary congestion, negative for PNA. Pt presents with functional swallow for safe consumption of regular textures/thin liquids w/o concern for aspiration. Spoke w/Dr. Lind

## 2018-02-21 NOTE — DISCHARGE NOTE ADULT - HOSPITAL COURSE
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 2015)/PPM, rheumatic fever in childhood, A Fib -on Xarelto, carotid stenosis, former smoker, DM borderline, former ETOH abuse -in recovery x 25 yrs, gout, PAD with bilateral lower extremity stents admitted with COPD exacerbation and CHF.  RVP negative, patient started on steroids, duonebs, lasix. ASA and Xarelto continud, plavix initially held but re-started per cardio as pt has stents in legs from PAD. Course c/w BRITANY, lasix held. 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 2015)/PPM, rheumatic fever in childhood, A Fib -on Xarelto, carotid stenosis, former smoker, DM borderline, former ETOH abuse -in recovery x 25 yrs, gout, PAD with bilateral lower extremity stents admitted with COPD exacerbation and CHF.  RVP negative, patient started on steroids, duonebs, lasix. ASA and Xarelto continued plavix held as per cardiology since patient had CABG s/p 1 month prior c/w BRITANY, lasix held. 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 2015)/PPM, rheumatic fever in childhood, A Fib -on Xarelto, carotid stenosis, former smoker, DM borderline, former ETOH abuse -in recovery x 25 yrs, gout, PAD with bilateral lower extremity stents admitted with COPD exacerbation and CHF.  RVP negative, blood cultures negative. CXR showed mild basilar congestion. Patient started on steroids, duonebs, lasix. ASA and Xarelto continued plavix held as per cardiology since patient had CABG s/p 1 month prior. PT HD stable on day of discharge. Pt will need home Oxygen use.    The acute part of the patient's COPd has been resolved. Pt is in a chronic, stable state and will need Oxygen for long term use for his COPD.     T(C): 36.6 (02-21-18 @ 11:16), Max: 36.9 (02-20-18 @ 20:51)  HR: 78 (02-21-18 @ 11:16) (75 - 94)  BP: 128/70 (02-21-18 @ 11:16) (92/55 - 158/76)  RR: 17 (02-21-18 @ 11:16) (17 - 19)  SpO2: 87% (02-21-18 @ 11:35) (83% - 95%)    PHYSICAL EXAM:  GENERAL: NAD, well-groomed, well-developed, expressive aphasia (baseline)  HEAD:  Atraumatic, Normocephalic  EYES: EOMI, PERRLA, conjunctiva and sclera clear  NECK: Supple, No JVD,  NERVOUS SYSTEM: +right sided facial droop (residual),  Alert, awake, Good concentration; Motor Strength 5/5 B/L upper and lower extremities  CHEST/LUNG: Mildly decreased bs b/l bases. 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 2015)/PPM, rheumatic fever in childhood, A Fib -on Xarelto, carotid stenosis, former smoker, DM borderline, former ETOH abuse -in recovery x 25 yrs, gout, PAD with bilateral lower extremity stents   admitted with acute on chr  COPD exacerbation and   acute on systolic CHF  exacerbation . CXR showed mild basilar congestion. Patient started on iv steroids, duonebs, for copd exacerbation .    -RVP negative  -Continue Duonebs 4x/day  -Dr Granger (Pulm) consulted, appreciate recs  -Continue Solumedrol 40qd  -Symbicort re-started   -NC, Keep O2 above 88% , hosp course pt feeling better , blood cult neg to date    CHF exacerbation. acute on chr  sytolic    40  ef  2017   -Dr. Steel cardio following,   -Lasix 40 IV  given change to po Lasix daily    Elevated serum creatinine possible sec to prerenal azotemia cause of zora .   -Hypokalemia replated  · residual  Aphasia S/P  hx old CVA.    . as patient 1 month post CABG and on Xarelto  -Continue ASA and Xarelto.   ASA and Xarelto continued  with plavix  as of  hx pad  with  bl low ext stent    cardiology  aware . anemia of chr dis stable hb cont supplement      will need Oxygen for long term use for his COPD.   pulm and cardio clear pt to be dc  . medically stable to be dc / physical at day of dc     T(C): 36.6 (02-21-18 @ 11:16), Max: 36.9 (02-20-18 @ 20:51)  HR: 78 (02-21-18 @ 11:16) (75 - 94)  BP: 128/70 (02-21-18 @ 11:16) (92/55 - 158/76)  RR: 17 (02-21-18 @ 11:16) (17 - 19)  SpO2: 87% (02-21-18 @ 11:35) (83% - 95%)    PHYSICAL EXAM:  GENERAL: NAD, well-groomed, well-developed, expressive aphasia (baseline)  HEAD:  Atraumatic, Normocephalic  EYES: EOMI, PERRLA, conjunctiva and sclera clear  NECK: Supple, No JVD,  NERVOUS SYSTEM: +right sided facial droop (residual),  Alert, awake, Good concentration; Motor Strength 5/5 B/L upper and lower extremities  CHEST/LUNG: Mildly decreased bs b/l bases. No rales, +mild coarse breath sounds likely transmitted from upper airways  HEART: Regular rate and rhythm; No murmurs,   ABDOMEN: Soft, Nontender, Nondistended; Bowel sounds present  EXTREMITIES:  2+ Peripheral Pulses, No clubbing, cyanosis, or edema  pmd dr fermin ribera notified dc plan / fu with in 1wk . fu out pt cr / electrolyte in 3 to 4 days. fu cardio dr timmons office in 1wk.  family bed side aware dc plan . 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 2015)/PPM, rheumatic fever in childhood, A Fib -on Xarelto, carotid stenosis, former smoker, DM borderline, former ETOH abuse -in recovery x 25 yrs, gout, PAD with bilateral lower extremity stents   admitted with acute on chr  COPD exacerbation and   acute on systolic CHF  exacerbation . CXR showed mild basilar congestion. Patient started on iv steroids, duonebs, for copd exacerbation .    -RVP negative  -Continue Duonebs 4x/day  -Dr Granger (Pulm) consulted, appreciate recs  -Continue Solumedrol 40qd  -Symbicort re-started   -NC, Keep O2 above 88% , hosp course pt feeling better , blood cult neg to date    CHF exacerbation. acute on chr  sytolic    40  ef  2017   -Dr. Steel cardio following,   -Lasix 40 IV  given change to po Lasix daily    Elevated serum creatinine possible sec to prerenal azotemia cause of zora .   -Hypokalemia replated  · residual  Aphasia S/P  hx old CVA.    . as patient 1 month post CABG and on Xarelto  -Continue ASA and Xarelto.   ASA and Xarelto continued  with plavix  as of  hx pad  with  bl low ext stent    cardiology  aware . anemia of chr dis stable hb cont supplement . leucocytosis reactive sec to steroid      will need Oxygen for long term use for his COPD.   pulm and cardio clear pt to be dc  . medically stable to be dc / physical at day of dc     T(C): 36.6 (02-21-18 @ 11:16), Max: 36.9 (02-20-18 @ 20:51)  HR: 78 (02-21-18 @ 11:16) (75 - 94)  BP: 128/70 (02-21-18 @ 11:16) (92/55 - 158/76)  RR: 17 (02-21-18 @ 11:16) (17 - 19)  SpO2: 87% (02-21-18 @ 11:35) (83% - 95%)    PHYSICAL EXAM:  GENERAL: NAD, well-groomed, well-developed, expressive aphasia (baseline)  HEAD:  Atraumatic, Normocephalic  EYES: EOMI, PERRLA, conjunctiva and sclera clear  NECK: Supple, No JVD,  NERVOUS SYSTEM: +right sided facial droop (residual),  Alert, awake, Good concentration; Motor Strength 5/5 B/L upper and lower extremities  CHEST/LUNG: Mildly decreased bs b/l bases. No rales, +mild coarse breath sounds likely transmitted from upper airways  HEART: Regular rate and rhythm; No murmurs,   ABDOMEN: Soft, Nontender, Nondistended; Bowel sounds present  EXTREMITIES:  2+ Peripheral Pulses, No clubbing, cyanosis, or edema  pmd dr fermin ribera notified dc plan / fu with in 1wk . fu out pt cr / electrolyte in 3 to 4 days. fu cardio dr timmons office in 1wk.  family bed side aware dc plan .

## 2018-02-21 NOTE — DISCHARGE NOTE ADULT - PATIENT PORTAL LINK FT
You can access the [email or web address], offered by [facility], by registering with the following website: [email or web address]

## 2018-02-21 NOTE — DISCHARGE NOTE ADULT - CARE PLAN
Principal Discharge DX:	COPD exacerbation  Secondary Diagnosis:	Atrial Fibrillation  Secondary Diagnosis:	CHF exacerbation  Secondary Diagnosis:	Coronary Artery Disease  Secondary Diagnosis:	PAD (peripheral artery disease) Principal Discharge DX:	COPD exacerbation  Goal:	resolution of symptoms  Secondary Diagnosis:	Atrial Fibrillation  Secondary Diagnosis:	CHF exacerbation  Secondary Diagnosis:	Coronary Artery Disease  Secondary Diagnosis:	PAD (peripheral artery disease) Principal Discharge DX:	COPD exacerbation  Goal:	resolution of symptoms  Assessment and plan of treatment:	The acute part of the patient's COPd has been resolved. Pt is in a chronic, stable state and will need Oxygen for long term use for his COPD. Please follow up with your PCP within 1 week of discharge.  Secondary Diagnosis:	Atrial Fibrillation  Assessment and plan of treatment:	Please follow up with your PCP within 1 week of discharge.  Secondary Diagnosis:	CHF exacerbation  Assessment and plan of treatment:	Please follow up with your PCP within 1 week of discharge.  Secondary Diagnosis:	Coronary Artery Disease  Assessment and plan of treatment:	Please follow up with your PCP within 1 week of discharge.  Secondary Diagnosis:	PAD (peripheral artery disease)  Assessment and plan of treatment:	Please follow up with your PCP within 1 week of discharge. Principal Discharge DX:	COPD exacerbation  Goal:	resolution of symptoms  Assessment and plan of treatment:	The acute part of the patient's COPD has been resolved. Pt is in a chronic, stable state and will need Oxygen for long term use for his COPD. Please follow up with your PCP within 3-4 days of discharge. please take steroids at home as directed. please follow up with your Pulmonologist within 1 week of discharge.  Secondary Diagnosis:	Atrial Fibrillation  Assessment and plan of treatment:	Please follow up with your PCP within 3-4 days of discharge. Please f/u with your Cardiologist within 1 week of discharge. Continue Xarelto 20 mg daily, and see you PCP within3-4 days of discharge to monitor kidney function and to decide whether to adjust dose. Continue on Amiodarone, Toprol and digoxin home meds  Secondary Diagnosis:	Coronary Artery Disease  Assessment and plan of treatment:	Please follow up with your PCP within 1 week of discharge. Continue your aspirin and Xarelto, we are holding your Plavix for now.  Please follow up with your cardiologist within a week and they will decide whether or not to continue.  Secondary Diagnosis:	PAD (peripheral artery disease)  Assessment and plan of treatment:	Please follow up with your PCP within 3-4 days of discharge. Please f/u with your Cardiologist within 1 week of discharge. Please continue your home meds  Secondary Diagnosis:	Acute on chronic systolic (congestive) heart failure  Assessment and plan of treatment:	Please follow up with your PCP within 3-4 days of discharge. Please f/u with your Cardiologist within 1 week of discharge.  Please take medicine as directed. You will be on Lasix 20 mg every other day. Your PCP will check your kidney function within 3-4 days of discharge. Principal Discharge DX:	COPD exacerbation  Goal:	resolved  Assessment and plan of treatment:	taper steriod cont neb tt ,  will need  new Oxygen for long term use for his COPD. Please follow up with your PCP within 3-4 days of discharge. please take steroids at home as directed. please follow up with your Pulmonologist within 1 week of discharge.  Secondary Diagnosis:	Atrial Fibrillation  Assessment and plan of treatment:	Please follow up with your PCP within 3-4 days of discharge. Please f/u with your Cardiologist within 1 week of discharge. Continue Xarelto 20 mg daily, and see you PCP within3-4 days of discharge to monitor kidney function and to decide whether to adjust dose. Continue on Amiodarone, Toprol and digoxin home meds  Secondary Diagnosis:	Coronary Artery Disease  Assessment and plan of treatment:	Please follow up with your PCP within 1 week of discharge. Continue your aspirin and Xarelto, we are holding your Plavix for now.  Please follow up with your cardiologist within a week and they will decide whether or not to continue.  Secondary Diagnosis:	PAD (peripheral artery disease)  Goal:	stable  Assessment and plan of treatment:	on Plavix , statin  Secondary Diagnosis:	Acute on chronic systolic (congestive) heart failure  Assessment and plan of treatment:	Please follow up with your PCP within 3-4 days of discharge. Please f/u with your Cardiologist within 1   You will be on Lasix 20 mg every other day. Your PCP will check your kidney function within 3-4 days of discharge.

## 2018-02-21 NOTE — PROGRESS NOTE ADULT - SUBJECTIVE AND OBJECTIVE BOX
HPI:  73M PMHx CVA 10/2017 with residual aphasia mild facial assymetry, COPD not on home O2, CAD s/p stent , CABG 2017, HTN, HLD, chronic systolic HF (Last Echo  showing moderate segmental LV systolic dysfunction, severely hypokinetic inferior and septal walls, variable EF, moderate pulmonary HTN), NSVT,  ICD([facility] )/PPM, rheumatic fever in childhood, A Fib -on Xarelto, carotid stenosis, former smoker, DM borderline, former ETOH abuse -in recovery x 25 yrs, gout, PAD with bilateral lower extremity stents presented with cough x 2 weeks. Patient states completed 10 day course of Levaquin, but congestion has persisted. Friday seen by physiatry and noted to be hypoxic. Patient has occasional non-productive cough, but sister and friend have noted course breath sounds and congestion worsening. Denies fever, chills, SOB, chest pain.   Per sister at bedside, due to aphasia patient often says yes when he means no and vice versa.     In the ED T 97.8F, Hr 80, /88, O2 97% on RA, RR 19. INR 2.02, K+ 3.6, Cr 1.4, proBNP 6583, albumin 3.0, lactate 1.3, Digoxin level therapeutic, RVP negative. CXR showing mild basilar congestion. Noted to be hypoxic to 86-88% on RA, started 4L NC, Given Solumedrol 125, IV Lasix 40, Duonebs x2. (2018 16:23)      SUBJECTIVE: Pt. resting comfortably in bed in NAD, lying flat with no respiratory distress, no c/o chest pain, dyspnea, palpitations, PND, or orthopnea       OBJECTIVE:  Review Of Systems:  Constitutional: [ ] Fever [ ] Chills [ ] Fatigue [ ] Weight change   HEENT: [ ] Blurred vision [ ] Eye Pain [ ] Headache [ ] Runny nose [ ] Sore Throat   Respiratory: [ ] Cough [ ] Wheezing [ ] Shortness of breath  Cardiovascular: [ ] Chest Pain [ ] Palpitations [ ] FAITH [ ] PND [ ] Orthopnea  Gastrointestinal: [ ] Abdominal Pain [ ] Diarrhea [ ] Constipation [ ] Hemorrhoids [ ] Nausea [ ] Vomiting  Genitourinary: [ ] Nocturia [ ] Dysuria [ ] Incontinence  Extremities: [ ] Swelling [ ] Joint Pain  Neurologic: [ ] Focal deficit [ ] Paresthesias [ ] Syncope  Lymphatic: [ ] Swelling [ ] Lymphadenopathy   Skin: [ ] Rash [ ] Ecchymoses [ ] Wounds [ ] Lesions  Psychiatry: [ ] Depression [ ] Suicidal/Homicidal Ideation [ ] Anxiety [ ] Sleep Disturbances  [ ] 10 point review of systems is otherwise negative except as mentioned above              [ ]Unable to obtain    Allergy:  No Known Allergies      Medications:  MEDICATIONS  (STANDING):  ALBUTerol/ipratropium for Nebulization 3 milliLiter(s) Nebulizer four times a day  ascorbic acid 500 milliGRAM(s) Oral daily  aspirin enteric coated 81 milliGRAM(s) Oral daily  atorvastatin 40 milliGRAM(s) Oral at bedtime  buDESOnide 160 MICROgram(s)/formoterol 4.5 MICROgram(s) Inhaler 2 Puff(s) Inhalation two times a day  clopidogrel Tablet 75 milliGRAM(s) Oral daily  digoxin     Tablet 0.125 milliGRAM(s) Oral daily  docusate sodium 100 milliGRAM(s) Oral three times a day  famotidine    Tablet 20 milliGRAM(s) Oral daily  ferrous    sulfate 325 milliGRAM(s) Oral daily  finasteride 5 milliGRAM(s) Oral daily  FLUoxetine 10 milliGRAM(s) Oral daily  furosemide   Injectable 40 milliGRAM(s) IV Push daily  methylPREDNISolone sodium succinate Injectable 40 milliGRAM(s) IV Push daily  metoprolol succinate ER 50 milliGRAM(s) Oral daily  rivaroxaban 20 milliGRAM(s) Oral every 24 hours  tamsulosin 0.4 milliGRAM(s) Oral at bedtime    MEDICATIONS  (PRN):  melatonin 3 milliGRAM(s) Oral at bedtime PRN Insomnia      PMH/PSH/FH/SH: [ ] Unchanged    Vitals:  T(C): 36.3 (18 @ 04:34), Max: 36.9 (18 @ 20:51)  HR: 75 (18 @ 04:34) (74 - 94)  BP: 158/76 (18 @ 04:34) (92/55 - 158/76)  BP(mean): --  RR: 17 (18 @ 04:34) (17 - 19)  SpO2: 95% (18 @ 04:34) (93% - 97%)  Wt(kg): --  Daily     Daily Weight in k.7 (2018 04:34)  I&O's Summary      Labs:                        12.7   3.2   )-----------( 259      ( 2018 06:03 )             40.5     02-20    137  |  101  |  21  ----------------------------<  145<H>  3.4<L>   |  26  |  1.40<H>    Ca    8.9      2018 06:03  Phos  4.0     02-20  Mg     1.7     02-20    TPro  7.3  /  Alb  2.8<L>  /  TBili  0.6  /  DBili  x   /  AST  20  /  ALT  19  /  AlkPhos  77  02-20    PT/INR - ( 2018 06:03 )   PT: 26.9 sec;   INR: 2.42 ratio         PTT - ( 2018 13:46 )  PTT:49.8 sec        ECG:    Echo:    Stress Testing:     Cath:    Imaging:    Interpretation of Telemetry:      Physical Exam:  Appearance: [ ] Normal  [ ] abnormal [ ] NAD   Eyes: [ ] PERRL [ ] EOMI  HENT: [ ] Normal [ ] Abnormal oral mucosa [ ]NC/AT  Cardiovascular: [ ] S1 [ ] S2 [ ] RRR [ ] m/r/g [ ]edema [ ] JVP  Procedural Access Site: [ ]  hematoma [ ] tender to palpation [ ] 2+ pulse [ ] bruit [ ] Ecchymosis  Respiratory: [ ] Clear to auscultation bilaterally  Gastrointestinal: [ ] Soft [ ] tenderness[ ] distension [ ] BS  Musculoskeletal: [ ] clubbing [ ] joint deformity   Neurologic: [ ] Non-focal  Lymphatic: [ ] lymphadenopathy  Psychiatry: [ ] AAOx3  [ ] confused [ ] disoriented [ ] Mood & affect appropriate  Skin: [ ]  rashes [ ] ecchymoses [ ] cyanosis HPI:  73M PMHx CVA 10/2017 with residual aphasia mild facial assymetry, COPD not on home O2, CAD s/p stent , CABG 2017, HTN, HLD, chronic systolic HF (Last Echo  showing moderate segmental LV systolic dysfunction, severely hypokinetic inferior and septal walls, variable EF, moderate pulmonary HTN), NSVT,  ICD([facility] )/PPM, rheumatic fever in childhood, A Fib -on Xarelto, carotid stenosis, former smoker, DM borderline, former ETOH abuse -in recovery x 25 yrs, gout, PAD with bilateral lower extremity stents presented with cough x 2 weeks. Patient states completed 10 day course of Levaquin, but congestion has persisted. Friday seen by physiatry and noted to be hypoxic. Patient has occasional non-productive cough, but sister and friend have noted course breath sounds and congestion worsening. Denies fever, chills, SOB, chest pain.   Per sister at bedside, due to aphasia patient often says yes when he means no and vice versa.     In the ED T 97.8F, Hr 80, /88, O2 97% on RA, RR 19. INR 2.02, K+ 3.6, Cr 1.4, proBNP 6583, albumin 3.0, lactate 1.3, Digoxin level therapeutic, RVP negative. CXR showing mild basilar congestion. Noted to be hypoxic to 86-88% on RA, started 4L NC, Given Solumedrol 125, IV Lasix 40, Duonebs x2. (2018 16:23)      SUBJECTIVE: Pt. resting comfortably in bed in NAD, with no respiratory distress, no c/o chest pain, dyspnea, palpitations, PND, or orthopnea       OBJECTIVE:  Review Of Systems:  Constitutional: [ ] Fever [ ] Chills [ ] Fatigue [ ] Weight change   HEENT: [ ] Blurred vision [ ] Eye Pain [ ] Headache [ ] Runny nose [ ] Sore Throat   Respiratory: [ ] Cough [ ] Wheezing [ ] Shortness of breath  Cardiovascular: [ ] Chest Pain [ ] Palpitations [ ] FAITH [ ] PND [ ] Orthopnea  Gastrointestinal: [ ] Abdominal Pain [ ] Diarrhea [ ] Constipation [ ] Hemorrhoids [ ] Nausea [ ] Vomiting  Genitourinary: [ ] Nocturia [ ] Dysuria [ ] Incontinence  Extremities: [ ] Swelling [ ] Joint Pain  Neurologic: [ ] Focal deficit [ ] Paresthesias [ ] Syncope  Lymphatic: [ ] Swelling [ ] Lymphadenopathy   Skin: [ ] Rash [ ] Ecchymoses [ ] Wounds [ ] Lesions  Psychiatry: [ ] Depression [ ] Suicidal/Homicidal Ideation [ ] Anxiety [ ] Sleep Disturbances  [X] 10 point review of systems is otherwise negative except as mentioned above              [ ]Unable to obtain    Allergy:  No Known Allergies      Medications:  MEDICATIONS  (STANDING):  ALBUTerol/ipratropium for Nebulization 3 milliLiter(s) Nebulizer four times a day  ascorbic acid 500 milliGRAM(s) Oral daily  aspirin enteric coated 81 milliGRAM(s) Oral daily  atorvastatin 40 milliGRAM(s) Oral at bedtime  buDESOnide 160 MICROgram(s)/formoterol 4.5 MICROgram(s) Inhaler 2 Puff(s) Inhalation two times a day  clopidogrel Tablet 75 milliGRAM(s) Oral daily  digoxin     Tablet 0.125 milliGRAM(s) Oral daily  docusate sodium 100 milliGRAM(s) Oral three times a day  famotidine    Tablet 20 milliGRAM(s) Oral daily  ferrous    sulfate 325 milliGRAM(s) Oral daily  finasteride 5 milliGRAM(s) Oral daily  FLUoxetine 10 milliGRAM(s) Oral daily  furosemide   Injectable 40 milliGRAM(s) IV Push daily  methylPREDNISolone sodium succinate Injectable 40 milliGRAM(s) IV Push daily  metoprolol succinate ER 50 milliGRAM(s) Oral daily  rivaroxaban 20 milliGRAM(s) Oral every 24 hours  tamsulosin 0.4 milliGRAM(s) Oral at bedtime    MEDICATIONS  (PRN):  melatonin 3 milliGRAM(s) Oral at bedtime PRN Insomnia      PMH/PSH/FH/SH: [ ] Unchanged    Vitals:  T(C): 36.3 (18 @ 04:34), Max: 36.9 (18 @ 20:51)  HR: 75 (18 @ 04:34) (74 - 94)  BP: 158/76 (18 @ 04:34) (92/55 - 158/76)  BP(mean): --  RR: 17 (18 @ 04:34) (17 - 19)  SpO2: 95% (18 @ 04:34) (93% - 97%)  Wt(kg): --  Daily     Daily Weight in k.7 (2018 04:34)  I&O's Summary      Labs:                        12.6   12.1  )-----------( 286      ( 2018 07:49 )             39.6       136  |  100  |  35<H>  ----------------------------<  92  3.5   |  27  |  1.70<H>    Ca    9.1      2018 07:49  Phos  3.8       Mg     2.1         TPro  7.3  /  Alb  2.8<L>  /  TBili  0.6  /  DBili  x   /  AST  20  /  ALT  19  /  AlkPhos  77               ECG:    Echo:    Stress Testing:     Cath:    Imaging:    Interpretation of Telemetry: afib v-paced with PVC's rate 70's , last night pt. had 13 beat VT       Physical Exam:  Appearance: [ ] Normal  [ ] abnormal [X ] NAD   Eyes: [ ] PERRL [ ] EOMI  HENT: [ ] Normal [ ] Abnormal oral mucosa [ ]NC/AT  Cardiovascular: [X ] S1 [X ] S2 [ ] RRR [ ] m/r/g [ ]edema [ ] JVP  Procedural Access Site: [ ]  hematoma [ ] tender to palpation [ ] 2+ pulse [ ] bruit [ ] Ecchymosis  Respiratory: [X ] Clear to auscultation bilaterally  Gastrointestinal: [ ] Soft [ ] tenderness[ ] distension [ ] BS  Musculoskeletal: [ ] clubbing [ ] joint deformity   Neurologic: [ ] Non-focal  Lymphatic: [ ] lymphadenopathy  Psychiatry: [X ] AAOx3  [ ] confused [ ] disoriented [ ] Mood & affect appropriate  Skin: [ ]  rashes [ ] ecchymoses [ ] cyanosis

## 2018-02-24 LAB
CULTURE RESULTS: SIGNIFICANT CHANGE UP
CULTURE RESULTS: SIGNIFICANT CHANGE UP
SPECIMEN SOURCE: SIGNIFICANT CHANGE UP
SPECIMEN SOURCE: SIGNIFICANT CHANGE UP

## 2018-03-13 ENCOUNTER — APPOINTMENT (OUTPATIENT)
Dept: CARDIOLOGY | Facility: CLINIC | Age: 73
End: 2018-03-13
Payer: MEDICARE

## 2018-03-13 ENCOUNTER — NON-APPOINTMENT (OUTPATIENT)
Age: 73
End: 2018-03-13

## 2018-03-13 VITALS
RESPIRATION RATE: 15 BRPM | HEIGHT: 68 IN | SYSTOLIC BLOOD PRESSURE: 125 MMHG | BODY MASS INDEX: 23.95 KG/M2 | WEIGHT: 158 LBS | TEMPERATURE: 97.8 F | DIASTOLIC BLOOD PRESSURE: 78 MMHG | OXYGEN SATURATION: 92 % | HEART RATE: 76 BPM

## 2018-03-13 VITALS
DIASTOLIC BLOOD PRESSURE: 78 MMHG | OXYGEN SATURATION: 92 % | BODY MASS INDEX: 23.95 KG/M2 | WEIGHT: 158 LBS | HEART RATE: 76 BPM | SYSTOLIC BLOOD PRESSURE: 125 MMHG | HEIGHT: 68 IN

## 2018-03-13 PROCEDURE — 93000 ELECTROCARDIOGRAM COMPLETE: CPT

## 2018-03-13 PROCEDURE — 99214 OFFICE O/P EST MOD 30 MIN: CPT | Mod: 25

## 2018-03-15 ENCOUNTER — APPOINTMENT (OUTPATIENT)
Dept: ELECTROPHYSIOLOGY | Facility: CLINIC | Age: 73
End: 2018-03-15
Payer: MEDICARE

## 2018-03-15 VITALS
BODY MASS INDEX: 24.02 KG/M2 | DIASTOLIC BLOOD PRESSURE: 83 MMHG | WEIGHT: 158 LBS | SYSTOLIC BLOOD PRESSURE: 141 MMHG | OXYGEN SATURATION: 95 % | HEART RATE: 75 BPM

## 2018-03-15 PROCEDURE — 93282 PRGRMG EVAL IMPLANTABLE DFB: CPT

## 2018-03-23 ENCOUNTER — APPOINTMENT (OUTPATIENT)
Dept: UROLOGY | Facility: CLINIC | Age: 73
End: 2018-03-23
Payer: MEDICARE

## 2018-03-23 VITALS
DIASTOLIC BLOOD PRESSURE: 87 MMHG | BODY MASS INDEX: 24.82 KG/M2 | SYSTOLIC BLOOD PRESSURE: 146 MMHG | WEIGHT: 160 LBS | OXYGEN SATURATION: 89 % | HEART RATE: 90 BPM | TEMPERATURE: 97.3 F | HEIGHT: 67.5 IN

## 2018-03-23 DIAGNOSIS — Z98.62 PERIPHERAL VASCULAR ANGIOPLASTY STATUS: ICD-10-CM

## 2018-03-23 DIAGNOSIS — Z95.0 PRESENCE OF CARDIAC PACEMAKER: ICD-10-CM

## 2018-03-23 PROCEDURE — 99204 OFFICE O/P NEW MOD 45 MIN: CPT

## 2018-03-23 PROCEDURE — 51798 US URINE CAPACITY MEASURE: CPT

## 2018-04-03 ENCOUNTER — APPOINTMENT (OUTPATIENT)
Dept: NEUROLOGY | Facility: CLINIC | Age: 73
End: 2018-04-03
Payer: MEDICARE

## 2018-04-03 VITALS
HEIGHT: 67.5 IN | DIASTOLIC BLOOD PRESSURE: 70 MMHG | SYSTOLIC BLOOD PRESSURE: 134 MMHG | BODY MASS INDEX: 23.58 KG/M2 | HEART RATE: 78 BPM | WEIGHT: 152 LBS

## 2018-04-03 DIAGNOSIS — F10.21 ALCOHOL DEPENDENCE, IN REMISSION: ICD-10-CM

## 2018-04-03 PROCEDURE — 99215 OFFICE O/P EST HI 40 MIN: CPT

## 2018-04-03 RX ORDER — ALBUTEROL SULFATE 90 UG/1
108 (90 BASE) POWDER, METERED RESPIRATORY (INHALATION)
Qty: 1 | Refills: 0 | Status: DISCONTINUED | COMMUNITY
Start: 2018-02-21

## 2018-04-03 RX ORDER — PREDNISONE 10 MG/1
10 TABLET ORAL
Qty: 18 | Refills: 0 | Status: DISCONTINUED | COMMUNITY
Start: 2018-02-21

## 2018-04-03 RX ORDER — PROMETHAZINE HYDROCHLORIDE AND CODEINE PHOSPHATE 6.25; 1 MG/5ML; MG/5ML
6.25-1 SOLUTION ORAL
Qty: 120 | Refills: 0 | Status: COMPLETED | COMMUNITY
Start: 2018-02-09

## 2018-04-03 RX ORDER — BUDESONIDE AND FORMOTEROL FUMARATE DIHYDRATE 160; 4.5 UG/1; UG/1
160-4.5 AEROSOL RESPIRATORY (INHALATION)
Qty: 10 | Refills: 0 | Status: COMPLETED | COMMUNITY
Start: 2018-02-21

## 2018-04-03 RX ORDER — PREDNISONE 50 MG/1
TABLET ORAL
Refills: 0 | Status: COMPLETED | COMMUNITY

## 2018-04-03 RX ORDER — METOPROLOL SUCCINATE 25 MG/1
25 TABLET, EXTENDED RELEASE ORAL
Qty: 90 | Refills: 0 | Status: DISCONTINUED | COMMUNITY
Start: 2017-12-06

## 2018-04-05 ENCOUNTER — EMERGENCY (EMERGENCY)
Facility: HOSPITAL | Age: 73
LOS: 1 days | Discharge: HOME | End: 2018-04-05
Attending: EMERGENCY MEDICINE | Admitting: EMERGENCY MEDICINE
Payer: COMMERCIAL

## 2018-04-05 VITALS
TEMPERATURE: 98 F | SYSTOLIC BLOOD PRESSURE: 124 MMHG | WEIGHT: 154.98 LBS | OXYGEN SATURATION: 98 % | DIASTOLIC BLOOD PRESSURE: 68 MMHG | HEART RATE: 87 BPM | HEIGHT: 67 IN | RESPIRATION RATE: 18 BRPM

## 2018-04-05 DIAGNOSIS — Z95.1 PRESENCE OF AORTOCORONARY BYPASS GRAFT: Chronic | ICD-10-CM

## 2018-04-05 DIAGNOSIS — S49.90XA UNSPECIFIED INJURY OF SHOULDER AND UPPER ARM, UNSPECIFIED ARM, INITIAL ENCOUNTER: Chronic | ICD-10-CM

## 2018-04-05 DIAGNOSIS — I25.10 ATHEROSCLEROTIC HEART DISEASE OF NATIVE CORONARY ARTERY WITHOUT ANGINA PECTORIS: Chronic | ICD-10-CM

## 2018-04-05 DIAGNOSIS — S85.009A UNSPECIFIED INJURY OF POPLITEAL ARTERY, UNSPECIFIED LEG, INITIAL ENCOUNTER: Chronic | ICD-10-CM

## 2018-04-05 PROCEDURE — 99282 EMERGENCY DEPT VISIT SF MDM: CPT

## 2018-04-05 PROCEDURE — 99283 EMERGENCY DEPT VISIT LOW MDM: CPT

## 2018-04-05 NOTE — ED ADULT TRIAGE NOTE - CHIEF COMPLAINT QUOTE
Patient had wax irrigated from ear 3 days ago and after had blood in ear. No bleeding for three days at this time, but patient wife concerned that patient is on blood thinners.

## 2018-04-05 NOTE — ED PROVIDER NOTE - PMH
Alcohol abuse  in recovery  Aphasia S/P CVA    Atrial Fibrillation    Carotid Stenosis    Chronic Obstructive Pulmonary Disease (COPD)    Coronary Artery Disease    DM (diabetes mellitus)    Former smoker    Gout    H/O: Rheumatic Fever    HF (heart failure)    Hyperlipidemia    Hypertension    NSVT (nonsustained ventricular tachycardia)    Pacemaker  defibrillator model # v-268 serial # [id number]  PAD (peripheral artery disease)  with stents

## 2018-04-05 NOTE — ED ADULT NURSE NOTE - PMH
Alcohol abuse  in recovery  Aphasia S/P CVA    Atrial Fibrillation    Carotid Stenosis    Chronic Obstructive Pulmonary Disease (COPD)    Coronary Artery Disease    DM (diabetes mellitus)    Former smoker    Gout    H/O: Rheumatic Fever    HF (heart failure)    Hyperlipidemia    Hypertension    NSVT (nonsustained ventricular tachycardia)    Pacemaker  defibrillator model # v-268 serial # [id number]  PAD (peripheral artery disease)  with stents

## 2018-04-05 NOTE — ED PROVIDER NOTE - OBJECTIVE STATEMENT
check up for left ear.  pt was at [facility] and left ear irrigated c some bleeding. no other comlaint.

## 2018-04-05 NOTE — ED ADULT NURSE NOTE - PSH
Arm injury  s/p surgery 2009  CAD S/P percutaneous coronary angioplasty  stent placed  Pacemaker  [employer] serial #[id number]   model #v-268  Popliteal artery injury  iliac/ popliteal angioplasty with stents 2010  S/P CABG x 1    S/P Implantation of Automatic Cardioverter/Defibrillator (AICD)    S/P Tonsillectomy

## 2018-04-05 NOTE — ED PROVIDER NOTE - PSH
Arm injury  s/p surgery 2009  CAD S/P percutaneous coronary angioplasty  stent placed  Pacemaker  [employer] serial #[id number]   model #v-268  Popliteal artery injury  iliac/ popliteal angioplasty with stents 2010  S/P CABG x 1    S/P Implantation of Automatic Cardioverter/Defibrillator (AICD)    S/P Tonsillectomy

## 2018-04-26 ENCOUNTER — APPOINTMENT (OUTPATIENT)
Dept: NEUROLOGY | Facility: CLINIC | Age: 73
End: 2018-04-26

## 2018-05-26 ENCOUNTER — EMERGENCY (EMERGENCY)
Facility: HOSPITAL | Age: 73
LOS: 1 days | Discharge: HOME | End: 2018-05-26
Attending: EMERGENCY MEDICINE
Payer: COMMERCIAL

## 2018-05-26 VITALS
OXYGEN SATURATION: 97 % | DIASTOLIC BLOOD PRESSURE: 88 MMHG | TEMPERATURE: 99 F | RESPIRATION RATE: 14 BRPM | HEART RATE: 85 BPM | SYSTOLIC BLOOD PRESSURE: 154 MMHG

## 2018-05-26 VITALS
SYSTOLIC BLOOD PRESSURE: 158 MMHG | WEIGHT: 154.98 LBS | RESPIRATION RATE: 16 BRPM | TEMPERATURE: 99 F | HEART RATE: 87 BPM | OXYGEN SATURATION: 94 % | DIASTOLIC BLOOD PRESSURE: 89 MMHG | HEIGHT: 66 IN

## 2018-05-26 DIAGNOSIS — Z95.1 PRESENCE OF AORTOCORONARY BYPASS GRAFT: Chronic | ICD-10-CM

## 2018-05-26 DIAGNOSIS — S49.90XA UNSPECIFIED INJURY OF SHOULDER AND UPPER ARM, UNSPECIFIED ARM, INITIAL ENCOUNTER: Chronic | ICD-10-CM

## 2018-05-26 DIAGNOSIS — S85.009A UNSPECIFIED INJURY OF POPLITEAL ARTERY, UNSPECIFIED LEG, INITIAL ENCOUNTER: Chronic | ICD-10-CM

## 2018-05-26 DIAGNOSIS — I25.10 ATHEROSCLEROTIC HEART DISEASE OF NATIVE CORONARY ARTERY WITHOUT ANGINA PECTORIS: Chronic | ICD-10-CM

## 2018-05-26 PROCEDURE — 99283 EMERGENCY DEPT VISIT LOW MDM: CPT

## 2018-05-26 PROCEDURE — 99282 EMERGENCY DEPT VISIT SF MDM: CPT

## 2018-05-26 NOTE — ED ADULT NURSE NOTE - PSH
Arm injury  s/p surgery 2009  CAD S/P percutaneous coronary angioplasty  stent placed  Pacemaker  [employer] serial #[id number]   model #v-268  Popliteal artery injury  iliac/ popliteal angioplasty with stents 2010  S/P CABG x 1    S/P Implantation of Automatic Cardioverter/Defibrillator (AICD)    S/P Tonsillectomy

## 2018-05-26 NOTE — ED ADULT NURSE NOTE - PMH
Alcohol abuse  in recovery  Aphasia S/P CVA    Atrial Fibrillation    Carotid Stenosis    Chronic Obstructive Pulmonary Disease (COPD)    Coronary Artery Disease    DM (diabetes mellitus)    Former smoker    Gout    H/O: Rheumatic Fever    HF (heart failure)    Hyperlipidemia    Hypertension    NSVT (nonsustained ventricular tachycardia)    Pacemaker  defibrillator model # v-268 serial # [id number]  PAD (peripheral artery disease)  with stents

## 2018-05-26 NOTE — ED PROVIDER NOTE - MEDICAL DECISION MAKING DETAILS
epistaxis since yesterday from digital trauma yesterday, packing placed by family, removed by me, no active bleeding here in ED, will f/u with ENT

## 2018-05-26 NOTE — ED PROVIDER NOTE - OBJECTIVE STATEMENT
72 yo male hx of CVA, afib on xarelto and plavix c/o intermittent epistaxis from left nostril since last night, patient admits to picking at his nose yesterday, wife placed a gauze pack and a gel packing from [facility] around 10am.  No bleeding here in ED.  No fever/chills.  No ENT.

## 2018-05-26 NOTE — ED PROVIDER NOTE - PMH
Alcohol abuse  in recovery  Aphasia S/P CVA    Atrial Fibrillation    Carotid Stenosis    Chronic Obstructive Pulmonary Disease (COPD)    Coronary Artery Disease    DM (diabetes mellitus)    Former smoker    Gout    H/O: Rheumatic Fever    HF (heart failure)    Hyperlipidemia    Hypertension    NSVT (nonsustained ventricular tachycardia)    Pacemaker  defibrillator model # v-268 serial # [id number]  PAD (peripheral artery disease)  with stents

## 2018-05-26 NOTE — ED PROVIDER NOTE - ENMT, MLM
Airway patent, Left nare +gauze packing and gel foam packing. right nare clear, no active bleeding. Mouth with normal mucosa. Throat has no vesicles, no oropharyngeal exudates and uvula is midline.

## 2018-05-26 NOTE — ED PROVIDER NOTE - PSH
Arm injury  s/p surgery 2009  CAD S/P percutaneous coronary angioplasty  stent placed  Pacemaker  [employer] serial #[id number]   model #v-268  Popliteal artery injury  iliac/ popliteal angioplasty with stents 2010  S/P CABG x 1    S/P Implantation of Automatic Cardioverter/Defibrillator (AICD)    S/P Tonsillectomy

## 2018-07-02 NOTE — DISCHARGE NOTE ADULT - MEDICATION SUMMARY - MEDICATIONS TO TAKE
I will START or STAY ON the medications listed below when I get home from the hospital:    finasteride 5 mg oral tablet  -- 1 tab(s) by mouth once a day  -- Indication: For bph    aspirin 81 mg oral delayed release tablet  -- 1 tab(s) by mouth once a day  -- Indication: For CAD    ramipril 10 mg oral capsule  -- 1 cap(s) by mouth once a day  -- Indication: For Htn    amiodarone 200 mg oral tablet  -- 1 tab(s) by mouth once a day  -- Indication: For Afib    digoxin 125 mcg (0.125 mg) oral tablet  -- 1 tab(s) by mouth once a day  -- Indication: For Afib    atorvastatin 40 mg oral tablet  -- 1 tab(s) by mouth once a day (at bedtime)  -- Indication: For CAD    Uloric 40 mg oral tablet  -- 1 tab(s) by mouth once a day  -- Indication: For Gout    clopidogrel 75 mg oral tablet  -- 1 tab(s) by mouth once a day  -- Indication: For CAD    metoprolol succinate 100 mg oral tablet, extended release  -- 1 tab(s) by mouth once a day morning   -- Indication: For Htn Add Progress Note...

## 2018-07-17 ENCOUNTER — NON-APPOINTMENT (OUTPATIENT)
Age: 73
End: 2018-07-17

## 2018-07-17 ENCOUNTER — APPOINTMENT (OUTPATIENT)
Dept: CARDIOLOGY | Facility: CLINIC | Age: 73
End: 2018-07-17
Payer: MEDICARE

## 2018-07-17 VITALS
OXYGEN SATURATION: 98 % | BODY MASS INDEX: 23.89 KG/M2 | HEART RATE: 82 BPM | WEIGHT: 154 LBS | HEIGHT: 67.5 IN | SYSTOLIC BLOOD PRESSURE: 127 MMHG | DIASTOLIC BLOOD PRESSURE: 82 MMHG

## 2018-07-17 DIAGNOSIS — N40.1 BENIGN PROSTATIC HYPERPLASIA WITH LOWER URINARY TRACT SYMPMS: ICD-10-CM

## 2018-07-17 DIAGNOSIS — Z12.5 ENCOUNTER FOR SCREENING FOR MALIGNANT NEOPLASM OF PROSTATE: ICD-10-CM

## 2018-07-17 DIAGNOSIS — Z95.1 PRESENCE OF AORTOCORONARY BYPASS GRAFT: ICD-10-CM

## 2018-07-17 DIAGNOSIS — R53.1 WEAKNESS: ICD-10-CM

## 2018-07-17 DIAGNOSIS — N13.8 BENIGN PROSTATIC HYPERPLASIA WITH LOWER URINARY TRACT SYMPMS: ICD-10-CM

## 2018-07-17 PROCEDURE — 99214 OFFICE O/P EST MOD 30 MIN: CPT | Mod: 25

## 2018-07-17 PROCEDURE — 93000 ELECTROCARDIOGRAM COMPLETE: CPT

## 2018-07-18 PROBLEM — I69.920 APHASIA FOLLOWING UNSPECIFIED CEREBROVASCULAR DISEASE: Chronic | Status: ACTIVE | Noted: 2018-02-19

## 2018-08-07 ENCOUNTER — APPOINTMENT (OUTPATIENT)
Dept: NEUROLOGY | Facility: CLINIC | Age: 73
End: 2018-08-07
Payer: MEDICARE

## 2018-08-07 VITALS
DIASTOLIC BLOOD PRESSURE: 70 MMHG | WEIGHT: 150 LBS | SYSTOLIC BLOOD PRESSURE: 124 MMHG | HEART RATE: 73 BPM | BODY MASS INDEX: 23.15 KG/M2

## 2018-08-07 PROCEDURE — 99214 OFFICE O/P EST MOD 30 MIN: CPT

## 2018-08-07 RX ORDER — CHLORHEXIDINE GLUCONATE 4 %
325 (65 FE) LIQUID (ML) TOPICAL DAILY
Refills: 0 | Status: DISCONTINUED | COMMUNITY
End: 2018-08-07

## 2018-08-10 ENCOUNTER — MOBILE ON CALL (OUTPATIENT)
Age: 73
End: 2018-08-10

## 2018-08-17 NOTE — PRE-OP CHECKLIST - ANTIBIOTIC
Subjective:       Patient ID: Anthony Blair is a 83 y.o. male.    Chief Complaint: Chest Pain    82 yo male present to Urgent Care with reports of chest pain and tightness for 2 days.  He reports chest pain in unrelieved by and rest.  He reports he became SOB while lying down today.  He has had a headache for 2 days as well.  He denies any radiating symptoms.  He denies taking nitro for relief as he has never taken this in past.        Review of Systems   Constitutional: Negative for fatigue and fever.   HENT: Negative for congestion.    Respiratory: Positive for chest tightness and shortness of breath. Negative for cough, wheezing and stridor.    Cardiovascular: Positive for chest pain. Negative for palpitations and leg swelling.   Genitourinary: Negative for difficulty urinating.   Skin: Negative for color change.   Allergic/Immunologic: Negative for environmental allergies.   Neurological: Positive for headaches. Negative for dizziness, seizures, syncope, speech difficulty, weakness and light-headedness.   Psychiatric/Behavioral: Negative for agitation.       Objective:    Deferred for ER evaluation.  Physical Exam    Assessment:       1. Chest pain, unspecified type        Plan:         Anthony was seen today for chest pain.    Diagnoses and all orders for this visit:    Chest pain, unspecified type  Comments:  TO ER    Patient has a pacemaker.  He has extensive cardiac history and ER workup is necessary.  He is refusing ambulance transfer stating it will take longer for him to get there verse driving.  He is also refusing aspirin stating his doctor told him not to take any do to past CVA.  Discussed disposition with care giver and patient.  Both agreed with plan.  Patient is in no distress and discharged ambulating with AVS in hand.   
yes

## 2018-09-18 ENCOUNTER — APPOINTMENT (OUTPATIENT)
Dept: ELECTROPHYSIOLOGY | Facility: CLINIC | Age: 73
End: 2018-09-18
Payer: MEDICARE

## 2018-09-18 VITALS
OXYGEN SATURATION: 97 % | HEIGHT: 67 IN | DIASTOLIC BLOOD PRESSURE: 89 MMHG | WEIGHT: 152 LBS | SYSTOLIC BLOOD PRESSURE: 145 MMHG | HEART RATE: 96 BPM | BODY MASS INDEX: 23.86 KG/M2

## 2018-09-18 PROCEDURE — 93283 PRGRMG EVAL IMPLANTABLE DFB: CPT

## 2018-11-29 ENCOUNTER — EMERGENCY (EMERGENCY)
Facility: HOSPITAL | Age: 73
LOS: 1 days | Discharge: HOME | End: 2018-11-29
Attending: EMERGENCY MEDICINE | Admitting: EMERGENCY MEDICINE
Payer: COMMERCIAL

## 2018-11-29 VITALS
HEART RATE: 80 BPM | RESPIRATION RATE: 16 BRPM | TEMPERATURE: 98 F | DIASTOLIC BLOOD PRESSURE: 74 MMHG | SYSTOLIC BLOOD PRESSURE: 155 MMHG | OXYGEN SATURATION: 100 %

## 2018-11-29 VITALS
HEART RATE: 94 BPM | RESPIRATION RATE: 15 BRPM | SYSTOLIC BLOOD PRESSURE: 160 MMHG | TEMPERATURE: 98 F | DIASTOLIC BLOOD PRESSURE: 84 MMHG | OXYGEN SATURATION: 95 %

## 2018-11-29 DIAGNOSIS — I25.10 ATHEROSCLEROTIC HEART DISEASE OF NATIVE CORONARY ARTERY WITHOUT ANGINA PECTORIS: Chronic | ICD-10-CM

## 2018-11-29 DIAGNOSIS — S85.009A UNSPECIFIED INJURY OF POPLITEAL ARTERY, UNSPECIFIED LEG, INITIAL ENCOUNTER: Chronic | ICD-10-CM

## 2018-11-29 DIAGNOSIS — S49.90XA UNSPECIFIED INJURY OF SHOULDER AND UPPER ARM, UNSPECIFIED ARM, INITIAL ENCOUNTER: Chronic | ICD-10-CM

## 2018-11-29 DIAGNOSIS — Z95.1 PRESENCE OF AORTOCORONARY BYPASS GRAFT: Chronic | ICD-10-CM

## 2018-11-29 PROCEDURE — 99283 EMERGENCY DEPT VISIT LOW MDM: CPT

## 2018-11-29 PROCEDURE — 99282 EMERGENCY DEPT VISIT SF MDM: CPT

## 2018-11-29 NOTE — ED PROVIDER NOTE - PSH
Arm injury  s/p surgery 2009  CAD S/P percutaneous coronary angioplasty  stent placed  Pacemaker  [employer] serial #[id number]   model #v-268  Popliteal artery injury  iliac/ popliteal angioplasty with stents 2010  S/P CABG x 1    S/P Implantation of Automatic Cardioverter/Defibrillator (AICD)    S/P Tonsillectomy

## 2018-11-29 NOTE — ED PROVIDER NOTE - OBJECTIVE STATEMENT
72 y/o male  presents to the ED with complaint of bleeding left hand skin tears that have not resolved for 2 days now  pt is on clopedgril and luciano   has used otc gauze with no relief of symptoms 74 y/o male  presents to the ED with complaint of bleeding left hand skin tears that have not resolved for 2 days now  pt is on clopedgril and xarelto   has used otc gauze with no relief of symptoms 72 y/o male  presents to the ED with complaint of bleeding right hand skin tears that have not resolved for 2 days now  pt is on clopedgril and xarelto   has used otc gauze with no relief of symptoms

## 2018-11-29 NOTE — ED ADULT NURSE NOTE - PSH
Arm injury  s/p surgery 2009  CAD S/P percutaneous coronary angioplasty  stent placed  Pacemaker  [employer] serial #[id number]   model #v-268  Popliteal artery injury  iliac/ popliteal angioplasty with stents 2010  S/P CABG x 1    S/P Implantation of Automatic Cardioverter/Defibrillator (AICD)    S/P Tonsillectomy

## 2018-11-29 NOTE — ED PROVIDER NOTE - PROGRESS NOTE DETAILS
right hand dorsum skin tear, on blood thinners,   not resolved with pressure, surgicell applied  pressure dressing and wound isntuctions discussed follow up with pmd

## 2018-11-29 NOTE — ED PROVIDER NOTE - ATTENDING CONTRIBUTION TO CARE
I have personally performed a face to face diagnostic evaluation on this patient.  I have reviewed the resident's note and agree with the history, exam, and plan of care, except as noted.  History and Exam by me shows right hand skin tear that was still bleeding.   Wound about nickel at mid dorsal of hand. Wound dressed with Surgicel. I have personally performed a face to face diagnostic evaluation on this patient.  I have reviewed the resident's note and agree with the history, exam, and plan of care, except as noted.  History and Exam by me shows right hand skin tear that was still bleeding.   Wound about nickel at mid dorsal of hand. Wound dressed with Surgicel..

## 2018-11-29 NOTE — ED ADULT NURSE NOTE - PMH
Alcohol abuse  in recovery  Aphasia S/P CVA    Atrial Fibrillation    Carotid Stenosis    Chronic Obstructive Pulmonary Disease (COPD)    Coronary Artery Disease    DM (diabetes mellitus)    Former smoker    Gout    H/O: Rheumatic Fever    HF (heart failure)    Hyperlipidemia    Hypertension    NSVT (nonsustained ventricular tachycardia)    Pacemaker  defibrillator model # v-268 serial # [id number]  PAD (peripheral artery disease)  with stents

## 2018-11-29 NOTE — ED PROVIDER NOTE - MEDICAL DECISION MAKING DETAILS
74 y/o male  skin abrasion right dorsum of hand on blood thinners  wound clean, surgicell applied  keep clean and dry follow up with pmd

## 2018-11-29 NOTE — ED ADULT TRIAGE NOTE - CHIEF COMPLAINT QUOTE
laceration to the top of the right hand, as per sister the cause is because skin is dry and thin. denies injury or trauma. patient takes daily plavix and xarelto

## 2018-11-29 NOTE — ED ADULT NURSE NOTE - OBJECTIVE STATEMENT
Present to ER with c/o of skin tear and bleeding on right hand. As per family member, pt has dry skin and skin is very thin and started bleeding. Patient on xarelto and plavix. denies any trauma in the area.

## 2018-11-29 NOTE — ED PROVIDER NOTE - PMH
Alcohol abuse  in recovery  Aphasia S/P CVA    Atrial Fibrillation    Carotid Stenosis    Chronic Obstructive Pulmonary Disease (COPD)    Coronary Artery Disease    DM (diabetes mellitus)    Former smoker    Gout    H/O: Rheumatic Fever    HF (heart failure)    Hyperlipidemia    Hypertension    NSVT (nonsustained ventricular tachycardia)    Pacemaker  defibrillator model # v-268 serial # [id number]  PAD (peripheral artery disease)  with stents

## 2018-12-11 ENCOUNTER — APPOINTMENT (OUTPATIENT)
Dept: NEUROLOGY | Facility: CLINIC | Age: 73
End: 2018-12-11
Payer: MEDICARE

## 2018-12-11 VITALS
HEART RATE: 84 BPM | HEIGHT: 67 IN | SYSTOLIC BLOOD PRESSURE: 116 MMHG | WEIGHT: 150 LBS | DIASTOLIC BLOOD PRESSURE: 66 MMHG | BODY MASS INDEX: 23.54 KG/M2

## 2018-12-11 PROCEDURE — 99214 OFFICE O/P EST MOD 30 MIN: CPT

## 2018-12-11 RX ORDER — ASPIRIN ENTERIC COATED TABLETS 81 MG 81 MG/1
81 TABLET, DELAYED RELEASE ORAL
Qty: 30 | Refills: 0 | Status: DISCONTINUED | COMMUNITY
Start: 2017-11-24 | End: 2018-12-11

## 2018-12-11 RX ORDER — FAMOTIDINE 20 MG/1
20 TABLET, FILM COATED ORAL
Qty: 30 | Refills: 0 | Status: DISCONTINUED | COMMUNITY
Start: 2017-11-24 | End: 2018-12-11

## 2018-12-11 RX ORDER — FUROSEMIDE 20 MG/1
20 TABLET ORAL
Qty: 90 | Refills: 3 | Status: COMPLETED | COMMUNITY
Start: 2017-08-28 | End: 2018-12-11

## 2018-12-11 RX ORDER — LEVOFLOXACIN 750 MG/1
750 TABLET, FILM COATED ORAL DAILY
Refills: 0 | Status: DISCONTINUED | COMMUNITY
End: 2018-12-11

## 2018-12-12 ENCOUNTER — MEDICATION RENEWAL (OUTPATIENT)
Age: 73
End: 2018-12-12

## 2018-12-12 ENCOUNTER — RX RENEWAL (OUTPATIENT)
Age: 73
End: 2018-12-12

## 2019-01-15 ENCOUNTER — NON-APPOINTMENT (OUTPATIENT)
Age: 74
End: 2019-01-15

## 2019-01-15 ENCOUNTER — APPOINTMENT (OUTPATIENT)
Dept: ELECTROPHYSIOLOGY | Facility: CLINIC | Age: 74
End: 2019-01-15
Payer: MEDICARE

## 2019-01-15 ENCOUNTER — APPOINTMENT (OUTPATIENT)
Dept: CARDIOLOGY | Facility: CLINIC | Age: 74
End: 2019-01-15
Payer: MEDICARE

## 2019-01-15 VITALS
OXYGEN SATURATION: 93 % | DIASTOLIC BLOOD PRESSURE: 70 MMHG | TEMPERATURE: 98.7 F | BODY MASS INDEX: 49.44 KG/M2 | SYSTOLIC BLOOD PRESSURE: 129 MMHG | HEIGHT: 67 IN | WEIGHT: 315 LBS | RESPIRATION RATE: 14 BRPM | HEART RATE: 71 BPM

## 2019-01-15 VITALS
HEIGHT: 67 IN | DIASTOLIC BLOOD PRESSURE: 79 MMHG | OXYGEN SATURATION: 98 % | SYSTOLIC BLOOD PRESSURE: 144 MMHG | WEIGHT: 150 LBS | BODY MASS INDEX: 23.54 KG/M2 | HEART RATE: 80 BPM

## 2019-01-15 VITALS — OXYGEN SATURATION: 92 %

## 2019-01-15 PROCEDURE — 93000 ELECTROCARDIOGRAM COMPLETE: CPT

## 2019-01-15 PROCEDURE — 99214 OFFICE O/P EST MOD 30 MIN: CPT | Mod: 25

## 2019-01-15 PROCEDURE — 93283 PRGRMG EVAL IMPLANTABLE DFB: CPT

## 2019-01-15 RX ORDER — SAW/PYGEUM/BETA/HERB/D3/B6/ZN 30 MG-25MG
200 CAPSULE ORAL
Refills: 0 | Status: DISCONTINUED | COMMUNITY
End: 2019-01-15

## 2019-01-15 NOTE — CARDIOLOGY SUMMARY
[Inf Wall Defect] : inferior wall defect [LVEF ___%] : LVEF [unfilled]% [Severe] : severe LV dysfunction [Mild] : mild pulmonary hypertension [Enlarged] : enlarged LA size [Moderate] : moderate mitral regurgitation [___] : [unfilled]

## 2019-01-15 NOTE — REASON FOR VISIT
[Follow-Up - Clinic] : a clinic follow-up of [Atrial Fibrillation] : atrial fibrillation [Coronary Artery Disease] : coronary artery disease [Hyperlipidemia] : hyperlipidemia [Hypertension] : hypertension [Peripheral Vascular Disease] : peripheral vascular disease [Other: _____] : [unfilled]

## 2019-01-20 NOTE — PHYSICAL EXAM
[General Appearance - Well Developed] : well developed [Normal Appearance] : normal appearance [Normal Conjunctiva] : the conjunctiva exhibited no abnormalities [Eyelids - No Xanthelasma] : the eyelids demonstrated no xanthelasmas [Normal Oral Mucosa] : normal oral mucosa [No Oral Pallor] : no oral pallor [No Oral Cyanosis] : no oral cyanosis [Normal Oropharynx] : normal oropharynx [Normal Jugular Venous V Waves Present] : normal jugular venous V waves present [No Jugular Venous Samuel A Waves] : no jugular venous samuel A waves [Auscultation Breath Sounds / Voice Sounds] : lungs were clear to auscultation bilaterally [Abdomen Soft] : soft [Abdomen Tenderness] : non-tender [Abdomen Mass (___ Cm)] : no abdominal mass palpated [Abnormal Walk] : normal gait [Nail Clubbing] : no clubbing of the fingernails [Cyanosis, Localized] : no localized cyanosis [Petechial Hemorrhages (___cm)] : no petechial hemorrhages [Skin Color & Pigmentation] : normal skin color and pigmentation [] : no rash [No Venous Stasis] : no venous stasis [Skin Lesions] : no skin lesions [No Skin Ulcers] : no skin ulcer [No Xanthoma] : no  xanthoma was observed [Oriented To Time, Place, And Person] : oriented to person, place, and time [Affect] : the affect was normal [Mood] : the mood was normal [No Anxiety] : not feeling anxious [Normal Rate] : normal [Rhythm Regular] : regular [Normal S1] : normal S1 [Normal S2] : normal S2 [I] : a grade 1 [0] : right 0 [No Abnormalities] : the abdominal aorta was not enlarged and no bruit was heard [FreeTextEntry1] : no JVD [S3] : no S3 [S4] : no S4

## 2019-01-20 NOTE — DISCUSSION/SUMMARY
[FreeTextEntry1] : CVA: Embolic. Following with neurology, still having speech therapy, according to patient some improvement. \par Coronary artery disease: s/p CABG, Stable.will  continue current medical therapy.\par Atrial fibrillation: Rate controlled. . Tolerating  xarelto.\par Peripheral arterial disease: Currently no claudication and tolerating current medical therapy.\par Hyperlipidemia: Continue Atorvastatin daily. will follow up labs from PCP.    \par Hypertension: Controlled. Low sodium diet discussed.\par Chronic systolic heart failure: Will repeat Echo, Well compensated. Watch na in diet.  \par F/u in 4 month.

## 2019-01-20 NOTE — HISTORY OF PRESENT ILLNESS
[FreeTextEntry1] : 73 year old male with PMH: Afib, CAD, Hyperlipidemia, HTN, PVD, Stroke (followed by neuro) presents to clinic today for routine follow up. \par Denies chest pain, SOB, palpitations, no claudication. Patient can understand but still has some difficulty to express. Patient feels that he getting better in communicating. He is seeing speech therapist. \par Labs done at PCP recently will get it faxed.\par PPM interrogation done today at [facility] ok according to sister.

## 2019-01-24 ENCOUNTER — OUTPATIENT (OUTPATIENT)
Dept: OUTPATIENT SERVICES | Facility: HOSPITAL | Age: 74
LOS: 1 days | End: 2019-01-24
Payer: COMMERCIAL

## 2019-01-24 DIAGNOSIS — S49.90XA UNSPECIFIED INJURY OF SHOULDER AND UPPER ARM, UNSPECIFIED ARM, INITIAL ENCOUNTER: Chronic | ICD-10-CM

## 2019-01-24 DIAGNOSIS — I63.9 CEREBRAL INFARCTION, UNSPECIFIED: ICD-10-CM

## 2019-01-24 DIAGNOSIS — S85.009A UNSPECIFIED INJURY OF POPLITEAL ARTERY, UNSPECIFIED LEG, INITIAL ENCOUNTER: Chronic | ICD-10-CM

## 2019-01-24 DIAGNOSIS — Z95.1 PRESENCE OF AORTOCORONARY BYPASS GRAFT: Chronic | ICD-10-CM

## 2019-01-24 DIAGNOSIS — R48.2 APRAXIA: ICD-10-CM

## 2019-01-24 DIAGNOSIS — I25.10 ATHEROSCLEROTIC HEART DISEASE OF NATIVE CORONARY ARTERY WITHOUT ANGINA PECTORIS: Chronic | ICD-10-CM

## 2019-01-24 DIAGNOSIS — R47.01 APHASIA: ICD-10-CM

## 2019-01-24 DIAGNOSIS — Z51.89 ENCOUNTER FOR OTHER SPECIFIED AFTERCARE: ICD-10-CM

## 2019-02-04 ENCOUNTER — RX RENEWAL (OUTPATIENT)
Age: 74
End: 2019-02-04

## 2019-02-04 ENCOUNTER — MEDICATION RENEWAL (OUTPATIENT)
Age: 74
End: 2019-02-04

## 2019-03-03 ENCOUNTER — EMERGENCY (EMERGENCY)
Facility: HOSPITAL | Age: 74
LOS: 1 days | Discharge: HOME | End: 2019-03-03
Attending: EMERGENCY MEDICINE | Admitting: EMERGENCY MEDICINE
Payer: COMMERCIAL

## 2019-03-03 VITALS
HEART RATE: 89 BPM | TEMPERATURE: 98 F | SYSTOLIC BLOOD PRESSURE: 127 MMHG | DIASTOLIC BLOOD PRESSURE: 72 MMHG | OXYGEN SATURATION: 97 % | RESPIRATION RATE: 16 BRPM

## 2019-03-03 VITALS
HEART RATE: 100 BPM | WEIGHT: 179.9 LBS | OXYGEN SATURATION: 94 % | TEMPERATURE: 99 F | DIASTOLIC BLOOD PRESSURE: 74 MMHG | HEIGHT: 68 IN | RESPIRATION RATE: 16 BRPM | SYSTOLIC BLOOD PRESSURE: 129 MMHG

## 2019-03-03 DIAGNOSIS — S85.009A UNSPECIFIED INJURY OF POPLITEAL ARTERY, UNSPECIFIED LEG, INITIAL ENCOUNTER: Chronic | ICD-10-CM

## 2019-03-03 DIAGNOSIS — Z95.1 PRESENCE OF AORTOCORONARY BYPASS GRAFT: Chronic | ICD-10-CM

## 2019-03-03 DIAGNOSIS — S49.90XA UNSPECIFIED INJURY OF SHOULDER AND UPPER ARM, UNSPECIFIED ARM, INITIAL ENCOUNTER: Chronic | ICD-10-CM

## 2019-03-03 DIAGNOSIS — I25.10 ATHEROSCLEROTIC HEART DISEASE OF NATIVE CORONARY ARTERY WITHOUT ANGINA PECTORIS: Chronic | ICD-10-CM

## 2019-03-03 LAB
ALBUMIN SERPL ELPH-MCNC: 2.6 G/DL — LOW (ref 3.3–5)
ALP SERPL-CCNC: 150 U/L — HIGH (ref 40–120)
ALT FLD-CCNC: 46 U/L — SIGNIFICANT CHANGE UP (ref 12–78)
ANION GAP SERPL CALC-SCNC: 10 MMOL/L — SIGNIFICANT CHANGE UP (ref 5–17)
AST SERPL-CCNC: 46 U/L — HIGH (ref 15–37)
BASOPHILS # BLD AUTO: 0.04 K/UL — SIGNIFICANT CHANGE UP (ref 0–0.2)
BASOPHILS NFR BLD AUTO: 0.7 % — SIGNIFICANT CHANGE UP (ref 0–2)
BILIRUB SERPL-MCNC: 1.2 MG/DL — SIGNIFICANT CHANGE UP (ref 0.2–1.2)
BUN SERPL-MCNC: 17 MG/DL — SIGNIFICANT CHANGE UP (ref 7–23)
CALCIUM SERPL-MCNC: 8.7 MG/DL — SIGNIFICANT CHANGE UP (ref 8.5–10.1)
CHLORIDE SERPL-SCNC: 98 MMOL/L — SIGNIFICANT CHANGE UP (ref 96–108)
CO2 SERPL-SCNC: 25 MMOL/L — SIGNIFICANT CHANGE UP (ref 22–31)
CREAT SERPL-MCNC: 1.1 MG/DL — SIGNIFICANT CHANGE UP (ref 0.5–1.3)
EOSINOPHIL # BLD AUTO: 0.24 K/UL — SIGNIFICANT CHANGE UP (ref 0–0.5)
EOSINOPHIL NFR BLD AUTO: 4 % — SIGNIFICANT CHANGE UP (ref 0–6)
GLUCOSE SERPL-MCNC: 91 MG/DL — SIGNIFICANT CHANGE UP (ref 70–99)
HCT VFR BLD CALC: 41.4 % — SIGNIFICANT CHANGE UP (ref 39–50)
HGB BLD-MCNC: 13.7 G/DL — SIGNIFICANT CHANGE UP (ref 13–17)
IMM GRANULOCYTES NFR BLD AUTO: 0.5 % — SIGNIFICANT CHANGE UP (ref 0–1.5)
LYMPHOCYTES # BLD AUTO: 0.65 K/UL — LOW (ref 1–3.3)
LYMPHOCYTES # BLD AUTO: 10.8 % — LOW (ref 13–44)
MCHC RBC-ENTMCNC: 29.3 PG — SIGNIFICANT CHANGE UP (ref 27–34)
MCHC RBC-ENTMCNC: 33.1 GM/DL — SIGNIFICANT CHANGE UP (ref 32–36)
MCV RBC AUTO: 88.5 FL — SIGNIFICANT CHANGE UP (ref 80–100)
MONOCYTES # BLD AUTO: 0.82 K/UL — SIGNIFICANT CHANGE UP (ref 0–0.9)
MONOCYTES NFR BLD AUTO: 13.6 % — SIGNIFICANT CHANGE UP (ref 2–14)
NEUTROPHILS # BLD AUTO: 4.25 K/UL — SIGNIFICANT CHANGE UP (ref 1.8–7.4)
NEUTROPHILS NFR BLD AUTO: 70.4 % — SIGNIFICANT CHANGE UP (ref 43–77)
NRBC # BLD: 0 /100 WBCS — SIGNIFICANT CHANGE UP (ref 0–0)
PLATELET # BLD AUTO: 241 K/UL — SIGNIFICANT CHANGE UP (ref 150–400)
POTASSIUM SERPL-MCNC: 4.1 MMOL/L — SIGNIFICANT CHANGE UP (ref 3.5–5.3)
POTASSIUM SERPL-SCNC: 4.1 MMOL/L — SIGNIFICANT CHANGE UP (ref 3.5–5.3)
PROT SERPL-MCNC: 7.4 G/DL — SIGNIFICANT CHANGE UP (ref 6–8.3)
RBC # BLD: 4.68 M/UL — SIGNIFICANT CHANGE UP (ref 4.2–5.8)
RBC # FLD: 14.6 % — HIGH (ref 10.3–14.5)
SODIUM SERPL-SCNC: 133 MMOL/L — LOW (ref 135–145)
WBC # BLD: 6.03 K/UL — SIGNIFICANT CHANGE UP (ref 3.8–10.5)
WBC # FLD AUTO: 6.03 K/UL — SIGNIFICANT CHANGE UP (ref 3.8–10.5)

## 2019-03-03 PROCEDURE — 93970 EXTREMITY STUDY: CPT | Mod: 26

## 2019-03-03 PROCEDURE — 85027 COMPLETE CBC AUTOMATED: CPT

## 2019-03-03 PROCEDURE — 99284 EMERGENCY DEPT VISIT MOD MDM: CPT

## 2019-03-03 PROCEDURE — 93970 EXTREMITY STUDY: CPT

## 2019-03-03 PROCEDURE — 36415 COLL VENOUS BLD VENIPUNCTURE: CPT

## 2019-03-03 PROCEDURE — 80053 COMPREHEN METABOLIC PANEL: CPT

## 2019-03-03 PROCEDURE — 99284 EMERGENCY DEPT VISIT MOD MDM: CPT | Mod: 25

## 2019-03-03 RX ORDER — ALUMINUM ZIRCONIUM TRICHLOROHYDREX GLY 0.2 G/G
1 STICK TOPICAL
Qty: 28 | Refills: 0
Start: 2019-03-03 | End: 2019-03-30

## 2019-03-03 RX ORDER — ACETAMINOPHEN 500 MG
650 TABLET ORAL ONCE
Qty: 0 | Refills: 0 | Status: COMPLETED | OUTPATIENT
Start: 2019-03-03 | End: 2019-03-03

## 2019-03-03 RX ORDER — AZTREONAM 2 G
1 VIAL (EA) INJECTION
Qty: 14 | Refills: 0
Start: 2019-03-03 | End: 2019-03-09

## 2019-03-03 RX ADMIN — Medication 1 TABLET(S): at 17:32

## 2019-03-03 RX ADMIN — Medication 650 MILLIGRAM(S): at 17:31

## 2019-03-03 NOTE — ED PROVIDER NOTE - OBJECTIVE STATEMENT
right foot swollen x 5 days and left foot swollen today.   pt was treated for right ankle gout with colchicine this past  Friday by [facility].  pt has speech problem for cva.

## 2019-03-03 NOTE — ED ADULT TRIAGE NOTE - CHIEF COMPLAINT QUOTE
pt presents with family who states he has increased swelling to lower extremities, had a stroke last year with residual asphasia

## 2019-03-03 NOTE — ED ADULT NURSE NOTE - OBJECTIVE STATEMENT
pt with complaints of swelling to bilateral feet, started with right foot and now left foot is also swollen, pt with redness to the tops of both feet.  pt with pain upon ambulating. pt denies fevers.

## 2019-03-03 NOTE — ED PROVIDER NOTE - PMH
Alcohol abuse  in recovery  Aphasia S/P CVA    Atrial Fibrillation    Carotid Stenosis    Chronic Obstructive Pulmonary Disease (COPD)    Coronary Artery Disease    DM (diabetes mellitus)    Former smoker    Gout    H/O: Rheumatic Fever    HF (heart failure)    Hyperlipidemia    Hypertension    NSVT (nonsustained ventricular tachycardia)    Pacemaker  defibrillator model # v-268 serial # [id number]  PAD (peripheral artery disease)  with stents

## 2019-03-03 NOTE — ED PROVIDER NOTE - CARE PROVIDER_API CALL
Earl Washington (DO)  Family Medicine  [address] [place] [address]  Phone: [phone]  Fax: [phone]  Follow Up Time:

## 2019-03-03 NOTE — ED ADULT NURSE NOTE - PMH
Alcohol abuse  in recovery  Aphasia S/P CVA    Atrial Fibrillation    Carotid Stenosis    Chronic Obstructive Pulmonary Disease (COPD)    Coronary Artery Disease    DM (diabetes mellitus)    Former smoker    Gout    H/O: Rheumatic Fever    HF (heart failure)    Hyperlipidemia    Hypertension    NSVT (nonsustained ventricular tachycardia)    Pacemaker  defibrillator model # v-268 serial # [id number]  PAD (peripheral artery disease)  with stents

## 2019-03-03 NOTE — ED ADULT NURSE NOTE - NSIMPLEMENTINTERV_GEN_ALL_ED
Implemented All Fall with Harm Risk Interventions:  [place] to call system. Call bell, personal items and telephone within reach. Instruct patient to call for assistance. Room bathroom lighting operational. Non-slip footwear when patient is off stretcher. Physically safe environment: no spills, clutter or unnecessary equipment. Stretcher in lowest position, wheels locked, appropriate side rails in place. Provide visual cue, wrist band, yellow gown, etc. Monitor gait and stability. Monitor for mental status changes and reorient to person, place, and time. Review medications for side effects contributing to fall risk. Reinforce activity limits and safety measures with patient and family. Provide visual clues: red socks.

## 2019-03-11 ENCOUNTER — MEDICATION RENEWAL (OUTPATIENT)
Age: 74
End: 2019-03-11

## 2019-03-24 ENCOUNTER — EMERGENCY (EMERGENCY)
Facility: HOSPITAL | Age: 74
LOS: 1 days | Discharge: HOME | End: 2019-03-24
Attending: EMERGENCY MEDICINE | Admitting: EMERGENCY MEDICINE
Payer: COMMERCIAL

## 2019-03-24 VITALS
OXYGEN SATURATION: 95 % | HEART RATE: 78 BPM | SYSTOLIC BLOOD PRESSURE: 110 MMHG | DIASTOLIC BLOOD PRESSURE: 70 MMHG | RESPIRATION RATE: 13 BRPM | TEMPERATURE: 98 F

## 2019-03-24 VITALS
SYSTOLIC BLOOD PRESSURE: 122 MMHG | HEIGHT: 67 IN | WEIGHT: 149.91 LBS | RESPIRATION RATE: 18 BRPM | TEMPERATURE: 98 F | HEART RATE: 85 BPM | DIASTOLIC BLOOD PRESSURE: 71 MMHG | OXYGEN SATURATION: 93 %

## 2019-03-24 DIAGNOSIS — S85.009A UNSPECIFIED INJURY OF POPLITEAL ARTERY, UNSPECIFIED LEG, INITIAL ENCOUNTER: Chronic | ICD-10-CM

## 2019-03-24 DIAGNOSIS — Z95.1 PRESENCE OF AORTOCORONARY BYPASS GRAFT: Chronic | ICD-10-CM

## 2019-03-24 DIAGNOSIS — S49.90XA UNSPECIFIED INJURY OF SHOULDER AND UPPER ARM, UNSPECIFIED ARM, INITIAL ENCOUNTER: Chronic | ICD-10-CM

## 2019-03-24 DIAGNOSIS — I25.10 ATHEROSCLEROTIC HEART DISEASE OF NATIVE CORONARY ARTERY WITHOUT ANGINA PECTORIS: Chronic | ICD-10-CM

## 2019-03-24 LAB
ALBUMIN SERPL ELPH-MCNC: 3.1 G/DL — LOW (ref 3.3–5)
ALP SERPL-CCNC: 149 U/L — HIGH (ref 40–120)
ALT FLD-CCNC: 21 U/L — SIGNIFICANT CHANGE UP (ref 12–78)
ANION GAP SERPL CALC-SCNC: 10 MMOL/L — SIGNIFICANT CHANGE UP (ref 5–17)
AST SERPL-CCNC: 20 U/L — SIGNIFICANT CHANGE UP (ref 15–37)
BASOPHILS # BLD AUTO: 0 K/UL — SIGNIFICANT CHANGE UP (ref 0–0.2)
BASOPHILS NFR BLD AUTO: 0 % — SIGNIFICANT CHANGE UP (ref 0–2)
BILIRUB SERPL-MCNC: 1.2 MG/DL — SIGNIFICANT CHANGE UP (ref 0.2–1.2)
BUN SERPL-MCNC: 21 MG/DL — SIGNIFICANT CHANGE UP (ref 7–23)
CALCIUM SERPL-MCNC: 8.5 MG/DL — SIGNIFICANT CHANGE UP (ref 8.5–10.1)
CHLORIDE SERPL-SCNC: 101 MMOL/L — SIGNIFICANT CHANGE UP (ref 96–108)
CO2 SERPL-SCNC: 24 MMOL/L — SIGNIFICANT CHANGE UP (ref 22–31)
CREAT SERPL-MCNC: 1.4 MG/DL — HIGH (ref 0.5–1.3)
EOSINOPHIL # BLD AUTO: 0.16 K/UL — SIGNIFICANT CHANGE UP (ref 0–0.5)
EOSINOPHIL NFR BLD AUTO: 3 % — SIGNIFICANT CHANGE UP (ref 0–6)
ERYTHROCYTE [SEDIMENTATION RATE] IN BLOOD: 14 MM/HR — SIGNIFICANT CHANGE UP (ref 0–20)
GLUCOSE SERPL-MCNC: 125 MG/DL — HIGH (ref 70–99)
HCT VFR BLD CALC: 43.7 % — SIGNIFICANT CHANGE UP (ref 39–50)
HGB BLD-MCNC: 14.3 G/DL — SIGNIFICANT CHANGE UP (ref 13–17)
LYMPHOCYTES # BLD AUTO: 0.7 K/UL — LOW (ref 1–3.3)
LYMPHOCYTES # BLD AUTO: 13 % — SIGNIFICANT CHANGE UP (ref 13–44)
MCHC RBC-ENTMCNC: 29.2 PG — SIGNIFICANT CHANGE UP (ref 27–34)
MCHC RBC-ENTMCNC: 32.7 GM/DL — SIGNIFICANT CHANGE UP (ref 32–36)
MCV RBC AUTO: 89.4 FL — SIGNIFICANT CHANGE UP (ref 80–100)
MONOCYTES # BLD AUTO: 0.49 K/UL — SIGNIFICANT CHANGE UP (ref 0–0.9)
MONOCYTES NFR BLD AUTO: 9 % — SIGNIFICANT CHANGE UP (ref 2–14)
NEUTROPHILS # BLD AUTO: 3.88 K/UL — SIGNIFICANT CHANGE UP (ref 1.8–7.4)
NEUTROPHILS NFR BLD AUTO: 70 % — SIGNIFICANT CHANGE UP (ref 43–77)
NRBC # BLD: SIGNIFICANT CHANGE UP /100 WBCS (ref 0–0)
PLATELET # BLD AUTO: 223 K/UL — SIGNIFICANT CHANGE UP (ref 150–400)
POTASSIUM SERPL-MCNC: 4 MMOL/L — SIGNIFICANT CHANGE UP (ref 3.5–5.3)
POTASSIUM SERPL-SCNC: 4 MMOL/L — SIGNIFICANT CHANGE UP (ref 3.5–5.3)
PROT SERPL-MCNC: 8 G/DL — SIGNIFICANT CHANGE UP (ref 6–8.3)
RBC # BLD: 4.89 M/UL — SIGNIFICANT CHANGE UP (ref 4.2–5.8)
RBC # FLD: 15.6 % — HIGH (ref 10.3–14.5)
SODIUM SERPL-SCNC: 135 MMOL/L — SIGNIFICANT CHANGE UP (ref 135–145)
URATE SERPL-MCNC: 8.7 MG/DL — SIGNIFICANT CHANGE UP (ref 3.4–8.8)
WBC # BLD: 5.39 K/UL — SIGNIFICANT CHANGE UP (ref 3.8–10.5)
WBC # FLD AUTO: 5.39 K/UL — SIGNIFICANT CHANGE UP (ref 3.8–10.5)

## 2019-03-24 PROCEDURE — 99283 EMERGENCY DEPT VISIT LOW MDM: CPT

## 2019-03-24 PROCEDURE — 73130 X-RAY EXAM OF HAND: CPT | Mod: 26,LT

## 2019-03-24 PROCEDURE — 85027 COMPLETE CBC AUTOMATED: CPT

## 2019-03-24 PROCEDURE — 36415 COLL VENOUS BLD VENIPUNCTURE: CPT

## 2019-03-24 PROCEDURE — 99283 EMERGENCY DEPT VISIT LOW MDM: CPT | Mod: 25

## 2019-03-24 PROCEDURE — 80053 COMPREHEN METABOLIC PANEL: CPT

## 2019-03-24 PROCEDURE — 84550 ASSAY OF BLOOD/URIC ACID: CPT

## 2019-03-24 PROCEDURE — 85652 RBC SED RATE AUTOMATED: CPT

## 2019-03-24 PROCEDURE — 73130 X-RAY EXAM OF HAND: CPT

## 2019-03-24 RX ORDER — ACETAMINOPHEN 500 MG
650 TABLET ORAL ONCE
Qty: 0 | Refills: 0 | Status: COMPLETED | OUTPATIENT
Start: 2019-03-24 | End: 2019-03-24

## 2019-03-24 RX ADMIN — Medication 650 MILLIGRAM(S): at 16:22

## 2019-03-24 NOTE — ED PROVIDER NOTE - CLINICAL SUMMARY MEDICAL DECISION MAKING FREE TEXT BOX
75 y/o M with hx of CAD on plavix and xarelto, CVA, hx of gout presents with c/o L 5th finger pain/swelling x 2 days, seen in urgent care, xray showed OA of finger on 3/22, states that swelling is worse and spreading to dorsal hand, skin intact, NVI, no fever/streaking; will get xray, labs, tx for cellulitis, abx, likely OA, f/u hand

## 2019-03-24 NOTE — ED ADULT NURSE NOTE - NSIMPLEMENTINTERV_GEN_ALL_ED
Implemented All Universal Safety Interventions:  [employer] to call system. Call bell, personal items and telephone within reach. Instruct patient to call for assistance. Room bathroom lighting operational. Non-slip footwear when patient is off stretcher. Physically safe environment: no spills, clutter or unnecessary equipment. Stretcher in lowest position, wheels locked, appropriate side rails in place.

## 2019-03-24 NOTE — ED PROVIDER NOTE - ATTENDING CONTRIBUTION TO CARE
pt with c/o b/l LE redness and pain x few days left worse then right./  pt denies recent antibiotic use. + DM.  labs, imaging unremarkable.  PE consistent with LLE cellulitis.  dc home with oral antibiotics with strict follow up on worsening symptoms. pt with c/o b/l left 5th finger redness and pain.  pt denies recent antibiotic use.  labs, imaging unremarkable.  PE consistent with left finger cellulitis.  dc home with oral antibiotics with strict follow up on worsening symptoms.

## 2019-03-24 NOTE — ED ADULT NURSE NOTE - PMH
Alcohol abuse  in recovery  Aphasia S/P CVA    Atrial Fibrillation    Carotid Stenosis    Chronic Obstructive Pulmonary Disease (COPD)    Coronary Artery Disease    DM (diabetes mellitus)    Former smoker    Gout    H/O: Rheumatic Fever    HF (heart failure)    Hyperlipidemia    Hypertension    NSVT (nonsustained ventricular tachycardia)    Pacemaker  defibrillator model # v-268 serial # [id number]  PAD (peripheral artery disease)  with stents

## 2019-03-24 NOTE — ED PROVIDER NOTE - OBJECTIVE STATEMENT
75 y/o M with hx of CVA with residual aphasia and apraxia, anemia, HTN, HLD, BPH, GERD, defibrillator, CAD on plavix and xarelto presents with c/o L 5th finger swelling and pain x 2 days. States that pt was seen in urgent care on 3/22, Fri, had xray of finger which showed osteoarthritis. States that swelling has gotten worse today and spreading to dorsal hand. Pt is R hand dominant. Denies numbness, tingling, trauma, open wounds, fever, chills, n/v, streaking, arm swelling/pain.

## 2019-03-24 NOTE — ED PROVIDER NOTE - PROGRESS NOTE DETAILS
Pt examined by ED attending, Dr. Crystal who agreed with disposition and plan. Reevaluated patient at bedside.  Patient feeling much improved.  Discussed the results of all diagnostic testing in ED and copies of all reports given.   An opportunity to ask questions was given.  Discussed the importance of prompt, close medical follow-up.  Patient will return with any changes, concerns or persistent / worsening symptoms.  Understanding of all instructions verbalized.

## 2019-03-24 NOTE — ED PROVIDER NOTE - PMH
Alcohol abuse  in recovery  Aphasia S/P CVA    Atrial Fibrillation    Carotid Stenosis    Chronic Obstructive Pulmonary Disease (COPD)    Coronary Artery Disease    DM (diabetes mellitus)    Former smoker    Gout    H/O: Rheumatic Fever    HF (heart failure)    Hyperlipidemia    Hypertension    NSVT (nonsustained ventricular tachycardia)    Pacemaker  defibrillator model # v-268 serial # [id number]  PAD (peripheral artery disease)  with stents

## 2019-03-25 ENCOUNTER — APPOINTMENT (OUTPATIENT)
Dept: UROLOGY | Facility: CLINIC | Age: 74
End: 2019-03-25
Payer: MEDICARE

## 2019-03-25 VITALS
HEART RATE: 91 BPM | BODY MASS INDEX: 23.23 KG/M2 | RESPIRATION RATE: 14 BRPM | WEIGHT: 148 LBS | HEIGHT: 67 IN | DIASTOLIC BLOOD PRESSURE: 69 MMHG | SYSTOLIC BLOOD PRESSURE: 127 MMHG | OXYGEN SATURATION: 92 %

## 2019-03-25 PROCEDURE — 99214 OFFICE O/P EST MOD 30 MIN: CPT | Mod: 25

## 2019-03-25 PROCEDURE — 51741 ELECTRO-UROFLOWMETRY FIRST: CPT

## 2019-03-25 PROCEDURE — 51798 US URINE CAPACITY MEASURE: CPT | Mod: 59

## 2019-03-25 NOTE — ASSESSMENT
[FreeTextEntry1] : Benign Prostatic Hyperplasia:\par See Uroflo and PVR. \par Continue Flomax and Finasteride. \par \par PSA Screening:\par Continues to have PSA checks, up from before. Most recent corrected PSA: 5.94. \par Again discussed that his age adjusted upper limit of PSA would be 6.5. \par Discussed PSA screening and latest recommendations/guidelines- USPTF and AUA. \par Patient will like to continue PSA monitoring.\par \par Return to clinic in 1 year or sooner if any issues.

## 2019-03-25 NOTE — LETTER BODY
[Dear  ___] : Dear  [unfilled], [Courtesy Letter:] : I had the pleasure of seeing your patient, [unfilled], in my office today. [Please see my note below.] : Please see my note below. [Sincerely,] : Sincerely, [FreeTextEntry3] : John Figueroa MD\par  of Urology\par [facility]\par \par Offices:\par [facility] @\par [address], [place] [address]\par and\par [address], [place] [address], [address]\par and\par [address] [address]\par \par TEL: [phone]\par FAX: [phone]

## 2019-03-25 NOTE — HISTORY OF PRESENT ILLNESS
[FreeTextEntry1] : 73 yo male presents for follow up. \par Takes Flomax and Finasteride. Reports reasonable stream, urinates every few hours or so during the day and no nocturia. \par Denies hesitancy, straining, intermittency, urgency, incontinence, sense of incomplete emptying.\par Denies dysuria, hematuria, lower abdominal or flank pain, fever, chills or rigors.\par Has other medical conditions. Was at ED yesterday for left hand swelling. On Antibiotics, better now. \par \par Initially seen for history of Benign Prostatic Hyperplasia. \par Was noted to have elevated PSA. \par \par \par Was following with Dr Case in the past- underwent Spermatocele surgery, then saw Dr Armstrong([facility]).

## 2019-03-25 NOTE — PHYSICAL EXAM
[General Appearance - Well Developed] : well developed [Normal Appearance] : normal appearance [General Appearance - In No Acute Distress] : no acute distress [Abdomen Soft] : soft [Abdomen Tenderness] : non-tender [Abdomen Mass (___ Cm)] : no abdominal mass palpated [Costovertebral Angle Tenderness] : no ~M costovertebral angle tenderness [Urethral Meatus] : meatus normal [Penis Abnormality] : normal circumcised penis [Scrotum] : the scrotum was normal [Epididymis] : the epididymides were normal [Testes Tenderness] : no tenderness of the testes [Testes Mass (___cm)] : there were no testicular masses [Skin Color & Pigmentation] : normal skin color and pigmentation [FreeTextEntry1] : normal peripheral circulation [] : no respiratory distress [Oriented To Time, Place, And Person] : oriented to person, place, and time [Normal Station and Gait] : the gait and station were normal for the patient's age [No Focal Deficits] : no focal deficits [No Palpable Adenopathy] : no palpable adenopathy

## 2019-04-23 ENCOUNTER — APPOINTMENT (OUTPATIENT)
Dept: CARDIOLOGY | Facility: CLINIC | Age: 74
End: 2019-04-23
Payer: MEDICARE

## 2019-04-23 PROCEDURE — 93306 TTE W/DOPPLER COMPLETE: CPT

## 2019-05-07 ENCOUNTER — NON-APPOINTMENT (OUTPATIENT)
Age: 74
End: 2019-05-07

## 2019-05-07 ENCOUNTER — APPOINTMENT (OUTPATIENT)
Dept: CARDIOLOGY | Facility: CLINIC | Age: 74
End: 2019-05-07
Payer: MEDICARE

## 2019-05-07 VITALS
HEART RATE: 79 BPM | HEIGHT: 67 IN | DIASTOLIC BLOOD PRESSURE: 90 MMHG | OXYGEN SATURATION: 94 % | TEMPERATURE: 97.8 F | RESPIRATION RATE: 14 BRPM | SYSTOLIC BLOOD PRESSURE: 150 MMHG

## 2019-05-07 PROCEDURE — 99215 OFFICE O/P EST HI 40 MIN: CPT | Mod: 25

## 2019-05-07 PROCEDURE — 93000 ELECTROCARDIOGRAM COMPLETE: CPT

## 2019-05-07 NOTE — DISCUSSION/SUMMARY
[FreeTextEntry1] : CVA: Embolic. Following with neurology. speech therapy helping slightly.\par Coronary artery disease: s/p CABG.Doing well . Continue current medical therapy.\par Atrial fibrillation: Rate controlled. . Tolerating change to xarelto without issues..\par Peripheral arterial disease: Currently no claudication and tolerating current medical therapy.\par Hyperlipidemia: Blood work to be done. Cont. low fat diet.\par Pre-Diabetes: Low carb diet, and weight loss discussed.  \par Hypertension: Sub optimal Control. Low sodium diet discussed.\par Chronic systolic heart failure: Add olmesartan and help with LV Dysfunction.  \par F/u in 2 month.

## 2019-05-07 NOTE — CARDIOLOGY SUMMARY
[Inf Wall Defect] : inferior wall defect [LVEF ___%] : LVEF [unfilled]% [Mild] : mild pulmonary hypertension [Severe] : severe LV dysfunction [Moderate] : moderate mitral regurgitation [Enlarged] : enlarged LA size [___] : [unfilled]

## 2019-05-07 NOTE — HISTORY OF PRESENT ILLNESS
[FreeTextEntry1] : Had issue with hearing and found to be from large amount of ear wax and now better.  Had swelling ion foot and dx. with pseudo gout and now it is better. Speech and communication still difficult.  He understands but cannot express himself well yet. Wt. still down. BP running high here and occasionally at home. Reviewed echo (EF 28%), moderate MR.

## 2019-05-07 NOTE — REASON FOR VISIT
[Atrial Fibrillation] : atrial fibrillation [Follow-Up - Clinic] : a clinic follow-up of [Coronary Artery Disease] : coronary artery disease [Heart Failure] : congestive heart failure [Hyperlipidemia] : hyperlipidemia [Peripheral Vascular Disease] : peripheral vascular disease [Hypertension] : hypertension

## 2019-05-07 NOTE — PHYSICAL EXAM
[General Appearance - Well Developed] : well developed [Normal Appearance] : normal appearance [Normal Conjunctiva] : the conjunctiva exhibited no abnormalities [Eyelids - No Xanthelasma] : the eyelids demonstrated no xanthelasmas [Normal Oral Mucosa] : normal oral mucosa [No Oral Pallor] : no oral pallor [Normal Oropharynx] : normal oropharynx [No Oral Cyanosis] : no oral cyanosis [Normal Jugular Venous V Waves Present] : normal jugular venous V waves present [No Jugular Venous Samuel A Waves] : no jugular venous samuel A waves [Abdomen Soft] : soft [Auscultation Breath Sounds / Voice Sounds] : lungs were clear to auscultation bilaterally [Abdomen Mass (___ Cm)] : no abdominal mass palpated [Abdomen Tenderness] : non-tender [Nail Clubbing] : no clubbing of the fingernails [Cyanosis, Localized] : no localized cyanosis [Abnormal Walk] : normal gait [Petechial Hemorrhages (___cm)] : no petechial hemorrhages [Skin Color & Pigmentation] : normal skin color and pigmentation [No Venous Stasis] : no venous stasis [Skin Lesions] : no skin lesions [] : no rash [No Xanthoma] : no  xanthoma was observed [Oriented To Time, Place, And Person] : oriented to person, place, and time [No Skin Ulcers] : no skin ulcer [Mood] : the mood was normal [Affect] : the affect was normal [No Anxiety] : not feeling anxious [Diffuse] : diffuse [5th Left ICS - MCL] : palpated at the 5th LICS in the midclavicular line [Normal Rate] : normal [No Precordial Heave] : no precordial heave was noted [Rhythm Regular] : regular [Normal S2] : normal S2 [Normal S1] : normal S1 [I] : a grade 1 [2+] : Carotid: right 2+ [0] : left 0 [1+] : left 1+ [No Abnormalities] : the abdominal aorta was not enlarged and no bruit was heard [___ +] : bilateral [unfilled]U+ pitting edema to the ankles [S3] : no S3 [S4] : no S4

## 2019-05-08 ENCOUNTER — RX RENEWAL (OUTPATIENT)
Age: 74
End: 2019-05-08

## 2019-05-08 ENCOUNTER — RX CHANGE (OUTPATIENT)
Age: 74
End: 2019-05-08

## 2019-05-08 RX ORDER — OLMESARTAN MEDOXOMIL 20 MG/1
20 TABLET, FILM COATED ORAL
Qty: 90 | Refills: 3 | Status: DISCONTINUED | COMMUNITY
Start: 2019-05-07 | End: 2019-05-08

## 2019-05-14 ENCOUNTER — APPOINTMENT (OUTPATIENT)
Dept: NEUROLOGY | Facility: CLINIC | Age: 74
End: 2019-05-14
Payer: MEDICARE

## 2019-05-14 VITALS
BODY MASS INDEX: 25.71 KG/M2 | DIASTOLIC BLOOD PRESSURE: 84 MMHG | SYSTOLIC BLOOD PRESSURE: 121 MMHG | WEIGHT: 160 LBS | HEIGHT: 66 IN | HEART RATE: 76 BPM

## 2019-05-14 PROCEDURE — 99214 OFFICE O/P EST MOD 30 MIN: CPT

## 2019-05-14 NOTE — DATA REVIEWED
[de-identified] : CT angiogram of the head. October 27, 2017. Impression no large vessel occlusion. Nonobstructive atherosclerotic calcifications as noted above

## 2019-05-14 NOTE — REVIEW OF SYSTEMS
[Difficulty with Language] : ~M difficulty with language [Difficulties in Speech] : speech difficulties [Difficulty Writing] : difficulty writing [Easy Bleeding] : a tendency for easy bleeding [Easy Bruising] : a tendency for easy bruising [Negative] : Integumentary [Seizures] : no convulsions [Fainting] : no fainting [Vertigo] : no vertigo [Difficulty Walking] : no difficulty walking [Inability to Walk] : able to walk [Frequent Falls] : not falling [de-identified] : psuedo-bulbar affect resolved

## 2019-05-14 NOTE — PHYSICAL EXAM
[General Appearance - In No Acute Distress] : in no acute distress [General Appearance - Alert] : alert [General Appearance - Well-Appearing] : healthy appearing [Affect] : the affect was normal [Mood] : the mood was normal [Person] : oriented to person [Time] : oriented to time [Cranial Nerves Optic (II)] : visual acuity intact bilaterally,  visual fields full to confrontation, pupils equal round and reactive to light [Cranial Nerves Oculomotor (III)] : extraocular motion intact [Cranial Nerves Vestibulocochlear (VIII)] : hearing was intact bilaterally [Cranial Nerves Trigeminal (V)] : facial sensation intact symmetrically [Cranial Nerves Accessory (XI - Cranial And Spinal)] : head turning and shoulder shrug symmetric [Cranial Nerves Glossopharyngeal (IX)] : tongue and palate midline [Cranial Nerves Hypoglossal (XII)] : there was no tongue deviation with protrusion [Motor Tone] : muscle tone was normal in all four extremities [Motor Strength] : muscle strength was normal in all four extremities [Motor Handedness Right-Handed] : the patient is right hand dominant [Sensation Tactile Decrease] : light touch was intact [Sensation Pain / Temperature Decrease] : pain and temperature was intact [Proprioception] : proprioception was intact [Tremor] : a tremor present [1+] : Patella left 1+ [0] : Ankle jerk left 0 [Sclera] : the sclera and conjunctiva were normal [PERRL With Normal Accommodation] : pupils were equal in size, round, reactive to light, with normal accommodation [Full Visual Field] : full visual field [Extraocular Movements] : extraocular movements were intact [Hearing Threshold Finger Rub Not [place]] : hearing was normal [Neck Appearance] : the appearance of the neck was normal [Neck Cervical Mass (___cm)] : no neck mass was observed [Auscultation Breath Sounds / Voice Sounds] : lungs were clear to auscultation bilaterally [Heart Sounds] : normal S1 and S2 [Arterial Pulses Carotid] : carotid pulses were normal with no bruits [Edema] : there was no peripheral edema [Veins - Varicosity Changes] : there were no varicosital changes [No Spinal Tenderness] : no spinal tenderness [Abnormal Walk] : normal gait [Involuntary Movements] : no involuntary movements were seen [] : no rash [Naming Objects] : difficulty naming common objects [Writing A Sentence] : difficulty writing a sentence [Repeating Phrases] : difficulty repeating a phrase [Fluency] : fluency not intact [Comprehension] : comprehension not intact [Current Events] : inadequate knowledge of current events [Reading] : difficulty reading [Paresis Pronator Drift Right-Sided] : no pronator drift on the right [Vocabulary] : inadequate range of vocabulary [Paresis Pronator Drift Left-Sided] : no pronator drift on the left [Limited Balance] : balance was intact [Past-pointing] : there was no past-pointing [Dysdiadochokinesia Bilaterally] : not present [Coordination - Dysmetria Impaired Finger-to-Nose Bilateral] : not present [Plantar Reflex Right Only] : normal on the right [___] : absent on the right [Plantar Reflex Left Only] : normal on the left [___] : absent on the left [FreeTextEntry5] : mild right facial droop

## 2019-05-14 NOTE — DISCUSSION/SUMMARY
[FreeTextEntry1] : 74 year old man with PMH of CAD/CABG-AICD, HTN, PVD, A-fib on Xarelto, BASA, Plavix; s/p  Left CVA in October 2017, resulting in expressive > sensory aphasia.\par  \par # Left cerebral infract; likely embolic with residual aphasia- expressive < sensory\par \par - continue Xarelto and Plavix\par - Continue atorvastatin for stroke prophylaxis\par - CONTINUE speech therapy\par \par # mild pseudobulbar effect, resolved, \par \par - continue fluoxetine 10 mgs.\par \par \par

## 2019-05-14 NOTE — REASON FOR VISIT
[Follow-Up: _____] : a [unfilled] follow-up visit [Family Member] : family member [FreeTextEntry1] : for CVA - speech disturbance

## 2019-05-14 NOTE — HISTORY OF PRESENT ILLNESS
[FreeTextEntry1] : patient is here for a followup visit, was last seen on 12/11/18. Pt started speech therapy at [facility],  has been attending, has noted some improvement, continues to have difficulty with expression of speech and naming, he comprehends well, gets frustrated when unable to communicate . He has no difficulty walking or motor weakness.\par \par Patient's sister reports that patient has made remarkable improvement of mood and pseudobulbar affect, he is on Flouxetine, he is also receiving music therapy with friends, .

## 2019-05-16 ENCOUNTER — APPOINTMENT (OUTPATIENT)
Dept: ELECTROPHYSIOLOGY | Facility: CLINIC | Age: 74
End: 2019-05-16
Payer: MEDICARE

## 2019-05-16 VITALS
HEIGHT: 66 IN | OXYGEN SATURATION: 94 % | WEIGHT: 160 LBS | DIASTOLIC BLOOD PRESSURE: 80 MMHG | BODY MASS INDEX: 25.71 KG/M2 | HEART RATE: 78 BPM | SYSTOLIC BLOOD PRESSURE: 132 MMHG

## 2019-05-16 PROCEDURE — 93282 PRGRMG EVAL IMPLANTABLE DFB: CPT

## 2019-05-16 RX ORDER — TELMISARTAN 40 MG/1
40 TABLET ORAL DAILY
Qty: 90 | Refills: 1 | Status: DISCONTINUED | COMMUNITY
Start: 2019-05-08 | End: 2019-05-16

## 2019-05-28 PROCEDURE — 92523 SPEECH SOUND LANG COMPREHEN: CPT

## 2019-05-28 PROCEDURE — 92507 TX SP LANG VOICE COMM INDIV: CPT

## 2019-05-31 LAB
25(OH)D3 SERPL-MCNC: 37 NG/ML
ALBUMIN SERPL ELPH-MCNC: 4.1 G/DL
ALP BLD-CCNC: 149 U/L
ALT SERPL-CCNC: 15 U/L
ANION GAP SERPL CALC-SCNC: 13 MMOL/L
AST SERPL-CCNC: 19 U/L
BASOPHILS # BLD AUTO: 0.08 K/UL
BASOPHILS NFR BLD AUTO: 1.3 %
BILIRUB SERPL-MCNC: 1.9 MG/DL
BUN SERPL-MCNC: 17 MG/DL
CALCIUM SERPL-MCNC: 9.6 MG/DL
CHLORIDE SERPL-SCNC: 101 MMOL/L
CHOLEST SERPL-MCNC: 137 MG/DL
CHOLEST/HDLC SERPL: 2.9 RATIO
CK SERPL-CCNC: 88 U/L
CO2 SERPL-SCNC: 23 MMOL/L
CREAT SERPL-MCNC: 1.26 MG/DL
EOSINOPHIL # BLD AUTO: 0.14 K/UL
EOSINOPHIL NFR BLD AUTO: 2.2 %
ESTIMATED AVERAGE GLUCOSE: 123 MG/DL
GLUCOSE SERPL-MCNC: 95 MG/DL
HBA1C MFR BLD HPLC: 5.9 %
HCT VFR BLD CALC: 48 %
HDLC SERPL-MCNC: 48 MG/DL
HGB BLD-MCNC: 15.5 G/DL
IMM GRANULOCYTES NFR BLD AUTO: 0.6 %
LDLC SERPL CALC-MCNC: 79 MG/DL
LDLC SERPL DIRECT ASSAY-MCNC: 87 MG/DL
LYMPHOCYTES # BLD AUTO: 0.86 K/UL
LYMPHOCYTES NFR BLD AUTO: 13.7 %
MAN DIFF?: NORMAL
MCHC RBC-ENTMCNC: 28.6 PG
MCHC RBC-ENTMCNC: 32.3 GM/DL
MCV RBC AUTO: 88.6 FL
MONOCYTES # BLD AUTO: 0.89 K/UL
MONOCYTES NFR BLD AUTO: 14.2 %
NEUTROPHILS # BLD AUTO: 4.27 K/UL
NEUTROPHILS NFR BLD AUTO: 68 %
PLATELET # BLD AUTO: 211 K/UL
POTASSIUM SERPL-SCNC: 4.6 MMOL/L
PROT SERPL-MCNC: 6.9 G/DL
RBC # BLD: 5.42 M/UL
RBC # FLD: 17.2 %
SODIUM SERPL-SCNC: 137 MMOL/L
TRIGL SERPL-MCNC: 51 MG/DL
WBC # FLD AUTO: 6.28 K/UL

## 2019-06-24 NOTE — ED ADULT NURSE NOTE - CAS EDN DISCHARGE ASSESSMENT
Up to date on [facility] 10/2014    Call or come in if any signs of infection-Fever, redness, pain, exudate.    Otherwise congrats on wedding.     Alert and oriented to person, place and time/Patient baseline mental status

## 2019-07-09 ENCOUNTER — APPOINTMENT (OUTPATIENT)
Dept: CARDIOLOGY | Facility: CLINIC | Age: 74
End: 2019-07-09
Payer: MEDICARE

## 2019-07-09 ENCOUNTER — NON-APPOINTMENT (OUTPATIENT)
Age: 74
End: 2019-07-09

## 2019-07-09 VITALS
BODY MASS INDEX: 23.14 KG/M2 | DIASTOLIC BLOOD PRESSURE: 82 MMHG | HEART RATE: 71 BPM | RESPIRATION RATE: 14 BRPM | HEIGHT: 66 IN | SYSTOLIC BLOOD PRESSURE: 135 MMHG | WEIGHT: 144 LBS | OXYGEN SATURATION: 92 %

## 2019-07-09 DIAGNOSIS — Z00.00 ENCOUNTER FOR GENERAL ADULT MEDICAL EXAMINATION W/OUT ABNORMAL FINDINGS: ICD-10-CM

## 2019-07-09 PROCEDURE — 99214 OFFICE O/P EST MOD 30 MIN: CPT | Mod: 25

## 2019-07-09 PROCEDURE — 93000 ELECTROCARDIOGRAM COMPLETE: CPT

## 2019-07-09 NOTE — REVIEW OF SYSTEMS
[Feeling Fatigued] : not feeling fatigued [see HPI] : see HPI [Shortness Of Breath] : no shortness of breath [Dyspnea on exertion] : not dyspnea during exertion [Chest  Pressure] : no chest pressure [Chest Pain] : no chest pain [Leg Claudication] : no intermittent leg claudication [Lower Ext Edema] : no extremity edema [Palpitations] : no palpitations [Negative] : Heme/Lymph

## 2019-07-09 NOTE — PHYSICAL EXAM
[General Appearance - Well Developed] : well developed [Normal Appearance] : normal appearance [Normal Conjunctiva] : the conjunctiva exhibited no abnormalities [Eyelids - No Xanthelasma] : the eyelids demonstrated no xanthelasmas [Normal Oral Mucosa] : normal oral mucosa [No Oral Pallor] : no oral pallor [Normal Oropharynx] : normal oropharynx [No Oral Cyanosis] : no oral cyanosis [Normal Jugular Venous V Waves Present] : normal jugular venous V waves present [No Jugular Venous Samuel A Waves] : no jugular venous samuel A waves [Auscultation Breath Sounds / Voice Sounds] : lungs were clear to auscultation bilaterally [Abdomen Soft] : soft [Abdomen Tenderness] : non-tender [Abnormal Walk] : normal gait [Abdomen Mass (___ Cm)] : no abdominal mass palpated [Cyanosis, Localized] : no localized cyanosis [Nail Clubbing] : no clubbing of the fingernails [Petechial Hemorrhages (___cm)] : no petechial hemorrhages [Skin Color & Pigmentation] : normal skin color and pigmentation [] : no rash [No Venous Stasis] : no venous stasis [No Skin Ulcers] : no skin ulcer [Skin Lesions] : no skin lesions [No Xanthoma] : no  xanthoma was observed [Oriented To Time, Place, And Person] : oriented to person, place, and time [Affect] : the affect was normal [Mood] : the mood was normal [No Anxiety] : not feeling anxious [5th Left ICS - MCL] : palpated at the 5th LICS in the midclavicular line [Diffuse] : diffuse [No Precordial Heave] : no precordial heave was noted [Normal Rate] : normal [Rhythm Regular] : regular [Normal S1] : normal S1 [Normal S2] : normal S2 [S3] : no S3 [S4] : no S4 [I] : a grade 1 [2+] : left 2+ [0] : right 0 [1+] : left 1+ [No Abnormalities] : the abdominal aorta was not enlarged and no bruit was heard [___ +] : bilateral [unfilled]U+ pitting edema to the ankles

## 2019-07-09 NOTE — HISTORY OF PRESENT ILLNESS
[FreeTextEntry1] : Foot much better. Denies chest pain, shortness of breath, dyspnea on exertion, palpitations, orthopnea, paroxysmal nocturnal dyspnea, claudication, dizziness,  lightheadedness, presyncopal, or syncopal symptoms. Hearing much better since wax removed. Lost weight and doing ok as he is eating less. \par

## 2019-07-09 NOTE — DISCUSSION/SUMMARY
[FreeTextEntry1] : CVA: Embolic. Following with neurology. \par Coronary artery disease: s/p CABG.Doing well . Continue current medical therapy.\par Atrial fibrillation: Rate controlled. On Xarelto.\par Peripheral arterial disease: Currently no claudication and tolerating current medical therapy.\par Hyperlipidemia: Blood work good. Cont. low fat diet.\par Pre-Diabetes: HGBA1c good on recent blood work. Low carb diet, and weight loss discussed.  \par Hypertension: Controlled. Low sodium diet discussed.\par Chronic systolic heart failure: Clinically doing well on current regime.  \par F/u in 6 month.

## 2019-07-09 NOTE — REASON FOR VISIT
[Follow-Up - Clinic] : a clinic follow-up of [Coronary Artery Disease] : coronary artery disease [Atrial Fibrillation] : atrial fibrillation [Heart Failure] : congestive heart failure [Hypertension] : hypertension [Hyperlipidemia] : hyperlipidemia [Peripheral Vascular Disease] : peripheral vascular disease

## 2019-08-07 ENCOUNTER — RX RENEWAL (OUTPATIENT)
Age: 74
End: 2019-08-07

## 2019-08-07 ENCOUNTER — MEDICATION RENEWAL (OUTPATIENT)
Age: 74
End: 2019-08-07

## 2019-08-12 ENCOUNTER — MEDICATION RENEWAL (OUTPATIENT)
Age: 74
End: 2019-08-12

## 2019-08-13 ENCOUNTER — MEDICATION RENEWAL (OUTPATIENT)
Age: 74
End: 2019-08-13

## 2019-09-18 ENCOUNTER — APPOINTMENT (OUTPATIENT)
Dept: CARDIOLOGY | Facility: CLINIC | Age: 74
End: 2019-09-18
Payer: MEDICARE

## 2019-09-18 ENCOUNTER — NON-APPOINTMENT (OUTPATIENT)
Age: 74
End: 2019-09-18

## 2019-09-18 VITALS — SYSTOLIC BLOOD PRESSURE: 117 MMHG | HEART RATE: 63 BPM | OXYGEN SATURATION: 94 % | DIASTOLIC BLOOD PRESSURE: 71 MMHG

## 2019-09-18 PROCEDURE — 93000 ELECTROCARDIOGRAM COMPLETE: CPT

## 2019-09-18 PROCEDURE — 99214 OFFICE O/P EST MOD 30 MIN: CPT | Mod: 25

## 2019-09-18 RX ORDER — FUROSEMIDE 20 MG/1
20 TABLET ORAL
Qty: 90 | Refills: 3 | Status: ACTIVE | COMMUNITY

## 2019-09-18 RX ORDER — PANTOPRAZOLE 40 MG/1
40 TABLET, DELAYED RELEASE ORAL
Refills: 0 | Status: ACTIVE | COMMUNITY

## 2019-09-18 NOTE — REASON FOR VISIT
[Follow-Up - Clinic] : a clinic follow-up of [Atrial Fibrillation] : atrial fibrillation [Coronary Artery Disease] : coronary artery disease [Heart Failure] : congestive heart failure [Hypertension] : hypertension [Hyperlipidemia] : hyperlipidemia [Peripheral Vascular Disease] : peripheral vascular disease

## 2019-09-18 NOTE — DISCUSSION/SUMMARY
[FreeTextEntry1] : CVA: Embolic. Following with neurology. \par Coronary artery disease: s/p CABG.asymptomatic  . Continue current medical therapy.\par Atrial fibrillation: Rate controlled. On Xarelto.\par PPM: follow up with Dr Dominguez.\par Peripheral arterial disease: Currently no claudication and tolerating current medical therapy.\par Hyperlipidemia:Recent blood work at PCP. LDL 99. Will increase lipitor to 80 mg daily. Discussed . low fat diet. He is not compliant with diet. Will repeat blood works in 2 months. \par Pre-Diabetes: HGBA1c good on recent blood work. Low carb diet, and weight loss discussed.  \par Hypertension: Controlled. Low sodium diet discussed.\par Chronic systolic heart failure: Clinically doing well on current regime.  \par F/u in 6 month.

## 2019-09-18 NOTE — PHYSICAL EXAM
[General Appearance - Well Developed] : well developed [Normal Appearance] : normal appearance [Normal Conjunctiva] : the conjunctiva exhibited no abnormalities [Normal Oral Mucosa] : normal oral mucosa [Eyelids - No Xanthelasma] : the eyelids demonstrated no xanthelasmas [No Oral Pallor] : no oral pallor [Normal Jugular Venous V Waves Present] : normal jugular venous V waves present [Normal Oropharynx] : normal oropharynx [No Oral Cyanosis] : no oral cyanosis [No Jugular Venous Samuel A Waves] : no jugular venous samuel A waves [Auscultation Breath Sounds / Voice Sounds] : lungs were clear to auscultation bilaterally [Abdomen Soft] : soft [Abdomen Tenderness] : non-tender [Abdomen Mass (___ Cm)] : no abdominal mass palpated [Abnormal Walk] : normal gait [Nail Clubbing] : no clubbing of the fingernails [Petechial Hemorrhages (___cm)] : no petechial hemorrhages [Cyanosis, Localized] : no localized cyanosis [] : no rash [Skin Color & Pigmentation] : normal skin color and pigmentation [Skin Lesions] : no skin lesions [No Venous Stasis] : no venous stasis [No Skin Ulcers] : no skin ulcer [No Xanthoma] : no  xanthoma was observed [Oriented To Time, Place, And Person] : oriented to person, place, and time [Affect] : the affect was normal [Mood] : the mood was normal [No Anxiety] : not feeling anxious [5th Left ICS - MCL] : palpated at the 5th LICS in the midclavicular line [Diffuse] : diffuse [No Precordial Heave] : no precordial heave was noted [Normal Rate] : normal [Rhythm Regular] : regular [Normal S1] : normal S1 [Normal S2] : normal S2 [I] : a grade 1 [2+] : left 2+ [0] : right 0 [1+] : left 1+ [No Abnormalities] : the abdominal aorta was not enlarged and no bruit was heard [___ +] : bilateral [unfilled]U+ pitting edema to the ankles [S3] : no S3 [S4] : no S4

## 2019-09-18 NOTE — HISTORY OF PRESENT ILLNESS
[FreeTextEntry1] : presented for routine cardiac check up. . Denies chest pain, shortness of breath, dyspnea on exertion, palpitations, orthopnea, paroxysmal nocturnal dyspnea, claudication, dizziness,  lightheadedness, presyncopal, or syncopal symptoms. Blood works done recently with PCP. PPM interrogation due this moth with Dr Dominguez. H/o CVA follows up with neurologist.

## 2019-09-26 ENCOUNTER — APPOINTMENT (OUTPATIENT)
Dept: ELECTROPHYSIOLOGY | Facility: CLINIC | Age: 74
End: 2019-09-26

## 2019-10-14 ENCOUNTER — APPOINTMENT (OUTPATIENT)
Dept: ELECTROPHYSIOLOGY | Facility: CLINIC | Age: 74
End: 2019-10-14
Payer: MEDICARE

## 2019-10-14 VITALS
HEART RATE: 85 BPM | SYSTOLIC BLOOD PRESSURE: 133 MMHG | HEIGHT: 66 IN | DIASTOLIC BLOOD PRESSURE: 80 MMHG | WEIGHT: 144 LBS | BODY MASS INDEX: 23.14 KG/M2 | OXYGEN SATURATION: 95 %

## 2019-10-14 PROCEDURE — 93283 PRGRMG EVAL IMPLANTABLE DFB: CPT

## 2019-10-15 ENCOUNTER — APPOINTMENT (OUTPATIENT)
Dept: NEUROLOGY | Facility: CLINIC | Age: 74
End: 2019-10-15
Payer: MEDICARE

## 2019-10-15 VITALS
HEIGHT: 66 IN | BODY MASS INDEX: 24.11 KG/M2 | DIASTOLIC BLOOD PRESSURE: 82 MMHG | WEIGHT: 150 LBS | SYSTOLIC BLOOD PRESSURE: 147 MMHG | HEART RATE: 73 BPM

## 2019-10-15 PROCEDURE — 99214 OFFICE O/P EST MOD 30 MIN: CPT

## 2019-10-15 NOTE — DATA REVIEWED
[de-identified] : CT angiogram of the head. October 27, 2017. Impression no large vessel occlusion. Nonobstructive atherosclerotic calcifications as noted above

## 2019-10-15 NOTE — REVIEW OF SYSTEMS
[Difficulty with Language] : ~M difficulty with language [Difficulty Writing] : difficulty writing [Difficulties in Speech] : speech difficulties [Easy Bleeding] : a tendency for easy bleeding [Easy Bruising] : a tendency for easy bruising [Negative] : Integumentary [Fainting] : no fainting [Seizures] : no convulsions [Vertigo] : no vertigo [Difficulty Walking] : no difficulty walking [Inability to Walk] : able to walk [Frequent Falls] : not falling [de-identified] : psuedo-bulbar affect resolved

## 2019-10-15 NOTE — PHYSICAL EXAM
[General Appearance - Alert] : alert [General Appearance - In No Acute Distress] : in no acute distress [General Appearance - Well-Appearing] : healthy appearing [Affect] : the affect was normal [Mood] : the mood was normal [Person] : oriented to person [Cranial Nerves Optic (II)] : visual acuity intact bilaterally,  visual fields full to confrontation, pupils equal round and reactive to light [Cranial Nerves Oculomotor (III)] : extraocular motion intact [Cranial Nerves Trigeminal (V)] : facial sensation intact symmetrically [Cranial Nerves Vestibulocochlear (VIII)] : hearing was intact bilaterally [Cranial Nerves Glossopharyngeal (IX)] : tongue and palate midline [Cranial Nerves Accessory (XI - Cranial And Spinal)] : head turning and shoulder shrug symmetric [Cranial Nerves Hypoglossal (XII)] : there was no tongue deviation with protrusion [Motor Tone] : muscle tone was normal in all four extremities [Motor Strength] : muscle strength was normal in all four extremities [Motor Handedness Right-Handed] : the patient is right hand dominant [Sensation Tactile Decrease] : light touch was intact [Sensation Pain / Temperature Decrease] : pain and temperature was intact [Proprioception] : proprioception was intact [Tremor] : a tremor present [1+] : Patella left 1+ [0] : Ankle jerk left 0 [Sclera] : the sclera and conjunctiva were normal [PERRL With Normal Accommodation] : pupils were equal in size, round, reactive to light, with normal accommodation [Extraocular Movements] : extraocular movements were intact [Full Visual Field] : full visual field [Hearing Threshold Finger Rub Not [place]] : hearing was normal [Neck Appearance] : the appearance of the neck was normal [Neck Cervical Mass (___cm)] : no neck mass was observed [Auscultation Breath Sounds / Voice Sounds] : lungs were clear to auscultation bilaterally [Heart Sounds] : normal S1 and S2 [Arterial Pulses Carotid] : carotid pulses were normal with no bruits [Edema] : there was no peripheral edema [Veins - Varicosity Changes] : there were no varicosital changes [No Spinal Tenderness] : no spinal tenderness [Abnormal Walk] : normal gait [Involuntary Movements] : no involuntary movements were seen [] : no rash [Place] : disoriented to place [Naming Objects] : difficulty naming common objects [Time] : disoriented to time [Writing A Sentence] : difficulty writing a sentence [Repeating Phrases] : difficulty repeating a phrase [Reading] : difficulty reading [Fluency] : fluency not intact [Comprehension] : comprehension not intact [Current Events] : inadequate knowledge of current events [Vocabulary] : inadequate range of vocabulary [Paresis Pronator Drift Right-Sided] : no pronator drift on the right [Paresis Pronator Drift Left-Sided] : no pronator drift on the left [Limited Balance] : balance was intact [Past-pointing] : there was no past-pointing [Coordination - Dysmetria Impaired Finger-to-Nose Bilateral] : not present [Dysdiadochokinesia Bilaterally] : not present [Plantar Reflex Right Only] : normal on the right [Plantar Reflex Left Only] : normal on the left [___] : absent on the left [___] : absent on the right [FreeTextEntry5] : mild right facial droop

## 2019-10-15 NOTE — DISCUSSION/SUMMARY
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

## 2019-10-15 NOTE — HISTORY OF PRESENT ILLNESS
[FreeTextEntry1] : patient is here for a followup visit, was last seen on 19. Pt has d/c speech therapy at [facility],  has not noted any improvement, continues to have difficulty with expression of speech and naming, also has difficulty writing, he however, comprehends well, gets frustrated when unable to communicate. He has no difficulty walking or motor weakness.\par \par Patient's sister reports that patient has made remarkable improvement of mood and pseudobulbar affect, he is on Flouxetine, he is also receiving music therapy with friends .\par \par Patient has been monitoring his sister regarding driving, apparently he is taking on driving, his license  18 months ago, he has no car.

## 2019-11-04 ENCOUNTER — MEDICATION RENEWAL (OUTPATIENT)
Age: 74
End: 2019-11-04

## 2019-11-08 ENCOUNTER — MEDICATION RENEWAL (OUTPATIENT)
Age: 74
End: 2019-11-08

## 2019-11-08 ENCOUNTER — RX CHANGE (OUTPATIENT)
Age: 74
End: 2019-11-08

## 2019-11-08 RX ORDER — TELMISARTAN 40 MG/1
40 TABLET ORAL
Qty: 90 | Refills: 3 | Status: DISCONTINUED | COMMUNITY
End: 2019-11-08

## 2019-11-11 ENCOUNTER — MEDICATION RENEWAL (OUTPATIENT)
Age: 74
End: 2019-11-11

## 2019-11-19 LAB
ALBUMIN SERPL ELPH-MCNC: 4 G/DL
ALP BLD-CCNC: 111 U/L
ALT SERPL-CCNC: 15 U/L
ANION GAP SERPL CALC-SCNC: 13 MMOL/L
AST SERPL-CCNC: 16 U/L
BILIRUB SERPL-MCNC: 0.8 MG/DL
BUN SERPL-MCNC: 34 MG/DL
CALCIUM SERPL-MCNC: 9.5 MG/DL
CHLORIDE SERPL-SCNC: 100 MMOL/L
CHOLEST SERPL-MCNC: 138 MG/DL
CHOLEST/HDLC SERPL: 2.8 RATIO
CO2 SERPL-SCNC: 24 MMOL/L
CREAT SERPL-MCNC: 1.76 MG/DL
GLUCOSE SERPL-MCNC: 92 MG/DL
HDLC SERPL-MCNC: 49 MG/DL
LDLC SERPL CALC-MCNC: 73 MG/DL
POTASSIUM SERPL-SCNC: 4.7 MMOL/L
PROT SERPL-MCNC: 7.1 G/DL
SODIUM SERPL-SCNC: 136 MMOL/L
TRIGL SERPL-MCNC: 79 MG/DL

## 2020-01-01 ENCOUNTER — APPOINTMENT (OUTPATIENT)
Dept: CARDIOLOGY | Facility: CLINIC | Age: 75
End: 2020-01-01
Payer: MEDICARE

## 2020-01-01 ENCOUNTER — INPATIENT (INPATIENT)
Facility: HOSPITAL | Age: 75
LOS: 3 days | Discharge: HOME | DRG: 150 | End: 2020-05-12
Attending: FAMILY MEDICINE | Admitting: FAMILY MEDICINE
Payer: COMMERCIAL

## 2020-01-01 ENCOUNTER — APPOINTMENT (OUTPATIENT)
Dept: ELECTROPHYSIOLOGY | Facility: CLINIC | Age: 75
End: 2020-01-01
Payer: MEDICARE

## 2020-01-01 ENCOUNTER — APPOINTMENT (OUTPATIENT)
Dept: UROLOGY | Facility: CLINIC | Age: 75
End: 2020-01-01
Payer: MEDICARE

## 2020-01-01 ENCOUNTER — NON-APPOINTMENT (OUTPATIENT)
Age: 75
End: 2020-01-01

## 2020-01-01 ENCOUNTER — APPOINTMENT (OUTPATIENT)
Dept: NEUROLOGY | Facility: CLINIC | Age: 75
End: 2020-01-01

## 2020-01-01 ENCOUNTER — TRANSCRIPTION ENCOUNTER (OUTPATIENT)
Age: 75
End: 2020-01-01

## 2020-01-01 ENCOUNTER — RX RENEWAL (OUTPATIENT)
Age: 75
End: 2020-01-01

## 2020-01-01 VITALS
RESPIRATION RATE: 18 BRPM | HEART RATE: 100 BPM | SYSTOLIC BLOOD PRESSURE: 117 MMHG | DIASTOLIC BLOOD PRESSURE: 60 MMHG | OXYGEN SATURATION: 100 % | TEMPERATURE: 97 F | WEIGHT: 130.07 LBS

## 2020-01-01 VITALS
DIASTOLIC BLOOD PRESSURE: 88 MMHG | SYSTOLIC BLOOD PRESSURE: 142 MMHG | HEIGHT: 66 IN | BODY MASS INDEX: 24.11 KG/M2 | WEIGHT: 150 LBS | HEART RATE: 70 BPM | OXYGEN SATURATION: 95 %

## 2020-01-01 VITALS
BODY MASS INDEX: 23.78 KG/M2 | DIASTOLIC BLOOD PRESSURE: 70 MMHG | SYSTOLIC BLOOD PRESSURE: 117 MMHG | HEART RATE: 73 BPM | WEIGHT: 148 LBS | HEIGHT: 66 IN | OXYGEN SATURATION: 92 %

## 2020-01-01 VITALS
SYSTOLIC BLOOD PRESSURE: 154 MMHG | OXYGEN SATURATION: 89 % | WEIGHT: 150 LBS | BODY MASS INDEX: 24.11 KG/M2 | HEIGHT: 66 IN | HEART RATE: 76 BPM | DIASTOLIC BLOOD PRESSURE: 81 MMHG

## 2020-01-01 VITALS
HEART RATE: 72 BPM | DIASTOLIC BLOOD PRESSURE: 56 MMHG | OXYGEN SATURATION: 94 % | RESPIRATION RATE: 18 BRPM | SYSTOLIC BLOOD PRESSURE: 91 MMHG | TEMPERATURE: 98 F

## 2020-01-01 DIAGNOSIS — Z95.1 PRESENCE OF AORTOCORONARY BYPASS GRAFT: Chronic | ICD-10-CM

## 2020-01-01 DIAGNOSIS — Z87.898 PERSONAL HISTORY OF OTHER SPECIFIED CONDITIONS: ICD-10-CM

## 2020-01-01 DIAGNOSIS — I73.9 PERIPHERAL VASCULAR DISEASE, UNSPECIFIED: ICD-10-CM

## 2020-01-01 DIAGNOSIS — I42.9 CARDIOMYOPATHY, UNSPECIFIED: ICD-10-CM

## 2020-01-01 DIAGNOSIS — I63.412 CEREBRAL INFARCTION DUE TO EMBOLISM OF LEFT MIDDLE CEREBRAL ARTERY: ICD-10-CM

## 2020-01-01 DIAGNOSIS — E78.5 HYPERLIPIDEMIA, UNSPECIFIED: ICD-10-CM

## 2020-01-01 DIAGNOSIS — J44.9 CHRONIC OBSTRUCTIVE PULMONARY DISEASE, UNSPECIFIED: ICD-10-CM

## 2020-01-01 DIAGNOSIS — Z87.19 PERSONAL HISTORY OF OTHER DISEASES OF THE DIGESTIVE SYSTEM: ICD-10-CM

## 2020-01-01 DIAGNOSIS — I50.9 HEART FAILURE, UNSPECIFIED: ICD-10-CM

## 2020-01-01 DIAGNOSIS — S85.009A UNSPECIFIED INJURY OF POPLITEAL ARTERY, UNSPECIFIED LEG, INITIAL ENCOUNTER: Chronic | ICD-10-CM

## 2020-01-01 DIAGNOSIS — I25.10 ATHEROSCLEROTIC HEART DISEASE OF NATIVE CORONARY ARTERY WITHOUT ANGINA PECTORIS: Chronic | ICD-10-CM

## 2020-01-01 DIAGNOSIS — N40.1 BENIGN PROSTATIC HYPERPLASIA WITH LOWER URINARY TRACT SYMPMS: ICD-10-CM

## 2020-01-01 DIAGNOSIS — N13.8 BENIGN PROSTATIC HYPERPLASIA WITH LOWER URINARY TRACT SYMPMS: ICD-10-CM

## 2020-01-01 DIAGNOSIS — I48.91 UNSPECIFIED ATRIAL FIBRILLATION: ICD-10-CM

## 2020-01-01 DIAGNOSIS — I69.320 APHASIA FOLLOWING CEREBRAL INFARCTION: ICD-10-CM

## 2020-01-01 DIAGNOSIS — R04.0 EPISTAXIS: ICD-10-CM

## 2020-01-01 DIAGNOSIS — S49.90XA UNSPECIFIED INJURY OF SHOULDER AND UPPER ARM, UNSPECIFIED ARM, INITIAL ENCOUNTER: Chronic | ICD-10-CM

## 2020-01-01 DIAGNOSIS — I50.20 UNSPECIFIED SYSTOLIC (CONGESTIVE) HEART FAILURE: ICD-10-CM

## 2020-01-01 DIAGNOSIS — E11.9 TYPE 2 DIABETES MELLITUS WITHOUT COMPLICATIONS: ICD-10-CM

## 2020-01-01 DIAGNOSIS — R74.8 ABNORMAL LEVELS OF OTHER SERUM ENZYMES: ICD-10-CM

## 2020-01-01 DIAGNOSIS — K92.2 GASTROINTESTINAL HEMORRHAGE, UNSPECIFIED: ICD-10-CM

## 2020-01-01 DIAGNOSIS — E78.00 PURE HYPERCHOLESTEROLEMIA, UNSPECIFIED: ICD-10-CM

## 2020-01-01 DIAGNOSIS — I25.10 ATHEROSCLEROTIC HEART DISEASE OF NATIVE CORONARY ARTERY W/OUT ANGINA PECTORIS: ICD-10-CM

## 2020-01-01 DIAGNOSIS — I10 ESSENTIAL (PRIMARY) HYPERTENSION: ICD-10-CM

## 2020-01-01 DIAGNOSIS — R47.01 APHASIA: ICD-10-CM

## 2020-01-01 DIAGNOSIS — D72.829 ELEVATED WHITE BLOOD CELL COUNT, UNSPECIFIED: ICD-10-CM

## 2020-01-01 DIAGNOSIS — Z29.9 ENCOUNTER FOR PROPHYLACTIC MEASURES, UNSPECIFIED: ICD-10-CM

## 2020-01-01 DIAGNOSIS — I50.22 CHRONIC SYSTOLIC (CONGESTIVE) HEART FAILURE: ICD-10-CM

## 2020-01-01 LAB
ALBUMIN SERPL ELPH-MCNC: 2.6 G/DL — LOW (ref 3.3–5)
ALBUMIN SERPL ELPH-MCNC: 2.8 G/DL — LOW (ref 3.3–5)
ALBUMIN SERPL ELPH-MCNC: 2.8 G/DL — LOW (ref 3.3–5)
ALBUMIN SERPL ELPH-MCNC: 3.7 G/DL
ALP BLD-CCNC: 148 U/L
ALP SERPL-CCNC: 109 U/L — SIGNIFICANT CHANGE UP (ref 40–120)
ALP SERPL-CCNC: 110 U/L — SIGNIFICANT CHANGE UP (ref 40–120)
ALP SERPL-CCNC: 112 U/L — SIGNIFICANT CHANGE UP (ref 40–120)
ALP SERPL-CCNC: 113 U/L — SIGNIFICANT CHANGE UP (ref 40–120)
ALP SERPL-CCNC: 131 U/L — HIGH (ref 40–120)
ALT FLD-CCNC: 13 U/L — SIGNIFICANT CHANGE UP (ref 12–78)
ALT FLD-CCNC: 17 U/L — SIGNIFICANT CHANGE UP (ref 12–78)
ALT FLD-CCNC: 17 U/L — SIGNIFICANT CHANGE UP (ref 12–78)
ALT FLD-CCNC: 19 U/L — SIGNIFICANT CHANGE UP (ref 12–78)
ALT FLD-CCNC: 21 U/L — SIGNIFICANT CHANGE UP (ref 12–78)
ALT SERPL-CCNC: 17 U/L
ANION GAP SERPL CALC-SCNC: 10 MMOL/L
ANION GAP SERPL CALC-SCNC: 4 MMOL/L — LOW (ref 5–17)
ANION GAP SERPL CALC-SCNC: 7 MMOL/L — SIGNIFICANT CHANGE UP (ref 5–17)
ANION GAP SERPL CALC-SCNC: 7 MMOL/L — SIGNIFICANT CHANGE UP (ref 5–17)
ANION GAP SERPL CALC-SCNC: 8 MMOL/L — SIGNIFICANT CHANGE UP (ref 5–17)
ANION GAP SERPL CALC-SCNC: 9 MMOL/L — SIGNIFICANT CHANGE UP (ref 5–17)
APPEARANCE UR: CLEAR — SIGNIFICANT CHANGE UP
APPEARANCE: CLEAR
APTT BLD: 34 SEC — SIGNIFICANT CHANGE UP (ref 28.5–37)
AST SERPL-CCNC: 15 U/L
AST SERPL-CCNC: 16 U/L — SIGNIFICANT CHANGE UP (ref 15–37)
AST SERPL-CCNC: 16 U/L — SIGNIFICANT CHANGE UP (ref 15–37)
AST SERPL-CCNC: 21 U/L — SIGNIFICANT CHANGE UP (ref 15–37)
AST SERPL-CCNC: 33 U/L — SIGNIFICANT CHANGE UP (ref 15–37)
AST SERPL-CCNC: 34 U/L — SIGNIFICANT CHANGE UP (ref 15–37)
BACTERIA UR CULT: NORMAL
BACTERIA: NEGATIVE
BASOPHILS # BLD AUTO: 0 K/UL — SIGNIFICANT CHANGE UP (ref 0–0.2)
BASOPHILS # BLD AUTO: 0.04 K/UL — SIGNIFICANT CHANGE UP (ref 0–0.2)
BASOPHILS # BLD AUTO: 0.05 K/UL — SIGNIFICANT CHANGE UP (ref 0–0.2)
BASOPHILS # BLD AUTO: 0.06 K/UL
BASOPHILS # BLD AUTO: 0.08 K/UL — SIGNIFICANT CHANGE UP (ref 0–0.2)
BASOPHILS NFR BLD AUTO: 0 % — SIGNIFICANT CHANGE UP (ref 0–2)
BASOPHILS NFR BLD AUTO: 0.6 % — SIGNIFICANT CHANGE UP (ref 0–2)
BASOPHILS NFR BLD AUTO: 0.8 % — SIGNIFICANT CHANGE UP (ref 0–2)
BASOPHILS NFR BLD AUTO: 0.9 %
BASOPHILS NFR BLD AUTO: 1 % — SIGNIFICANT CHANGE UP (ref 0–2)
BILIRUB SERPL-MCNC: 0.9 MG/DL
BILIRUB SERPL-MCNC: 1.1 MG/DL — SIGNIFICANT CHANGE UP (ref 0.2–1.2)
BILIRUB SERPL-MCNC: 1.3 MG/DL — HIGH (ref 0.2–1.2)
BILIRUB SERPL-MCNC: 1.5 MG/DL — HIGH (ref 0.2–1.2)
BILIRUB SERPL-MCNC: 1.6 MG/DL — HIGH (ref 0.2–1.2)
BILIRUB SERPL-MCNC: 1.8 MG/DL — HIGH (ref 0.2–1.2)
BILIRUB UR-MCNC: NEGATIVE — SIGNIFICANT CHANGE UP
BILIRUBIN URINE: NEGATIVE
BLOOD URINE: NEGATIVE
BUN SERPL-MCNC: 25 MG/DL
BUN SERPL-MCNC: 27 MG/DL — HIGH (ref 7–23)
BUN SERPL-MCNC: 32 MG/DL — HIGH (ref 7–23)
BUN SERPL-MCNC: 42 MG/DL — HIGH (ref 7–23)
BUN SERPL-MCNC: 48 MG/DL — HIGH (ref 7–23)
BUN SERPL-MCNC: 54 MG/DL — HIGH (ref 7–23)
CALCIUM SERPL-MCNC: 8.5 MG/DL — SIGNIFICANT CHANGE UP (ref 8.5–10.1)
CALCIUM SERPL-MCNC: 8.5 MG/DL — SIGNIFICANT CHANGE UP (ref 8.5–10.1)
CALCIUM SERPL-MCNC: 8.6 MG/DL — SIGNIFICANT CHANGE UP (ref 8.5–10.1)
CALCIUM SERPL-MCNC: 8.6 MG/DL — SIGNIFICANT CHANGE UP (ref 8.5–10.1)
CALCIUM SERPL-MCNC: 9 MG/DL — SIGNIFICANT CHANGE UP (ref 8.5–10.1)
CALCIUM SERPL-MCNC: 9.1 MG/DL
CHLORIDE SERPL-SCNC: 103 MMOL/L — SIGNIFICANT CHANGE UP (ref 96–108)
CHLORIDE SERPL-SCNC: 106 MMOL/L — SIGNIFICANT CHANGE UP (ref 96–108)
CHLORIDE SERPL-SCNC: 107 MMOL/L — SIGNIFICANT CHANGE UP (ref 96–108)
CHLORIDE SERPL-SCNC: 111 MMOL/L — HIGH (ref 96–108)
CHLORIDE SERPL-SCNC: 112 MMOL/L — HIGH (ref 96–108)
CHLORIDE SERPL-SCNC: 98 MMOL/L
CHOLEST SERPL-MCNC: 90 MG/DL
CHOLEST/HDLC SERPL: 2 RATIO
CK MB BLD-MCNC: 2.1 % — SIGNIFICANT CHANGE UP (ref 0–3.5)
CK MB BLD-MCNC: 2.2 % — SIGNIFICANT CHANGE UP (ref 0–3.5)
CK MB BLD-MCNC: 2.4 % — SIGNIFICANT CHANGE UP (ref 0–3.5)
CK MB BLD-MCNC: 2.9 % — SIGNIFICANT CHANGE UP (ref 0–3.5)
CK MB BLD-MCNC: 3.1 % — SIGNIFICANT CHANGE UP (ref 0–3.5)
CK MB BLD-MCNC: 3.3 % — SIGNIFICANT CHANGE UP (ref 0–3.5)
CK MB CFR SERPL CALC: 2.6 NG/ML — SIGNIFICANT CHANGE UP (ref 0–3.6)
CK MB CFR SERPL CALC: 2.6 NG/ML — SIGNIFICANT CHANGE UP (ref 0–3.6)
CK MB CFR SERPL CALC: 2.7 NG/ML — SIGNIFICANT CHANGE UP (ref 0–3.6)
CK MB CFR SERPL CALC: 3 NG/ML — SIGNIFICANT CHANGE UP (ref 0–3.6)
CK MB CFR SERPL CALC: 3.3 NG/ML — SIGNIFICANT CHANGE UP (ref 0–3.6)
CK MB CFR SERPL CALC: 3.4 NG/ML — SIGNIFICANT CHANGE UP (ref 0–3.6)
CK SERPL-CCNC: 102 U/L — SIGNIFICANT CHANGE UP (ref 26–308)
CK SERPL-CCNC: 102 U/L — SIGNIFICANT CHANGE UP (ref 26–308)
CK SERPL-CCNC: 105 U/L — SIGNIFICANT CHANGE UP (ref 26–308)
CK SERPL-CCNC: 114 U/L — SIGNIFICANT CHANGE UP (ref 26–308)
CK SERPL-CCNC: 116 U/L — SIGNIFICANT CHANGE UP (ref 26–308)
CK SERPL-CCNC: 123 U/L — SIGNIFICANT CHANGE UP (ref 26–308)
CO2 SERPL-SCNC: 23 MMOL/L
CO2 SERPL-SCNC: 24 MMOL/L — SIGNIFICANT CHANGE UP (ref 22–31)
CO2 SERPL-SCNC: 25 MMOL/L — SIGNIFICANT CHANGE UP (ref 22–31)
CO2 SERPL-SCNC: 25 MMOL/L — SIGNIFICANT CHANGE UP (ref 22–31)
CO2 SERPL-SCNC: 26 MMOL/L — SIGNIFICANT CHANGE UP (ref 22–31)
CO2 SERPL-SCNC: 28 MMOL/L — SIGNIFICANT CHANGE UP (ref 22–31)
COLOR SPEC: YELLOW — SIGNIFICANT CHANGE UP
COLOR: YELLOW
CREAT SERPL-MCNC: 1.3 MG/DL — SIGNIFICANT CHANGE UP (ref 0.5–1.3)
CREAT SERPL-MCNC: 1.4 MG/DL — HIGH (ref 0.5–1.3)
CREAT SERPL-MCNC: 1.4 MG/DL — HIGH (ref 0.5–1.3)
CREAT SERPL-MCNC: 1.49 MG/DL
CREAT SERPL-MCNC: 1.5 MG/DL — HIGH (ref 0.5–1.3)
CREAT SERPL-MCNC: 1.5 MG/DL — HIGH (ref 0.5–1.3)
CRP SERPL-MCNC: 0.39 MG/DL — SIGNIFICANT CHANGE UP (ref 0–0.4)
DIFF PNL FLD: ABNORMAL
EOSINOPHIL # BLD AUTO: 0 K/UL — SIGNIFICANT CHANGE UP (ref 0–0.5)
EOSINOPHIL # BLD AUTO: 0.16 K/UL
EOSINOPHIL # BLD AUTO: 0.19 K/UL — SIGNIFICANT CHANGE UP (ref 0–0.5)
EOSINOPHIL # BLD AUTO: 0.2 K/UL — SIGNIFICANT CHANGE UP (ref 0–0.5)
EOSINOPHIL # BLD AUTO: 0.24 K/UL — SIGNIFICANT CHANGE UP (ref 0–0.5)
EOSINOPHIL NFR BLD AUTO: 0 % — SIGNIFICANT CHANGE UP (ref 0–6)
EOSINOPHIL NFR BLD AUTO: 2.4 %
EOSINOPHIL NFR BLD AUTO: 2.9 % — SIGNIFICANT CHANGE UP (ref 0–6)
EOSINOPHIL NFR BLD AUTO: 3 % — SIGNIFICANT CHANGE UP (ref 0–6)
EOSINOPHIL NFR BLD AUTO: 3.2 % — SIGNIFICANT CHANGE UP (ref 0–6)
FERRITIN SERPL-MCNC: 37 NG/ML — SIGNIFICANT CHANGE UP (ref 30–400)
GLUCOSE QUALITATIVE U: NEGATIVE
GLUCOSE SERPL-MCNC: 75 MG/DL — SIGNIFICANT CHANGE UP (ref 70–99)
GLUCOSE SERPL-MCNC: 76 MG/DL — SIGNIFICANT CHANGE UP (ref 70–99)
GLUCOSE SERPL-MCNC: 84 MG/DL — SIGNIFICANT CHANGE UP (ref 70–99)
GLUCOSE SERPL-MCNC: 94 MG/DL — SIGNIFICANT CHANGE UP (ref 70–99)
GLUCOSE SERPL-MCNC: 96 MG/DL — SIGNIFICANT CHANGE UP (ref 70–99)
GLUCOSE SERPL-MCNC: 97 MG/DL
GLUCOSE UR QL: NEGATIVE — SIGNIFICANT CHANGE UP
HCT VFR BLD CALC: 26.2 % — LOW (ref 39–50)
HCT VFR BLD CALC: 26.4 % — LOW (ref 39–50)
HCT VFR BLD CALC: 26.8 % — LOW (ref 39–50)
HCT VFR BLD CALC: 27.4 % — LOW (ref 39–50)
HCT VFR BLD CALC: 27.6 % — LOW (ref 39–50)
HCT VFR BLD CALC: 27.8 % — LOW (ref 39–50)
HCT VFR BLD CALC: 28.3 % — LOW (ref 39–50)
HCT VFR BLD CALC: 28.3 % — LOW (ref 39–50)
HCT VFR BLD CALC: 29.1 % — LOW (ref 39–50)
HCT VFR BLD CALC: 29.2 %
HDLC SERPL-MCNC: 45 MG/DL
HGB BLD-MCNC: 8.3 G/DL — LOW (ref 13–17)
HGB BLD-MCNC: 8.4 G/DL — LOW (ref 13–17)
HGB BLD-MCNC: 8.5 G/DL — LOW (ref 13–17)
HGB BLD-MCNC: 8.7 G/DL — LOW (ref 13–17)
HGB BLD-MCNC: 8.8 G/DL — LOW (ref 13–17)
HGB BLD-MCNC: 8.9 G/DL
HGB BLD-MCNC: 9.1 G/DL — LOW (ref 13–17)
HGB BLD-MCNC: 9.2 G/DL — LOW (ref 13–17)
HYALINE CASTS: 0 /LPF
IMM GRANULOCYTES NFR BLD AUTO: 0.6 %
IMM GRANULOCYTES NFR BLD AUTO: 0.6 % — SIGNIFICANT CHANGE UP (ref 0–1.5)
IMM GRANULOCYTES NFR BLD AUTO: 0.6 % — SIGNIFICANT CHANGE UP (ref 0–1.5)
IMM GRANULOCYTES NFR BLD AUTO: 0.8 % — SIGNIFICANT CHANGE UP (ref 0–1.5)
INR BLD: 1.42 RATIO — HIGH (ref 0.88–1.16)
INR BLD: 1.52 RATIO — HIGH (ref 0.88–1.16)
INR BLD: 1.58 RATIO — HIGH (ref 0.88–1.16)
INR BLD: 2.06 RATIO — HIGH (ref 0.88–1.16)
KETONES UR-MCNC: NEGATIVE — SIGNIFICANT CHANGE UP
KETONES URINE: NEGATIVE
LDLC SERPL CALC-MCNC: 35 MG/DL
LEUKOCYTE ESTERASE UR-ACNC: NEGATIVE — SIGNIFICANT CHANGE UP
LEUKOCYTE ESTERASE URINE: NEGATIVE
LYMPHOCYTES # BLD AUTO: 0.27 K/UL — LOW (ref 1–3.3)
LYMPHOCYTES # BLD AUTO: 0.58 K/UL — LOW (ref 1–3.3)
LYMPHOCYTES # BLD AUTO: 0.59 K/UL — LOW (ref 1–3.3)
LYMPHOCYTES # BLD AUTO: 0.71 K/UL
LYMPHOCYTES # BLD AUTO: 0.77 K/UL — LOW (ref 1–3.3)
LYMPHOCYTES # BLD AUTO: 2 % — LOW (ref 13–44)
LYMPHOCYTES # BLD AUTO: 9.2 % — LOW (ref 13–44)
LYMPHOCYTES # BLD AUTO: 9.4 % — LOW (ref 13–44)
LYMPHOCYTES # BLD AUTO: 9.4 % — LOW (ref 13–44)
LYMPHOCYTES NFR BLD AUTO: 10.7 %
MAGNESIUM SERPL-MCNC: 1.9 MG/DL — SIGNIFICANT CHANGE UP (ref 1.6–2.6)
MAGNESIUM SERPL-MCNC: 2.2 MG/DL — SIGNIFICANT CHANGE UP (ref 1.6–2.6)
MAN DIFF?: NORMAL
MCHC RBC-ENTMCNC: 26.3 PG
MCHC RBC-ENTMCNC: 26.3 PG — LOW (ref 27–34)
MCHC RBC-ENTMCNC: 26.7 PG — LOW (ref 27–34)
MCHC RBC-ENTMCNC: 26.7 PG — LOW (ref 27–34)
MCHC RBC-ENTMCNC: 26.9 PG — LOW (ref 27–34)
MCHC RBC-ENTMCNC: 27 PG — SIGNIFICANT CHANGE UP (ref 27–34)
MCHC RBC-ENTMCNC: 30.5 GM/DL
MCHC RBC-ENTMCNC: 31.1 GM/DL — LOW (ref 32–36)
MCHC RBC-ENTMCNC: 31.4 GM/DL — LOW (ref 32–36)
MCHC RBC-ENTMCNC: 31.6 GM/DL — LOW (ref 32–36)
MCHC RBC-ENTMCNC: 31.7 GM/DL — LOW (ref 32–36)
MCHC RBC-ENTMCNC: 32.2 GM/DL — SIGNIFICANT CHANGE UP (ref 32–36)
MCV RBC AUTO: 83.1 FL — SIGNIFICANT CHANGE UP (ref 80–100)
MCV RBC AUTO: 84 FL — SIGNIFICANT CHANGE UP (ref 80–100)
MCV RBC AUTO: 84.2 FL — SIGNIFICANT CHANGE UP (ref 80–100)
MCV RBC AUTO: 85.4 FL — SIGNIFICANT CHANGE UP (ref 80–100)
MCV RBC AUTO: 85.8 FL — SIGNIFICANT CHANGE UP (ref 80–100)
MCV RBC AUTO: 86.1 FL
MICROSCOPIC-UA: NORMAL
MONOCYTES # BLD AUTO: 0.72 K/UL — SIGNIFICANT CHANGE UP (ref 0–0.9)
MONOCYTES # BLD AUTO: 0.73 K/UL — SIGNIFICANT CHANGE UP (ref 0–0.9)
MONOCYTES # BLD AUTO: 0.77 K/UL — SIGNIFICANT CHANGE UP (ref 0–0.9)
MONOCYTES # BLD AUTO: 0.83 K/UL
MONOCYTES # BLD AUTO: 1.64 K/UL — HIGH (ref 0–0.9)
MONOCYTES NFR BLD AUTO: 11.6 % — SIGNIFICANT CHANGE UP (ref 2–14)
MONOCYTES NFR BLD AUTO: 12 % — SIGNIFICANT CHANGE UP (ref 2–14)
MONOCYTES NFR BLD AUTO: 12.2 % — SIGNIFICANT CHANGE UP (ref 2–14)
MONOCYTES NFR BLD AUTO: 12.6 %
MONOCYTES NFR BLD AUTO: 8.8 % — SIGNIFICANT CHANGE UP (ref 2–14)
NEUTROPHILS # BLD AUTO: 11.64 K/UL — HIGH (ref 1.8–7.4)
NEUTROPHILS # BLD AUTO: 4.65 K/UL — SIGNIFICANT CHANGE UP (ref 1.8–7.4)
NEUTROPHILS # BLD AUTO: 4.69 K/UL — SIGNIFICANT CHANGE UP (ref 1.8–7.4)
NEUTROPHILS # BLD AUTO: 4.81 K/UL
NEUTROPHILS # BLD AUTO: 6.31 K/UL — SIGNIFICANT CHANGE UP (ref 1.8–7.4)
NEUTROPHILS NFR BLD AUTO: 72.8 %
NEUTROPHILS NFR BLD AUTO: 73.8 % — SIGNIFICANT CHANGE UP (ref 43–77)
NEUTROPHILS NFR BLD AUTO: 74.8 % — SIGNIFICANT CHANGE UP (ref 43–77)
NEUTROPHILS NFR BLD AUTO: 77.3 % — HIGH (ref 43–77)
NEUTROPHILS NFR BLD AUTO: 85 % — HIGH (ref 43–77)
NITRITE UR-MCNC: NEGATIVE — SIGNIFICANT CHANGE UP
NITRITE URINE: NEGATIVE
NRBC # BLD: 0 /100 WBCS — SIGNIFICANT CHANGE UP (ref 0–0)
NRBC # BLD: SIGNIFICANT CHANGE UP /100 WBCS (ref 0–0)
OB PNL STL: POSITIVE
PH UR: 7 — SIGNIFICANT CHANGE UP (ref 5–8)
PH URINE: 6
PHOSPHATE SERPL-MCNC: 4.2 MG/DL — SIGNIFICANT CHANGE UP (ref 2.5–4.5)
PLATELET # BLD AUTO: 162 K/UL — SIGNIFICANT CHANGE UP (ref 150–400)
PLATELET # BLD AUTO: 165 K/UL — SIGNIFICANT CHANGE UP (ref 150–400)
PLATELET # BLD AUTO: 186 K/UL — SIGNIFICANT CHANGE UP (ref 150–400)
PLATELET # BLD AUTO: 193 K/UL — SIGNIFICANT CHANGE UP (ref 150–400)
PLATELET # BLD AUTO: 198 K/UL — SIGNIFICANT CHANGE UP (ref 150–400)
PLATELET # BLD AUTO: 311 K/UL
POTASSIUM SERPL-MCNC: 3.8 MMOL/L — SIGNIFICANT CHANGE UP (ref 3.5–5.3)
POTASSIUM SERPL-MCNC: 4.2 MMOL/L — SIGNIFICANT CHANGE UP (ref 3.5–5.3)
POTASSIUM SERPL-MCNC: 4.2 MMOL/L — SIGNIFICANT CHANGE UP (ref 3.5–5.3)
POTASSIUM SERPL-MCNC: 4.4 MMOL/L — SIGNIFICANT CHANGE UP (ref 3.5–5.3)
POTASSIUM SERPL-MCNC: 5 MMOL/L — SIGNIFICANT CHANGE UP (ref 3.5–5.3)
POTASSIUM SERPL-SCNC: 3.8 MMOL/L — SIGNIFICANT CHANGE UP (ref 3.5–5.3)
POTASSIUM SERPL-SCNC: 4.2 MMOL/L — SIGNIFICANT CHANGE UP (ref 3.5–5.3)
POTASSIUM SERPL-SCNC: 4.2 MMOL/L — SIGNIFICANT CHANGE UP (ref 3.5–5.3)
POTASSIUM SERPL-SCNC: 4.4 MMOL/L — SIGNIFICANT CHANGE UP (ref 3.5–5.3)
POTASSIUM SERPL-SCNC: 5 MMOL/L — SIGNIFICANT CHANGE UP (ref 3.5–5.3)
POTASSIUM SERPL-SCNC: 5.1 MMOL/L
PROT SERPL-MCNC: 6.1 G/DL — SIGNIFICANT CHANGE UP (ref 6–8.3)
PROT SERPL-MCNC: 6.1 G/DL — SIGNIFICANT CHANGE UP (ref 6–8.3)
PROT SERPL-MCNC: 6.2 G/DL — SIGNIFICANT CHANGE UP (ref 6–8.3)
PROT SERPL-MCNC: 6.4 G/DL — SIGNIFICANT CHANGE UP (ref 6–8.3)
PROT SERPL-MCNC: 6.5 G/DL — SIGNIFICANT CHANGE UP (ref 6–8.3)
PROT SERPL-MCNC: 6.6 G/DL
PROT UR-MCNC: 30 MG/DL
PROTEIN URINE: ABNORMAL
PROTHROM AB SERPL-ACNC: 16.1 SEC — HIGH (ref 10–12.9)
PROTHROM AB SERPL-ACNC: 17.3 SEC — HIGH (ref 10–12.9)
PROTHROM AB SERPL-ACNC: 17.9 SEC — HIGH (ref 10–12.9)
PROTHROM AB SERPL-ACNC: 23.6 SEC — HIGH (ref 10–12.9)
RBC # BLD: 3.09 M/UL — LOW (ref 4.2–5.8)
RBC # BLD: 3.3 M/UL — LOW (ref 4.2–5.8)
RBC # BLD: 3.3 M/UL — LOW (ref 4.2–5.8)
RBC # BLD: 3.37 M/UL — LOW (ref 4.2–5.8)
RBC # BLD: 3.39 M/UL
RBC # BLD: 3.5 M/UL — LOW (ref 4.2–5.8)
RBC # FLD: 19.2 %
RBC # FLD: 19.2 % — HIGH (ref 10.3–14.5)
RBC # FLD: 19.2 % — HIGH (ref 10.3–14.5)
RBC # FLD: 19.5 % — HIGH (ref 10.3–14.5)
RBC # FLD: 19.7 % — HIGH (ref 10.3–14.5)
RBC # FLD: 20 % — HIGH (ref 10.3–14.5)
RED BLOOD CELLS URINE: 1 /HPF
SARS-COV-2 RNA SPEC QL NAA+PROBE: SIGNIFICANT CHANGE UP
SODIUM SERPL-SCNC: 132 MMOL/L
SODIUM SERPL-SCNC: 135 MMOL/L — SIGNIFICANT CHANGE UP (ref 135–145)
SODIUM SERPL-SCNC: 139 MMOL/L — SIGNIFICANT CHANGE UP (ref 135–145)
SODIUM SERPL-SCNC: 140 MMOL/L — SIGNIFICANT CHANGE UP (ref 135–145)
SODIUM SERPL-SCNC: 142 MMOL/L — SIGNIFICANT CHANGE UP (ref 135–145)
SODIUM SERPL-SCNC: 146 MMOL/L — HIGH (ref 135–145)
SP GR SPEC: 1 — LOW (ref 1.01–1.02)
SPECIFIC GRAVITY URINE: 1.01
SQUAMOUS EPITHELIAL CELLS: 0 /HPF
TRIGL SERPL-MCNC: 49 MG/DL
TROPONIN I SERPL-MCNC: 0.08 NG/ML — HIGH (ref 0.01–0.04)
TROPONIN I SERPL-MCNC: 0.09 NG/ML — HIGH (ref 0.01–0.04)
TROPONIN I SERPL-MCNC: 0.1 NG/ML — HIGH (ref 0.01–0.04)
UROBILINOGEN FLD QL: 1
UROBILINOGEN URINE: ABNORMAL
WBC # BLD: 13.69 K/UL — HIGH (ref 3.8–10.5)
WBC # BLD: 6.28 K/UL — SIGNIFICANT CHANGE UP (ref 3.8–10.5)
WBC # BLD: 6.3 K/UL — SIGNIFICANT CHANGE UP (ref 3.8–10.5)
WBC # BLD: 6.34 K/UL — SIGNIFICANT CHANGE UP (ref 3.8–10.5)
WBC # BLD: 8.17 K/UL — SIGNIFICANT CHANGE UP (ref 3.8–10.5)
WBC # FLD AUTO: 13.69 K/UL — HIGH (ref 3.8–10.5)
WBC # FLD AUTO: 6.28 K/UL — SIGNIFICANT CHANGE UP (ref 3.8–10.5)
WBC # FLD AUTO: 6.3 K/UL — SIGNIFICANT CHANGE UP (ref 3.8–10.5)
WBC # FLD AUTO: 6.34 K/UL — SIGNIFICANT CHANGE UP (ref 3.8–10.5)
WBC # FLD AUTO: 6.61 K/UL
WBC # FLD AUTO: 8.17 K/UL — SIGNIFICANT CHANGE UP (ref 3.8–10.5)
WHITE BLOOD CELLS URINE: 1 /HPF

## 2020-01-01 PROCEDURE — 82728 ASSAY OF FERRITIN: CPT

## 2020-01-01 PROCEDURE — 84100 ASSAY OF PHOSPHORUS: CPT

## 2020-01-01 PROCEDURE — 81001 URINALYSIS AUTO W/SCOPE: CPT

## 2020-01-01 PROCEDURE — 99214 OFFICE O/P EST MOD 30 MIN: CPT

## 2020-01-01 PROCEDURE — 80053 COMPREHEN METABOLIC PANEL: CPT

## 2020-01-01 PROCEDURE — 86900 BLOOD TYPING SEROLOGIC ABO: CPT

## 2020-01-01 PROCEDURE — 99239 HOSP IP/OBS DSCHRG MGMT >30: CPT | Mod: GC

## 2020-01-01 PROCEDURE — 70450 CT HEAD/BRAIN W/O DYE: CPT

## 2020-01-01 PROCEDURE — 99223 1ST HOSP IP/OBS HIGH 75: CPT

## 2020-01-01 PROCEDURE — 82553 CREATINE MB FRACTION: CPT

## 2020-01-01 PROCEDURE — 99232 SBSQ HOSP IP/OBS MODERATE 35: CPT

## 2020-01-01 PROCEDURE — 70450 CT HEAD/BRAIN W/O DYE: CPT | Mod: 26

## 2020-01-01 PROCEDURE — 86850 RBC ANTIBODY SCREEN: CPT

## 2020-01-01 PROCEDURE — 97116 GAIT TRAINING THERAPY: CPT

## 2020-01-01 PROCEDURE — 51741 ELECTRO-UROFLOWMETRY FIRST: CPT

## 2020-01-01 PROCEDURE — 87635 SARS-COV-2 COVID-19 AMP PRB: CPT

## 2020-01-01 PROCEDURE — 93282 PRGRMG EVAL IMPLANTABLE DFB: CPT

## 2020-01-01 PROCEDURE — 85379 FIBRIN DEGRADATION QUANT: CPT

## 2020-01-01 PROCEDURE — 93010 ELECTROCARDIOGRAM REPORT: CPT

## 2020-01-01 PROCEDURE — 99232 SBSQ HOSP IP/OBS MODERATE 35: CPT | Mod: GC

## 2020-01-01 PROCEDURE — 96374 THER/PROPH/DIAG INJ IV PUSH: CPT

## 2020-01-01 PROCEDURE — 93295 DEV INTERROG REMOTE 1/2/MLT: CPT

## 2020-01-01 PROCEDURE — 83735 ASSAY OF MAGNESIUM: CPT

## 2020-01-01 PROCEDURE — 71045 X-RAY EXAM CHEST 1 VIEW: CPT

## 2020-01-01 PROCEDURE — 85018 HEMOGLOBIN: CPT

## 2020-01-01 PROCEDURE — 84484 ASSAY OF TROPONIN QUANT: CPT

## 2020-01-01 PROCEDURE — 85014 HEMATOCRIT: CPT

## 2020-01-01 PROCEDURE — 99285 EMERGENCY DEPT VISIT HI MDM: CPT | Mod: 25

## 2020-01-01 PROCEDURE — 71250 CT THORAX DX C-: CPT | Mod: 26

## 2020-01-01 PROCEDURE — 36415 COLL VENOUS BLD VENIPUNCTURE: CPT

## 2020-01-01 PROCEDURE — 86140 C-REACTIVE PROTEIN: CPT

## 2020-01-01 PROCEDURE — 82550 ASSAY OF CK (CPK): CPT

## 2020-01-01 PROCEDURE — 93000 ELECTROCARDIOGRAM COMPLETE: CPT

## 2020-01-01 PROCEDURE — 71045 X-RAY EXAM CHEST 1 VIEW: CPT | Mod: 26

## 2020-01-01 PROCEDURE — 86923 COMPATIBILITY TEST ELECTRIC: CPT

## 2020-01-01 PROCEDURE — 85730 THROMBOPLASTIN TIME PARTIAL: CPT

## 2020-01-01 PROCEDURE — 74174 CTA ABD&PLVS W/CONTRAST: CPT | Mod: 26

## 2020-01-01 PROCEDURE — 97530 THERAPEUTIC ACTIVITIES: CPT

## 2020-01-01 PROCEDURE — 51798 US URINE CAPACITY MEASURE: CPT | Mod: 59

## 2020-01-01 PROCEDURE — 82272 OCCULT BLD FECES 1-3 TESTS: CPT

## 2020-01-01 PROCEDURE — 99223 1ST HOSP IP/OBS HIGH 75: CPT | Mod: GC

## 2020-01-01 PROCEDURE — 82248 BILIRUBIN DIRECT: CPT

## 2020-01-01 PROCEDURE — 36430 TRANSFUSION BLD/BLD COMPNT: CPT

## 2020-01-01 PROCEDURE — 99443: CPT

## 2020-01-01 PROCEDURE — 86901 BLOOD TYPING SEROLOGIC RH(D): CPT

## 2020-01-01 PROCEDURE — 97162 PT EVAL MOD COMPLEX 30 MIN: CPT

## 2020-01-01 PROCEDURE — 71250 CT THORAX DX C-: CPT

## 2020-01-01 PROCEDURE — 85610 PROTHROMBIN TIME: CPT

## 2020-01-01 PROCEDURE — 99233 SBSQ HOSP IP/OBS HIGH 50: CPT | Mod: GC

## 2020-01-01 PROCEDURE — 99285 EMERGENCY DEPT VISIT HI MDM: CPT

## 2020-01-01 PROCEDURE — 99233 SBSQ HOSP IP/OBS HIGH 50: CPT

## 2020-01-01 PROCEDURE — 93296 REM INTERROG EVL PM/IDS: CPT

## 2020-01-01 PROCEDURE — 74174 CTA ABD&PLVS W/CONTRAST: CPT

## 2020-01-01 PROCEDURE — P9016: CPT

## 2020-01-01 PROCEDURE — 85027 COMPLETE CBC AUTOMATED: CPT

## 2020-01-01 PROCEDURE — 93005 ELECTROCARDIOGRAM TRACING: CPT

## 2020-01-01 PROCEDURE — 99214 OFFICE O/P EST MOD 30 MIN: CPT | Mod: 25

## 2020-01-01 PROCEDURE — 94640 AIRWAY INHALATION TREATMENT: CPT

## 2020-01-01 RX ORDER — APIXABAN 2.5 MG/1
2.5 TABLET, FILM COATED ORAL
Refills: 0 | Status: DISCONTINUED | OUTPATIENT
Start: 2020-01-01 | End: 2020-01-01

## 2020-01-01 RX ORDER — SODIUM CHLORIDE 9 MG/ML
1000 INJECTION INTRAMUSCULAR; INTRAVENOUS; SUBCUTANEOUS ONCE
Refills: 0 | Status: COMPLETED | OUTPATIENT
Start: 2020-01-01 | End: 2020-01-01

## 2020-01-01 RX ORDER — ALPHA LIPOIC ACID 50 MG
300 CAPSULE ORAL DAILY
Refills: 0 | Status: ACTIVE | COMMUNITY

## 2020-01-01 RX ORDER — APIXABAN 2.5 MG/1
2.5 TABLET, FILM COATED ORAL TWICE DAILY
Refills: 0 | Status: ACTIVE | COMMUNITY

## 2020-01-01 RX ORDER — SODIUM CHLORIDE 9 MG/ML
1000 INJECTION, SOLUTION INTRAVENOUS
Refills: 0 | Status: DISCONTINUED | OUTPATIENT
Start: 2020-01-01 | End: 2020-01-01

## 2020-01-01 RX ORDER — PANTOPRAZOLE SODIUM 20 MG/1
8 TABLET, DELAYED RELEASE ORAL
Qty: 80 | Refills: 0 | Status: DISCONTINUED | OUTPATIENT
Start: 2020-01-01 | End: 2020-01-01

## 2020-01-01 RX ORDER — FERROUS SULFATE 325(65) MG
1 TABLET ORAL
Qty: 0 | Refills: 0 | DISCHARGE

## 2020-01-01 RX ORDER — BUDESONIDE AND FORMOTEROL FUMARATE DIHYDRATE 80; 4.5 UG/1; UG/1
80-4.5 AEROSOL RESPIRATORY (INHALATION) TWICE DAILY
Refills: 0 | Status: ACTIVE | COMMUNITY

## 2020-01-01 RX ORDER — BUDESONIDE AND FORMOTEROL FUMARATE DIHYDRATE 160; 4.5 UG/1; UG/1
2 AEROSOL RESPIRATORY (INHALATION)
Refills: 0 | Status: DISCONTINUED | OUTPATIENT
Start: 2020-01-01 | End: 2020-01-01

## 2020-01-01 RX ORDER — PNV NO.95/FERROUS FUM/FOLIC AC 28MG-0.8MG
TABLET ORAL
Refills: 0 | Status: ACTIVE | COMMUNITY

## 2020-01-01 RX ORDER — TELMISARTAN 20 MG/1
1 TABLET ORAL
Qty: 0 | Refills: 0 | DISCHARGE

## 2020-01-01 RX ORDER — PANTOPRAZOLE SODIUM 20 MG/1
40 TABLET, DELAYED RELEASE ORAL DAILY
Refills: 0 | Status: DISCONTINUED | OUTPATIENT
Start: 2020-01-01 | End: 2020-01-01

## 2020-01-01 RX ORDER — METOPROLOL TARTRATE 50 MG
75 TABLET ORAL DAILY
Refills: 0 | Status: DISCONTINUED | OUTPATIENT
Start: 2020-01-01 | End: 2020-01-01

## 2020-01-01 RX ORDER — DEXTROSE 50 % IN WATER 50 %
12.5 SYRINGE (ML) INTRAVENOUS ONCE
Refills: 0 | Status: DISCONTINUED | OUTPATIENT
Start: 2020-01-01 | End: 2020-01-01

## 2020-01-01 RX ORDER — PANTOPRAZOLE SODIUM 20 MG/1
40 TABLET, DELAYED RELEASE ORAL ONCE
Refills: 0 | Status: COMPLETED | OUTPATIENT
Start: 2020-01-01 | End: 2020-01-01

## 2020-01-01 RX ORDER — TRAZODONE HCL 50 MG
1 TABLET ORAL
Qty: 0 | Refills: 0 | DISCHARGE

## 2020-01-01 RX ORDER — DEXTROSE 50 % IN WATER 50 %
25 SYRINGE (ML) INTRAVENOUS ONCE
Refills: 0 | Status: DISCONTINUED | OUTPATIENT
Start: 2020-01-01 | End: 2020-01-01

## 2020-01-01 RX ORDER — INSULIN LISPRO 100/ML
VIAL (ML) SUBCUTANEOUS EVERY 6 HOURS
Refills: 0 | Status: DISCONTINUED | OUTPATIENT
Start: 2020-01-01 | End: 2020-01-01

## 2020-01-01 RX ORDER — RIVAROXABAN 15 MG-20MG
1 KIT ORAL
Qty: 0 | Refills: 0 | DISCHARGE

## 2020-01-01 RX ORDER — FLUOXETINE HCL 10 MG
10 CAPSULE ORAL AT BEDTIME
Refills: 0 | Status: DISCONTINUED | OUTPATIENT
Start: 2020-01-01 | End: 2020-01-01

## 2020-01-01 RX ORDER — APIXABAN 2.5 MG/1
1 TABLET, FILM COATED ORAL
Qty: 60 | Refills: 0
Start: 2020-01-01 | End: 2020-01-01

## 2020-01-01 RX ORDER — FUROSEMIDE 40 MG
20 TABLET ORAL ONCE
Refills: 0 | Status: COMPLETED | OUTPATIENT
Start: 2020-01-01 | End: 2020-01-01

## 2020-01-01 RX ORDER — TAMSULOSIN HYDROCHLORIDE 0.4 MG/1
0.8 CAPSULE ORAL AT BEDTIME
Refills: 0 | Status: DISCONTINUED | OUTPATIENT
Start: 2020-01-01 | End: 2020-01-01

## 2020-01-01 RX ORDER — TELMISARTAN 20 MG/1
20 TABLET ORAL DAILY
Qty: 90 | Refills: 0 | Status: ACTIVE | COMMUNITY
Start: 1900-01-01 | End: 1900-01-01

## 2020-01-01 RX ORDER — CA/D3/MAG OX/ZINC/COP/MANG/BOR 600 MG-800
250 MCG TABLET,CHEWABLE ORAL DAILY
Refills: 0 | Status: ACTIVE | COMMUNITY

## 2020-01-01 RX ORDER — DIGOXIN 250 MCG
0.12 TABLET ORAL DAILY
Refills: 0 | Status: DISCONTINUED | OUTPATIENT
Start: 2020-01-01 | End: 2020-01-01

## 2020-01-01 RX ORDER — LOSARTAN POTASSIUM 50 MG/1
50 TABLET, FILM COATED ORAL
Qty: 90 | Refills: 1 | Status: DISCONTINUED | COMMUNITY
Start: 2019-11-08 | End: 2020-01-01

## 2020-01-01 RX ORDER — FUROSEMIDE 40 MG
20 TABLET ORAL DAILY
Refills: 0 | Status: DISCONTINUED | OUTPATIENT
Start: 2020-01-01 | End: 2020-01-01

## 2020-01-01 RX ORDER — GLUCAGON INJECTION, SOLUTION 0.5 MG/.1ML
1 INJECTION, SOLUTION SUBCUTANEOUS ONCE
Refills: 0 | Status: DISCONTINUED | OUTPATIENT
Start: 2020-01-01 | End: 2020-01-01

## 2020-01-01 RX ORDER — DEXTROSE 50 % IN WATER 50 %
15 SYRINGE (ML) INTRAVENOUS ONCE
Refills: 0 | Status: DISCONTINUED | OUTPATIENT
Start: 2020-01-01 | End: 2020-01-01

## 2020-01-01 RX ORDER — RIVAROXABAN 20 MG/1
20 TABLET, FILM COATED ORAL
Qty: 90 | Refills: 3 | Status: DISCONTINUED | COMMUNITY
Start: 2018-01-05 | End: 2020-01-01

## 2020-01-01 RX ORDER — CHOLECALCIFEROL (VITAMIN D3) 50 MCG
1000 CAPSULE ORAL DAILY
Refills: 0 | Status: ACTIVE | COMMUNITY

## 2020-01-01 RX ORDER — FINASTERIDE 5 MG/1
5 TABLET, FILM COATED ORAL DAILY
Refills: 0 | Status: DISCONTINUED | OUTPATIENT
Start: 2020-01-01 | End: 2020-01-01

## 2020-01-01 RX ORDER — ATORVASTATIN CALCIUM 80 MG/1
80 TABLET, FILM COATED ORAL AT BEDTIME
Refills: 0 | Status: DISCONTINUED | OUTPATIENT
Start: 2020-01-01 | End: 2020-01-01

## 2020-01-01 RX ORDER — METOPROLOL TARTRATE 50 MG
50 TABLET ORAL AT BEDTIME
Refills: 0 | Status: DISCONTINUED | OUTPATIENT
Start: 2020-01-01 | End: 2020-01-01

## 2020-01-01 RX ORDER — CLOPIDOGREL 75 MG/1
75 TABLET, FILM COATED ORAL DAILY
Qty: 1 | Refills: 3 | Status: ACTIVE | COMMUNITY

## 2020-01-01 RX ADMIN — PANTOPRAZOLE SODIUM 10 MG/HR: 20 TABLET, DELAYED RELEASE ORAL at 15:08

## 2020-01-01 RX ADMIN — ATORVASTATIN CALCIUM 80 MILLIGRAM(S): 80 TABLET, FILM COATED ORAL at 22:18

## 2020-01-01 RX ADMIN — Medication 20 MILLIGRAM(S): at 05:11

## 2020-01-01 RX ADMIN — SODIUM CHLORIDE 500 MILLILITER(S): 9 INJECTION INTRAMUSCULAR; INTRAVENOUS; SUBCUTANEOUS at 11:05

## 2020-01-01 RX ADMIN — Medication 20 MILLIGRAM(S): at 15:02

## 2020-01-01 RX ADMIN — Medication 50 MILLIGRAM(S): at 22:19

## 2020-01-01 RX ADMIN — Medication 75 MILLIGRAM(S): at 05:27

## 2020-01-01 RX ADMIN — BUDESONIDE AND FORMOTEROL FUMARATE DIHYDRATE 2 PUFF(S): 160; 4.5 AEROSOL RESPIRATORY (INHALATION) at 16:29

## 2020-01-01 RX ADMIN — PANTOPRAZOLE SODIUM 40 MILLIGRAM(S): 20 TABLET, DELAYED RELEASE ORAL at 11:35

## 2020-01-01 RX ADMIN — APIXABAN 2.5 MILLIGRAM(S): 2.5 TABLET, FILM COATED ORAL at 16:29

## 2020-01-01 RX ADMIN — TAMSULOSIN HYDROCHLORIDE 0.8 MILLIGRAM(S): 0.4 CAPSULE ORAL at 20:36

## 2020-01-01 RX ADMIN — Medication 0.12 MILLIGRAM(S): at 04:41

## 2020-01-01 RX ADMIN — Medication 10 MILLIGRAM(S): at 22:21

## 2020-01-01 RX ADMIN — Medication 0.12 MILLIGRAM(S): at 05:11

## 2020-01-01 RX ADMIN — TAMSULOSIN HYDROCHLORIDE 0.8 MILLIGRAM(S): 0.4 CAPSULE ORAL at 22:19

## 2020-01-01 RX ADMIN — FINASTERIDE 5 MILLIGRAM(S): 5 TABLET, FILM COATED ORAL at 12:27

## 2020-01-01 RX ADMIN — Medication 50 MILLIGRAM(S): at 21:31

## 2020-01-01 RX ADMIN — APIXABAN 2.5 MILLIGRAM(S): 2.5 TABLET, FILM COATED ORAL at 17:16

## 2020-01-01 RX ADMIN — ATORVASTATIN CALCIUM 80 MILLIGRAM(S): 80 TABLET, FILM COATED ORAL at 21:27

## 2020-01-01 RX ADMIN — PANTOPRAZOLE SODIUM 10 MG/HR: 20 TABLET, DELAYED RELEASE ORAL at 15:05

## 2020-01-01 RX ADMIN — Medication 10 MILLIGRAM(S): at 21:27

## 2020-01-01 RX ADMIN — Medication 20 MILLIGRAM(S): at 08:31

## 2020-01-01 RX ADMIN — Medication 10 MILLIGRAM(S): at 20:36

## 2020-01-01 RX ADMIN — APIXABAN 2.5 MILLIGRAM(S): 2.5 TABLET, FILM COATED ORAL at 17:25

## 2020-01-01 RX ADMIN — Medication 10 MILLIGRAM(S): at 21:31

## 2020-01-01 RX ADMIN — PANTOPRAZOLE SODIUM 40 MILLIGRAM(S): 20 TABLET, DELAYED RELEASE ORAL at 11:30

## 2020-01-01 RX ADMIN — BUDESONIDE AND FORMOTEROL FUMARATE DIHYDRATE 2 PUFF(S): 160; 4.5 AEROSOL RESPIRATORY (INHALATION) at 17:52

## 2020-01-01 RX ADMIN — APIXABAN 2.5 MILLIGRAM(S): 2.5 TABLET, FILM COATED ORAL at 05:05

## 2020-01-01 RX ADMIN — BUDESONIDE AND FORMOTEROL FUMARATE DIHYDRATE 2 PUFF(S): 160; 4.5 AEROSOL RESPIRATORY (INHALATION) at 08:04

## 2020-01-01 RX ADMIN — BUDESONIDE AND FORMOTEROL FUMARATE DIHYDRATE 2 PUFF(S): 160; 4.5 AEROSOL RESPIRATORY (INHALATION) at 17:36

## 2020-01-01 RX ADMIN — APIXABAN 2.5 MILLIGRAM(S): 2.5 TABLET, FILM COATED ORAL at 05:22

## 2020-01-01 RX ADMIN — Medication 0.12 MILLIGRAM(S): at 05:21

## 2020-01-01 RX ADMIN — FINASTERIDE 5 MILLIGRAM(S): 5 TABLET, FILM COATED ORAL at 11:30

## 2020-01-01 RX ADMIN — PANTOPRAZOLE SODIUM 10 MG/HR: 20 TABLET, DELAYED RELEASE ORAL at 05:11

## 2020-01-01 RX ADMIN — Medication 0.12 MILLIGRAM(S): at 22:18

## 2020-01-01 RX ADMIN — FINASTERIDE 5 MILLIGRAM(S): 5 TABLET, FILM COATED ORAL at 11:35

## 2020-01-01 RX ADMIN — TAMSULOSIN HYDROCHLORIDE 0.8 MILLIGRAM(S): 0.4 CAPSULE ORAL at 21:27

## 2020-01-01 RX ADMIN — FINASTERIDE 5 MILLIGRAM(S): 5 TABLET, FILM COATED ORAL at 12:28

## 2020-01-01 RX ADMIN — BUDESONIDE AND FORMOTEROL FUMARATE DIHYDRATE 2 PUFF(S): 160; 4.5 AEROSOL RESPIRATORY (INHALATION) at 08:35

## 2020-01-01 RX ADMIN — BUDESONIDE AND FORMOTEROL FUMARATE DIHYDRATE 2 PUFF(S): 160; 4.5 AEROSOL RESPIRATORY (INHALATION) at 22:17

## 2020-01-01 RX ADMIN — ATORVASTATIN CALCIUM 80 MILLIGRAM(S): 80 TABLET, FILM COATED ORAL at 21:31

## 2020-01-01 RX ADMIN — BUDESONIDE AND FORMOTEROL FUMARATE DIHYDRATE 2 PUFF(S): 160; 4.5 AEROSOL RESPIRATORY (INHALATION) at 04:42

## 2020-01-01 RX ADMIN — ATORVASTATIN CALCIUM 80 MILLIGRAM(S): 80 TABLET, FILM COATED ORAL at 20:36

## 2020-01-01 RX ADMIN — Medication 0.12 MILLIGRAM(S): at 05:05

## 2020-01-01 RX ADMIN — PANTOPRAZOLE SODIUM 40 MILLIGRAM(S): 20 TABLET, DELAYED RELEASE ORAL at 12:27

## 2020-01-01 RX ADMIN — BUDESONIDE AND FORMOTEROL FUMARATE DIHYDRATE 2 PUFF(S): 160; 4.5 AEROSOL RESPIRATORY (INHALATION) at 17:17

## 2020-01-01 RX ADMIN — Medication 75 MILLIGRAM(S): at 05:04

## 2020-01-01 RX ADMIN — TAMSULOSIN HYDROCHLORIDE 0.8 MILLIGRAM(S): 0.4 CAPSULE ORAL at 21:31

## 2020-01-01 RX ADMIN — PANTOPRAZOLE SODIUM 10 MG/HR: 20 TABLET, DELAYED RELEASE ORAL at 23:33

## 2020-01-01 RX ADMIN — Medication 20 MILLIGRAM(S): at 05:21

## 2020-01-01 RX ADMIN — Medication 20 MILLIGRAM(S): at 04:41

## 2020-01-01 RX ADMIN — PANTOPRAZOLE SODIUM 10 MG/HR: 20 TABLET, DELAYED RELEASE ORAL at 19:50

## 2020-01-01 RX ADMIN — PANTOPRAZOLE SODIUM 40 MILLIGRAM(S): 20 TABLET, DELAYED RELEASE ORAL at 11:06

## 2020-01-01 RX ADMIN — BUDESONIDE AND FORMOTEROL FUMARATE DIHYDRATE 2 PUFF(S): 160; 4.5 AEROSOL RESPIRATORY (INHALATION) at 08:00

## 2020-01-24 RX ORDER — METOPROLOL SUCCINATE 50 MG/1
50 TABLET, EXTENDED RELEASE ORAL DAILY
Qty: 90 | Refills: 3 | Status: ACTIVE | COMMUNITY
Start: 1900-01-01 | End: 1900-01-01

## 2020-02-18 ENCOUNTER — APPOINTMENT (OUTPATIENT)
Dept: ELECTROPHYSIOLOGY | Facility: CLINIC | Age: 75
End: 2020-02-18
Payer: MEDICARE

## 2020-02-18 VITALS
BODY MASS INDEX: 24.11 KG/M2 | DIASTOLIC BLOOD PRESSURE: 69 MMHG | HEIGHT: 66 IN | SYSTOLIC BLOOD PRESSURE: 105 MMHG | HEART RATE: 90 BPM | WEIGHT: 150 LBS | OXYGEN SATURATION: 92 %

## 2020-02-18 PROCEDURE — 93283 PRGRMG EVAL IMPLANTABLE DFB: CPT

## 2020-03-02 NOTE — SWALLOW BEDSIDE ASSESSMENT ADULT - CONSISTENCIES ADMINISTERED
ED Screening Note


ED Screening Note: 





depression, anxiety 


SI last night 


states his girlfriend said he told her he wanted to "hang himself last night"


he states his plan is to hang himself with a belt or cut himself with a knife


states he fears that he wants to hurt someone else 


he has never been to psych facility before


he has harmed himself before by cutting his wrists


PMHx herniated disc 


no allergies to meds





non smoker


non drinker


no drug use








This initial assessment/diagnostic orders/clinical plan/treatment(s) is/are 

subject to change based on patients health status, clinical progression and re-

assessment by fellow clinical providers in the ED. Further treatment and workup 

at subsequent clinical providers discretion. Patient/guardian urged not to elope

from the ED as their condition may be serious if not clinically assessed and 

managed. 





Initial orders include: medical clearance thin liquid soft solid/puree/hard solid

## 2020-03-18 ENCOUNTER — NON-APPOINTMENT (OUTPATIENT)
Age: 75
End: 2020-03-18

## 2020-03-18 ENCOUNTER — APPOINTMENT (OUTPATIENT)
Dept: CARDIOLOGY | Facility: CLINIC | Age: 75
End: 2020-03-18
Payer: MEDICARE

## 2020-03-18 VITALS — DIASTOLIC BLOOD PRESSURE: 75 MMHG | HEART RATE: 67 BPM | OXYGEN SATURATION: 96 % | SYSTOLIC BLOOD PRESSURE: 124 MMHG

## 2020-03-18 VITALS — TEMPERATURE: 98.1 F

## 2020-03-18 PROCEDURE — 93000 ELECTROCARDIOGRAM COMPLETE: CPT

## 2020-03-20 ENCOUNTER — APPOINTMENT (OUTPATIENT)
Dept: CARDIOLOGY | Facility: CLINIC | Age: 75
End: 2020-03-20
Payer: MEDICARE

## 2020-05-08 NOTE — CONSULT NOTE ADULT - SUBJECTIVE AND OBJECTIVE BOX
[facility] Consultation    CHIEF COMPLAINT: Patient is a 75y old  Male who presents with a chief complaint of bleeding    HPI:  75M PMHx CVA 10/2017 with residual aphasia, COPD, CAD s/p stent 2016, CABG 8/2017, HTN, HLD, chronic systolic HF (Last Echo 8/17 showing moderate segmental LV systolic dysfunction, severely hypokinetic inferior and septal walls, variable EF, moderate pulmonary HTN), ICD([facility] 2015)/PPM, A Fib on Xarelto, DM who presents with epistaxis that began last night. Hx obtained from sister Sandra (HCP) as pt is aphasic. Per daughter, pt had an episode of epistaxis at 6pm yesterday after spending the day outside with his lady friend Angelia. The sister tried to put ice and compresses on to stop the bleeding without relief. Pt continued to have episodes of epistaxis throughout the night and into the morning, which prompted her to take him to the ED. As pt was being prepared to be taken to ED, the paramedics noted he had dark tarry formed stool. Sister said she was unaware of this until today. Pt has never had dark stools or hx of GI bleeds in the past. Pt follows with ENT as an outpatient for hx of epistaxis. Pt has hx of picking his nose per sister. Did not take xarelto last night. Admits to weakness, fatigue. Denies CP, SOB, cough, fever, chills, night sweats, abd pain, NVD, focal weakness, numbness tingling. PT IS DNR DNI.    In ED: Pt received one dose 40mg IV protonix, 1 unit pRBCs, 1L NS bolus  Vitals , /60, RR 18, T 97.2, SpO2 97% on RA  Labs WBC: 13.69, Hb 9.2 (13-14s per chart review), Cr 1.4 (at baseline), Alk phos 131, FOBT+  EKG: afib w RVR, rate 106. Isolated T wave inversion in V6, possible ST depressions (new compared to previous EKG) in V4-V6  Imaging  < from: CT Head No Cont (05.08.20 @ 12:43) >  IMPRESSION:  Mild chronic microvascular changes without evidence of an acute transcortical infarction or hemorrhage. MR is a more sensitive imaging modality for the evaluation of an acute infarction.     < end of copied text >  < from: Xray Chest 1 View-PORTABLE IMMEDIATE (05.08.20 @ 11:00) >  IMPRESSION:    No lung consolidations.    He has been seen by gastroenterology and felt to have a gastrointestinal bleed with melena likely from epistaxis. His EKG was noted to be abnormal with known atrial fibrillation but lateral ST depression.    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  DM (diabetes mellitus)  HF (heart failure)  PAD (peripheral artery disease): with stents  Former smoker  Hypertension  H/O: Rheumatic Fever  Atrial Fibrillation  Pacemaker: defibrillator model # v-268 serial # [id number]  Hyperlipidemia  Gout  Coronary Artery Disease  Chronic Obstructive Pulmonary Disease (COPD)  Carotid Stenosis  S/P CABG x 1  CAD S/P percutaneous coronary angioplasty: stent placed  Popliteal artery injury: iliac/ popliteal angioplasty with stents 2010  Arm injury: s/p surgery 2009  Pacemaker: [employer] serial #[id number]   model #v-268  S/P Implantation of Automatic Cardioverter/Defibrillator (AICD)  S/P Tonsillectomy      SOCIAL HISTORY: former tobacco, h/o alcohol abuse    FAMILY HISTORY:   No pertinent family history in first degree relatives      MEDICATIONS  (STANDING):  atorvastatin 80 milliGRAM(s) Oral at bedtime  budesonide 160 MICROgram(s)/formoterol 4.5 MICROgram(s) Inhaler 2 Puff(s) Inhalation two times a day  digoxin     Tablet 0.125 milliGRAM(s) Oral daily  finasteride 5 milliGRAM(s) Oral daily  FLUoxetine 10 milliGRAM(s) Oral at bedtime  furosemide    Tablet 20 milliGRAM(s) Oral daily  metoprolol succinate ER 50 milliGRAM(s) Oral at bedtime  pantoprazole Infusion 8 mG/Hr (10 mL/Hr) IV Continuous <Continuous>  tamsulosin 0.8 milliGRAM(s) Oral at bedtime        Allergies    No Known Allergies    REVIEW OF SYSTEMS: unobtainable      VITAL SIGNS:   Vital Signs Last 24 Hrs  T(C): 37.3 (08 May 2020 14:00), Max: 37.3 (08 May 2020 14:00)  T(F): 99.1 (08 May 2020 14:00), Max: 99.1 (08 May 2020 14:00)  HR: 87 (08 May 2020 14:00) (87 - 100)  BP: 115/75 (08 May 2020 14:00) (115/75 - 117/60)  BP(mean): --  RR: 17 (08 May 2020 14:00) (17 - 18)  SpO2: 97% (08 May 2020 14:00) (97% - 100%)    I&O's Summary      PHYSICAL EXAM:    Constitutional: NAD  Eyes:   Pupils round, no lesions  ENMT: epistaxis  Pulmonary:  breath sounds are clear bilaterally, No wheezing, rales or rhonchi  Cardiovascular: PMI not palpable irregular normal S1 and S2, no murmurs, rubs, gallops or clicks  Gastrointestinal: Bowel Sounds present, soft, nontender.   Lymph: trc peripheral edema. No cervical lymphadenopathy.  Neurological: Alert, no focal deficits  Skin: No rashes. Changes of chronic venous stasis. No cyanosis.  Psych:  cannot assess    LABS: All Labs Reviewed:                        9.2    13.69 )-----------( 193      ( 08 May 2020 11:07 )             29.1     08 May 2020 11:07    139    |  106    |  48     ----------------------------<  96     5.0     |  25     |  1.40     Ca    8.6        08 May 2020 11:07  Mg     1.9       08 May 2020 11:07    TPro  6.5    /  Alb  2.8    /  TBili  1.3    /  DBili  x      /  AST  16     /  ALT  17     /  AlkPhos  131    08 May 2020 11:07    PT/INR - ( 08 May 2020 11:07 )   PT: 23.6 sec;   INR: 2.06 ratio         PTT - ( 08 May 2020 11:07 )  PTT:34.0 sec  CARDIAC MARKERS ( 08 May 2020 11:07 )  .050 ng/mL / x     / 117 U/L / x     / 3.8 ng/mL    ECG: AF, poss IWMI JASON, lat ST dep c/w ischemia vs dig effect    < from: Xray Chest 1 View-PORTABLE IMMEDIATE (05.08.20 @ 11:00) >    EXAM:  XR CHEST PORTABLE IMMED 1V                            PROCEDURE DATE:  05/08/2020          INTERPRETATION:  PROCEDURE: AP view of the chest.    CLINICAL INFORMATION: Hypoxia.    COMPARISON: 2/19/2018.    FINDINGS:     The patient is status post sternotomy. There is a cardiac conduction device overlying the left axilla with intact leads to the heart.    Lungs: There are no lung consolidations.  Heart: There is cardiomegaly.  Mediastinum: Atherosclerotic calcifications are seen in the aorta.    IMPRESSION:    No lung consolidations.                MICHAEL SCRUGGS M.D., ATTENDING RADIOLOGIST  This document has been electronically signed. May  8 2020 11:02AM                < end of copied text >    < from: Transthoracic Echocardiogram (08.09.17 @ 16:00) >    Patient name: VALENTINA CAVANAUGH  YOB: 1945   Age: 72 (M)   MR#: [id number]  Study Date: 8/9/2017  Location: [address] [place], [place] [id number]: Daryn Shetty RDCS  Study quality: Technically fair  Referring Physician: Murali Myers MD  Blood Pressure: 140/80 mmHg  Height: 173 cm  Weight: 88 kg  BSA: 2 m2  ------------------------------------------------------------------------  PROCEDURE: Transthoracic echocardiogram with 2-D, M-Mode  and complete spectral and color flow Doppler.  INDICATION: Atherosclerotic heart disease of native  coronary artery without angina pectoris (I25.10)  ------------------------------------------------------------------------  Dimensions:    Normal Values:  LA:     3.8    2.0 - 4.0 cm  Ao:     2.9    2.0 - 3.8 cm  SEPTUM: 0.8    0.6 - 1.2 cm  PWT:    0.9    0.6 - 1.1 cm  LVIDd:  4.7    3.0 - 5.6 cm  LVIDs:  3.5    1.8 - 4.0 cm  Derived variables:  LVMI: 66 g/m2  RWT: 0.38  ------------------------------------------------------------------------  Observations:  Mitral Valve: Mitral annular calcification. Mitral annular  dilatation with normal leaflet opening. Minimal mitral  regurgitation.  Aortic Valve/Aorta: Aortic valve not well visualized;  appears to be a calcified trileaflet valve with normal  opening.. Mild aortic regurgitation.  Aortic Root: 2.9 cm.  Left Atrium: Severely dilated left atrium.  LA volume index  = 57 cc/m2.  Left Ventricle: Endocardium not well visualized; moderate  segmental left ventricular systolic dysfunction. The  inferior and septal walls are severely hypokinetic.  Abnormal septal motion consistent with paced  rhythm/post-operative septal motion. Patient in atrial  fibrillation; ejection fraction varies with R-R interval.  Recommend limited imaging with intravenous echo contrast to  better assess LV systolic function if clinically indicated.  Normal left ventricular internal dimensions and wall  thicknesses.  Right Heart: Mild right atrial enlargement. The right  ventricle is not well visualized; right ventricle appears  normal size with decreased systolic function. A device wire  is noted in the right heart. Tricuspid valve not well  visualized, probably normal. Mild tricuspid regurgitation.  Normal pulmonic valve.  Pericardium/Pleura: Normal pericardium with no pericardial  effusion.  Hemodynamic: Estimated right atrial pressure is 8 mm Hg.  Estimated right ventricular systolic pressure equals 54 mm  Hg, assuming right atrial pressure equals 8 mm Hg,  consistent with moderate pulmonary hypertension.  ------------------------------------------------------------------------  Conclusions:  1. Mitral annular calcification. Mitral annular dilatation  with normal leaflet opening. Minimal mitral regurgitation.  2. Aortic valve not well visualized; appears cyrus a  calcified trileaflet valve with normal opening.. Mild  aortic regurgitation.  3. Endocardium not well visualized; moderate segmental left  ventricular systolic dysfunction. The inferior and septal  walls are severely hypokinetic. Abnormal septal motion  consistent with paced rhythm/post-operative septal motion.  Patient in atrial fibrillation; ejection fraction varies  with R-R interval. Recommend limited imaging with  intravenous echo contrast to better assess LV systolic  function if clinically indicated.  4. The right ventricle is not well visualized; right  ventricle appears normal size with decreased systolic  function. A device wire is noted in the right heart.  5. Estimated pulmonary artery systolic pressure equals 54  mm Hg, assuming right atrial pressure equals 8 mm Hg,  consistent with moderate pulmonary pressures.  ------------------------------------------------------------------------  Confirmed on  8/9/2017 - 17:15:39 by Deepali Velásquez M.D.  ------------------------------------------------------------------------    < end of copied text >

## 2020-05-08 NOTE — ED ADULT NURSE NOTE - PMH
Alcohol abuse  in recovery  Aphasia S/P CVA    Atrial Fibrillation    Carotid Stenosis    Chronic Obstructive Pulmonary Disease (COPD)    Coronary Artery Disease    DM (diabetes mellitus)    Former smoker    Gout    H/O: Rheumatic Fever    HF (heart failure)    Hyperlipidemia    Hypertension    NSVT (nonsustained ventricular tachycardia)    Pacemaker  defibrillator model # v-268 serial # [id number]  PAD (peripheral artery disease)  with stents

## 2020-05-08 NOTE — H&P ADULT - PROBLEM SELECTOR PLAN 8
SCDs, hold chemical AC for now given GI bleed.   Sandra (daughter) cell phone number: [phone] Pt w hx of borderline DM, not on home medications  Will hold off on ISS  Trend AM fasting glucose

## 2020-05-08 NOTE — ED PROVIDER NOTE - PSH
Arm injury  s/p surgery 2009  CAD S/P percutaneous coronary angioplasty  stent placed  Pacemaker  [employer] serial #[id number]   model #v-268  Popliteal artery injury  iliac/ popliteal angioplasty with stents 2010  S/P CABG x 1    S/P Implantation of Automatic Cardioverter/Defibrillator (AICD)    S/P Tonsillectomy

## 2020-05-08 NOTE — H&P ADULT - ASSESSMENT
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 2015)/PPM, A Fib on Xarelto, DM who presents with epistaxis that began last night.

## 2020-05-08 NOTE — CONSULT NOTE ADULT - ASSESSMENT
Patient admitted with epistaxis likely leading to melena and anemia. He is been seen by gastroenterology and will be followed closely. From a cardiac standpoint, he has known persistent atrial fibrillation, CAD, and moderate LV dysfunction on past echo.  His lateral ST depression could represent a component of ischemia/infarction but there is a likely component of the digoxin effect. In any event, conservative therapy is warranted    Recommend    Admit to telemetry    GI followup    Follow CBC closely    Hold anticoagulation due to bleeding    Continue negative chronotropic therapy    Serial cardiac enzymes    Repeat EKG tomorrow    Watch volume status closely if he needs transfusion in the setting of known moderate left ventricular dysfunction and coronary artery disease    Further management will be dependent on his clinical course

## 2020-05-08 NOTE — H&P ADULT - NSICDXPASTSURGICALHX_GEN_ALL_CORE_FT
PAST SURGICAL HISTORY:  Arm injury s/p surgery 2009    CAD S/P percutaneous coronary angioplasty stent placed    Pacemaker [employer] serial #[id number]   model #v-268    Popliteal artery injury iliac/ popliteal angioplasty with stents 2010    S/P CABG x 1     S/P Implantation of Automatic Cardioverter/Defibrillator (AICD)     S/P Tonsillectomy

## 2020-05-08 NOTE — H&P ADULT - NSICDXPASTMEDICALHX_GEN_ALL_CORE_FT
PAST MEDICAL HISTORY:  Alcohol abuse in recovery    Aphasia S/P CVA     Atrial Fibrillation     Carotid Stenosis     Chronic Obstructive Pulmonary Disease (COPD)     Coronary Artery Disease     DM (diabetes mellitus)     Former smoker     Gout     H/O: Rheumatic Fever     HF (heart failure)     Hyperlipidemia     Hypertension     NSVT (nonsustained ventricular tachycardia)     Pacemaker defibrillator model # v-268 serial # [id number]    PAD (peripheral artery disease) with stents

## 2020-05-08 NOTE — H&P ADULT - NSHPPHYSICALEXAM_GEN_ALL_CORE
T(C): 37.3 (05-08-20 @ 14:00), Max: 37.3 (05-08-20 @ 14:00)  HR: 87 (05-08-20 @ 14:00) (87 - 100)  BP: 115/75 (05-08-20 @ 14:00) (115/75 - 117/60)  RR: 17 (05-08-20 @ 14:00) (17 - 18)  SpO2: 97% (05-08-20 @ 14:00) (97% - 100%)    GENERAL: patient appears well, no acute distress, appropriate  EYES: sclera clear, no exudates, PERRLA  ENMT: scattered dried blood in oral cavity and within nares, also on surrounding skin. No erythema, no exudates, dry mucous membranes  NECK: supple, soft, no thyromegaly noted  LUNGS:  diminished breath sounds bl in all lung fields, no rales, wheezing or rhonchi appreciated  HEART: S1/S2, irregular rate and rhythm, no murmurs noted, trace lower extremity edema bl  GASTROINTESTINAL: abdomen is soft, nontender, nondistended, normoactive bowel sounds, no palpable masses  MUSCULOSKELETAL: no clubbing or cyanosis, no obvious deformity  NEUROLOGIC: awake, alert, oriented x3, aphasic, strength 5/5 in all extremities, no obvious sensory deficits  PSYCHIATRIC: mood is good, affect is congruent, linear and logical thought process  HEME/LYMPH:  no obvious ecchymosis or petechiae T(C): 37.3 (05-08-20 @ 14:00), Max: 37.3 (05-08-20 @ 14:00)  HR: 87 (05-08-20 @ 14:00) (87 - 100)  BP: 115/75 (05-08-20 @ 14:00) (115/75 - 117/60)  RR: 17 (05-08-20 @ 14:00) (17 - 18)  SpO2: 97% (05-08-20 @ 14:00) (97% - 100%)    GENERAL: patient appears well, no acute distress, appropriate  EYES: sclera clear, no exudates, PERRLA  ENMT: scattered dried blood in oral cavity and within nares, also on surrounding skin. No erythema, no exudates, dry mucous membranes. No signs of posterior pharyngeal bleeding.  NECK: supple, soft, no thyromegaly noted  LUNGS:  diminished breath sounds bl in all lung fields, no rales, wheezing or rhonchi appreciated  HEART: S1/S2, irregular rate and rhythm, no murmurs noted, trace lower extremity edema bl  GASTROINTESTINAL: abdomen is soft, nontender, nondistended, normoactive bowel sounds, no palpable masses  MUSCULOSKELETAL: no clubbing or cyanosis, no obvious deformity  NEUROLOGIC: awake, alert, oriented x3, aphasic, strength 5/5 in all extremities, no obvious sensory deficits  PSYCHIATRIC: mood is good, affect is congruent, linear and logical thought process  HEME/LYMPH:  no obvious ecchymosis or petechiae

## 2020-05-08 NOTE — H&P ADULT - PROBLEM SELECTOR PLAN 7
Chronic, stable  Continue home telimesartan Chronic, stable. /60 on admission  Hold telimesartan for now. Cr 1.4 on admission

## 2020-05-08 NOTE — H&P ADULT - ATTENDING COMMENTS
Will closely monitor H/H. His bleed is likely from significant nose bleed, however will treat for possible UGIB as well.

## 2020-05-08 NOTE — H&P ADULT - PROBLEM SELECTOR PLAN 2
Pt with multiple episodes of epistaxis prior to admission. Has hx of nose bleeds, follows with ENT (Dr. Mehta) as outpatient. Physical exam significant for dried blood in nares and oropharynx.   Hb 9.2 on admission (baseline 13s-14s). Will transfuse one unit pRBCs now  Hold AC, antiplatelet agents.   ENT following- Dr. Smith Pt with multiple episodes of epistaxis prior to admission. Has hx of nose bleeds, follows with ENT (Dr. Mehta) as outpatient. Physical exam significant for dried blood in nares and oropharynx. Possible source of melena  Hb 9.2 on admission (baseline 13s-14s). Will transfuse one unit pRBCs now  Hold AC, antiplatelet agents.   ENT following- Dr. Smith

## 2020-05-08 NOTE — H&P ADULT - PROBLEM SELECTOR PLAN 3
Pt w hx of afib, on xarelto and metoprolol. EKG on admission significant for afib w RVR , rate 106, possible T wave inversion in V6 on EKG.   Cardio consulted- [facility]  Will hold chemical AC for now in setting of GI bleed.  Continue metoprolol for rate control Pt w hx of afib, on xarelto and metoprolol. EKG on admission significant for afib w RVR , rate 106, possible T wave inversion in V6, ST depressions in V4-V6 on EKG.   Will add on 1st set of CE now- f/u   Cardio consulted- [facility]  Will hold chemical AC in setting of GI bleed.  Continue metoprolol for rate control Pt w hx of afib, on xarelto and metoprolol. EKG on admission significant for afib w RVR , rate 106, possible T wave inversion in V6, ST depressions in V4-V6 on EKG. ST depression have been present on previous EKGs, but given rapid anemia should monitor cardiac status closely.   Will add on 1st set of CE now- f/u   Cardio consulted- [facility]  Will hold chemical AC in setting of GI bleed.  Continue metoprolol for rate control

## 2020-05-08 NOTE — H&P ADULT - PROBLEM SELECTOR PLAN 1
Admit to medicine  Pt with episode of dark tarry stool x1, FOBT +, Hb 9.2 (typically runs around 13-14). No known hx of previous GI bleed per sister.   Gi following- Dr. Zapata   Will transfuse one unit pRBCs now.   F/u H/H at 19:00  NPO, IVF D5W @ 50mL/hr  GI PPx w IV protonix gtt  F/u CT abd and pelvis  Hold AC, anti platelet agents  Continue Serial CBCs Admit to medicine  Pt with episode of dark tarry stool x1, FOBT +, Hb 9.2 (typically runs around 13-14). No known hx of previous GI bleed per sister.   Gi following- Dr. Zapata   Will transfuse one unit pRBCs now. Transfuse if Hb<9  F/u H/H at 19:00  NPO, IVF D5W @ 50mL/hr  GI PPx w IV protonix gtt  F/u CT abd and pelvis  Hold AC, anti platelet agents  Trend H/H q4, Serial CBCs

## 2020-05-08 NOTE — ED PROVIDER NOTE - ATTENDING CONTRIBUTION TO CARE
I have personally performed a face to face diagnostic evaluation on this patient.  I have reviewed the PA note and agree with the history, exam, and plan of care, except as noted.  History and Exam by me shows patient brought in by ambulance with report of epistaxis and low o2 sat 82% on room air, placed on non rebreather. Patient provides to information, sister who is health care proxy states nasal bleeding started yesterday evening, on xarelto for afib, continued this morning, and called for ambulance, patient awake but not providing history, noted melena in diaper, epistaxis of heart and lungs clear, abdomen soft, f/u labs, type and screen, transfuse prbc, start protonix drip, send covid swab, document code status, admit.

## 2020-05-08 NOTE — CONSULT NOTE ADULT - SUBJECTIVE AND OBJECTIVE BOX
Chief Complaint:  Patient is a 75y old  Male who presents with a chief complaint of epistaxis    Aphasia S/P CVA  NSVT (nonsustained ventricular tachycardia)  Alcohol abuse  DM (diabetes mellitus)  HF (heart failure)  PAD (peripheral artery disease)  Former smoker  Hypertension  H/O: Rheumatic Fever  Atrial Fibrillation  Pacemaker  Hyperlipidemia  H/O: Hypertension  Gout  Coronary Artery Disease  Chronic Obstructive Pulmonary Disease (COPD)  Carotid Stenosis  S/P CABG x 1  S/P CABG x 3  CAD S/P percutaneous coronary angioplasty  Popliteal artery injury  Arm injury  Pacemaker  S/P Implantation of Automatic Cardioverter/Defibrillator (AICD)  S/P Tonsillectomy  Hypertension     HPI: 76 yo M PMHx HTN, DM, HLD, Afib on Xarelto, aphasia s/p CVA 2017. carotid stenosis, COPD, CAD, s/p CABG, s/p pacemaker presents to ED for evaluation of epistaxis x last night. As pt is a aphasic s/p CVA, history obtained from sister Sandra (lives with pt, next of kin/proxy/power of )-- states she noticed the epistaxis was becoming more profuse since last night, so decided to call EMS. States he has otherwise been at his normal baseline behavior/mental status. Pt found to have active black tarry stools in ED. Sister states she was unaware of this. As per sister, pt is a DNR/DNI (states she has all the signed documentation at home but is unable to send to hospital at this time.)    gi consulted for gib in setting of epistaxis. on xarelto for afib. chart reviewed. pt seen and examined. unable to provide meaningful hx, sitting up in bed, denies abd pain. per rn, pt w epistaxis and black stool x1, no hematemesis, no hematochezia. initially w low o2 sat on nrb, now on nc. upon eval, pt w dried blood to face/nares, no active nosebleed appreciated. diaper clean wo notable melena; recently changed per nursing    recent vs/labs/imaging reviewed-  afebrile mildly tachy normotensive  hgb 9.2 (3/2019 14.3) ob+  wbc~13  plts 193 inr 2  renal insufficiency  mild elevation in lfts  imaging pending      No Known Allergies      pantoprazole Infusion 8 mG/Hr IV Continuous <Continuous>        FAMILY HISTORY:  No pertinent family history in first degree relatives        Review of Systems:    unable to obtain    Relevant Family History:   n/c    Relevant Social History: n/c      Physical Exam:    Vital Signs:  Vital Signs Last 24 Hrs  T(C): 36.2 (08 May 2020 10:00), Max: 36.2 (08 May 2020 10:00)  T(F): 97.2 (08 May 2020 10:00), Max: 97.2 (08 May 2020 10:00)  HR: 100 (08 May 2020 10:00) (100 - 100)  BP: 117/60 (08 May 2020 10:00) (117/60 - 117/60)  BP(mean): --  RR: 18 (08 May 2020 10:00) (18 - 18)  SpO2: 100% (08 May 2020 10:00) (100% - 100%)  Daily     Daily     General: sitting up in bed on nc  HEENT:  NC/AT, dried blood in nares  Abdomen:  Soft, non-tender, mild dt on exam diaper clean no melena  Extremities:  no edema  Neuro/Psych:  awake but confused    Laboratory:                            9.2    13.69 )-----------( 193      ( 08 May 2020 11:07 )             29.1     05-08    139  |  106  |  48<H>  ----------------------------<  96  5.0   |  25  |  1.40<H>    Ca    8.6      08 May 2020 11:07  Mg     1.9     05-08    TPro  6.5  /  Alb  2.8<L>  /  TBili  1.3<H>  /  DBili  x   /  AST  16  /  ALT  17  /  AlkPhos  131<H>  05-08    LIVER FUNCTIONS - ( 08 May 2020 11:07 )  Alb: 2.8 g/dL / Pro: 6.5 g/dL / ALK PHOS: 131 U/L / ALT: 17 U/L / AST: 16 U/L / GGT: x           PT/INR - ( 08 May 2020 11:07 )   PT: 23.6 sec;   INR: 2.06 ratio         PTT - ( 08 May 2020 11:07 )  PTT:34.0 sec      Imaging:    pending

## 2020-05-08 NOTE — H&P ADULT - PROBLEM SELECTOR PLAN 6
Pt w hx of aphasia 2ndary to CVA. At baseline mental status  Continue statin, hold antiplatelet agents in setting of GI bleed

## 2020-05-08 NOTE — H&P ADULT - HISTORY OF PRESENT ILLNESS
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 2015)/PPM, A Fib on Xarelto, DM who presents with epistaxis that began last night. Hx obtained from daughter Sandra ([facility]) as pt is aphasic.      states she noticed the epistaxis was becoming more profuse since last night, so decided to call EMS. States he has otherwise been at his normal baseline behavior/mental status. Pt found to have active black tarry stools in ED. Sister states she was unaware of this.    In ED: Pt received one dose 40mg IV protonix, 1 unit pRBCs, 1L NS bolus  Vitals , /60, RR 18, T 97.2, SpO2 100 on RA  Labs WBC: 13.69, Hb 9.2 (13-14s per chart review), Cr 1.4 (at baseline), Alk phos 131, FOBT+  EKG:   Imaging  < from: CT Head No Cont (05.08.20 @ 12:43) >  IMPRESSION:  Mild chronic microvascular changes without evidence of an acute transcortical infarction or hemorrhage. MR is a more sensitive imaging modality for the evaluation of an acute infarction.     < end of copied text >  < from: Xray Chest 1 View-PORTABLE IMMEDIATE (05.08.20 @ 11:00) >  IMPRESSION:    No lung consolidations.    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 2015)/PPM, A Fib on Xarelto, DM who presents with epistaxis that began last night. Hx obtained from daughter Sandra ([facility]) as pt is aphasic. Per daughter, pt had an episode of epistaxis at 6pm yesterday after spending the day outside with his lady friend Angelia. The daughter tried to put ice and compresses on to stop the bleeding without relief. Pt continued to have episodes of epistaxis throughout the night and into the morning, which prompted her to take him to the ED. As pt was being prepared to be taken to ED, the paramedics noted he had dark tarry formed stool. Daughter said she was unaware of this until today. Pt has never had dark stools or hx of GI bleeds in the past. Pt follows with ENT as an outpatient for hx of epistaxis. Pt has hx of picking his nose per daughter. Did not take xarelto last night. Admits to weakness, fatigue. Denies CP, SOB, cough, fever, chills, night sweats, abd pain, NVD, focal weakness, numbness tingling. PT IS DNR DNI     states she noticed the epistaxis was becoming more profuse since last night, so decided to call EMS. States he has otherwise been at his normal baseline behavior/mental status. Pt found to have active black tarry stools in ED. Sister states she was unaware of this.    In ED: Pt received one dose 40mg IV protonix, 1 unit pRBCs, 1L NS bolus  Vitals , /60, RR 18, T 97.2, SpO2 100 on RA  Labs WBC: 13.69, Hb 9.2 (13-14s per chart review), Cr 1.4 (at baseline), Alk phos 131, FOBT+  EKG:   Imaging  < from: CT Head No Cont (05.08.20 @ 12:43) >  IMPRESSION:  Mild chronic microvascular changes without evidence of an acute transcortical infarction or hemorrhage. MR is a more sensitive imaging modality for the evaluation of an acute infarction.     < end of copied text >  < from: Xray Chest 1 View-PORTABLE IMMEDIATE (05.08.20 @ 11:00) >  IMPRESSION:    No lung consolidations.    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 2015)/PPM, A Fib on Xarelto, DM who presents with epistaxis that began last night. Hx obtained from sister Sandra ([facility]) as pt is aphasic. Per daughter, pt had an episode of epistaxis at 6pm yesterday after spending the day outside with his lady friend Angelia. The sister tried to put ice and compresses on to stop the bleeding without relief. Pt continued to have episodes of epistaxis throughout the night and into the morning, which prompted her to take him to the ED. As pt was being prepared to be taken to ED, the paramedics noted he had dark tarry formed stool. Sister said she was unaware of this until today. Pt has never had dark stools or hx of GI bleeds in the past. Pt follows with ENT as an outpatient for hx of epistaxis. Pt has hx of picking his nose per sister. Did not take xarelto last night. Admits to weakness, fatigue. Denies CP, SOB, cough, fever, chills, night sweats, abd pain, NVD, focal weakness, numbness tingling. PT IS DNR DNI.    In ED: Pt received one dose 40mg IV protonix, 1 unit pRBCs, 1L NS bolus  Vitals , /60, RR 18, T 97.2, SpO2 97% on RA  Labs WBC: 13.69, Hb 9.2 (13-14s per chart review), Cr 1.4 (at baseline), Alk phos 131, FOBT+  EKG: afib w RVR, rate 106. Isolated T wave inversion in V6  Imaging  < from: CT Head No Cont (05.08.20 @ 12:43) >  IMPRESSION:  Mild chronic microvascular changes without evidence of an acute transcortical infarction or hemorrhage. MR is a more sensitive imaging modality for the evaluation of an acute infarction.     < end of copied text >  < from: Xray Chest 1 View-PORTABLE IMMEDIATE (05.08.20 @ 11:00) >  IMPRESSION:    No lung consolidations.    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 2015)/PPM, A Fib on Xarelto, DM who presents with epistaxis that began last night. Hx obtained from sister Sandra ([facility]) as pt is aphasic. Per daughter, pt had an episode of epistaxis at 6pm yesterday after spending the day outside with his lady friend Angelia. The sister tried to put ice and compresses on to stop the bleeding without relief. Pt continued to have episodes of epistaxis throughout the night and into the morning, which prompted her to take him to the ED. As pt was being prepared to be taken to ED, the paramedics noted he had dark tarry formed stool. Sister said she was unaware of this until today. Pt has never had dark stools or hx of GI bleeds in the past. Pt follows with ENT as an outpatient for hx of epistaxis. Pt has hx of picking his nose per sister. Did not take xarelto last night. Admits to weakness, fatigue. Denies CP, SOB, cough, fever, chills, night sweats, abd pain, NVD, focal weakness, numbness tingling. PT IS DNR DNI.    In ED: Pt received one dose 40mg IV protonix, 1 unit pRBCs, 1L NS bolus  Vitals , /60, RR 18, T 97.2, SpO2 97% on RA  Labs WBC: 13.69, Hb 9.2 (13-14s per chart review), Cr 1.4 (at baseline), Alk phos 131, FOBT+  EKG: afib w RVR, rate 106. Isolated T wave inversion in V6, possible ST depressions (new compared to previous EKG) in V4-V6  Imaging  < from: CT Head No Cont (05.08.20 @ 12:43) >  IMPRESSION:  Mild chronic microvascular changes without evidence of an acute transcortical infarction or hemorrhage. MR is a more sensitive imaging modality for the evaluation of an acute infarction.     < end of copied text >  < from: Xray Chest 1 View-PORTABLE IMMEDIATE (05.08.20 @ 11:00) >  IMPRESSION:    No lung consolidations.    < end of copied text >

## 2020-05-08 NOTE — ED ADULT NURSE NOTE - PSH
Arm injury  s/p surgery 2009  CAD S/P percutaneous coronary angioplasty  stent placed  Pacemaker  [employer] serial #[id number]   model #v-268  Popliteal artery injury  iliac/ popliteal angioplasty with stents 2010  S/P CABG x 1    S/P Implantation of Automatic Cardioverter/Defibrillator (AICD)    S/P Tonsillectomy

## 2020-05-08 NOTE — ED PROVIDER NOTE - PMH
Alcohol abuse  in recovery  Aphasia S/P CVA    Atrial Fibrillation    Carotid Stenosis    Chronic Obstructive Pulmonary Disease (COPD)    Coronary Artery Disease    DM (diabetes mellitus)    Former smoker    Gout    H/O: Rheumatic Fever    HF (heart failure)    Hyperlipidemia    Hypertension    NSVT (nonsustained ventricular tachycardia)    Pacemaker  defibrillator model # v-268 serial # [id number]  PAD (peripheral artery disease)  with stents

## 2020-05-08 NOTE — ED PROVIDER NOTE - CLINICAL SUMMARY MEDICAL DECISION MAKING FREE TEXT BOX
74 yo M p/w epistaxis and GI bleed--> will check labs, CT angio, GI consult, PRBC as per attending, admit to medicine vs ICU

## 2020-05-08 NOTE — ED PROVIDER NOTE - OBJECTIVE STATEMENT
76 yo M PMHx HTN, DM, HLD, Afib on Xarelto, aphasia s/p CVA 2017. carotid stenosis, COPD, CAD, s/p CABG, s/p pacemaker presents to ED for evaluation of epistaxis x last night. As pt is a aphasic s/p CVA, history obtained from sister Sandra (lives with pt, next of kin/proxy/power of )-- states she noticed the epistaxis was becoming more profuse since last night, so decided to call EMS. States he has otherwise been at his normal baseline behavior/mental status. Pt found to have active black tarry stools in ED. Sister states she was unaware of this.  As per sister, pt is a DNR/DNI (states she has all the signed documentation at home but is unable to send to hospital at this time.)

## 2020-05-08 NOTE — H&P ADULT - PROBLEM SELECTOR PLAN 5
Pt w chronic systolic HF (Last Echo 8/17 showing moderate segmental LV systolic dysfunction, severely hypokinetic inferior and septal walls, variable EF, moderate pulmonary HTN)  Continue home lasix, digoxin   Cards [facility]

## 2020-05-08 NOTE — H&P ADULT - NSHPSOCIALHISTORY_GEN_ALL_CORE
Lives with: Sister    Substance Use :  Tobacco Usage:   former smoker, 2PPD x 40 years  Alcohol Usage: History of EtOH abuse  No illicit drug use Lives with: Sister. Uses walker.  Tobacco Usage: former smoker, 2PPD x 40 years  Alcohol Usage: History of EtOH abuse  No illicit drug use

## 2020-05-09 NOTE — DISCHARGE NOTE PROVIDER - NSDCMRMEDTOKEN_GEN_ALL_CORE_FT
Alpha Lipoic Acid 100 mg oral tablet: 3 tab(s) orally once a day  atorvastatin 80 mg oral tablet: 1 tab(s) orally once a day (at bedtime)  B-12 1000 mcg oral tablet: 1 tab(s) orally once a day  clopidogrel 75 mg oral tablet: 1 tab(s) orally once a day  digoxin 125 mcg (0.125 mg) oral tablet: 1 tab(s) orally once a day  finasteride 5 mg oral tablet: 1 tab(s) orally once a day  FLUoxetine 10 mg oral capsule: 1 cap(s) orally once a day (at bedtime)  furosemide 20 mg oral tablet:   melatonin 3 mg oral tablet: 1 tab(s) orally once a day (at bedtime), As needed, Insomnia  metoprolol succinate 50 mg oral tablet, extended release: 1 tab(s) orally once a day (at bedtime)  Omega-3 350 mg oral capsule: 1 cap(s) orally once a day  pantoprazole 40 mg oral delayed release tablet: 1 tab(s) orally once a day  Symbicort 160 mcg-4.5 mcg/inh inhalation aerosol: 2 puff(s) inhaled 2 times a day   tamsulosin 0.4 mg oral capsule: 2 cap(s) orally once a day (at bedtime)  telmisartan 40 mg oral tablet: 1 tab(s) orally once a day  Vitamin D3 1000 intl units (25 mcg) oral tablet: 1 tab(s) orally once a day  Xarelto 20 mg oral tablet: 1 tab(s) orally once a day (in the evening) Alpha Lipoic Acid 100 mg oral tablet: 3 tab(s) orally once a day  atorvastatin 80 mg oral tablet: 1 tab(s) orally once a day (at bedtime)  B-12 1000 mcg oral tablet: 1 tab(s) orally once a day  clopidogrel 75 mg oral tablet: 1 tab(s) orally once a day  digoxin 125 mcg (0.125 mg) oral tablet: 1 tab(s) orally once a day  finasteride 5 mg oral tablet: 1 tab(s) orally once a day  FLUoxetine 10 mg oral capsule: 1 cap(s) orally once a day (at bedtime)  furosemide 20 mg oral tablet:   melatonin 3 mg oral tablet: 1 tab(s) orally once a day (at bedtime), As needed, Insomnia  metoprolol succinate 50 mg oral tablet, extended release: 1 tab(s) orally once a day (at bedtime)  Omega-3 350 mg oral capsule: 1 cap(s) orally once a day  pantoprazole 40 mg oral delayed release tablet: 1 tab(s) orally once a day  Symbicort 160 mcg-4.5 mcg/inh inhalation aerosol: 2 puff(s) inhaled 2 times a day   tamsulosin 0.4 mg oral capsule: 2 cap(s) orally once a day (at bedtime)  Vitamin D3 1000 intl units (25 mcg) oral tablet: 1 tab(s) orally once a day Alpha Lipoic Acid 100 mg oral tablet: 3 tab(s) orally once a day  apixaban 2.5 mg oral tablet: 1 tab(s) orally 2 times a day  atorvastatin 80 mg oral tablet: 1 tab(s) orally once a day (at bedtime)  B-12 1000 mcg oral tablet: 1 tab(s) orally once a day  clopidogrel 75 mg oral tablet: 1 tab(s) orally once a day  digoxin 125 mcg (0.125 mg) oral tablet: 1 tab(s) orally once a day  finasteride 5 mg oral tablet: 1 tab(s) orally once a day  FLUoxetine 10 mg oral capsule: 1 cap(s) orally once a day (at bedtime)  furosemide 20 mg oral tablet:   melatonin 3 mg oral tablet: 1 tab(s) orally once a day (at bedtime), As needed, Insomnia  metoprolol succinate 50 mg oral tablet, extended release: 1 tab(s) orally once a day (at bedtime)  Omega-3 350 mg oral capsule: 1 cap(s) orally once a day  pantoprazole 40 mg oral delayed release tablet: 1 tab(s) orally once a day  Symbicort 160 mcg-4.5 mcg/inh inhalation aerosol: 2 puff(s) inhaled 2 times a day   tamsulosin 0.4 mg oral capsule: 2 cap(s) orally once a day (at bedtime)  Vitamin D3 1000 intl units (25 mcg) oral tablet: 1 tab(s) orally once a day Alpha Lipoic Acid 100 mg oral tablet: 3 tab(s) orally once a day  apixaban 2.5 mg oral tablet: 1 tab(s) orally 2 times a day  atorvastatin 80 mg oral tablet: 1 tab(s) orally once a day (at bedtime)  B-12 1000 mcg oral tablet: 1 tab(s) orally once a day  clopidogrel 75 mg oral tablet: 1 tab(s) orally once a day  digoxin 125 mcg (0.125 mg) oral tablet: 1 tab(s) orally once a day  finasteride 5 mg oral tablet: 1 tab(s) orally once a day  FLUoxetine 10 mg oral capsule: 1 cap(s) orally once a day (at bedtime)  furosemide 20 mg oral tablet:   home 02 with concentrator and portable oxyen  RA saturation 85%, ICD 10 J44.9:   melatonin 3 mg oral tablet: 1 tab(s) orally once a day (at bedtime), As needed, Insomnia  metoprolol succinate 50 mg oral tablet, extended release: 1 tab(s) orally once a day (at bedtime)  Omega-3 350 mg oral capsule: 1 cap(s) orally once a day  pantoprazole 40 mg oral delayed release tablet: 1 tab(s) orally once a day  Symbicort 160 mcg-4.5 mcg/inh inhalation aerosol: 2 puff(s) inhaled 2 times a day   tamsulosin 0.4 mg oral capsule: 2 cap(s) orally once a day (at bedtime)  Vitamin D3 1000 intl units (25 mcg) oral tablet: 1 tab(s) orally once a day

## 2020-05-09 NOTE — DISCHARGE NOTE PROVIDER - CARE PROVIDERS DIRECT ADDRESSES
,DirectAddress_Unknown ,[email or web address],DirectAddress_Unknown,[email or web address]

## 2020-05-09 NOTE — PROGRESS NOTE ADULT - PROBLEM SELECTOR PLAN 3
-mild elevation in Miguel, liekly 2/2 demand ischemia in setting of likely gi bleed epistaxis   -17 beats of v tach overnight, pt was asymptomatic at the time   -cont monitor on tele   -trend Miguel to peak  -repeat ekg today   Cardio consulted- reccs noted

## 2020-05-09 NOTE — PROGRESS NOTE ADULT - SUBJECTIVE AND OBJECTIVE BOX
[facility] -- Javon Hernandez Grossman, Wachsman, Pannella, Patel, Savella  Office # [phone]      Follow Up:      Subjective/Observations:       REVIEW OF SYSTEMS: All other review of systems is negative unless indicated above    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  DM (diabetes mellitus)  HF (heart failure)  PAD (peripheral artery disease): with stents  Former smoker  Hypertension  H/O: Rheumatic Fever  Atrial Fibrillation  Pacemaker: defibrillator model # v-268 serial # [id number]  Hyperlipidemia  Gout  Coronary Artery Disease  Chronic Obstructive Pulmonary Disease (COPD)  Carotid Stenosis  S/P CABG x 1  CAD S/P percutaneous coronary angioplasty: stent placed  Popliteal artery injury: iliac/ popliteal angioplasty with stents 2010  Arm injury: s/p surgery 2009  Pacemaker: [employer] serial #[id number]   model #v-268  S/P Implantation of Automatic Cardioverter/Defibrillator (AICD)  S/P Tonsillectomy      MEDICATIONS  (STANDING):  atorvastatin 80 milliGRAM(s) Oral at bedtime  budesonide 160 MICROgram(s)/formoterol 4.5 MICROgram(s) Inhaler 2 Puff(s) Inhalation two times a day  digoxin     Tablet 0.125 milliGRAM(s) Oral daily  finasteride 5 milliGRAM(s) Oral daily  FLUoxetine 10 milliGRAM(s) Oral at bedtime  furosemide    Tablet 20 milliGRAM(s) Oral daily  metoprolol succinate ER 50 milliGRAM(s) Oral at bedtime  pantoprazole Infusion 8 mG/Hr (10 mL/Hr) IV Continuous <Continuous>  tamsulosin 0.8 milliGRAM(s) Oral at bedtime    MEDICATIONS  (PRN):      Allergies    No Known Allergies    Intolerances        Vital Signs Last 24 Hrs  T(C): 36.3 (09 May 2020 04:30), Max: 37.5 (08 May 2020 17:54)  T(F): 97.3 (09 May 2020 04:30), Max: 99.5 (08 May 2020 17:54)  HR: 76 (09 May 2020 04:30) (73 - 477)  BP: 112/66 (09 May 2020 04:30) (112/60 - 154/84)  BP(mean): --  RR: 16 (09 May 2020 04:30) (16 - 19)  SpO2: 98% (09 May 2020 04:30) (96% - 100%)    I&O's Summary    08 May 2020 07:01  -  09 May 2020 07:00  --------------------------------------------------------  IN: 0 mL / OUT: 1250 mL / NET: -1250 mL      Weight (kg): 66.5 (05-08 @ 18:20)    PHYSICAL EXAM:  TELE:   Constitutional: NAD, awake and alert, well-developed  HEENT: Moist Mucous Membranes, Anicteric  Pulmonary: Non-labored, breath sounds are clear bilaterally, No wheezing, crackles or rhonchi  Cardiovascular: Regular, S1 and S2 nl, No murmurs, rubs, gallops or clicks  Gastrointestinal: Bowel Sounds present, soft, nontender.   Lymph: No lymphadenopathy. No peripheral edema.  Skin: No visible rashes or ulcers.  Psych:  Mood & affect appropriate    LABS: All Labs Reviewed:                        8.8    8.17  )-----------( 165      ( 09 May 2020 08:14 )             27.8                         8.8    x     )-----------( x        ( 08 May 2020 23:55 )             27.4                         8.5    x     )-----------( x        ( 08 May 2020 19:50 )             26.2     09 May 2020 08:14    142    |  111    |  54     ----------------------------<  76     4.4     |  24     |  1.30   08 May 2020 11:07    139    |  106    |  48     ----------------------------<  96     5.0     |  25     |  1.40     Ca    8.6        09 May 2020 08:14  Ca    8.6        08 May 2020 11:07  Mg     1.9       08 May 2020 11:07    TPro  6.1    /  Alb  2.6    /  TBili  1.6    /  DBili  x      /  AST  16     /  ALT  13     /  AlkPhos  113    09 May 2020 08:14  TPro  6.5    /  Alb  2.8    /  TBili  1.3    /  DBili  x      /  AST  16     /  ALT  17     /  AlkPhos  131    08 May 2020 11:07    PT/INR - ( 09 May 2020 08:14 )   PT: 17.9 sec;   INR: 1.58 ratio         PTT - ( 08 May 2020 11:07 )  PTT:34.0 sec  CARDIAC MARKERS ( 09 May 2020 08:14 )  .097 ng/mL / x     / 102 U/L / x     / 3.0 ng/mL  CARDIAC MARKERS ( 08 May 2020 23:55 )  .091 ng/mL / x     / 102 U/L / x     / 3.4 ng/mL  CARDIAC MARKERS ( 08 May 2020 16:53 )  .077 ng/mL / x     / 105 U/L / x     / 3.3 ng/mL  CARDIAC MARKERS ( 08 May 2020 11:07 )  .050 ng/mL / x     / 117 U/L / x     / 3.8 ng/mL         ECG:    Echo:    Radiology:

## 2020-05-09 NOTE — DISCHARGE NOTE PROVIDER - PROVIDER TOKENS
FREE:[LAST:[mounika],PHONE:[(   )    -],FAX:[(   )    -],FOLLOWUP:[1 week],ESTABLISHEDPATIENT:[T]] PROVIDER:[TOKEN:[[id number]],FOLLOWUP:[1 week],ESTABLISHEDPATIENT:[T]],FREE:[LAST:[sharpe],FIRST:[abdus],PHONE:[(   )    -],FAX:[(   )    -],ADDRESS:[[address] [phone]],FOLLOWUP:[1 week],ESTABLISHEDPATIENT:[T]],PROVIDER:[TOKEN:[[id number]],FOLLOWUP:[1 week],ESTABLISHEDPATIENT:[T]]

## 2020-05-09 NOTE — DISCHARGE NOTE PROVIDER - NSDCFUADDINST_GEN_ALL_CORE_FT
- Please make an appointment for follow up with your primary doctor in 1-3 days  - Please make an appointment for follow up with cardiology (information above)  - Patient or caretaker is responsible for making all appointments  - Please return to the ED if you develop worsening symptoms or fever

## 2020-05-09 NOTE — PROGRESS NOTE ADULT - PROBLEM SELECTOR PLAN 1
melena 2/2 epistaxis, exacerbated in setting of ac use  recommend ent evaluation  ct reviewed, neg for acute gi pathology  f/u am labs  cont ppi  monitor cbc/coags, transfuse prn  hold ac anti platelets and nsaids as able  no plans for gi intervention  further care per primary.

## 2020-05-09 NOTE — DISCHARGE NOTE PROVIDER - CARE PROVIDER_API CALL
mounika,   Phone: (   )    -  Fax: (   )    -  Established Patient  Follow Up Time: 1 week Lucio Camacho (MD)  Cardiovascular Disease  [address] [place] [address]  Phone: [phone]  Fax: [phone]  Established Patient  Follow Up Time: 1 week    fermin sharpe  [address] [phone]  Phone: (   )    -  Fax: (   )    -  Established Patient  Follow Up Time: 1 week    Stephen Mehta)  Otolaryngology  [address] [facility], [place] [address]  Phone: [phone]  Fax: [phone]  Established Patient  Follow Up Time: 1 week

## 2020-05-09 NOTE — DISCHARGE NOTE PROVIDER - HOSPITAL COURSE
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 2015)/PPM, A Fib on Xarelto, DM who presents with epistaxis that began last night. Hx obtained from sister Sandra ([facility]) as pt is aphasic. Per daughter, pt had an episode of epistaxis at 6pm yesterday after spending the day outside with his lady friend Angelia. The sister tried to put ice and compresses on to stop the bleeding without relief. Pt continued to have episodes of epistaxis throughout the night and into the morning, which prompted her to take him to the ED. As pt was being prepared to be taken to ED, the paramedics noted he had dark tarry formed stool. Sister said she was unaware of this until today. Pt has never had dark stools or hx of GI bleeds in the past. Pt follows with ENT as an outpatient for hx of epistaxis. Pt has hx of picking his nose per sister. Did not take xarelto last night. Admits to weakness, fatigue. Denies CP, SOB, cough, fever, chills, night sweats, abd pain, NVD, focal weakness, numbness tingling. PT IS DNR DNI.        In ED: Pt received one dose 40mg IV protonix, 1 unit pRBCs, 1L NS bolus    Vitals , /60, RR 18, T 97.2, SpO2 97% on RA    Labs WBC: 13.69, Hb 9.2 (13-14s per chart review), Cr 1.4 (at baseline), Alk phos 131, FOBT+    EKG: afib w RVR, rate 106. Isolated T wave inversion in V6, possible ST depressions (new compared to previous EKG) in V4-V6    ---------------------    Hospital Course: Pt admitted to [facility] for r/o GI bleed 1 episode of melena, epistaxis. Workup for GI bleed was performed. Hgb was found to be ___, pt had baseline of 13-14, given 1 U PRBCs, Hgb was then followed serially, remained stable, pt remained asymptomatic, no further melena or epistaxis starting 5/9/20. CT abd and pelvis showed: No bowel obstruction, no bowel inflammation. Colonic diverticulosis, no diverticulitis. No extravasation of vascular contrast into the GI lumen to suggest source of active gastrointestinal bleeding. Small complex cyst in the left kidney measuring up to 1.7 cm. Cholelithiasis. Pt found to have +FOBT. GI Dr. Wilson was consulted, started pt on IV protonix ggt, made NPO, started on IVF, home xarelto (for a fib) held, no further plan for specific GI procedure or intervention. For epistaxis,  was consulted, recommended humidified air, confirmed epistaxis had resolved. Pt then started on humidified NC. H/H continued to be trended, remained stable. Pt was noted to have multiple episodes of epistaxis prior to admission. Has hx of nose bleeds, follows with ENT (Dr. Mehta) as outpatient. Based on CT abd and pelvis result, epistaxis was likely 2/2 to xarelto use, epistaxis was the suspected source of melena.     Of note, pt had mild elevation in Miguel along with 17 beats of v tach durin which they were asymptomatic. Cardio Dr. Early was consulted, found to be likely 2/2 demand ishemia in the setting of likely gi bleed and epistaxis. Pt was monitored on tele, Miguel trended to peak, EKG repeated. Pt cleared by a GI and ENT standpoint for discharge.               Problem/Plan - 4:    ·  Problem: Atrial Fibrillation.  Plan: hx of afib, on xarelto and metoprolol.     -EKG on admission significant for afib w RVR , rate 106, possible T wave inversion  V6, ST depressions in V4-V6 on EKG. ST depression have been present on previous EKGs, but given rapid anemia continue to monitor cardiac status closely    -repeat ekg     -cont monitor on tele     hold chemical AC in setting of GI bleed.    -continue beta blocker and dig, metropolol for rate control as per cardio     Cardio consulted- reccs noted.          Problem/Plan - 5:    ·  Problem: Leukocytosis.  Plan: -resolved    -on admission moderate leukocytosis was likely reactive secondary to GI bleed    continue to trend.          Problem/Plan - 6:    Problem: HF (heart failure). Plan: chronic systolic HF     -Last Echo 8/17 showing moderate segmental LV systolic dysfunction, severely hypokinetic inferior and septal walls, variable EF, moderate pulmonary HTN    Continue home lasix, digoxin, beta blocker as per cardio    Cards - [facility]: reccs noted.         Problem/Plan - 7:    ·  Problem: Aphasia S/P CVA.  Plan: hx of aphasia 2ndary to CVA, at baseline mental status    Continue statin, hold antiplatelet agents in setting of GI bleed.          Problem/Plan - 8:    ·  Problem: Hypertension.  Plan: Chronic, stable.     Hold telimesartan for now.                 Pt admitted for __acute asthma exacerbtationion_________. Pt started on ______.      ________ from _________ was consulted, started pt on ___, workup up pt, likely 2/2 ____, ruled out ______.     Pt showed was hemodynamically stabilized, epistaxis stopped, no further signs of melena starting from 5/9/20. 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 2015)/PPM, A Fib on Xarelto, DM who presents with epistaxis that began last night. Hx obtained from sister Sandra ([facility]) as pt is aphasic. Per daughter, pt had an episode of epistaxis at 6pm yesterday after spending the day outside with his lady friend Angelia. The sister tried to put ice and compresses on to stop the bleeding without relief. Pt continued to have episodes of epistaxis throughout the night and into the morning, which prompted her to take him to the ED. As pt was being prepared to be taken to ED, the paramedics noted he had dark tarry formed stool. Sister said she was unaware of this until today. Pt has never had dark stools or hx of GI bleeds in the past. Pt follows with ENT as an outpatient for hx of epistaxis. Pt has hx of picking his nose per sister. Did not take xarelto last night. Admits to weakness, fatigue. Denies CP, SOB, cough, fever, chills, night sweats, abd pain, NVD, focal weakness, numbness tingling. PT IS DNR DNI.        In ED: Pt received one dose 40mg IV protonix, 1 unit pRBCs, 1L NS bolus    Vitals , /60, RR 18, T 97.2, SpO2 97% on RA    Labs WBC: 13.69, Hb 9.2 (13-14s per chart review), Cr 1.4 (at baseline), Alk phos 131, FOBT+    EKG: afib w RVR, rate 106. Isolated T wave inversion in V6, possible ST depressions (new compared to previous EKG) in V4-V6    ---------------------    Hospital Course: Pt admitted to [facility] for r/o GI bleed 1 episode of melena, epistaxis. Workup for GI bleed was performed. Hgb dropped to 8.5, given baseline of 13-14 with active bleed, pt given 1 U PRBCs, Hgb was then followed serially, remained stable, pt remained asymptomatic, no further melena or epistaxis starting 5/9/20. CT abd and pelvis showed: No bowel obstruction, no bowel inflammation. Colonic diverticulosis, no diverticulitis. No extravasation of vascular contrast into the GI lumen to suggest source of active gastrointestinal bleeding. Small complex cyst in the left kidney measuring up to 1.7 cm. Cholelithiasis. Pt found to have +FOBT. GI Dr. Wilson was consulted, started pt on IV protonix ggt, made NPO, started on IVF, home xarelto (for a fib) held, no further plan for specific GI procedure or intervention. For epistaxis,  was consulted, recommended humidified air, confirmed epistaxis had resolved. Pt then started on humidified NC. H/H continued to be trended, remained stable. Pt was noted to have multiple episodes of epistaxis prior to admission. Has hx of nose bleeds, follows with ENT (Dr. Mehta) as outpatient. Based on CT abd and pelvis result, epistaxis was likely 2/2 to xarelto use, epistaxis was the suspected source of melena.     Of note, pt had mild elevation in Miguel along with 17 beats of v tach durin which they were asymptomatic. Cardio Dr. Early was consulted, found to be likely 2/2 demand ishemia in the setting of likely gi bleed and epistaxis. Pt was monitored on tele, Miguel trended to peak, EKG repeated. Pt had hx of atrial fibrillation on xarelto, cardio agreed to hold xarelto during inpatient stay, home dose metoprolol and dig was continued. Pt also had hx of chronic systolic HF, lasix was continued. As per cardio home dose telimesartan for HTN was held.      Mild leukocytosis resolved, was likely reactive secondary to GI bleed.          Pt improved, hemodynamically stabilized, epistaxis stopped, no further signs of melena starting from 5/9/20. Pt cleared by a GI and ENT standpoint for discharge. 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 2015)/PPM, A Fib on Xarelto, DM who presents with epistaxis that began last night. Hx obtained from sister Sandra ([facility]) as pt is aphasic. Per daughter, pt had an episode of epistaxis at 6pm yesterday after spending the day outside with his lady friend Angelia. The sister tried to put ice and compresses on to stop the bleeding without relief. Pt continued to have episodes of epistaxis throughout the night and into the morning, which prompted her to take him to the ED. As pt was being prepared to be taken to ED, the paramedics noted he had dark tarry formed stool. Sister said she was unaware of this until today. Pt has never had dark stools or hx of GI bleeds in the past. Pt follows with ENT as an outpatient for hx of epistaxis. Pt has hx of picking his nose per sister. Did not take xarelto last night. Admits to weakness, fatigue. Denies CP, SOB, cough, fever, chills, night sweats, abd pain, NVD, focal weakness, numbness tingling. PT IS DNR DNI.        In ED: Pt received one dose 40mg IV protonix, 1 unit pRBCs, 1L NS bolus    Vitals , /60, RR 18, T 97.2, SpO2 97% on RA    Labs WBC: 13.69, Hb 9.2 (13-14s per chart review), Cr 1.4 (at baseline), Alk phos 131, FOBT+    EKG: afib w RVR, rate 106. Isolated T wave inversion in V6, possible ST depressions (new compared to previous EKG) in V4-V6    ---------------------    Hospital Course: Pt admitted to [facility] for r/o GI bleed in setting of melena and epistaxis. Workup for GI bleed was performed. Hgb dropped to 8.5, given baseline of 13-14 with active bleed, pt given 1 U PRBCs, Hgb was then followed serially, remained stable, pt remained asymptomatic, no further melena or epistaxis. CT abd and pelvis showed: No bowel obstruction, no bowel inflammation. Colonic diverticulosis, no diverticulitis. No extravasation of vascular contrast into the GI lumen to suggest source of active gastrointestinal bleeding. Small complex cyst in the left kidney measuring up to 1.7 cm. Cholelithiasis. Pt found to have (+) FOBT. GI Dr. Zhang was consulted, started pt on IV protonix, home xarelto (for a fib) held, no further plan for specific GI procedure or intervention. For epistaxis,  was consulted, recommended humidified air, confirmed epistaxis had resolved. H/H continued to be trended, remained stable. Heme/onc consulted and shared decision was made between heme/onc, cardio, GI to restart Eliquis for AC. given patients risk profile. Of note, patient had mild troponin leak while admitted. CE were trended along with serial EKG's. Cardio ([facility]) was consulted, found to be likely 2/2 demand ischemia in the setting of likely gi bleed and epistaxis.         Patient showed gradual improvement over hospitalization and is medically optimized for discharge with close outpatient PMD, ENT and cardiology follow up. 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 2015)/PPM, A Fib on Xarelto, DM who presents with epistaxis that began last night. Hx obtained from sister Sandra ([facility]) as pt is aphasic. Per daughter, pt had an episode of epistaxis at 6pm yesterday after spending the day outside with his lady friend Angelia. The sister tried to put ice and compresses on to stop the bleeding without relief. Pt continued to have episodes of epistaxis throughout the night and into the morning, which prompted her to take him to the ED. As pt was being prepared to be taken to ED, the paramedics noted he had dark tarry formed stool. Sister said she was unaware of this until today. Pt has never had dark stools or hx of GI bleeds in the past. Pt follows with ENT as an outpatient for hx of epistaxis. Pt has hx of picking his nose per sister. Did not take xarelto last night. Admits to weakness, fatigue. Denies CP, SOB, cough, fever, chills, night sweats, abd pain, NVD, focal weakness, numbness tingling. PT IS DNR DNI.        In ED: Pt received one dose 40mg IV protonix, 1 unit pRBCs, 1L NS bolus    Vitals , /60, RR 18, T 97.2, SpO2 97% on RA    Labs WBC: 13.69, Hb 9.2 (13-14s per chart review), Cr 1.4 (at baseline), Alk phos 131, FOBT+    EKG: afib w RVR, rate 106. Isolated T wave inversion in V6, possible ST depressions (new compared to previous EKG) in V4-V6    ---------------------    Hospital Course: Pt admitted to [facility] for r/o GI bleed in setting of melena and epistaxis. Workup for GI bleed was performed. Hgb dropped to 8.5, given baseline of 13-14 with active bleed, pt given 1 U PRBCs, Hgb was then followed serially, remained stable, pt remained asymptomatic, no further melena or epistaxis. CT abd and pelvis showed: No bowel obstruction, no bowel inflammation. Colonic diverticulosis, no diverticulitis. No extravasation of vascular contrast into the GI lumen to suggest source of active gastrointestinal bleeding. Small complex cyst in the left kidney measuring up to 1.7 cm. Cholelithiasis. Pt found to have (+) FOBT. GI Dr. Zhang was consulted, started pt on IV protonix, home xarelto (for a fib) held, no further plan for specific GI procedure or intervention. For epistaxis,  was consulted, recommended humidified air, confirmed epistaxis had resolved. H/H continued to be trended, remained stable. Heme/onc consulted and shared decision was made between heme/onc, cardio, GI to restart Eliquis for AC. given patients risk profile. Of note, patient had mild troponin leak while admitted. CE were trended along with serial EKG's. Cardio ([facility]) was consulted, found to be likely 2/2 demand ischemia in the setting of likely gi bleed and epistaxis.         Patient showed gradual improvement over hospitalization and is medically optimized for discharge with close outpatient PMD, ENT and cardiology follow up.        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 2015)/PPM, A Fib on Xarelto, DM who presents with epistaxis that began last night. Hx obtained from sister Sandra ([facility]) as pt is aphasic. Per daughter, pt had an episode of epistaxis at 6pm yesterday after spending the day outside with his lady friend Angelia. The sister tried to put ice and compresses on to stop the bleeding without relief. Pt continued to have episodes of epistaxis throughout the night and into the morning, which prompted her to take him to the ED. As pt was being prepared to be taken to ED, the paramedics noted he had dark tarry formed stool. Sister said she was unaware of this until today. Pt has never had dark stools or hx of GI bleeds in the past. Pt follows with ENT as an outpatient for hx of epistaxis. Pt has hx of picking his nose per sister. Did not take xarelto last night. Admits to weakness, fatigue. Denies CP, SOB, cough, fever, chills, night sweats, abd pain, NVD, focal weakness, numbness tingling. PT IS DNR DNI.        In ED: Pt received one dose 40mg IV protonix, 1 unit pRBCs, 1L NS bolus    Vitals , /60, RR 18, T 97.2, SpO2 97% on RA    Labs WBC: 13.69, Hb 9.2 (13-14s per chart review), Cr 1.4 (at baseline), Alk phos 131, FOBT+    EKG: afib w RVR, rate 106. Isolated T wave inversion in V6, possible ST depressions (new compared to previous EKG) in V4-V6    ---------------------    Hospital Course: Pt admitted to [facility] for r/o GI bleed in setting of melena and epistaxis. Workup for GI bleed was performed. Hgb dropped to 8.5, given baseline of 13-14 with active bleed, pt given 1 U PRBCs, Hgb was then followed serially, remained stable, pt remained asymptomatic, no further melena or epistaxis. CT abd and pelvis showed: No bowel obstruction, no bowel inflammation. Colonic diverticulosis, no diverticulitis. No extravasation of vascular contrast into the GI lumen to suggest source of active gastrointestinal bleeding. Small complex cyst in the left kidney measuring up to 1.7 cm. Cholelithiasis. Pt found to have (+) FOBT. GI Dr. Zhang was consulted, started pt on IV protonix, home xarelto (for a fib) held, no further plan for specific GI procedure or intervention. For epistaxis,  was consulted, recommended humidified air, confirmed epistaxis had resolved. H/H continued to be trended, remained stable. Heme/onc consulted and shared decision was made between heme/onc, cardio, GI to restart Eliquis for AC. given patients risk profile. Of note, patient had mild troponin leak while admitted. CE were trended along with serial EKG's. Cardio ([facility]ka) was consulted, found to be likely 2/2 demand ischemia in the setting of likely gi bleed and epistaxis.         copd requiring 2 L nasal canula 02 supplement , off 0xygen pt satuartion is 85% on room air, would need  02 supplement 2 L for home        Patient showed gradual improvement over hospitalization and is medically optimized for discharge with close outpatient PMD, ENT and cardiology follow up.        PE    NAD    chest s1, s2    lungs decrease air entry a the     bases    abd:BS+    ext: no pedal edema     d/c planning

## 2020-05-09 NOTE — PROGRESS NOTE ADULT - PROBLEM SELECTOR PLAN 6
chronic systolic HF   -Last Echo 8/17 showing moderate segmental LV systolic dysfunction, severely hypokinetic inferior and septal walls, variable EF, moderate pulmonary HTN  Continue home lasix, digoxin, beta blocker as per cardio  Cards - [facility]: reccs noted

## 2020-05-09 NOTE — PROGRESS NOTE ADULT - PROBLEM SELECTOR PLAN 7
hx of aphasia 2ndary to CVA, at baseline mental status  Continue statin, hold antiplatelet agents in setting of GI bleed

## 2020-05-09 NOTE — PROGRESS NOTE ADULT - PROBLEM SELECTOR PLAN 9
Chronic, stable  Saturating well   wean off NC as tolerated   Continue routine hemodynamic monitoring.

## 2020-05-09 NOTE — PROGRESS NOTE ADULT - PROBLEM SELECTOR PLAN 1
-on admission dark tarry stool x1, FOBT +, no current melena, no hx of GI bleed as per sister    CT abd and pelvis: No bowel obstruction, no bowel inflammation. Colonic diverticulosis, no diverticulitis. No extravasation of vascular contrast into the GI lumen to suggest source of active gastrointestinal bleeding. Small complex cyst in the left kidney measuring up to 1.7 cm. Cholelithiasis.  -s/p 1 U PRBCs Hb now 8.8, baseline 13-14, transfuse if <8 or symptomatic   -no plan for GI intervention   F/u H/H at 16:00, cont to follow H/H CBCs   NPO, IVF D5W @ 50mL/hr  GI PPx w IV protonix gtt  Hold AC, anti platelet agents  Gi following- Dr. Zapata

## 2020-05-09 NOTE — PROGRESS NOTE ADULT - PROBLEM SELECTOR PLAN 4
hx of afib, on xarelto and metoprolol.   -EKG on admission significant for afib w RVR , rate 106, possible T wave inversion  V6, ST depressions in V4-V6 on EKG. ST depression have been present on previous EKGs, but given rapid anemia continue to monitor cardiac status closely  -repeat ekg   -cont monitor on tele   hold chemical AC in setting of GI bleed.  -continue beta blocker and dig, metropolol for rate control as per cardio   Cardio consulted- reccs noted

## 2020-05-09 NOTE — PROGRESS NOTE ADULT - SUBJECTIVE AND OBJECTIVE BOX
This progress note was completed in part by resident acting as telephonic scribe during COVID-19 crisis. Physical exam and review of systems was completed by attending physician, and findings below are those of the attending physician, and have been reviewed in detail.     Patient is a 75y old  Male who presents with a chief complaint of epistaxis (09 May 2020 10:31)      INTERVAL HPI/OVERNIGHT EVENTS:  Pt with 17 beats of v tach overnight, asymptomatic during the time. Pt seen and examined at bedside. Last known time of epistaxis around 1900 last night, no further epistaxis noted.     T(C): 36.3 (20 @ 04:30), Max: 37.5 (20 @ 17:54)  HR: 76 (20 @ 04:30) (73 - 477)  BP: 112/66 (20 @ 04:30) (112/60 - 154/84)  RR: 16 (20 @ 04:30) (16 - 19)  SpO2: 95% (20 @ 10:37) (88% - 100%)  Wt(kg): --    I&O's Summary    08 May 2020 07:01  -  09 May 2020 07:00  --------------------------------------------------------  IN: 0 mL / OUT: 1250 mL / NET: -1250 mL        LABS:                        8.8    8.17  )-----------( 165      ( 09 May 2020 08:14 )             27.8     0509    142  |  111<H>  |  54<H>  ----------------------------<  76  4.4   |  24  |  1.30    Ca    8.6      09 May 2020 08:14  Mg     1.9     08    TPro  6.1  /  Alb  2.6<L>  /  TBili  1.6<H>  /  DBili  x   /  AST  16  /  ALT  13  /  AlkPhos  113  09    PT/INR - ( 09 May 2020 08:14 )   PT: 17.9 sec;   INR: 1.58 ratio         PTT - ( 08 May 2020 11:07 )  PTT:34.0 sec  CAPILLARY BLOOD GLUCOSE          Urinalysis Basic - ( 08 May 2020 14:47 )    Color: Yellow / Appearance: Clear / S.005 / pH: x  Gluc: x / Ketone: Negative  / Bili: Negative / Urobili: 1   Blood: x / Protein: 30 mg/dL / Nitrite: Negative   Leuk Esterase: Negative / RBC: 3-5 /HPF / WBC 0-2   Sq Epi: x / Non Sq Epi: Occasional / Bacteria: Occasional           MEDICATIONS  (STANDING):  atorvastatin 80 milliGRAM(s) Oral at bedtime  budesonide 160 MICROgram(s)/formoterol 4.5 MICROgram(s) Inhaler 2 Puff(s) Inhalation two times a day  digoxin     Tablet 0.125 milliGRAM(s) Oral daily  finasteride 5 milliGRAM(s) Oral daily  FLUoxetine 10 milliGRAM(s) Oral at bedtime  furosemide    Tablet 20 milliGRAM(s) Oral daily  metoprolol succinate ER 50 milliGRAM(s) Oral at bedtime  pantoprazole Infusion 8 mG/Hr (10 mL/Hr) IV Continuous <Continuous>  tamsulosin 0.8 milliGRAM(s) Oral at bedtime    MEDICATIONS  (PRN):      REVIEW OF SYSTEMS: unable to obtain 2/2 aphasia     RADIOLOGY & ADDITIONAL TESTS:    Imaging Personally Reviewed:  [ ] YES  [ ] NO    Consultant(s) Notes Reviewed:  [ ] YES  [ ] NO    General: NAD  HEENT:  NC/AT, moist mucus membranes  Pulmonary: Non-labored, CTA b/l no wheezing, crackles or rhonchi  Cardiovascular: S1 and S2 noted, No murmurs  Abdomen:  Soft, non-tender, mild distension   Extremities: no edema  Neuro:  awake but confused  skin no rashes       Care Discussed with Consultants/Other Providers [ ] YES  [ ] NO

## 2020-05-09 NOTE — DISCHARGE NOTE PROVIDER - NSDCCPCAREPLAN_GEN_ALL_CORE_FT
PRINCIPAL DISCHARGE DIAGNOSIS  Diagnosis: Gastrointestinal hemorrhage, unspecified gastrointestinal hemorrhage type  Assessment and Plan of Treatment:       SECONDARY DISCHARGE DIAGNOSES  Diagnosis: Cardiac enzymes elevated  Assessment and Plan of Treatment: Cardiac enzymes elevated    Diagnosis: Epistaxis  Assessment and Plan of Treatment: Epistaxis    Diagnosis: Chronic Obstructive Pulmonary Disease (COPD)  Assessment and Plan of Treatment: Chronic Obstructive Pulmonary Disease (COPD)    Diagnosis: DM (diabetes mellitus)  Assessment and Plan of Treatment: DM (diabetes mellitus)    Diagnosis: Atrial Fibrillation  Assessment and Plan of Treatment: Atrial Fibrillation    Diagnosis: Hypertension  Assessment and Plan of Treatment: Hypertension    Diagnosis: HF (heart failure)  Assessment and Plan of Treatment: HF (heart failure)    Diagnosis: Aphasia S/P CVA  Assessment and Plan of Treatment: Aphasia S/P CVA    Diagnosis: Leukocytosis  Assessment and Plan of Treatment: Leukocytosis PRINCIPAL DISCHARGE DIAGNOSIS  Diagnosis: Gastrointestinal hemorrhage, unspecified gastrointestinal hemorrhage type  Assessment and Plan of Treatment: You were hospitalized for a bleed in your GI tract. You were given 1 unit of red blood cells and improved.  -stop taking your xarelto and clopidogrel until you see your cardiologist within 1 week  -follow up with your primary care doctor Dr. Tana Ruiz within 1 week      SECONDARY DISCHARGE DIAGNOSES  Diagnosis: Epistaxis  Assessment and Plan of Treatment: You had a nosebleed. You were treated with 1 unit of red blood cells and seen by a ENT (Ear Nose Throat) doctor. You were then started on humidified oxygen.  -follow up with your primary care Dr. Ruiz within 1 week  -follow up with your ENT Dr. Mehta within 1 week    Diagnosis: Cardiac enzymes elevated  Assessment and Plan of Treatment: Your cardiac enzymes were elevated. (a enzyme in your heart). You were seen by a cardiologist for this.  -follow up with your primary care doctor Dr. Morro Ruiz within 1 week  -follow up with your cardiologist Dr Camacho within 1 week      Diagnosis: History of BPH  Assessment and Plan of Treatment: continue your home finasteride    Diagnosis: Depression  Assessment and Plan of Treatment: continue your home FLUoxetine    Diagnosis: Chronic Obstructive Pulmonary Disease (COPD)  Assessment and Plan of Treatment: clopidogrel 75 mg oral tablet: Last Dose Taken:  , 1 tab(s) orally once a day  -conitnue your home dose symbicort    Diagnosis: DM (diabetes mellitus)  Assessment and Plan of Treatment: DM (diabetes mellitus)    Diagnosis: Atrial Fibrillation  Assessment and Plan of Treatment: -continue your home metropolol   -stop your home xarelto and clopidogrel   -follow up with your cardiologist Dr Camacho within 1 week    Diagnosis: Hypertension  Assessment and Plan of Treatment: continue your home metropolol, telmisartan    Diagnosis: Hyperlipidemia  Assessment and Plan of Treatment: continue your home dose statin    Diagnosis: HF (heart failure)  Assessment and Plan of Treatment: conitnue your home lasix and digoxin PRINCIPAL DISCHARGE DIAGNOSIS  Diagnosis: Epistaxis  Assessment and Plan of Treatment: You were admitted for epistaxis (nose bleed) with sustpected GI bleed. You were evaluated by GI, cardiology, hematology and ENT while admitted with no further bleeing. You recieved 1 unit of blood transfusion with stable hemoglobin levels. Your anticoagulation was chnaged from Xarelto to Eliquis. Please follow up with your cardiologist for further management. Follow up with you ENT or return to the ED with any bleeding      SECONDARY DISCHARGE DIAGNOSES  Diagnosis: History of BPH  Assessment and Plan of Treatment: continue your home finasteride    Diagnosis: Depression  Assessment and Plan of Treatment: continue your home FLUoxetine    Diagnosis: Hyperlipidemia  Assessment and Plan of Treatment: continue your home dose statin    Diagnosis: HF (heart failure)  Assessment and Plan of Treatment: conitnue your home lasix and digoxin    Diagnosis: Hypertension  Assessment and Plan of Treatment: continue your home metropolol, telmisartan    Diagnosis: Atrial Fibrillation  Assessment and Plan of Treatment: -continue your home metropolol   -stop your home xarelto and clopidogrel   -follow up with your cardiologist Dr Camacho within 1 week    Diagnosis: DM (diabetes mellitus)  Assessment and Plan of Treatment: DM (diabetes mellitus)    Diagnosis: Chronic Obstructive Pulmonary Disease (COPD)  Assessment and Plan of Treatment: clopidogrel 75 mg oral tablet: Last Dose Taken:  , 1 tab(s) orally once a day  -conitnue your home dose symbicort    Diagnosis: Epistaxis  Assessment and Plan of Treatment: You had a nosebleed. You were treated with 1 unit of red blood cells and seen by a ENT (Ear Nose Throat) doctor. You were then started on humidified oxygen.  -follow up with your primary care Dr. Ruiz within 1 week  -follow up with your ENT Dr. Mehta within 1 week    Diagnosis: Cardiac enzymes elevated  Assessment and Plan of Treatment: Your cardiac enzymes were elevated. (a enzyme in your heart). You were seen by a cardiologist for this.  -follow up with your primary care doctor Dr. Morro Ruiz within 1 week  -follow up with your cardiologist Dr Camacho within 1 week

## 2020-05-09 NOTE — CONSULT NOTE ADULT - SUBJECTIVE AND OBJECTIVE BOX
Reason for Consult/Chief Complaint:    HEALTH ISSUES - PROBLEM Dx:  Leukocytosis: Leukocytosis  Epistaxis: Epistaxis  Need for prophylactic measure: Need for prophylactic measure  Chronic Obstructive Pulmonary Disease (COPD): Chronic Obstructive Pulmonary Disease (COPD)  DM (diabetes mellitus): DM (diabetes mellitus)  Atrial Fibrillation: Atrial Fibrillation  Hypertension: Hypertension  HF (heart failure): HF (heart failure)  Aphasia S/P CVA: Aphasia S/P CVA  Gastrointestinal hemorrhage, unspecified gastrointestinal hemorrhage type: Gastrointestinal hemorrhage, unspecified gastrointestinal hemorrhage type        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  DM (diabetes mellitus)  HF (heart failure)  PAD (peripheral artery disease): with stents  Former smoker  Hypertension  H/O: Rheumatic Fever  Atrial Fibrillation  Pacemaker: defibrillator model # v-268 serial # [id number]  Hyperlipidemia  Gout  Coronary Artery Disease  Chronic Obstructive Pulmonary Disease (COPD)  Carotid Stenosis  S/P CABG x 1  CAD S/P percutaneous coronary angioplasty: stent placed  Popliteal artery injury: iliac/ popliteal angioplasty with stents 2010  Arm injury: s/p surgery 2009  Pacemaker: [employer] serial #[id number]   model #v-268  S/P Implantation of Automatic Cardioverter/Defibrillator (AICD)  S/P Tonsillectomy    Physical Exam  Vital Signs Last 24 Hrs  T(C): 36.3 (09 May 2020 04:30), Max: 37.5 (08 May 2020 17:54)  T(F): 97.3 (09 May 2020 04:30), Max: 99.5 (08 May 2020 17:54)  HR: 76 (09 May 2020 04:30) (73 - 477)  BP: 112/66 (09 May 2020 04:30) (112/60 - 154/84)  BP(mean): --  RR: 16 (09 May 2020 04:30) (16 - 19)  SpO2: 95% (09 May 2020 10:37) (88% - 100%)    General: elderly man, no active bleeding from nose.  Communication and voice - non verbal.   Head and Face: no swellings, lesions, tumors of masses. No tenderness to palpation or percussion. Facial strenth is normal.    Ears: external auditory canals, Tympanic membranes and middle ear space appeared normal bilaterally, normal hearing to speech. inspection of outer ear normal.   Nose: no signs of infection, bleeding or polyps.raw, dry mambranes, no active bleeding.    Oral cavity / Oropharynx: normal mucosa. no lesions, tumors, masses or signs of infection. good palate mobility.   Neck: no lesions tumors, masses or swollen glands.   Thyroid - no nodules or thyromegaly noted.     MEDICATIONS  (STANDING):  atorvastatin 80 milliGRAM(s) Oral at bedtime  budesonide 160 MICROgram(s)/formoterol 4.5 MICROgram(s) Inhaler 2 Puff(s) Inhalation two times a day  digoxin     Tablet 0.125 milliGRAM(s) Oral daily  finasteride 5 milliGRAM(s) Oral daily  FLUoxetine 10 milliGRAM(s) Oral at bedtime  furosemide    Tablet 20 milliGRAM(s) Oral daily  metoprolol succinate ER 50 milliGRAM(s) Oral at bedtime  pantoprazole Infusion 8 mG/Hr (10 mL/Hr) IV Continuous <Continuous>  tamsulosin 0.8 milliGRAM(s) Oral at bedtime                            8.8    8.17  )-----------( 165      ( 09 May 2020 08:14 )             27.8

## 2020-05-09 NOTE — PROGRESS NOTE ADULT - PROBLEM SELECTOR PLAN 2
-Pt with multiple episodes of epistaxis prior to admission. Has hx of nose bleeds, follows with ENT (Dr. Mehta) as outpatient. On xarelto for a fib at home. Likely epistaxis 2/2 to xarelto use is the source of melena.   -last known time of espitaxis at 19:00 5/8/20  Hb 9.2 on admission (baseline 13s-14s), s/p 1 U PRBCS   Hold AC, antiplatelet agents.   ENT consulted- Dr. Smith f/u reccs

## 2020-05-09 NOTE — DISCHARGE NOTE PROVIDER - NSDCFUSCHEDAPPT_GEN_ALL_CORE_FT
[facility] ; 06/15/2020 ; [facility] [address] [facility] ; 06/22/2020 ; [facility] Urology [address] [facility]NA[facility] ; 06/15/2020 ; [facility] [address] [facility] ; 06/22/2020 ; [facility] Urology [address] [facility]NA[facility] ; 06/15/2020 ; [facility] [address] [facility] ; 06/22/2020 ; NPP Urology [address] [facility]NA[facility] ; 06/15/2020 ; [facility] [address] [facility] ; 06/22/2020 ; NPP Urology [facility]NA[facility] ; 06/15/2020 ; N[facility] Cardio [facility] [address] [facility] ; 06/22/2020 ; NPP Urology [address] [facility] ; 06/15/2020 ; N[facility] Cardio Electro [address] [facility] ; 06/22/2020 ; NPP Urology [address] [facility] C[facility]NAUGH ; 06/15/2020 ; N[facility] Cardio [facility] [address] [facility] ; 06/22/2020 ; NPP Urology [address] [facility]NA[facility] ; 06/15/2020 ; [facility] Cardio [facility] [address] [facility] ; 06/22/2020 ; NPP Urology [address] [facility]

## 2020-05-09 NOTE — CONSULT NOTE ADULT - PROBLEM SELECTOR RECOMMENDATION 9
suspect melena 2/2 epistaxis, exacerbated in setting of ac use  recommend ent evaluation  f/u imaging  npo/ivf  cont ppi  monitor cbc/coags, transfuse prn  hold ac anti platelets and nsaids  plan dw attg, agreeable; will follow  further care per primary
Recommend humidified oxygen via face mask/tent.

## 2020-05-09 NOTE — CHART NOTE - NSCHARTNOTEFT_GEN_A_CORE
Called by RN as patient with 17 beats of v tach. Confirmed w/ tele. Patient asymptomatic at this time and resting comfortably. Will check electrolytes in AM. Cardiology ([facility]) following. RN to call with changes.

## 2020-05-09 NOTE — PROGRESS NOTE ADULT - PROBLEM SELECTOR PLAN 5
-resolved  -on admission moderate leukocytosis was likely reactive secondary to GI bleed  continue to trend

## 2020-05-09 NOTE — PROGRESS NOTE ADULT - PROBLEM SELECTOR PLAN 10
SCDs, hold chemical AC for now given GI bleed.  11 diabetes melitis borderline:   Pt w hx of borderline DM type 2, not on home medications  AM fasting glucose within non diabetic range  hold off on ISS

## 2020-05-09 NOTE — PROGRESS NOTE ADULT - SUBJECTIVE AND OBJECTIVE BOX
INTERVAL HPI/OVERNIGHT EVENTS:  pt seen and examined  smiling in bed, confused  per rn last episode of nosebleed yesterday evening at shift change, none further overnight  no hematemesis no melena  sp 1u prbc yesterday    MEDICATIONS  (STANDING):  atorvastatin 80 milliGRAM(s) Oral at bedtime  budesonide 160 MICROgram(s)/formoterol 4.5 MICROgram(s) Inhaler 2 Puff(s) Inhalation two times a day  digoxin     Tablet 0.125 milliGRAM(s) Oral daily  finasteride 5 milliGRAM(s) Oral daily  FLUoxetine 10 milliGRAM(s) Oral at bedtime  furosemide    Tablet 20 milliGRAM(s) Oral daily  metoprolol succinate ER 50 milliGRAM(s) Oral at bedtime  pantoprazole Infusion 8 mG/Hr (10 mL/Hr) IV Continuous <Continuous>  tamsulosin 0.8 milliGRAM(s) Oral at bedtime    MEDICATIONS  (PRN):      Allergies    No Known Allergies    Intolerances        Review of Systems:  unable to obtain       Vital Signs Last 24 Hrs  T(C): 36.3 (09 May 2020 04:30), Max: 37.5 (08 May 2020 17:54)  T(F): 97.3 (09 May 2020 04:30), Max: 99.5 (08 May 2020 17:54)  HR: 76 (09 May 2020 04:30) (73 - 477)  BP: 112/66 (09 May 2020 04:30) (112/60 - 154/84)  BP(mean): --  RR: 16 (09 May 2020 04:30) (16 - 19)  SpO2: 98% (09 May 2020 04:30) (96% - 100%)    PHYSICAL EXAM:    General: sitting up in bed  HEENT:  NC/AT  Abdomen:  Soft, non-tender, mild dt   Extremities:  no edema  Neuro/Psych:  awake but confused    LABS:                        8.8    x     )-----------( x        ( 08 May 2020 23:55 )             27.4     05-08    139  |  106  |  48<H>  ----------------------------<  96  5.0   |  25  |  1.40<H>    Ca    8.6      08 May 2020 11:07  Mg     1.9     05-08    TPro  6.5  /  Alb  2.8<L>  /  TBili  1.3<H>  /  DBili  x   /  AST  16  /  ALT  17  /  AlkPhos  131<H>  05-08    PT/INR - ( 08 May 2020 11:07 )   PT: 23.6 sec;   INR: 2.06 ratio         PTT - ( 08 May 2020 11:07 )  PTT:34.0 sec  Urinalysis Basic - ( 08 May 2020 14:47 )    Color: Yellow / Appearance: Clear / S.005 / pH: x  Gluc: x / Ketone: Negative  / Bili: Negative / Urobili: 1   Blood: x / Protein: 30 mg/dL / Nitrite: Negative   Leuk Esterase: Negative / RBC: 3-5 /HPF / WBC 0-2   Sq Epi: x / Non Sq Epi: Occasional / Bacteria: Occasional        RADIOLOGY & ADDITIONAL TESTS:  < from: CT Angio Abdomen and Pelvis w/ IV Cont (20 @ 21:40) >    EXAM:  CT ANGIO ABD PELV (W)AW IC                            PROCEDURE DATE:  2020          INTERPRETATION:  CLINICAL INFORMATION: Gastrointestinal bleeding, at the static cysts, chronic aphasia.    COMPARISON: CT of the chest from 2017.    PROCEDURE:   CT of the Abdomen and Pelvis was performed with and without intravenous contrast.  Precontrast, Arterial and Delayed phases were performed.  Intravenous contrast: 90 ml Omnipaque 350. 10 ml discarded.  Oral contrast: None.  Sagittal and coronal reformats were performed.    FINDINGS:    LOWER CHEST: Mild emphysema and bibasilar dependent and platelike atelectasis. Cardiac lead in the right ventricle.    LIVER: Within normal limits.  BILE DUCTS: Normal caliber.  GALLBLADDER: Cholelithiasis.  SPLEEN: Within normal limits.  PANCREAS: Within normal limits.  ADRENALS: Within normal limits.  KIDNEYS/URETERS: Lobulated contour of the kidneys with focal areas of renal cortical thinning/scarring. Kidneys enhance symmetrically. No right or left hydronephrosis. Left renal cysts including a indeterminate complex cyst measuring up to 1.7 cm (series 4:40).    BLADDER: Within normal limits.  REPRODUCTIVE ORGANS: Enlarged prostate gland with coarse calcification.    BOWEL: No bowel obstruction or overt bowel wall thickening. Appendix is normal. Colonic diverticulosis without evidence of diverticulitis. No extravasation of vascular contrast into the gastrointestinal lumen to suggest source of active gastrointestinal bleeding.  PERITONEUM: No ascites, pneumoperitoneum, or loculated collection. No mesenteric lymphadenopathy.  VESSELS: Diffuse atherosclerotic calcification of the aortoiliac tree and abdominal aortic branches. Aortobiiliac stent. Stent in the left external iliac artery.Celiac artery, SMA, renal arteries, and JM are patent, however, demonstrate moderate atherosclerotic calcification.  RETROPERITONEUM/LYMPH NODES: No lymphadenopathy. No retroperitoneal hematoma.    ABDOMINAL WALL: Surgical clips in the right and left groin.  BONES: Multilevel degenerative changes of the spine.    IMPRESSION:     No bowel obstruction or evidence of bowel inflammation. Colonic diverticulosis without evidence of diverticulitis. No extravasation of vascular contrast into the gastrointestinal lumen to suggest source of active gastrointestinal bleeding.    Small complex cyst in the left kidney measuring up to 1.7 cm (series 4:40) for which nonemergent renal sonogram may be considered to further evaluate.    Cholelithiasis.                    RAMSEY CUNNINGHAM D.O. ATTENDING RADIOLOGIST  This document has been electronically signed. May  8 2020  9:58PM                < end of copied text >

## 2020-05-10 NOTE — PROGRESS NOTE ADULT - PROBLEM SELECTOR PLAN 1
-on admission dark tarry stool x1, FOBT +, no melena overnight     CT abd and pelvis: No extravasation of vascular contrast into the GI lumen to suggest source of active gastrointestinal bleeding.   -s/p 1 U PRBCs, Hgb now stable ~high 8s however baseline 13-14, transfuse if <8 or symptomatic   -no plan for GI intervention   cont to follow H/H CBCs   IVF D5W @ 50mL/hr, NPO, will attempt to start diet today    GI PPx w IV protonix gtt  Hold AC, anti platelet agents  Gi following- Dr. Zapata -on admission dark tarry stool x1, FOBT +, no melena overnight     CT abd and pelvis: No extravasation of vascular contrast into the GI lumen to suggest source of active gastrointestinal bleeding.   -s/p 1 U PRBCs, Hgb now stable ~high 8s however baseline 13-14, transfuse if <8 or symptomatic   -Discussed with GI, doubts GI bleed, will start renally dosed Eliquis 2.5 BID, with careful monitoring for any sign of bleeding including melena, epistaxis. Will keep patient in hospital while starting Eliquis in order to monitor H/H, and signs or symptoms of bleed. Pt was on Xarelto at home as AC for a fib, given its risk of epistaxis, will switch to Eliquis and monitor closely.   -no plan for GI intervention   cont to follow H/H CBCs   d/c D5W, will attempt to start diet today    GI PPx w IV protonix gtt  Gi following- Dr. Zapata

## 2020-05-10 NOTE — PROGRESS NOTE ADULT - PROBLEM SELECTOR PLAN 2
-On xarelto for a fib at home. Likely epistaxis 2/2 to xarelto use is the source of melena.   -improved, no epistaxis overnight   Hb stable high 8s (baseline 13s-14s), s/p 1 U PRBCS   Hold AC, antiplatelet agents.   ENT consulted- Dr. Smith f/u reccs -On xarelto for a fib at home. Likely epistaxis 2/2 to xarelto use is the source of melena.   -improved, no epistaxis overnight   Hb stable high 8s (baseline 13s-14s), s/p 1 U PRBCS   -see problem1 for plan on AC with strict monitoring for any signs of bleeding  ENT consulted- Dr. Smith f/u reccs

## 2020-05-10 NOTE — PROGRESS NOTE ADULT - ASSESSMENT
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 [facility] 2015)/PPM, A Fib on Xarelto, DM who presents with epistaxis that began last night.    lateral ST depression could represent a component of ischemia/infarction but there is a likely component of the digoxin effect. In any event, conservative therapy is warranted    7 beats of VT on monitor  - toprol xl increased to 75mg  -continue to monitor on tele  -monitor electrolytes, K is 4.2, mag 2.2  -patient has an ICD, no need for further interrogation at this time  -recommend a repeat ECHO      Epistaxis   -GI is following, continues to recommend HOLDING AC  sp PRBC   seen by GI no intervention planned   -ENT recommends humidifed oxygen to reduce bleed risk    Troponin leak in setting of demand ischemia  -no further need to trend  -cardiac enzymes flat    Atrial Fibrillation.    -HOLD ALL ANTICOAUGULATION FOR NOW for now  -once cleared by GI, can trial on eliquis, although there remains bleeding risk  -d/c xarelto, evidence of ROCKET trial  suggests xarelto has increased risk of GIB   -Continue beta blocker and digoxin     chronic systolic HF (Last Echo 8/17 showing moderate segmental LV systolic dysfunction, severely hypokinetic inferior and septal walls, variable EF, moderate pulmonary HTN)  -euvolemic on exam  Continue home lasix 20mg po qd  -increased BB today,

## 2020-05-10 NOTE — PROGRESS NOTE ADULT - PROBLEM SELECTOR PLAN 6
chronic systolic HF   -Last Echo 8/17 showing moderate segmental LV systolic dysfunction, severely hypokinetic inferior and septal walls, variable EF, moderate pulmonary HTN  -Continue home lasix, digoxin, beta blocker as per cardio  -Cards - [facility]: reccs noted

## 2020-05-10 NOTE — PROGRESS NOTE ADULT - SUBJECTIVE AND OBJECTIVE BOX
This progress note was completed in part by resident acting as telephonic scribe during COVID-19 crisis. Physical exam and review of systems was completed by attending physician, and findings below are those of the attending physician, and have been reviewed in detail.     Patient is a 75y old  Male who presents with a chief complaint of epistaxis (09 May 2020 10:31)      INTERVAL HPI/OVERNIGHT EVENTS:  No acute events overnight. Pt seen and examined at bedside. Last known time of epistaxis around 1900 last night> No epistaxis or melena overnight.       ICU Vital Signs Last 24 Hrs  T(C): 36.7 (10 May 2020 04:39), Max: 36.8 (09 May 2020 14:39)  T(F): 98 (10 May 2020 04:39), Max: 98.2 (09 May 2020 14:39)  HR: 70 (10 May 2020 08:31) (67 - 82)  BP: 122/70 (10 May 2020 08:31) (110/65 - 134/76)  BP(mean): --  ABP: --  ABP(mean): --  RR: 18 (10 May 2020 08:31) (17 - 20)  SpO2: 94% (10 May 2020 08:31) (88% - 99%)      05-10    146<H>  |  112<H>  |  42<H>  ----------------------------<  75  4.2   |  25  |  1.50<H>    Ca    9.0      10 May 2020 07:41  Phos  4.2     05-10  Mg     2.2     05-10    TPro  6.4  /  Alb  2.8<L>  /  TBili  1.8<H>  /  DBili  1.00<H>  /  AST  21  /  ALT  17  /  AlkPhos  112  05-10      Urinalysis Basic - ( 08 May 2020 14:47 )    Color: Yellow / Appearance: Clear / S.005 / pH: x  Gluc: x / Ketone: Negative  / Bili: Negative / Urobili: 1   Blood: x / Protein: 30 mg/dL / Nitrite: Negative   Leuk Esterase: Negative / RBC: 3-5 /HPF / WBC 0-2   Sq Epi: x / Non Sq Epi: Occasional / Bacteria: Occasional           MEDICATIONS  (STANDING):  atorvastatin 80 milliGRAM(s) Oral at bedtime  budesonide 160 MICROgram(s)/formoterol 4.5 MICROgram(s) Inhaler 2 Puff(s) Inhalation two times a day  digoxin     Tablet 0.125 milliGRAM(s) Oral daily  finasteride 5 milliGRAM(s) Oral daily  FLUoxetine 10 milliGRAM(s) Oral at bedtime  furosemide    Tablet 20 milliGRAM(s) Oral daily  metoprolol succinate ER 50 milliGRAM(s) Oral at bedtime  pantoprazole Infusion 8 mG/Hr (10 mL/Hr) IV Continuous <Continuous>  tamsulosin 0.8 milliGRAM(s) Oral at bedtime    MEDICATIONS  (PRN):      REVIEW OF SYSTEMS: unable to obtain 2/2 aphasia     RADIOLOGY & ADDITIONAL TESTS:    Imaging Personally Reviewed:  [ ] YES  [ ] NO    Consultant(s) Notes Reviewed:  [ ] YES  [ ] NO    General: NAD  HEENT:  NC/AT, moist mucus membranes  Pulmonary: Non-labored, CTA b/l no wheezing, crackles or rhonchi  Cardiovascular: S1 and S2 noted, No murmurs  Abdomen:  Soft, non-tender, mild distension   Extremities: no edema  Neuro:  awake but confused  skin no rashes       Care Discussed with Consultants/Other Providers [ ] YES  [ ] NO

## 2020-05-10 NOTE — PROGRESS NOTE ADULT - PROBLEM SELECTOR PLAN 10
SCDs, hold chemical AC for now given GI bleed.  11 diabetes melitis borderline:   Pt w hx of borderline DM type 2, not on home medications  AM fasting glucose within non diabetic range  hold off on ISS SCDs, tiral of eliquis 2.5 BID for AC but with strict monitoring for any signs or symtoms of bleeding, spoke with GI  11 diabetes melitis borderline:   Pt w hx of borderline DM type 2, not on home medications  AM fasting glucose within non diabetic range  hold off on ISS

## 2020-05-10 NOTE — PROGRESS NOTE ADULT - PROBLEM SELECTOR PLAN 1
melena 2/2 epistaxis, exacerbated in setting of ac use, resolving, HD stable  ct reviewed, neg for acute gi pathology  ent input appreciated  f/u am labs  cont ppi  monitor cbc, transfuse prn  hold ac anti platelets and nsaids as able  no plans for gi intervention; no gi objection to diet as tolerated  further care per primary

## 2020-05-10 NOTE — PROGRESS NOTE ADULT - SUBJECTIVE AND OBJECTIVE BOX
[facility] -- Javon Hernandez, Neil Early, Jordi Victor Savella, [place]  Office # [phone]    Follow Up:    GIB; epistaxis and melena in setting of xarelto for AC  Subjective/Observations:   Patient seen and examined. No further epistaxis. Patient does not communicate his needs, aphasia from previous stroke. He continues to present with a dry cough. No apparent distress.     EVENTS:   7 beats of VT on monitor today    REVIEW OF SYSTEMS: All other review of systems is negative unless indicated above  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  DM (diabetes mellitus)  HF (heart failure)  PAD (peripheral artery disease): with stents  Former smoker  Hypertension  H/O: Rheumatic Fever  Atrial Fibrillation  Pacemaker: defibrillator model # v-268 serial # [id number]  Hyperlipidemia  Gout  Coronary Artery Disease  Chronic Obstructive Pulmonary Disease (COPD)  Carotid Stenosis  S/P CABG x 1  CAD S/P percutaneous coronary angioplasty: stent placed  Popliteal artery injury: iliac/ popliteal angioplasty with stents 2010  Arm injury: s/p surgery 2009  Pacemaker: [employer] serial #[id number]   model #v-268  S/P Implantation of Automatic Cardioverter/Defibrillator (AICD)  S/P Tonsillectomy    MEDICATIONS  (STANDING):  atorvastatin 80 milliGRAM(s) Oral at bedtime  budesonide 160 MICROgram(s)/formoterol 4.5 MICROgram(s) Inhaler 2 Puff(s) Inhalation two times a day  digoxin     Tablet 0.125 milliGRAM(s) Oral daily  finasteride 5 milliGRAM(s) Oral daily  FLUoxetine 10 milliGRAM(s) Oral at bedtime  furosemide    Tablet 20 milliGRAM(s) Oral daily  metoprolol succinate ER 50 milliGRAM(s) Oral at bedtime  pantoprazole   Suspension 40 milliGRAM(s) Oral daily  tamsulosin 0.8 milliGRAM(s) Oral at bedtime    MEDICATIONS  (PRN):    Allergies    No Known Allergies    Intolerances      Vital Signs Last 24 Hrs  T(C): 36.7 (10 May 2020 04:39), Max: 36.8 (09 May 2020 14:39)  T(F): 98 (10 May 2020 04:39), Max: 98.2 (09 May 2020 14:39)  HR: 70 (10 May 2020 08:31) (67 - 82)  BP: 122/70 (10 May 2020 08:31) (110/65 - 134/76)  BP(mean): --  RR: 18 (10 May 2020 08:31) (17 - 20)  SpO2: 92% (10 May 2020 11:17) (85% - 99%)  I&O's Summary    09 May 2020 07:01  -  10 May 2020 07:00  --------------------------------------------------------  IN: 340 mL / OUT: 1000 mL / NET: -660 mL        PHYSICAL EXAM:  TELE: AV paced underlying afib  Constitutional: NAD, awake and alert, well-developed  HEENT: Moist Mucous Membranes, Anicteric  Pulmonary: Non-labored, breath sounds are clear bilaterally, No wheezing, rales or rhonchi  Cardiovascular: Regular, S1 and S2, No murmurs, rubs, gallops or clicks  Gastrointestinal: Bowel Sounds present, soft, nontender.   Lymph: No peripheral edema. No lymphadenopathy.  Skin: No visible rashes or ulcers.  Psych:  Mood & affect appropriate  LABS: All Labs Reviewed:                        8.8    6.34  )-----------( 186      ( 10 May 2020 07:41 )             28.3                         8.7    x     )-----------( x        ( 09 May 2020 22:22 )             27.6                         8.4    x     )-----------( x        ( 09 May 2020 16:11 )             26.8     10 May 2020 07:41    146    |  112    |  42     ----------------------------<  75     4.2     |  25     |  1.50   09 May 2020 08:14    142    |  111    |  54     ----------------------------<  76     4.4     |  24     |  1.30   08 May 2020 11:07    139    |  106    |  48     ----------------------------<  96     5.0     |  25     |  1.40     Ca    9.0        10 May 2020 07:41  Ca    8.6        09 May 2020 08:14  Ca    8.6        08 May 2020 11:07  Phos  4.2       10 May 2020 07:41  Mg     2.2       10 May 2020 07:41  Mg     1.9       08 May 2020 11:07    TPro  6.4    /  Alb  2.8    /  TBili  1.8    /  DBili  1.00   /  AST  21     /  ALT  17     /  AlkPhos  112    10 May 2020 07:41  TPro  6.1    /  Alb  2.6    /  TBili  1.6    /  DBili  x      /  AST  16     /  ALT  13     /  AlkPhos  113    09 May 2020 08:14  TPro  6.5    /  Alb  2.8    /  TBili  1.3    /  DBili  x      /  AST  16     /  ALT  17     /  AlkPhos  131    08 May 2020 11:07    PT/INR - ( 10 May 2020 07:41 )   PT: 16.1 sec;   INR: 1.42 ratio           CARDIAC MARKERS ( 09 May 2020 22:22 )  .093 ng/mL / x     / 123 U/L / x     / 2.6 ng/mL  CARDIAC MARKERS ( 09 May 2020 20:16 )  .085 ng/mL / x     / 116 U/L / x     / 2.6 ng/mL  CARDIAC MARKERS ( 09 May 2020 16:11 )  .085 ng/mL / x     / 114 U/L / x     / 2.7 ng/mL  CARDIAC MARKERS ( 09 May 2020 08:14 )  .097 ng/mL / x     / 102 U/L / x     / 3.0 ng/mL  CARDIAC MARKERS ( 08 May 2020 23:55 )  .091 ng/mL / x     / 102 U/L / x     / 3.4 ng/mL  CARDIAC MARKERS ( 08 May 2020 16:53 )  .077 ng/mL / x     / 105 U/L / x     / 3.3 ng/mL       < from: Transthoracic Echocardiogram (08.09.17 @ 16:00) >    Patient name: VALENTINA CAVANAUGH  YOB: 1945   Age: 72 (M)   MR#: [id number]  Study Date: 8/9/2017  Location: [address] [place], [place] [id number]: Daryn Shetty RDCS  Study quality: Technically fair  Referring Physician: Murali Myers MD  Blood Pressure: 140/80 mmHg  Height: 173 cm  Weight: 88 kg  BSA: 2 m2  ------------------------------------------------------------------------  PROCEDURE: Transthoracic echocardiogram with 2-D, M-Mode  and complete spectral and color flow Doppler.  INDICATION: Atherosclerotic heart disease of native  coronary artery without angina pectoris (I25.10)  ------------------------------------------------------------------------  Dimensions:    Normal Values:  LA:     3.8    2.0 - 4.0 cm  Ao:     2.9    2.0 - 3.8 cm  SEPTUM: 0.8    0.6 - 1.2 cm  PWT:    0.9    0.6 - 1.1 cm  LVIDd:  4.7    3.0 - 5.6 cm  LVIDs:  3.5    1.8 - 4.0 cm  Derived variables:  LVMI: 66 g/m2  RWT: 0.38  ------------------------------------------------------------------------  Observations:  Mitral Valve: Mitral annular calcification. Mitral annular  dilatation with normal leaflet opening. Minimal mitral  regurgitation.  Aortic Valve/Aorta: Aortic valve not well visualized;  appears to be a calcified trileaflet valve with normal  opening.. Mild aortic regurgitation.  Aortic Root: 2.9 cm.  Left Atrium: Severely dilated left atrium.  LA volume index  = 57 cc/m2.  Left Ventricle: Endocardium not well visualized; moderate  segmental left ventricular systolic dysfunction. The  inferior and septal walls are severely hypokinetic.  Abnormal septal motion consistent with paced  rhythm/post-operative septal motion. Patient in atrial  fibrillation; ejection fraction varies with R-R interval.  Recommend limited imaging with intravenous echo contrast to  better assess LV systolic function if clinically indicated.  Normal left ventricular internal dimensions and wall  thicknesses.  Right Heart: Mild right atrial enlargement. The right  ventricle is not well visualized; right ventricle appears  normal size with decreased systolic function. A device wire  is noted in the right heart. Tricuspid valve not well  visualized, probably normal. Mild tricuspid regurgitation.  Normal pulmonic valve.  Pericardium/Pleura: Normal pericardium with no pericardial  effusion.  Hemodynamic: Estimated right atrial pressure is 8 mm Hg.  Estimated right ventricular systolic pressure equals 54 mm  Hg, assuming right atrial pressure equals 8 mm Hg,  consistent with moderate pulmonary hypertension.  ------------------------------------------------------------------------  Conclusions:  1. Mitral annular calcification. Mitral annular dilatation  with normal leaflet opening. Minimal mitral regurgitation.  2. Aortic valve not well visualized; appears cyrus a  calcified trileaflet valve with normal opening.. Mild  aortic regurgitation.  3. Endocardium not well visualized; moderate segmental left  ventricular systolic dysfunction. The inferior and septal  walls are severely hypokinetic. Abnormal septal motion  consistent with paced rhythm/post-operative septal motion.  Patient in atrial fibrillation; ejection fraction varies  with R-R interval. Recommend limited imaging with  intravenous echo contrast to better assess LV systolic  function if clinically indicated.  4. The right ventricle is not well visualized; right  ventricle appears normal size with decreased systolic  function. A device wire is noted in the right heart.  5. Estimated pulmonary artery systolic pressure equals 54  mm Hg, assuming right atrial pressure equals 8 mm Hg,  consistent with moderate pulmonary pressures.  ------------------------------------------------------------------------  Confirmed on  8/9/2017 - 17:15:39 by Deepali Velásquez M.D.  ---------------------------------------    < end of copied text >      Wilma Serna, DNP   #[id number] [phone]

## 2020-05-10 NOTE — CONSULT NOTE ADULT - SUBJECTIVE AND OBJECTIVE BOX
All records reviewed.    HPI:  74 y/o man w hx CVA 10/2017 w residual aphasia, COPD, CAD s/p stent , CABG 2017, HTN, HLD, chronic systolic HF (Last Echo  showing moderate segmental LV systolic dysfunction, severely hypokinetic inferior and septal walls, variable EF, moderate pulmonary HTN), ICD([facility] )/PPM, A Fib on Xarelto, and DM.  Adm with epistaxis that started the day before admission, and an episode of dark tarry stool.    In ED(20) Pt received one dose 40mg IV Protonix, 1 unit pRBCs, 1L NS bolus  Vitals , /60, RR 18, T 97.2, SpO2 97% on RA  Labs WBC: 13.69, Hb 9.2 (13-14s per chart review), Cr 1.4 (at baseline), Alk phos 131, FOBT+  EKG: afib w RVR, rate 106. Isolated T wave inversion in V6, possible ST depressions (new compared to previous EKG) in V4-V6  CT Head No Cont (20) Mild chronic microvascular changes without evidence of an acute transcortical infarction or hemorrhage. MR is a more sensitive imaging modality for the evaluation of an acute infarction.   Xray Chest 1 View-PORTABLE (20) negative  Serial Hgb since that time remains in 8 range, today Hgb 8.8  No further bleeds  Remains off Xarelto    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  DM (diabetes mellitus)  HF (heart failure)  PAD (peripheral artery disease): with stents  Former smoker  Hypertension  H/O: Rheumatic Fever  Atrial Fibrillation  Pacemaker: defibrillator model # v-268 serial # [id number]  Hyperlipidemia  Gout  Coronary Artery Disease  Chronic Obstructive Pulmonary Disease (COPD)  Carotid Stenosis  S/P CABG x 1  CAD S/P percutaneous coronary angioplasty: stent placed  Popliteal artery injury: iliac/ popliteal angioplasty with stents   Arm injury: s/p surgery   Pacemaker: [employer] serial #[id number]   model #v-268  S/P Implantation of Automatic Cardioverter/Defibrillator (AICD)  S/P Tonsillectomy      Review of System:  see above  pt indicates has had nose bleeds in past  no other sites of incr bruise or bleeds  no SOB/CP    MEDICATIONS  (STANDING):  atorvastatin 80 milliGRAM(s) Oral at bedtime  budesonide 160 MICROgram(s)/formoterol 4.5 MICROgram(s) Inhaler 2 Puff(s) Inhalation two times a day  digoxin     Tablet 0.125 milliGRAM(s) Oral daily  finasteride 5 milliGRAM(s) Oral daily  FLUoxetine 10 milliGRAM(s) Oral at bedtime  furosemide    Tablet 20 milliGRAM(s) Oral daily  metoprolol succinate ER 50 milliGRAM(s) Oral at bedtime  pantoprazole   Suspension 40 milliGRAM(s) Oral daily  tamsulosin 0.8 milliGRAM(s) Oral at bedtime    MEDICATIONS  (PRN):      Allergies    No Known Allergies    Intolerances        SOCIAL HISTORY:  unble to give    FAMILY HISTORY:  No pertinent family history in first degree relatives      Vital Signs Last 24 Hrs  T(C): 36.7 (10 May 2020 04:39), Max: 36.8 (09 May 2020 14:39)  T(F): 98 (10 May 2020 04:39), Max: 98.2 (09 May 2020 14:39)  HR: 70 (10 May 2020 08:31) (67 - 82)  BP: 122/70 (10 May 2020 08:31) (110/65 - 134/76)  BP(mean): --  RR: 18 (10 May 2020 08:31) (17 - 20)  SpO2: 94% (10 May 2020 08:31) (94% - 99%)    PHYSICAL EXAM:      General:well developed well nourished, but frail thin elderly man in chair, in no acute distress  Eyes:sclera anicteric, pupils equal and reactive to light  ENMT:buccal mucosa moist, pharynx not injected  Neck:supple, trachea midline  Lungs:clear, no wheeze/rhonchi  Cardiovascular:regular rate, S1 S2  Abdomen:soft, nontender, no organomegaly present, bowel sounds normal  Neurological:alert and responsive, Cranial Nerves II-XII grossly intact  Skin:no increased ecchymosis/petechiae/purpura  Lymph Nodes:no palpable cervical/supraclavicular lymph nodes enlargements  Extremities:no cyanosis/clubbing/edema        LABS:                        8.8    6.34  )-----------( 186      ( 10 May 2020 07:41 )             28.3     05-10 @ 07:41  WBC6.34  RBC3.30 Hgb8.8 Hct28.3  MCV85.8  [id number]  Auto Neutro-- Band-- Auto Lymph-- Atypical Lymph-- Reactive Lymph-- Auto Mono-- Auto Eos-- Auto Baso--         @ 22:22  WBC--  RBC-- Hgb8.7 Hct27.6  MCV--  Plts--  Auto Neutro-- Band-- Auto Lymph-- Atypical Lymph-- Reactive Lymph-- Auto Mono-- Auto Eos-- Auto Baso--         @ 16:11  WBC--  RBC-- Hgb8.4 Hct26.8  MCV--  Plts--  Auto Neutro-- Band-- Auto Lymph-- Atypical Lymph-- Reactive Lymph-- Auto Mono-- Auto Eos-- Auto Baso--         @ 08:14  WBC8.17  RBC3.30 Hgb8.8 Hct27.8  MCV84.2  [id number]  Auto Vlylzh30.3 Band-- Auto Lymph9.4 Atypical Lymph-- Reactive Lymph-- Auto Mono8.8 Auto Eos2.9 Auto Baso1.0         @ 23:55  WBC--  RBC-- Hgb8.8 Hct27.4  MCV--  Plts--  Auto Neutro-- Band-- Auto Lymph-- Atypical Lymph-- Reactive Lymph-- Auto Mono-- Auto Eos-- Auto Baso--         @ 19:50  WBC--  RBC-- Hgb8.5 Hct26.2  MCV--  Plts--  Auto Neutro-- Band-- Auto Lymph-- Atypical Lymph-- Reactive Lymph-- Auto Mono-- Auto Eos-- Auto Baso--         @ 11:07  WBC13.69  RBC3.50 Hgb9.2 Hct29.1  MCV83.1  [id number]  Auto Yyauhx42.0 Band-- Auto Lymph2.0 Atypical Lymph-- Reactive Lymph1.0 Auto Mono12.0 Auto Eos0.0 Auto Baso0.0          10    146<H>  |  112<H>  |  42<H>  ----------------------------<  75  4.2   |  25  |  1.50<H>    Ca    9.0      10 May 2020 07:41  Phos  4.2     05-10  Mg     2.2     05-10    TPro  6.4  /  Alb  2.8<L>  /  TBili  1.8<H>  /  DBili  1.00<H>  /  AST  21  /  ALT  17  /  AlkPhos  112  05-10    05-10 @ 07:41  PT16.1 INR1.42  PTT--  -09 @ 08:14  PT17.9 INR1.58  PTT--  08 @ 11:07  PT23.6 INR2.06  PTT34.0    Urinalysis Basic - ( 08 May 2020 14:47 )    Color: Yellow / Appearance: Clear / S.005 / pH: x  Gluc: x / Ketone: Negative  / Bili: Negative / Urobili: 1   Blood: x / Protein: 30 mg/dL / Nitrite: Negative   Leuk Esterase: Negative / RBC: 3-5 /HPF / WBC 0-2   Sq Epi: x / Non Sq Epi: Occasional / Bacteria: Occasional        PERIPHERAL BLOOD SMEAR REVIEW:      RADIOLOGY & ADDITIONAL STUDIES:  < from: CT Chest No Cont (20 @ 21:32) >    EXAM:  CT CHEST                            PROCEDURE DATE:  2020          INTERPRETATION:  Reason for Exam:  GI bleed    CT of the chest was performed from the thoracic inlet to the level of the adrenal glands without contrast injection.    Comparison: 2017    Tubes/Lines: None.    Mediastinum/Vessels/Heart: Post CABG. There is multichamber cardiac enlargement. Dual-chamber pacemaker noted with leads in the right atrium and ventricle. Extensive atherosclerotic calcifications noted of the aorta.    Lungs/Pleura/Airways: Severe emphysema noted. Superimposed mild interlobular septal thickening is noted. Minimal focal pleural thickening in the lingula has decreased since the previous exam.    Visualized abdomen: Multiple exophytic cysts noted arising from the left kidney. Vascular calcifications noted. Upper abdomen is otherwise unremarkable.    Bones and soft tissues: No suspicious osseous lesions. Degenerative changes noted throughout the spine.    IMPRESSION:    Mild septal thickening suggestive of interstitial edema superimposed on emphysema.    < end of copied text >

## 2020-05-10 NOTE — PROGRESS NOTE ADULT - PROBLEM SELECTOR PLAN 3
-mild elevation in Miguel, likely 2/2 demand ischemia in setting of likely gi bleed epistaxis   -cont monitor on tele   -Miguel trended to peak   -repeat ekg form 5/9 Atrial fibrillation with frequent ventricular-paced complexes  Incomplete LBBB, ST & T wave abnormality, consider lateral ischemia  -cardio consulted- reccs noted

## 2020-05-10 NOTE — PROGRESS NOTE ADULT - SUBJECTIVE AND OBJECTIVE BOX
INTERVAL HPI/OVERNIGHT EVENTS:  pt seen and examined  resting in bed  had episode of dark stool yesterday am, per rn no epistaxis or melena overnight    MEDICATIONS  (STANDING):  atorvastatin 80 milliGRAM(s) Oral at bedtime  budesonide 160 MICROgram(s)/formoterol 4.5 MICROgram(s) Inhaler 2 Puff(s) Inhalation two times a day  digoxin     Tablet 0.125 milliGRAM(s) Oral daily  finasteride 5 milliGRAM(s) Oral daily  FLUoxetine 10 milliGRAM(s) Oral at bedtime  furosemide    Tablet 20 milliGRAM(s) Oral daily  metoprolol succinate ER 50 milliGRAM(s) Oral at bedtime  pantoprazole Infusion 8 mG/Hr (10 mL/Hr) IV Continuous <Continuous>  tamsulosin 0.8 milliGRAM(s) Oral at bedtime    MEDICATIONS  (PRN):      Allergies    No Known Allergies    Intolerances        Review of Systems:    unable to obtain       Vital Signs Last 24 Hrs  T(C): 36.7 (10 May 2020 04:39), Max: 36.8 (09 May 2020 14:39)  T(F): 98 (10 May 2020 04:39), Max: 98.2 (09 May 2020 14:39)  HR: 76 (10 May 2020 04:39) (67 - 82)  BP: 118/64 (10 May 2020 04:39) (110/65 - 134/76)  BP(mean): --  RR: 18 (10 May 2020 04:39) (17 - 20)  SpO2: 94% (10 May 2020 04:39) (88% - 99%)    PHYSICAL EXAM:    General: sitting up in bed  HEENT:  NC/AT  Abdomen:  Soft, non-tender, mild dt   Extremities:  no edema  Neuro/Psych:  awake but confused    LABS:                        8.7    x     )-----------( x        ( 09 May 2020 22:22 )             27.6     05-09    142  |  111<H>  |  54<H>  ----------------------------<  76  4.4   |  24  |  1.30    Ca    8.6      09 May 2020 08:14  Mg     1.9     05-08    TPro  6.1  /  Alb  2.6<L>  /  TBili  1.6<H>  /  DBili  x   /  AST  16  /  ALT  13  /  AlkPhos  113  05-09    PT/INR - ( 09 May 2020 08:14 )   PT: 17.9 sec;   INR: 1.58 ratio         PTT - ( 08 May 2020 11:07 )  PTT:34.0 sec  Urinalysis Basic - ( 08 May 2020 14:47 )    Color: Yellow / Appearance: Clear / S.005 / pH: x  Gluc: x / Ketone: Negative  / Bili: Negative / Urobili: 1   Blood: x / Protein: 30 mg/dL / Nitrite: Negative   Leuk Esterase: Negative / RBC: 3-5 /HPF / WBC 0-2   Sq Epi: x / Non Sq Epi: Occasional / Bacteria: Occasional        RADIOLOGY & ADDITIONAL TESTS:

## 2020-05-10 NOTE — PROGRESS NOTE ADULT - PROBLEM SELECTOR PLAN 4
hx of afib, on xarelto and metoprolol.   -EKG on admission significant for afib w RVR , rate 106, possible T wave inversion  V6, ST depressions in V4-V6 on EKG. ST depression have been present on previous EKGs, but given rapid anemia continue to monitor cardiac status closely  -repeat ekg form 5/9 Atrial fibrillation with frequent ventricular-paced complexes, Incomplete LBBB, ST & T wave abnormality, consider lateral ischemia  -cont monitor on tele   hold chemical AC in setting of GI bleed.  -continue beta blocker and dig, metropolol for rate control as per cardio   Cardio consulted- reccs noted hx of afib, on xarelto and metoprolol.   -EKG on admission significant for afib w RVR , rate 106, possible T wave inversion  V6, ST depressions in V4-V6 on EKG. ST depression have been present on previous EKGs, but given rapid anemia continue to monitor cardiac status closely  -repeat ekg form 5/9 Atrial fibrillation with frequent ventricular-paced complexes, Incomplete LBBB, ST & T wave abnormality, consider lateral ischemia  -cont monitor on tele   -see problem 1 for plan on trial of AC with strict monitoring for any signs of bleeding, if bleeding develops immediately stop Eliquis  -continue beta blocker and dig, metropolol for rate control as per cardio   Cardio consulted- reccs noted

## 2020-05-10 NOTE — PROGRESS NOTE ADULT - ASSESSMENT
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 2015)/PPM, A Fib on Xarelto, DM who presents with epistaxis that began last night and one episode of melena. Admit to r/o GI bleed and for epistaxis.

## 2020-05-10 NOTE — CONSULT NOTE ADULT - ASSESSMENT
,74 y/o man w hx CVA 10/2017 w residual aphasia, COPD, CAD s/p stent 2016, CABG 8/2017, HTN, HLD, chronic systolic HF (Last Echo 8/17 showing moderate segmental LV systolic dysfunction, severely hypokinetic inferior and septal walls, variable EF, moderate pulmonary HTN), ICD([facility] 2015)/PPM, A Fib on Xarelto, and DM.  Adm with epistaxis that started the day before admission, and an episode of dark tarry stool.    In ED(05/08/20) Pt received one dose 40mg IV Protonix, 1 unit pRBCs, 1L NS bolus  Vitals , /60, RR 18, T 97.2, SpO2 97% on RA  CT c/a/p no other sites of bleed  Labs WBC: 13.69, Hb 9.2 (13-14s per chart review), Cr 1.4 (at baseline), Alk phos 131, FOBT+  Serial Hgb since that time remains in 8 range, today Hgb 8.8  No further bleeds  Remains off Xarelto  Now questions wrt resuming anticoagulation    -by nature of anticoagulation, patients will always be at higher risk of bleed. Risk and benefits needs to be weighed.   -pt has stabilized, no other visualized sited of incr bleed or bruise by exam and CT scans. Episode of dark stool ?due to blood from nose bleed, already evaluated by GI.  -Eliquis by report has less incidence of bleed in elderly patients, If ok by Cardiology, may consider change from Xarelto to Eliquis. 5mg po BID, and monitor for any recurrence of bleeds.    Thank you, please call if any questions
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
## 2020-05-11 NOTE — PROGRESS NOTE ADULT - PROBLEM SELECTOR PLAN 1
melena 2/2 epistaxis, exacerbated in setting of ac use, now resolved  ct reviewed, neg for acute gi pathology  ent input appreciated  cont ppi ppx  monitor cbc, transfuse prn  no plans for gi intervention  diet as tolerated  further care per primary

## 2020-05-11 NOTE — PROGRESS NOTE ADULT - ASSESSMENT
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 2015)/PPM, A Fib on Xarelto, DM who presents with epistaxis that began last night.    lateral ST depression could represent a component of ischemia/infarction but there is a likely component of the digoxin effect. In any event, conservative therapy is warranted    Epistaxis   - CT abdomen, no active bleeding noted  - GI following; no intervention planned   - ENT recommends humidifed oxygen to reduce bleed risk  - Now back on Eliquis (low dose).    - Monitor H/H; transfuse to keep Hgb>8 given CAD    CAD s/p CABG  - Troponin leak in setting of demand ischemia; no further need to trend  - His EKG showed ST depression in lateral leads.  Will repeat EKG in am  - No clinical evidence of cardiac ischemia  - Continue BB and statin  - Not on ASA as he is on Eliquis and he has the propensity to bleed    Atrial Fibrillation.    - Vpaced with underlying Afib on tele.  Can discontinue telemetry monitoring  - Back on Eliquis (low dose)  - Continue beta blocker and digoxin   - Monitor electrolytes, replete to keep K>4 and Mag>2    Chronic systolic HF   - Echo 8/17 showing moderate segmental LV systolic dysfunction, severely hypokinetic inferior and septal walls, variable EF, moderate pulmonary HTN)  - Euvolemic on exam  - Continue home Lasix 20mg po qd  - Continue Toprol XL    CKD  - Renal indices stable  - Avoid nephrotoxics  - Continue to trend    Will continue to follow  Further cardiac w/u pending clinical course    Magaly Brown DNP, NP-C  Cardiology   Spectra #[phone]

## 2020-05-11 NOTE — PROGRESS NOTE ADULT - PROBLEM SELECTOR PLAN 4
hx of afib, on xarelto and metoprolol.   -EKG on admission significant for afib w RVR , rate 106, possible T wave inversion  V6, ST depressions in V4-V6 on EKG. ST depression have been present on previous EKGs, but given rapid anemia continue to monitor cardiac status closely  -repeat ekg form 5/9 Atrial fibrillation with frequent ventricular-paced complexes, Incomplete LBBB, ST & T wave abnormality, consider lateral ischemia  -cont monitor on tele   -see problem 1 for plan on trial of AC with strict monitoring for any signs of bleeding  -continue beta blocker and dig, metropolol for rate control as per cardio   Cardio consulted- reccs noted

## 2020-05-11 NOTE — PHYSICAL THERAPY INITIAL EVALUATION ADULT - DID THE PATIENT HAVE SURGERY?
n/a n/a/CT Angio Chest/Abd/pelvis: (-) obstruction, colonic diverticulitis. Small complex cyst in the left kidney measuring up to 1.7 cm. Cholelithiasis; CT chest: mild septal thickening suggestive of intestinal edema /c superimposed emphysema; Troponin: .093; HGb 8.3

## 2020-05-11 NOTE — PHYSICAL THERAPY INITIAL EVALUATION ADULT - GAIT DISTANCE, PT EVAL
100 feet/Pt desatted on RA Pt desaturated to 83% in room air, improved to 95% /c 3 L of oxygen in 2 minutes. RN Osmar aware. NAD or c/o. (+) coughing noted./100 feet

## 2020-05-11 NOTE — PROGRESS NOTE ADULT - SUBJECTIVE AND OBJECTIVE BOX
INTERVAL HPI/OVERNIGHT EVENTS:  pt seen and examined  resting in bed  no epistaxis or s/s gib per nursing  tolerating po  restarted on ac    MEDICATIONS  (STANDING):  apixaban 2.5 milliGRAM(s) Oral two times a day  atorvastatin 80 milliGRAM(s) Oral at bedtime  budesonide 160 MICROgram(s)/formoterol 4.5 MICROgram(s) Inhaler 2 Puff(s) Inhalation two times a day  digoxin     Tablet 0.125 milliGRAM(s) Oral daily  finasteride 5 milliGRAM(s) Oral daily  FLUoxetine 10 milliGRAM(s) Oral at bedtime  furosemide    Tablet 20 milliGRAM(s) Oral daily  metoprolol succinate ER 75 milliGRAM(s) Oral daily  pantoprazole   Suspension 40 milliGRAM(s) Oral daily  tamsulosin 0.8 milliGRAM(s) Oral at bedtime    MEDICATIONS  (PRN):      Allergies    No Known Allergies    Intolerances        Review of Systems:  unable to obtain       Vital Signs Last 24 Hrs  T(C): 36.4 (11 May 2020 04:15), Max: 36.4 (11 May 2020 04:15)  T(F): 97.6 (11 May 2020 04:15), Max: 97.6 (11 May 2020 04:15)  HR: 71 (11 May 2020 04:15) (70 - 73)  BP: 121/62 (11 May 2020 04:15) (119/65 - 135/72)  BP(mean): --  RR: 17 (11 May 2020 04:15) (16 - 18)  SpO2: 100% (11 May 2020 07:00) (85% - 100%)    PHYSICAL EXAM:  General: sitting up in bed  HEENT:  NC/AT  Abdomen:  Soft, non-tender, mild dt   Extremities:  no edema  Neuro/Psych:  awake but confused        LABS:                        8.3    6.30  )-----------( 162      ( 11 May 2020 06:46 )             26.4     05-11    140  |  107  |  32<H>  ----------------------------<  84  3.8   |  26  |  1.40<H>    Ca    8.5      11 May 2020 06:46  Phos  4.2     05-10  Mg     2.2     05-10    TPro  6.2  /  Alb  2.6<L>  /  TBili  1.5<H>  /  DBili  x   /  AST  33  /  ALT  19  /  AlkPhos  109  05-11    PT/INR - ( 11 May 2020 06:46 )   PT: 17.3 sec;   INR: 1.52 ratio               RADIOLOGY & ADDITIONAL TESTS:

## 2020-05-11 NOTE — PHYSICAL THERAPY INITIAL EVALUATION ADULT - ADDITIONAL COMMENTS
Pt /c expressive aphasia and unable to get clear history. History obtained from EMR and CM. Pt lives ./c his sister in an apartment? /c no stairs to negotiate and was independent /c all functional mobility and ADLs prior to admission. Pt uses a rollator for long distances and uses none indoors.

## 2020-05-11 NOTE — GOALS OF CARE CONVERSATION - ADVANCED CARE PLANNING - CONVERSATION DETAILS
spoke to pt sister: Sandra on phone: pt has hcp Sandra, unable to provide copy at this time. further discussion of molst: Sandra agrees : DNR,DNI, no decision regarding CHEN at this time, wants treatment: IV fluids, antibiotics, send to hospital, limited medical. PC contact # given spoke to pt sister: Sandra on phone: pt has hcp Sandra, unable to provide copy at this time. further discussion of molst: Sandra agrees : DNR,DNI, no decision regarding CHEN at this time, wants treatment: IV fluids, antibiotics, send to hospital, limited medical.  Dr Washington to complete molst.    PC contact # given

## 2020-05-11 NOTE — PHYSICAL THERAPY INITIAL EVALUATION ADULT - PRECAUTIONS/LIMITATIONS, REHAB EVAL
fall precautions oxygen therapy device and L/min/fall precautions/Desaturation off O2, Expressive Aphasia form an old CVA

## 2020-05-11 NOTE — PROGRESS NOTE ADULT - SUBJECTIVE AND OBJECTIVE BOX
[facility] -- Javon Hernandez Grossman, [place], Jordi Victor Savella, [place]  Office # [phone]    Follow Up:  CAD s/p CABG, PPM, Afib, epistaxis/melena?    Subjective/Observations: Awake and alert, comfortable on NC.  Denies any further form of bleeding.  Denies any respiratory or cardiac discomfort.  With expressive aphasia    REVIEW OF SYSTEMS: All other review of systems is negative unless indicated above  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  DM (diabetes mellitus)  HF (heart failure)  PAD (peripheral artery disease): with stents  Former smoker  Hypertension  H/O: Rheumatic Fever  Atrial Fibrillation  Pacemaker: defibrillator model # v-268 serial # [id number]  Hyperlipidemia  Gout  Coronary Artery Disease  Chronic Obstructive Pulmonary Disease (COPD)  Carotid Stenosis  S/P CABG x 1  CAD S/P percutaneous coronary angioplasty: stent placed  Popliteal artery injury: iliac/ popliteal angioplasty with stents 2010  Arm injury: s/p surgery 2009  Pacemaker: [employer] serial #[id number]   model #v-268  S/P Implantation of Automatic Cardioverter/Defibrillator (AICD)  S/P Tonsillectomy    MEDICATIONS  (STANDING):  apixaban 2.5 milliGRAM(s) Oral two times a day  atorvastatin 80 milliGRAM(s) Oral at bedtime  budesonide 160 MICROgram(s)/formoterol 4.5 MICROgram(s) Inhaler 2 Puff(s) Inhalation two times a day  digoxin     Tablet 0.125 milliGRAM(s) Oral daily  finasteride 5 milliGRAM(s) Oral daily  FLUoxetine 10 milliGRAM(s) Oral at bedtime  furosemide    Tablet 20 milliGRAM(s) Oral daily  furosemide   Injectable 20 milliGRAM(s) IV Push once  metoprolol succinate ER 75 milliGRAM(s) Oral daily  pantoprazole   Suspension 40 milliGRAM(s) Oral daily  tamsulosin 0.8 milliGRAM(s) Oral at bedtime    MEDICATIONS  (PRN):    Allergies    No Known Allergies    Intolerances    Vital Signs Last 24 Hrs  T(C): 36.4 (11 May 2020 04:15), Max: 36.4 (11 May 2020 04:15)  T(F): 97.6 (11 May 2020 04:15), Max: 97.6 (11 May 2020 04:15)  HR: 73 (11 May 2020 09:14) (71 - 73)  BP: 121/62 (11 May 2020 04:15) (119/65 - 135/72)  BP(mean): --  RR: 17 (11 May 2020 04:15) (16 - 18)  SpO2: 96% (11 May 2020 09:14) (85% - 100%)  I&O's Summary    10 May 2020 07:01  -  11 May 2020 07:00  --------------------------------------------------------  IN: 100 mL / OUT: 1050 mL / NET: -950 mL    PHYSICAL EXAM:  TELE: Vpaced with underlying Afib  Constitutional: NAD, awake and alert, well-developed  HEENT: Moist Mucous Membranes, Anicteric  Pulmonary: Non-labored, breath sounds are clear bilaterally, No wheezing, rales or rhonchi  Cardiovascular: IRRR/RRR, S1 and S2, No murmurs, rubs, gallops or clicks  Gastrointestinal: Bowel Sounds present, soft, nontender.   Lymph: No peripheral edema. No lymphadenopathy.  Skin: No visible rashes or ulcers.  Psych:  Mood & affect appropriate  Neuro: + expressive aphasia  LABS: All Labs Reviewed:                        8.3    6.30  )-----------( 162      ( 11 May 2020 06:46 )             26.4                         8.8    6.34  )-----------( 186      ( 10 May 2020 07:41 )             28.3                         8.7    x     )-----------( x        ( 09 May 2020 22:22 )             27.6     11 May 2020 06:46    140    |  107    |  32     ----------------------------<  84     3.8     |  26     |  1.40   10 May 2020 07:41    146    |  112    |  42     ----------------------------<  75     4.2     |  25     |  1.50   09 May 2020 08:14    142    |  111    |  54     ----------------------------<  76     4.4     |  24     |  1.30     Ca    8.5        11 May 2020 06:46  Ca    9.0        10 May 2020 07:41  Ca    8.6        09 May 2020 08:14  Phos  4.2       10 May 2020 07:41  Mg     2.2       10 May 2020 07:41    TPro  6.2    /  Alb  2.6    /  TBili  1.5    /  DBili  x      /  AST  33     /  ALT  19     /  AlkPhos  109    11 May 2020 06:46  TPro  6.4    /  Alb  2.8    /  TBili  1.8    /  DBili  1.00   /  AST  21     /  ALT  17     /  AlkPhos  112    10 May 2020 07:41  TPro  6.1    /  Alb  2.6    /  TBili  1.6    /  DBili  x      /  AST  16     /  ALT  13     /  AlkPhos  113    09 May 2020 08:14    PT/INR - ( 11 May 2020 06:46 )   PT: 17.3 sec;   INR: 1.52 ratio      CARDIAC MARKERS ( 09 May 2020 22:22 )  .093 ng/mL / x     / 123 U/L / x     / 2.6 ng/mL  CARDIAC MARKERS ( 09 May 2020 20:16 )  .085 ng/mL / x     / 116 U/L / x     / 2.6 ng/mL  CARDIAC MARKERS ( 09 May 2020 16:11 )  .085 ng/mL / x     / 114 U/L / x     / 2.7 ng/mL    < from: TTE Echo Doppler w/o Cont (07.30.17 @ 14:53) >     EXAM:  ECHO TTE W/O CON COMP W/DOPPLR         PROCEDURE DATE:  07/30/2017        INTERPRETATION:  Ordering Physician: ERENDIRA FRANCOIS [id number]    Indication: Ventricular tachycardia    Study Quality: Technically difficult   A complete echocardiographic study was performed utilizing standard   protocol including spectral and color Doppler in all echocardiographic   windows.    Height: 172 cm  Weight: 85 kg  BSA: 1.9 sq m  Blood Pressure: 144/86    MEASUREMENTS  IVS: 1.3cm  PWT: 1.3cm  LA: 4.9cm  AO: 3.3cm  LVIDd: 4.0cm  LVIDs: 3.2cm    LVEF: 40%  FINDINGS  Left Ventricle: Moderate segmental left ventricular systolic dysfunction.   The septum, distal anterior wall, apex, inferolateral, and distal   inferior wall are hypokinetic.  Aortic Valve: Calcified trileaflet aortic valve.  Mild aortic   insufficiency.  Mitral Valve: Mitral annular calcification and calcified mitral leaflets.   Mild to moderate mitral insufficiency.  Tricuspid Valve: Normal tricuspid valve. Mild tricuspid insufficiency.  Pulmonic Valve: Not Well-visualized.  Left Atrium: Severe left atrial enlargement.  Right Ventricle: Normal right ventricular size and systolic function. A   device wire is noted in the right heart.  Right Atrium: Moderately Enlarged  Pericardium/Pleura: Normal pericardium with no pericardial effusion.    BONNIE VOGEL M.D., ATTENDING CARDIOLOGIST  This document has been electronically signed. Jul 31 2017 11:30AM     < end of copied text >    < from: CT Chest No Cont (05.08.20 @ 21:32) >    EXAM:  CT CHEST                            PROCEDURE DATE:  05/08/2020          INTERPRETATION:  Reason for Exam:  GI bleed    CT of the chest was performed from the thoracic inlet to the level of the adrenal glands without contrast injection.    Comparison: August 17, 2017    Tubes/Lines: None.    Mediastinum/Vessels/Heart: Post CABG. There is multichamber cardiac enlargement. Dual-chamber pacemaker noted with leads in the right atrium and ventricle. Extensive atherosclerotic calcifications noted of the aorta.    Lungs/Pleura/Airways: Severe emphysema noted. Superimposed mild interlobular septal thickening is noted. Minimal focal pleural thickening in the lingula has decreased since the previous exam.    Visualized abdomen: Multiple exophytic cysts noted arising from the left kidney. Vascular calcifications noted. Upper abdomen is otherwise unremarkable.    Bones and soft tissues: No suspicious osseous lesions. Degenerative changes noted throughout the spine.    IMPRESSION:    Mild septal thickening suggestive of interstitial edema superimposed on emphysema.    DREA RALPH M.D., ATTENDING RADIOLOGIST  This document has been electronically signed. May  8 2020  9:47PM     < end of copied text >    < from: 12 Lead ECG (05.09.20 @ 07:26) >    Ventricular Rate 81 BPM    Atrial Rate 71 BPM    QRS Duration 120 ms    Q-T Interval 384 ms    QTC Calculation(Bezet) 446 ms    R Axis -46 degrees    T Axis 140 degrees    Diagnosis Line Atrial fibrillation with frequent ventricular-paced complexes  Left axis deviation  Incomplete left bundle branch block  ST & T wave abnormality, consider lateral ischemia  Abnormal ECG  Confirmed by Nneka BUTLER, Pranav ([id number]) on 5/9/2020 12:11:13 PM    < end of copied text >

## 2020-05-11 NOTE — PHYSICAL THERAPY INITIAL EVALUATION ADULT - CRITERIA FOR SKILLED THERAPEUTIC INTERVENTIONS
impairments found/decreased endurance risk reduction/prevention/impairments found/rehab potential/decreased endurance

## 2020-05-11 NOTE — PROGRESS NOTE ADULT - PROBLEM SELECTOR PLAN 1
-on admission dark tarry stool x1, FOBT +, no melena overnight     CT abd and pelvis: No extravasation of vascular contrast into the GI lumen to suggest source of active gastrointestinal bleeding.   -s/p 1 U PRBCs, Hgb now stable ~high 8s however baseline 13-14, transfuse if <8 or symptomatic   -Discussed with GI, doubts GI bleed, will start renally dosed Eliquis 2.5 BID, with careful monitoring for any sign of bleeding including melena, epistaxis. Will keep patient in hospital while starting Eliquis in order to monitor H/H, and signs or symptoms of bleed. Pt was on Xarelto at home as AC for a fib, given its risk of epistaxis, will switch to Eliquis and monitor closely.   -no plan for GI intervention at this time  cont to follow H/H, serial CBCs   d/c D5W, will attempt to start diet today. Advance as tolerated  GI PPx w IV protonix gtt  Gi following- Dr. Zapata

## 2020-05-11 NOTE — PROGRESS NOTE ADULT - PROBLEM SELECTOR PLAN 10
SCDs, trial of eliquis 2.5 BID for AC but with strict monitoring for any signs or symtoms of bleeding, spoke with GI  11 borderline DM:   Pt w hx of borderline DM type 2, not on home medications  AM fasting glucose within non diabetic range  hold off on ISS

## 2020-05-11 NOTE — PHYSICAL THERAPY INITIAL EVALUATION ADULT - GENERAL OBSERVATIONS, REHAB EVAL
Pt rec'd supine in bed Pt rec'd in 2 [place] bed /c NAD but noted /c expressive aphasia. Pt was agreeable /c PT evaluation.

## 2020-05-11 NOTE — PHYSICAL THERAPY INITIAL EVALUATION ADULT - PERTINENT HX OF CURRENT PROBLEM, REHAB EVAL
Patient states completed 10 day course of Levaquin, but congestion has persisted. Friday seen by physiatry and noted to be hypoxic. Patient has occasional non-productive cough, but sister and friend have noted course breath sounds and congestion worsening. Denies fever, chills, SOB, chest pain. As per H&P:"75M PMHx CVA 10/2017 with residual aphasia, COPD, CAD s/p stent 2016, CABG 8/2017, HTN, HLD, chronic systolic HF (Last Echo 8/17 showing moderate segmental LV systolic dysfunction, severely hypokinetic inferior and septal walls, variable EF, moderate pulmonary HTN), ICD([employer] 2015)/PPM, A Fib on Xarelto, DM who presents with epistaxis that began last night. Hx obtained from sister Sandra (HCP) as pt is aphasic."

## 2020-05-11 NOTE — PHYSICAL THERAPY INITIAL EVALUATION ADULT - RANGE OF MOTION EXAMINATION, REHAB EVAL
no ROM deficits were identified bilateral lower extremity ROM was WFL (within functional limits)/bilateral upper extremity ROM was WFL (within functional limits)

## 2020-05-11 NOTE — PROVIDER CONTACT NOTE (OTHER) - ASSESSMENT
in chair assymptomatic
pt in bed calm and comfortable
alert and responsive. Denies dizziness, Sittin in the chair

## 2020-05-11 NOTE — PHYSICAL THERAPY INITIAL EVALUATION ADULT - GAIT TRAINING, PT EVAL
STG (3-5 sessions) Amb 150' independent Amb 150' or as tolerated /c rolling walker and independence in 2 weeks and O2 PRN

## 2020-05-11 NOTE — PHYSICAL THERAPY INITIAL EVALUATION ADULT - ANKLE STRATEGY ASSESSMENT, REHAB EVAL
Pt desaturated to 83% in room air /c ambulation, improved to 95% /c 3 L of oxygen in 2 minutes. RN Osmar aware. NAD or c/o. (+) coughing noted

## 2020-05-11 NOTE — PHYSICAL THERAPY INITIAL EVALUATION ADULT - PLANNED THERAPY INTERVENTIONS, PT EVAL
transfer training/gait training/balance training/bed mobility training/Endurance transfer training/Improve Endurance to Good + in 2 weeks/gait training/balance training/bed mobility training/strengthening

## 2020-05-11 NOTE — PROVIDER CONTACT NOTE (OTHER) - ACTION/TREATMENT ORDERED:
continue remote tele
no new order at this time.
AS per Dr Anaya to hold lasix as ordered for now till bp IS BETTER.

## 2020-05-11 NOTE — PROGRESS NOTE ADULT - SUBJECTIVE AND OBJECTIVE BOX
Patient is a 75y old  Male who presents with a chief complaint of nosebleeds. (09 May 2020 12:00)      INTERVAL HPI:  75M PMHx CVA 10/2017 with residual aphasia, COPD, CAD s/p stent 2016, CABG 8/2017, HTN, HLD, chronic systolic HF (Last Echo 8/17 showing moderate segmental LV systolic dysfunction, severely hypokinetic inferior and septal walls, variable EF, moderate pulmonary HTN), ICD([facility] 2015)/PPM, A Fib on Xarelto, DM who presents with epistaxis that began last night. Hx obtained from sister Sandra (HCP) as pt is aphasic. Per daughter, pt had an episode of epistaxis at 6pm yesterday after spending the day outside with his lady friend Angelia. The sister tried to put ice and compresses on to stop the bleeding without relief. Pt continued to have episodes of epistaxis throughout the night and into the morning, which prompted her to take him to the ED. As pt was being prepared to be taken to ED, the paramedics noted he had dark tarry formed stool. Sister said she was unaware of this until today. Pt has never had dark stools or hx of GI bleeds in the past. Pt follows with ENT as an outpatient for hx of epistaxis. Pt has hx of picking his nose per sister. Did not take xarelto last night. Admits to weakness, fatigue. Denies CP, SOB, cough, fever, chills, night sweats, abd pain, NVD, focal weakness, numbness tingling. PT IS DNR DNI.      OVERNIGHT EVENTS: Pt seen and examined at bedside. No acute events overnight. No further episodes of melena/epistaxis    Home Medications:  Alpha Lipoic Acid 100 mg oral tablet: 3 tab(s) orally once a day (08 May 2020 14:12)  atorvastatin 80 mg oral tablet: 1 tab(s) orally once a day (at bedtime) (08 May 2020 14:12)  B-12 1000 mcg oral tablet: 1 tab(s) orally once a day (08 May 2020 14:12)  clopidogrel 75 mg oral tablet: 1 tab(s) orally once a day (08 May 2020 14:12)  digoxin 125 mcg (0.125 mg) oral tablet: 1 tab(s) orally once a day (08 May 2020 14:12)  finasteride 5 mg oral tablet: 1 tab(s) orally once a day (08 May 2020 14:12)  FLUoxetine 10 mg oral capsule: 1 cap(s) orally once a day (at bedtime) (08 May 2020 14:12)  furosemide 20 mg oral tablet:  (08 May 2020 14:12)  melatonin 3 mg oral tablet: 1 tab(s) orally once a day (at bedtime), As needed, Insomnia (08 May 2020 14:12)  metoprolol succinate 50 mg oral tablet, extended release: 1 tab(s) orally once a day (at bedtime) (08 May 2020 14:12)  Omega-3 350 mg oral capsule: 1 cap(s) orally once a day (08 May 2020 14:12)  pantoprazole 40 mg oral delayed release tablet: 1 tab(s) orally once a day (08 May 2020 14:12)  tamsulosin 0.4 mg oral capsule: 2 cap(s) orally once a day (at bedtime) (08 May 2020 14:12)  Vitamin D3 1000 intl units (25 mcg) oral tablet: 1 tab(s) orally once a day (08 May 2020 14:12)      MEDICATIONS  (STANDING):  apixaban 2.5 milliGRAM(s) Oral two times a day  atorvastatin 80 milliGRAM(s) Oral at bedtime  budesonide 160 MICROgram(s)/formoterol 4.5 MICROgram(s) Inhaler 2 Puff(s) Inhalation two times a day  digoxin     Tablet 0.125 milliGRAM(s) Oral daily  finasteride 5 milliGRAM(s) Oral daily  FLUoxetine 10 milliGRAM(s) Oral at bedtime  furosemide    Tablet 20 milliGRAM(s) Oral daily  furosemide   Injectable 20 milliGRAM(s) IV Push once  metoprolol succinate ER 75 milliGRAM(s) Oral daily  pantoprazole   Suspension 40 milliGRAM(s) Oral daily  tamsulosin 0.8 milliGRAM(s) Oral at bedtime    MEDICATIONS  (PRN):      Allergies    No Known Allergies    Intolerances        REVIEW OF SYSTEMS:      Vital Signs Last 24 Hrs  T(C): 36.4 (11 May 2020 04:15), Max: 36.4 (11 May 2020 04:15)  T(F): 97.6 (11 May 2020 04:15), Max: 97.6 (11 May 2020 04:15)  HR: 73 (11 May 2020 09:14) (71 - 73)  BP: 121/62 (11 May 2020 04:15) (119/65 - 135/72)  BP(mean): --  RR: 17 (11 May 2020 04:15) (16 - 18)  SpO2: 96% (11 May 2020 09:14) (85% - 100%)  Finger Stick        05-10 @ 07:01  -  05-11 @ 07:00  --------------------------------------------------------  IN: 100 mL / OUT: 1050 mL / NET: -950 mL        PHYSICAL EXAM:        LABS:                        8.3    6.30  )-----------( 162      ( 11 May 2020 06:46 )             26.4     11 May 2020 06:46    140    |  107    |  32     ----------------------------<  84     3.8     |  26     |  1.40     Ca    8.5        11 May 2020 06:46    TPro  6.2    /  Alb  2.6    /  TBili  1.5    /  DBili  x      /  AST  33     /  ALT  19     /  AlkPhos  109    11 May 2020 06:46    PT/INR - ( 11 May 2020 06:46 )   PT: 17.3 sec;   INR: 1.52 ratio              CARDIAC MARKERS ( 09 May 2020 22:22 )  .093 ng/mL / x     / 123 U/L / x     / 2.6 ng/mL  CARDIAC MARKERS ( 09 May 2020 20:16 )  .085 ng/mL / x     / 116 U/L / x     / 2.6 ng/mL  CARDIAC MARKERS ( 09 May 2020 16:11 )  .085 ng/mL / x     / 114 U/L / x     / 2.7 ng/mL  CARDIAC MARKERS ( 09 May 2020 08:14 )  .097 ng/mL / x     / 102 U/L / x     / 3.0 ng/mL  CARDIAC MARKERS ( 08 May 2020 23:55 )  .091 ng/mL / x     / 102 U/L / x     / 3.4 ng/mL  CARDIAC MARKERS ( 08 May 2020 16:53 )  .077 ng/mL / x     / 105 U/L / x     / 3.3 ng/mL  CARDIAC MARKERS ( 08 May 2020 11:07 )  .050 ng/mL / x     / 117 U/L / x     / 3.8 ng/mL      Anemia Panel:  Ferritin, Serum: 37 ng/mL (05-08-20 @ 14:54)

## 2020-05-11 NOTE — PROGRESS NOTE ADULT - PROBLEM SELECTOR PLAN 2
-On xarelto for a fib at home. Likely epistaxis 2/2 to xarelto use is the source of melena.   -improved, no epistaxis overnight   Hb stable high 8s (baseline 13s-14s), s/p 1 U PRBCS   -see problem1 for plan on AC with strict monitoring for any signs of bleeding  ENT consulted- Dr. Smith f/u reccs

## 2020-05-12 NOTE — PROGRESS NOTE ADULT - ATTENDING COMMENTS
Chart reviewed    Patient seen and examined    Agree with plan as outlined above
Chart reviewed    Patient seen and examined    Agree with plan as outlined above
I saw and examined the patient personally. Spoke with above provider regarding this case. I reviewed the above findings completely.  I agree with the above history, physical, and plan which I have edited where appropriate.
Chart reviewed    Patient seen and examined    Agree with plan as outlined above
Advanced care planning was discussed with patient and family.  Advanced care planning forms were reviewed and discussed.  Risks, benefits and alternatives of gastroenterologic procedures were discussed in detail and all questions were answered.    30 minutes spent.
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 2015)/PPM, A Fib on Xarelto, DM who presents with epistaxis that began last night and one episode of melena. Admit to r/o GI bleed and for epistaxis.        Gastrointestinal hemorrhage, unspecified gastrointestinal hemorrhage type.  Plan: -on admission dark tarry stool x1, FOBT +, no melena overnight     CT abd and pelvis: No extravasation of vascular contrast into the GI lumen to suggest source of active gastrointestinal bleeding.   -s/p 1 U PRBCs, Hgb now stable ~high 8s however baseline 13-14, transfuse if <8 or symptomatic   -no plan for GI intervention   cont to follow H/H CBCs   IVF D5W @ 50mL/hr, NPO, will attempt to start diet today    GI PPx w IV protonix gtt  discussed with hemonc if can be reinated AC given pt has prior istory of cva with afib  high risk for cva
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 2015)/PPM, A Fib on Xarelto, DM who presents with epistaxis that began last night and one episode of melena. Admit to r/o GI bleed and for epistaxis.     systolic chf  attempted to wean off nasal canula, pt not tolerating   echo reviewed  will give lasix 20 mg ivp
episatxis with melantic stool likely from ingestion of blood  current epistazis resolved  ent consulta ppreicated  monitor cbc     afib  hold ac  until cleared by cardio/ent    spoke to sister

## 2020-05-12 NOTE — PROGRESS NOTE ADULT - PROBLEM SELECTOR PLAN 1
-on admission dark tarry stool x1, FOBT +, no melena further melena, now with soft but formed stools     CT abd and pelvis: No extravasation of vascular contrast into the GI lumen to suggest source of active gastrointestinal bleeding.   -s/p 1 U PRBCs, Hgb now stable ~high 8s however baseline 13-14, transfuse if <8 or symptomatic   -Discussed with GI, doubts GI bleed, continue renally dosed Eliquis 2.5 BID, with careful monitoring for any sign of bleeding including melena, epistaxis. Will keep patient in hospital while starting Eliquis in order to monitor H/H, and signs or symptoms of bleed. Pt was on Xarelto at home as AC for a fib, given its risk of epistaxis, will switch to Eliquis and monitor closely.   -no plan for GI intervention at this time  cont to follow H/H, serial CBCs   -d/c D5W, will attempt to start diet today. Advance as tolerated  -GI PPx w IV protonix gtt  -Gi following- Dr. Zapata -on admission dark tarry stool x1, FOBT +, no melena further melena, now with soft but formed stools     CT abd and pelvis: No extravasation of vascular contrast into the GI lumen to suggest source of active gastrointestinal bleeding.   -s/p 1 U PRBCs, Hgb now stable ~high 8s however baseline 13-14, transfuse if <8 or symptomatic   -Discussed with GI, doubts GI bleed, continue renally dosed Eliquis 2.5 BID, with careful monitoring for any sign of bleeding including melena, epistaxis. Will keep patient in hospital while starting Eliquis in order to monitor H/H, and signs or symptoms of bleed. Pt was on Xarelto at home as AC for a fib, given its risk of epistaxis, will switch to Eliquis and monitor closely.   -no plan for GI intervention at this time  cont to follow H/H, serial CBCs   -d/c D5W, will attempt to start diet today. Advance as tolerated  -GI PPx w IV protonix BID  -Gi following- Dr. Zapata

## 2020-05-12 NOTE — PROGRESS NOTE ADULT - SUBJECTIVE AND OBJECTIVE BOX
INTERVAL HPI/OVERNIGHT EVENTS:  pt seen and examined  no epistaxis no gib per rn  soft bps    MEDICATIONS  (STANDING):  apixaban 2.5 milliGRAM(s) Oral two times a day  atorvastatin 80 milliGRAM(s) Oral at bedtime  budesonide 160 MICROgram(s)/formoterol 4.5 MICROgram(s) Inhaler 2 Puff(s) Inhalation two times a day  digoxin     Tablet 0.125 milliGRAM(s) Oral daily  finasteride 5 milliGRAM(s) Oral daily  FLUoxetine 10 milliGRAM(s) Oral at bedtime  furosemide    Tablet 20 milliGRAM(s) Oral daily  metoprolol succinate ER 75 milliGRAM(s) Oral daily  pantoprazole   Suspension 40 milliGRAM(s) Oral daily  tamsulosin 0.8 milliGRAM(s) Oral at bedtime    MEDICATIONS  (PRN):      Allergies    No Known Allergies    Intolerances        Review of Systems:  unable to obtain       Vital Signs Last 24 Hrs  T(C): 36.9 (12 May 2020 05:08), Max: 36.9 (12 May 2020 05:08)  T(F): 98.4 (12 May 2020 05:08), Max: 98.4 (12 May 2020 05:08)  HR: 74 (12 May 2020 05:08) (73 - 84)  BP: 102/61 (12 May 2020 05:08) (90/56 - 112/64)  BP(mean): --  RR: 18 (12 May 2020 05:08) (17 - 18)  SpO2: 100% (12 May 2020 05:08) (96% - 100%)    PHYSICAL EXAM:  General: sitting up in bed  HEENT:  NC/AT  Abdomen:  Soft, non-tender, mild dt   Extremities:  no edema  Neuro/Psych:  awake but confused          LABS:                        9.1    6.28  )-----------( 198      ( 12 May 2020 06:23 )             28.3     05-12    135  |  103  |  27<H>  ----------------------------<  94  4.2   |  28  |  1.50<H>    Ca    8.5      12 May 2020 06:23    TPro  6.1  /  Alb  2.6<L>  /  TBili  1.1  /  DBili  x   /  AST  34  /  ALT  21  /  AlkPhos  110  05-12    PT/INR - ( 11 May 2020 06:46 )   PT: 17.3 sec;   INR: 1.52 ratio               RADIOLOGY & ADDITIONAL TESTS:

## 2020-05-12 NOTE — PROGRESS NOTE ADULT - PROBLEM SELECTOR PLAN 2
-On xarelto for a fib at home. Likely epistaxis 2/2 to xarelto use is the source of melena.   -improved, no epistaxis overnight   -Hb stable high 8s (baseline 13s-14s), s/p 1 U PRBCS   -see problem1 for plan on AC with strict monitoring for any signs of bleeding  ENT consulted- Dr. Smith f/u reccs

## 2020-05-12 NOTE — DISCHARGE NOTE NURSING/CASE MANAGEMENT/SOCIAL WORK - NSTOBACCOREFERRAL_GEN_A_CS
Patient presents for catheter placement per Dr. Renee. Patient prepped in usual sterile fashion. 2% lidocaine urojet used prior to insertion of 16F catheter. 5 ml of cloudy urine returned. Balloon filled with 10 mL sterile water. Catheter secured with new stat lock, connected to tubing and leg bag. Patient tolerated procedure well and will follow up as needed/directed.     Patient declined information

## 2020-05-12 NOTE — PROGRESS NOTE ADULT - SUBJECTIVE AND OBJECTIVE BOX
[facility] -- Javon Hernandez Grossman, [place], Jordi Victor Savella, Goodger  Office # [phone]    Follow Up: CAD s/p CABG, PPM, Afib, epistaxis/melena?    Subjective/Observations: Sitting on the chair, no complaints.  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  DM (diabetes mellitus)  HF (heart failure)  PAD (peripheral artery disease): with stents  Former smoker  Hypertension  H/O: Rheumatic Fever  Atrial Fibrillation  Pacemaker: defibrillator model # v-268 serial # [id number]  Hyperlipidemia  Gout  Coronary Artery Disease  Chronic Obstructive Pulmonary Disease (COPD)  Carotid Stenosis  S/P CABG x 1  CAD S/P percutaneous coronary angioplasty: stent placed  Popliteal artery injury: iliac/ popliteal angioplasty with stents 2010  Arm injury: s/p surgery 2009  Pacemaker: [employer] serial #[id number]   model #v-268  S/P Implantation of Automatic Cardioverter/Defibrillator (AICD)  S/P Tonsillectomy    MEDICATIONS  (STANDING):  apixaban 2.5 milliGRAM(s) Oral two times a day  atorvastatin 80 milliGRAM(s) Oral at bedtime  budesonide 160 MICROgram(s)/formoterol 4.5 MICROgram(s) Inhaler 2 Puff(s) Inhalation two times a day  digoxin     Tablet 0.125 milliGRAM(s) Oral daily  finasteride 5 milliGRAM(s) Oral daily  FLUoxetine 10 milliGRAM(s) Oral at bedtime  furosemide    Tablet 20 milliGRAM(s) Oral daily  metoprolol succinate ER 75 milliGRAM(s) Oral daily  pantoprazole   Suspension 40 milliGRAM(s) Oral daily  tamsulosin 0.8 milliGRAM(s) Oral at bedtime    MEDICATIONS  (PRN):    Allergies    No Known Allergies    Intolerances    Vital Signs Last 24 Hrs  T(C): 36.9 (12 May 2020 05:08), Max: 36.9 (12 May 2020 05:08)  T(F): 98.4 (12 May 2020 05:08), Max: 98.4 (12 May 2020 05:08)  HR: 74 (12 May 2020 05:08) (73 - 84)  BP: 102/61 (12 May 2020 05:08) (90/56 - 112/64)  BP(mean): --  RR: 18 (12 May 2020 05:08) (17 - 18)  SpO2: 100% (12 May 2020 05:08) (96% - 100%)  I&O's Summary    11 May 2020 07:01  -  12 May 2020 07:00  --------------------------------------------------------  IN: 750 mL / OUT: 3800 mL / NET: -3050 mL    PHYSICAL EXAM:  TELE: Vpaced with underlying Afib  Constitutional: NAD, awake and alert, well-developed  HEENT: Moist Mucous Membranes, Anicteric  Pulmonary: Non-labored, breath sounds are clear bilaterally, No wheezing, rales or rhonchi  Cardiovascular: IRRR/RRR, S1 and S2, No murmurs, rubs, gallops or clicks  Gastrointestinal: Bowel Sounds present, soft, nontender.   Lymph: No peripheral edema. No lymphadenopathy.  Skin: No visible rashes or ulcers.  Psych:  Mood & affect appropriate  Neuro: + expressive aphasia  LABS: All Labs Reviewed:                        9.1    6.28  )-----------( 198      ( 12 May 2020 06:23 )             28.3                         8.3    6.30  )-----------( 162      ( 11 May 2020 06:46 )             26.4                         8.8    6.34  )-----------( 186      ( 10 May 2020 07:41 )             28.3     12 May 2020 06:23    135    |  103    |  27     ----------------------------<  94     4.2     |  28     |  1.50   11 May 2020 06:46    140    |  107    |  32     ----------------------------<  84     3.8     |  26     |  1.40   10 May 2020 07:41    146    |  112    |  42     ----------------------------<  75     4.2     |  25     |  1.50     Ca    8.5        12 May 2020 06:23  Ca    8.5        11 May 2020 06:46  Ca    9.0        10 May 2020 07:41  Phos  4.2       10 May 2020 07:41  Mg     2.2       10 May 2020 07:41    TPro  6.1    /  Alb  2.6    /  TBili  1.1    /  DBili  x      /  AST  34     /  ALT  21     /  AlkPhos  110    12 May 2020 06:23  TPro  6.2    /  Alb  2.6    /  TBili  1.5    /  DBili  x      /  AST  33     /  ALT  19     /  AlkPhos  109    11 May 2020 06:46  TPro  6.4    /  Alb  2.8    /  TBili  1.8    /  DBili  1.00   /  AST  21     /  ALT  17     /  AlkPhos  112    10 May 2020 07:41    PT/INR - ( 11 May 2020 06:46 )   PT: 17.3 sec;   INR: 1.52 ratio      < from: TTE Echo Doppler w/o Cont (07.30.17 @ 14:53) >     EXAM:  ECHO TTE W/O CON COMP W/DOPPLR         PROCEDURE DATE:  07/30/2017        INTERPRETATION:  Ordering Physician: ERENDIRA FRANCOIS [id number]    Indication: Ventricular tachycardia    Study Quality: Technically difficult   A complete echocardiographic study was performed utilizing standard   protocol including spectral and color Doppler in all echocardiographic   windows.    Height: 172 cm  Weight: 85 kg  BSA: 1.9 sq m  Blood Pressure: 144/86    MEASUREMENTS  IVS: 1.3cm  PWT: 1.3cm  LA: 4.9cm  AO: 3.3cm  LVIDd: 4.0cm  LVIDs: 3.2cm    LVEF: 40%  FINDINGS  Left Ventricle: Moderate segmental left ventricular systolic dysfunction.   The septum, distal anterior wall, apex, inferolateral, and distal   inferior wall are hypokinetic.  Aortic Valve: Calcified trileaflet aortic valve.  Mild aortic   insufficiency.  Mitral Valve: Mitral annular calcification and calcified mitral leaflets.   Mild to moderate mitral insufficiency.  Tricuspid Valve: Normal tricuspid valve. Mild tricuspid insufficiency.  Pulmonic Valve: Not Well-visualized.  Left Atrium: Severe left atrial enlargement.  Right Ventricle: Normal right ventricular size and systolic function. A   device wire is noted in the right heart.  Right Atrium: Moderately Enlarged  Pericardium/Pleura: Normal pericardium with no pericardial effusion.    BONNIE VOGEL M.D., ATTENDING CARDIOLOGIST  This document has been electronically signed. Jul 31 2017 11:30AM     < end of copied text >    < from: CT Chest No Cont (05.08.20 @ 21:32) >    EXAM:  CT CHEST                            PROCEDURE DATE:  05/08/2020          INTERPRETATION:  Reason for Exam:  GI bleed    CT of the chest was performed from the thoracic inlet to the level of the adrenal glands without contrast injection.    Comparison: August 17, 2017    Tubes/Lines: None.    Mediastinum/Vessels/Heart: Post CABG. There is multichamber cardiac enlargement. Dual-chamber pacemaker noted with leads in the right atrium and ventricle. Extensive atherosclerotic calcifications noted of the aorta.    Lungs/Pleura/Airways: Severe emphysema noted. Superimposed mild interlobular septal thickening is noted. Minimal focal pleural thickening in the lingula has decreased since the previous exam.    Visualized abdomen: Multiple exophytic cysts noted arising from the left kidney. Vascular calcifications noted. Upper abdomen is otherwise unremarkable.    Bones and soft tissues: No suspicious osseous lesions. Degenerative changes noted throughout the spine.    IMPRESSION:  Mild septal thickening suggestive of interstitial edema superimposed on emphysema.    DREA RALPH M.D., ATTENDING RADIOLOGIST  This document has been electronically signed. May  8 2020  9:47PM     < end of copied text >    < from: 12 Lead ECG (05.09.20 @ 07:26) >    Ventricular Rate 81 BPM    Atrial Rate 71 BPM    QRS Duration 120 ms    Q-T Interval 384 ms    QTC Calculation(Bezet) 446 ms    R Axis -46 degrees    T Axis 140 degrees    Diagnosis Line Atrial fibrillation with frequent ventricular-paced complexes  Left axis deviation  Incomplete left bundle branch block  ST & T wave abnormality, consider lateral ischemia  Abnormal ECG  Confirmed by Nneka BUTLER, Pranav ([id number]) on 5/9/2020 12:11:13 PM    < end of copied text >             Magaly Brown [facility]  Cardiology   Spectra #[phone]

## 2020-05-12 NOTE — PROGRESS NOTE ADULT - PROBLEM SELECTOR PROBLEM 1
Gastrointestinal hemorrhage, unspecified gastrointestinal hemorrhage type
Opioid abuse with opioid-induced disorder

## 2020-05-12 NOTE — DISCHARGE NOTE NURSING/CASE MANAGEMENT/SOCIAL WORK - PATIENT PORTAL LINK FT
You can access the FollowMyHealth Patient Portal offered by [facility] by registering at the following website: [email or web address]. By joining [employer]’s FollowMyHealth portal, you will also be able to view your health information using other applications (apps) compatible with our system.

## 2020-05-12 NOTE — PROGRESS NOTE ADULT - ASSESSMENT
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 2015)/PPM, A Fib on Xarelto, DM who presents with epistaxis that began last night.    lateral ST depression could represent a component of ischemia/infarction but there is a likely component of the digoxin effect. In any event, conservative therapy is warranted    Epistaxis   - No further bleeding.  Hgb stable (9.1 <--8.3) with no intervention  - CT abdomen, no active bleeding noted  - GI following; no intervention planned   - Continue humidifed oxygen to reduce bleed risk  - Continue Eliquis (low dose).    - Monitor H/H; transfuse to keep Hgb>8 given CAD    CAD s/p CABG  - Troponin leak in setting of demand ischemia; no further need to trend  - His EKG showed ST depression in lateral leads.  Will repeat EKG in am  - No clinical evidence of cardiac ischemia  - Continue BB and statin  - Not on ASA as he is on Eliquis and he has the propensity to bleed    Atrial Fibrillation.    - Vpaced with underlying Afib on tele.  Can discontinue telemetry monitoring  - Back on Eliquis (low dose)  - Continue beta blocker and digoxin   - Monitor electrolytes, replete to keep K>4 and Mag>2    Chronic systolic HF   - Echo 8/17 showing moderate segmental LV systolic dysfunction, severely hypokinetic inferior and septal walls, variable EF, moderate pulmonary HTN)  - Euvolemic on exam  - Continue home Lasix 20mg po qd  - Continue Toprol XL    CKD  - Renal indices remain stable  - Avoid nephrotoxics  - Continue to trend    Will continue to follow  Further cardiac w/u pending clinical course    Magaly Brown DNP, NP-C  Cardiology   Spectra #[phone]

## 2020-07-17 NOTE — DISCUSSION/SUMMARY
[FreeTextEntry1] : Doing well. BP responded well to change in medication.  HGB shows marked improvement on iron.\par no other new issue.\par \par Cardiology Summary\par   ANNALISA 2015 Right 1.1, left 0.94. Abdominal ultrasound negative for AAA. 2013 Carotid Mild disease bilaterally. \par EK19, Paced rhythm, afib/flutter underlying. \par Stress Test: 2016, inferior wall defect, Pharm: inferior MI, new anterior wall ischemia. \par Echo: 2019, mild AI, mild tr, severe LV dysfunction, mild pulmonary hypertension, enlarged LA size, moderate mitral regurgitation LVEF 28%. \par Cardiac Cath: , , , 2 Vessel CAD with 60% LAD and 100% RCA. NL LV FX. 10/2016: 2 Vessel CAD with 80% LAD and 100% RCA and NL LV FX. 2017: 3 vessel CAD \par Stent: , , : stents in left and right iliacs and rt. popliteal. and rt. subclavian artery., 2016: SARAN to LAD. \par CAB, LIMA to LAD. \par  \par Active Problems\par 3-vessel CAD (414.00) (I25.10)\par Arteriosclerotic cardiovascular disease (429.2,440.9) (I25.10)\par Atrial fibrillation (427.31) (I48.91)\par Cardiomyopathy (425.4) (I42.9)\par Cerebrovascular accident (CVA) due to embolism of left middle cerebral artery (434.11)\par (I63.412)\par Chronic systolic congestive heart failure (428.22,428.0) (I50.22)\par Elevated PSA (790.93) (R97.20)\par Hypercholesterolemia (272.0) (E78.00)\par Hypertension (401.9) (I10)\par Peripheral vascular disease (443.9) (I73.9)\par History of Systolic congestive heart failure (428.20,428.0) (I50.20)\par BPH with obstruction/lower urinary tract symptoms (600.01,599.69) (N40.1,N13.8)\par Expressive aphasia (784.3) (R47.01)\par \par Past Medical History\par Cerebrovascular accident (CVA) due to embolism of left middle cerebral artery (434.11)\par (I63.412)\par History of Dyspnea on exertion (786.09) (R06.09)\par History of Elevated glycosylated hemoglobin (790.29) (R73.09)\par History of cardiac pacemaker (V12.50,V45.01) (Z95.0)\par History of hyperlipidemia (V12.29) (Z86.39)\par History of shortness of breath (V13.89) (Z87.898)\par History of Paroxysmal ventricular tachycardia (427.1) (I47.2)\par History of Premature ventricular contractions (427.69) (I49.3)\par History of S/P CABG x 1 (V45.81) (Z95.1)\par History of Sick sinus syndrome (427.81) (I49.5)\par History of Systolic congestive heart failure (428.20,428.0) (I50.20)\par History of Systolic heart failure (428.20) (I50.20)\par History of Asthmatic bronchitis (493.90) (J45.909)\par History of BPH with obstruction/lower urinary tract symptoms (600.01,599.69)\par (N40.1,N13.8)\par History of Chronic renal disease, stage III (585.3) (N18.3)\par History of H/O angioplasty (V45.89) (Z98.62)\par History of dysphagia (V12.79) (Z87.19)\par History of erectile dysfunction (V13.89) (Z87.438)\par History of gout (V12.29) (Z87.39)\par History of Right sided weakness (728.87) (R53.1)\par History of Screening PSA (prostate specific antigen) (V76.44) (Z12.5)\par \par \par A/P:\par GI Bleed: improving.\par CVA: Embolic. Following with neurology. \par Coronary artery disease: s/p CABG.Doing well. Continue current medical therapy.\par Atrial fibrillation: Rate controlled. On Eliquis without issues.  also still on Plavix.\par Peripheral arterial disease: Currently no claudication and tolerating current medical therapy.\par Hyperlipidemia: Cont. low fat diet.\par Pre-Diabetes: Low carb diet, and weight loss discussed. \par Hypertension: Reports good blood pressures at home (110-120's systolic). Low sodium diet discussed.\par Chronic systolic heart failure: Clinically doing well on current regime.\par Anemia: Improved.\par \par OV in 3 month.   [Home] : at home, [unfilled] , at the time of the visit. [FreeTextEntry3] : Sister [Medical Office: ([facility])___] : at the medical office located in  [Family Member] : family member

## 2020-07-27 NOTE — PHYSICAL EXAM
[Urethral Meatus] : meatus normal [Penis Abnormality] : normal circumcised penis [Scrotum] : the scrotum was normal [Epididymis] : the epididymides were normal [Testes Tenderness] : no tenderness of the testes [Testes Mass (___cm)] : there were no testicular masses [General Appearance - Well Developed] : well developed [Normal Appearance] : normal appearance [General Appearance - In No Acute Distress] : no acute distress [Abdomen Soft] : soft [Abdomen Tenderness] : non-tender [Abdomen Mass (___ Cm)] : no abdominal mass palpated [Costovertebral Angle Tenderness] : no ~M costovertebral angle tenderness [Skin Color & Pigmentation] : normal skin color and pigmentation [FreeTextEntry1] : normal peripheral circulation [Oriented To Time, Place, And Person] : oriented to person, place, and time [] : no respiratory distress [Normal Station and Gait] : the gait and station were normal for the patient's age [No Focal Deficits] : no focal deficits [No Palpable Adenopathy] : no palpable adenopathy

## 2020-07-27 NOTE — ASSESSMENT
[FreeTextEntry1] : Benign Prostatic Hyperplasia:\par See Uroflo and PVR: increased PVR. \par Discussed treatment options. \par Will continue Flomax (2 caps) and Finasteride. \par \par Elevated PSA:\par Status post negative prostate biopsy in 2012 with Dr Case. \par PSA: 5.73 in June 2020, corrected PSA: 11.46. \par Discussed MRI Prostate, has defibrillator, will need it at Hospital. \par Will like to do PSA monitoring for now. \par PSA with PCP in September. Will call office to inform. Will discuss results. \par \par Return to office in 6 months or sooner if any issues.

## 2020-07-27 NOTE — HISTORY OF PRESENT ILLNESS
[FreeTextEntry1] : 76 yo male presents for follow up. \par In May admitted to [facility] for nose bleed and thereafter developed urgency. \par Flomax was increased to 2 caps- urinating better, no longer has urgency. \par Taking Flomax (2 caps) and Finasteride. Reports reasonable stream, urinates every few hours or so during the day and no nocturia. \par Denies hesitancy, straining, intermittency, urgency, incontinence, sense of incomplete emptying.\par Denies dysuria, hematuria, lower abdominal or flank pain, fever, chills or rigors.\par \par Today was able to get office not from Dr Case from 2015 which mentioned pt has had history of Elevated PSA and status post negative prostate biopsy in 2012. \par \par Initially seen for history of Benign Prostatic Hyperplasia. \par Was noted to have elevated PSA. \par \par Was following with Dr Case in the past- underwent Spermatocele surgery, then saw Dr Armstrong([facility]).

## 2020-07-27 NOTE — LETTER BODY
[Dear  ___] : Dear  [unfilled], [Courtesy Letter:] : I had the pleasure of seeing your patient, [unfilled], in my office today. [Please see my note below.] : Please see my note below. [Sincerely,] : Sincerely, [FreeTextEntry3] : John Figueroa MD\par  of Urology\par [facility] [employer]\par \par Offices:\par [facility] @\par [address], [place] [address]\par and\par [address], [place] [address], [address]\par and\par [address] [address]\par \par TEL: [phone]\par FAX: [phone]

## 2020-08-10 NOTE — DISCHARGE NOTE ADULT - PROCEDURE 1
Status: POD#7 awake tracheostomy, direct laryngoscopy with biopsy and NG tube placement  Vitals: VSS. 21% htd. HME while up walking.  Neuros: A/O x4 - intact  IV: PIV SL  Resp/trach: LS coarse. Strong, productive cough. #6 shiley uncuffed. Trach cares completed/inner canula changed x1 - Pt participating in cares.  Diet: 2g Na diet w/nectar thick liquids. Also bolus TF w/6 cans per day (to re-eval TF needs r/t adequate PO intake and full feeling)  Bowel status: BS+ No BM Overnight  : Voiding w/o difficulty overnight. Some retention issues, straight cath once in the AM daily.   Skin: Trach site w/redness surrounding  Pain: Mild incisional pain managed w/tylenol  Activity: Up indep.  Plan: Continue to monitor and follow POC     CABG with single internal mammary artery

## 2020-08-27 NOTE — ED PROVIDER NOTE - NS ED MD DISPO ADMITTING SERVICE
Slit Excision Additional Text (Leave Blank If You Do Not Want): A linear line was drawn on the skin overlying the lesion. An incision was made slowly until the lesion was visualized.  Once visualized, the lesion was removed with blunt dissection. TELE

## 2020-10-14 PROBLEM — I63.412 CEREBROVASCULAR ACCIDENT (CVA) DUE TO EMBOLISM OF LEFT MIDDLE CEREBRAL ARTERY: Status: ACTIVE | Noted: 2017-12-03

## 2020-10-14 PROBLEM — I50.20 SYSTOLIC CONGESTIVE HEART FAILURE: Status: RESOLVED | Noted: 2017-08-28 | Resolved: 2018-07-17

## 2020-10-14 PROBLEM — Z87.898 HISTORY OF URINARY INCONTINENCE: Status: RESOLVED | Noted: 2020-01-01 | Resolved: 2020-01-01

## 2020-10-14 PROBLEM — I25.10 3-VESSEL CAD: Status: ACTIVE | Noted: 2017-08-28

## 2020-10-14 PROBLEM — R47.01 EXPRESSIVE APHASIA: Status: ACTIVE | Noted: 2017-12-03

## 2020-10-14 PROBLEM — Z87.898 HISTORY OF ELEVATED PROSTATE SPECIFIC ANTIGEN (PSA): Status: RESOLVED | Noted: 2019-03-25 | Resolved: 2020-01-01

## 2020-10-14 PROBLEM — N40.1 BPH WITH OBSTRUCTION/LOWER URINARY TRACT SYMPTOMS: Status: RESOLVED | Noted: 2019-03-25 | Resolved: 2020-01-01

## 2020-10-14 PROBLEM — I50.22 CHRONIC SYSTOLIC CONGESTIVE HEART FAILURE: Status: ACTIVE | Noted: 2019-05-07

## 2020-10-14 PROBLEM — I42.9 CARDIOMYOPATHY: Status: ACTIVE | Noted: 2017-08-28

## 2020-10-14 PROBLEM — Z87.19 HISTORY OF GASTROINTESTINAL HEMORRHAGE: Status: RESOLVED | Noted: 2020-01-01 | Resolved: 2020-01-01

## 2020-10-14 NOTE — PHYSICAL EXAM
[General Appearance - Well Developed] : well developed [Normal Appearance] : normal appearance [Normal Conjunctiva] : the conjunctiva exhibited no abnormalities [Normal Oral Mucosa] : normal oral mucosa [Eyelids - No Xanthelasma] : the eyelids demonstrated no xanthelasmas [No Oral Pallor] : no oral pallor [Normal Oropharynx] : normal oropharynx [No Oral Cyanosis] : no oral cyanosis [Normal Jugular Venous V Waves Present] : normal jugular venous V waves present [No Jugular Venous Samuel A Waves] : no jugular venous samuel A waves [Auscultation Breath Sounds / Voice Sounds] : lungs were clear to auscultation bilaterally [Abdomen Tenderness] : non-tender [Abdomen Soft] : soft [Abdomen Mass (___ Cm)] : no abdominal mass palpated [Abnormal Walk] : normal gait [Cyanosis, Localized] : no localized cyanosis [Petechial Hemorrhages (___cm)] : no petechial hemorrhages [Nail Clubbing] : no clubbing of the fingernails [Skin Color & Pigmentation] : normal skin color and pigmentation [] : no rash [No Venous Stasis] : no venous stasis [Skin Lesions] : no skin lesions [No Xanthoma] : no  xanthoma was observed [No Skin Ulcers] : no skin ulcer [Affect] : the affect was normal [Mood] : the mood was normal [Oriented To Time, Place, And Person] : oriented to person, place, and time [No Anxiety] : not feeling anxious [Diffuse] : diffuse [5th Left ICS - MCL] : palpated at the 5th LICS in the midclavicular line [Normal Rate] : normal [Rhythm Regular] : regular [No Precordial Heave] : no precordial heave was noted [Normal S1] : normal S1 [Normal S2] : normal S2 [I] : a grade 1 [2+] : left 2+ [1+] : left 1+ [0] : left 0 [No Abnormalities] : the abdominal aorta was not enlarged and no bruit was heard [___ +] : bilateral [unfilled]U+ pitting edema to the ankles [S3] : no S3 [S4] : no S4

## 2020-10-14 NOTE — DISCUSSION/SUMMARY
[FreeTextEntry1] : CVA: Embolic. Following with neurology. \par Coronary artery disease: s/p CABG.asymptomatic  . Continue current medical therapy.\par Atrial fibrillation: Rate controlled. On Xarelto.\par PPM: follow up with Dr Dominguez.\par Peripheral arterial disease: Currently no claudication and tolerating current medical therapy.\par Hyperlipidemia:Report lipids good with PCP. Low fat diet discussed and trying to maintain the weight loss he has had. \par Hypertension: Controlled. Low sodium diet discussed.\par Chronic systolic heart failure: Clinically doing well on current regime.  \par F/u in 6 month.

## 2020-10-14 NOTE — HISTORY OF PRESENT ILLNESS
[FreeTextEntry1] : Communication is still tough.Plays piano daily and is able. No shortness of breath, dyspnea on exertion, palpitations, orthopnea, paroxysmal nocturnal dyspnea, or syncopal symptoms.

## 2020-10-14 NOTE — CARDIOLOGY SUMMARY
[Inf Wall Defect] : inferior wall defect [LVEF ___%] : LVEF [unfilled]% [Mild] : mild pulmonary hypertension [Enlarged] : enlarged LA size [Severe] : severe LV dysfunction [Moderate] : moderate mitral regurgitation [___] : [unfilled]

## 2020-10-14 NOTE — REASON FOR VISIT
[Follow-Up - Clinic] : a clinic follow-up of [Atrial Fibrillation] : atrial fibrillation [Coronary Artery Disease] : coronary artery disease [Heart Failure] : congestive heart failure [Hyperlipidemia] : hyperlipidemia [Hypertension] : hypertension [Peripheral Vascular Disease] : peripheral vascular disease

## 2020-12-13 NOTE — PROGRESS NOTE ADULT - PROBLEM/PLAN-6
Pt discharged home with parent/guardian. Pt acting age appropriately, respirations regular and unlabored, cap refill less than two seconds. Skin pink, dry and warm. Lungs clear bilaterally. No further complaints at this time. Parent/guardian verbalized understanding of discharge paperwork and has no further questions at this time. Education provided about continuation of care, follow up care and medication administration: 800mg of motrin as prescribed for pain/stiffness, ice/heat packs as needed, and follow-up with PCP if symptoms worsen. Parent/guardian able to provided teach back about discharge instructions. DISPLAY PLAN FREE TEXT

## 2021-01-01 ENCOUNTER — EMERGENCY (EMERGENCY)
Facility: HOSPITAL | Age: 76
LOS: 1 days | Discharge: HOME | End: 2021-01-01
Attending: EMERGENCY MEDICINE | Admitting: EMERGENCY MEDICINE
Payer: COMMERCIAL

## 2021-01-01 ENCOUNTER — APPOINTMENT (OUTPATIENT)
Dept: ELECTROPHYSIOLOGY | Facility: CLINIC | Age: 76
End: 2021-01-01

## 2021-01-01 ENCOUNTER — APPOINTMENT (OUTPATIENT)
Dept: ELECTROPHYSIOLOGY | Facility: CLINIC | Age: 76
End: 2021-01-01
Payer: MEDICARE

## 2021-01-01 ENCOUNTER — NON-APPOINTMENT (OUTPATIENT)
Age: 76
End: 2021-01-01

## 2021-01-01 VITALS
OXYGEN SATURATION: 91 % | HEART RATE: 87 BPM | TEMPERATURE: 98 F | HEIGHT: 67 IN | SYSTOLIC BLOOD PRESSURE: 148 MMHG | WEIGHT: 145.06 LBS | DIASTOLIC BLOOD PRESSURE: 86 MMHG | RESPIRATION RATE: 16 BRPM

## 2021-01-01 VITALS
HEART RATE: 89 BPM | TEMPERATURE: 98 F | DIASTOLIC BLOOD PRESSURE: 78 MMHG | OXYGEN SATURATION: 99 % | RESPIRATION RATE: 16 BRPM | SYSTOLIC BLOOD PRESSURE: 132 MMHG

## 2021-01-01 VITALS
HEART RATE: 90 BPM | TEMPERATURE: 98 F | SYSTOLIC BLOOD PRESSURE: 140 MMHG | RESPIRATION RATE: 18 BRPM | HEIGHT: 67 IN | OXYGEN SATURATION: 97 % | WEIGHT: 145.06 LBS | DIASTOLIC BLOOD PRESSURE: 80 MMHG

## 2021-01-01 DIAGNOSIS — S85.009A UNSPECIFIED INJURY OF POPLITEAL ARTERY, UNSPECIFIED LEG, INITIAL ENCOUNTER: Chronic | ICD-10-CM

## 2021-01-01 DIAGNOSIS — Z95.1 PRESENCE OF AORTOCORONARY BYPASS GRAFT: Chronic | ICD-10-CM

## 2021-01-01 DIAGNOSIS — I25.10 ATHEROSCLEROTIC HEART DISEASE OF NATIVE CORONARY ARTERY WITHOUT ANGINA PECTORIS: Chronic | ICD-10-CM

## 2021-01-01 DIAGNOSIS — S49.90XA UNSPECIFIED INJURY OF SHOULDER AND UPPER ARM, UNSPECIFIED ARM, INITIAL ENCOUNTER: Chronic | ICD-10-CM

## 2021-01-01 PROCEDURE — 99283 EMERGENCY DEPT VISIT LOW MDM: CPT

## 2021-01-01 PROCEDURE — 93295 DEV INTERROG REMOTE 1/2/MLT: CPT

## 2021-01-01 PROCEDURE — 73130 X-RAY EXAM OF HAND: CPT

## 2021-01-01 PROCEDURE — 29125 APPL SHORT ARM SPLINT STATIC: CPT

## 2021-01-01 PROCEDURE — 73110 X-RAY EXAM OF WRIST: CPT | Mod: 26,RT

## 2021-01-01 PROCEDURE — 73130 X-RAY EXAM OF HAND: CPT | Mod: 26,RT

## 2021-01-01 PROCEDURE — 99283 EMERGENCY DEPT VISIT LOW MDM: CPT | Mod: 25

## 2021-01-01 PROCEDURE — 73110 X-RAY EXAM OF WRIST: CPT

## 2021-01-01 PROCEDURE — 93296 REM INTERROG EVL PM/IDS: CPT

## 2021-01-01 PROCEDURE — 99284 EMERGENCY DEPT VISIT MOD MDM: CPT | Mod: 25

## 2021-01-01 RX ORDER — COLCHICINE 0.6 MG
1.2 TABLET ORAL ONCE
Refills: 0 | Status: COMPLETED | OUTPATIENT
Start: 2021-01-01 | End: 2021-01-01

## 2021-01-01 RX ORDER — COLCHICINE 0.6 MG
0.6 TABLET ORAL ONCE
Refills: 0 | Status: COMPLETED | OUTPATIENT
Start: 2021-01-01 | End: 2021-01-01

## 2021-01-01 RX ORDER — IBUPROFEN 200 MG
400 TABLET ORAL ONCE
Refills: 0 | Status: COMPLETED | OUTPATIENT
Start: 2021-01-01 | End: 2021-01-01

## 2021-01-01 RX ORDER — OMEGA-3 ACID ETHYL ESTERS 1 G
1 CAPSULE ORAL
Qty: 0 | Refills: 0 | DISCHARGE

## 2021-01-01 RX ORDER — CHOLECALCIFEROL (VITAMIN D3) 125 MCG
1 CAPSULE ORAL
Qty: 0 | Refills: 0 | DISCHARGE

## 2021-01-01 RX ORDER — CLOPIDOGREL BISULFATE 75 MG/1
1 TABLET, FILM COATED ORAL
Qty: 0 | Refills: 0 | DISCHARGE

## 2021-01-01 RX ORDER — PANTOPRAZOLE SODIUM 20 MG/1
1 TABLET, DELAYED RELEASE ORAL
Qty: 0 | Refills: 0 | DISCHARGE

## 2021-01-01 RX ORDER — PREGABALIN 225 MG/1
1 CAPSULE ORAL
Qty: 0 | Refills: 0 | DISCHARGE

## 2021-01-01 RX ADMIN — Medication 0.6 MILLIGRAM(S): at 14:57

## 2021-01-01 RX ADMIN — Medication 1.2 MILLIGRAM(S): at 14:31

## 2021-01-01 RX ADMIN — Medication 40 MILLIGRAM(S): at 14:57

## 2021-01-01 RX ADMIN — Medication 400 MILLIGRAM(S): at 08:11

## 2021-01-28 NOTE — ED PROVIDER NOTE - PSH
Arm injury  s/p surgery 2009  CAD S/P percutaneous coronary angioplasty  stent placed  Pacemaker  [employer] serial #[id number]   model #v-268  Popliteal artery injury  iliac/ popliteal angioplasty with stents 2010  S/P CABG x 1    S/P Implantation of Automatic Cardioverter/Defibrillator (AICD)    S/P Tonsillectomy
General

## 2021-03-05 NOTE — ED PROVIDER NOTE - OBJECTIVE STATEMENT
77 y/o M with hx of gout and CVA with c/o right wrist pain, swelling and redness after possibly hitting his wrist on a wall. pt took 1 Tylenol prior to arrival.

## 2021-03-05 NOTE — ED PROVIDER NOTE - PMH
Alcohol abuse  in recovery  Aphasia S/P CVA    Atrial Fibrillation    Carotid Stenosis    Chronic Obstructive Pulmonary Disease (COPD)    Coronary Artery Disease    DM (diabetes mellitus)    Former smoker    Gout    H/O: Rheumatic Fever    HF (heart failure)    Hyperlipidemia    Hypertension    NSVT (nonsustained ventricular tachycardia)    Pacemaker  defibrillator model # v-268 serial # [id number]  PAD (peripheral artery disease)  with stents

## 2021-03-05 NOTE — ED ADULT TRIAGE NOTE - CHIEF COMPLAINT QUOTE
pt has swelling to right wrist with pain, unknown injury, pt has difficulty communicating from previous stroke

## 2021-03-05 NOTE — ED ADULT NURSE NOTE - OBJECTIVE STATEMENT
Received pt with right wrist pain wife states she thinks he hurt it yesterday on the night stand. Pt is able to move hand denies numbness and tingling fingers feel warm to touch. Wife states they were cold this morning and wrist was hurting him. Pt has a positive pulse. Call bell within reach. Freq rounding performed. Safety/Comfort maintained. Will continue to monitor.

## 2021-03-05 NOTE — ED PROVIDER NOTE - PATIENT PORTAL LINK FT
You can access the FollowMyHealth Patient Portal offered by [facility] by registering at the following website: [email or web address]. By joining [facility] FollowMyHealth portal, you will also be able to view your health information using other applications (apps) compatible with our system.

## 2021-03-05 NOTE — ED ADULT NURSE NOTE - PMH
Alcohol abuse  in recovery  Aphasia S/P CVA    Atrial Fibrillation    Carotid Stenosis    Chronic Obstructive Pulmonary Disease (COPD)    Coronary Artery Disease    DM (diabetes mellitus)    Former smoker    Gout    H/O: Rheumatic Fever    HF (heart failure)    Hyperlipidemia    Hypertension    NSVT (nonsustained ventricular tachycardia)    Pacemaker  defibrillator model # v-268 serial # [id number]  PAD (peripheral artery disease)  with stents

## 2021-03-05 NOTE — ED PROVIDER NOTE - NSFOLLOWUPINSTRUCTIONS_ED_ALL_ED_FT
Please take Tylenol as needed for pain.  You may take Ibuprofen 400mg every 6 hours as needed for breakthrough pain if needed.      Please return for any signs of infection: fevers, decreased movement/range of motion in the wrist, increased redness

## 2021-03-05 NOTE — ED PROVIDER NOTE - PSH
Arm injury  s/p surgery 2009  CAD S/P percutaneous coronary angioplasty  stent placed  Pacemaker  [employer] serial #[id number]   model #v-268  Popliteal artery injury  iliac/ popliteal angioplasty with stents 2010  S/P CABG x 1    S/P Implantation of Automatic Cardioverter/Defibrillator (AICD)    S/P Tonsillectomy

## 2021-03-05 NOTE — ED ADULT NURSE NOTE - PSH
Arm injury  s/p surgery 2009  CAD S/P percutaneous coronary angioplasty  stent placed  Pacemaker  [employer] serial #[id number]   model #v-268  Popliteal artery injury  iliac/ popliteal angioplasty with stents 2010  S/P CABG x 1    S/P Implantation of Automatic Cardioverter/Defibrillator (AICD)    S/P Tonsillectomy

## 2021-03-07 NOTE — ED PROVIDER NOTE - PSH
Arm injury  s/p surgery 2009  CAD S/P percutaneous coronary angioplasty  stent placed  Pacemaker  [employer] serial #[id number]   model #v-268  Popliteal artery injury  iliac/ popliteal angioplasty with stents 2010  S/P CABG x 1    S/P Implantation of Automatic Cardioverter/Defibrillator (AICD)    S/P Tonsillectomy

## 2021-03-07 NOTE — ED PROVIDER NOTE - NSFOLLOWUPINSTRUCTIONS_ED_ALL_ED_FT
continue the aleve twice per day OR ibuprofen every 8 hours with food  prednisone 40 mg (2x20 mg) once per day for next 3 days with food  wear splint unless bathing or dressing.  follow up with your primary care physician and on call ortho dr marcos Almodovar for fever chills worsening symptoms or any concerns return to emergency.    Gout    WHAT YOU NEED TO KNOW:    Gout is a form of arthritis that causes severe joint pain, redness, swelling, and stiffness. Acute gout pain starts suddenly, gets worse quickly, and stops on its own. Acute gout can become chronic and cause permanent damage to the joints.    DISCHARGE INSTRUCTIONS:    Return to the emergency department if:   •You have severe pain in one or more of your joints that you cannot tolerate.       •You have a fever or redness that spreads beyond the joint area.      Call your doctor if:   •You have new symptoms, such as a rash, after you start gout treatment.       •Your joint pain and swelling do not go away, even after treatment.      •You are not urinating as much or as often as you usually do.       •You have trouble taking your gout medicines.      •You have questions or concerns about your condition or care.      Medicines: You may need any of the following:   •Prescription pain medicine may be given. Ask your healthcare provider how to take this medicine safely. Some prescription pain medicines contain acetaminophen. Do not take other medicines that contain acetaminophen without talking to your healthcare provider. Too much acetaminophen may cause liver damage. Prescription pain medicine may cause constipation. Ask your healthcare provider how to prevent or treat constipation.       •NSAIDs, such as ibuprofen, help decrease swelling, pain, and fever. This medicine is available with or without a doctor's order. NSAIDs can cause stomach bleeding or kidney problems in certain people. If you take blood thinner medicine, always ask your healthcare provider if NSAIDs are safe for you. Always read the medicine label and follow directions.      •Gout medicine decreases joint pain and swelling. It may also be given to prevent new gout attacks.       •Steroids reduce inflammation and can help your joint stiffness and pain during gout attacks.      •Uric acid medicine may be given to reduce the amount of uric acid your body makes. Some medicines may help you pass more uric acid when you urinate.       •Take your medicine as directed. Contact your healthcare provider if you think your medicine is not helping or if you have side effects. Tell him or her if you are allergic to any medicine. Keep a list of the medicines, vitamins, and herbs you take. Include the amounts, and when and why you take them. Bring the list or the pill bottles to follow-up visits. Carry your medicine list with you in case of an emergency.      Manage your symptoms:   •Rest your painful joint so it can heal. Your healthcare provider may recommend crutches or a walker if the affected joint is in a leg.       •Apply ice to your joint. Ice decreases pain and swelling. Use an ice pack, or put crushed ice in a plastic bag. Cover the ice pack or bag with a towel before you apply it to your painful joint. Apply ice for 15 to 20 minutes every hour, or as directed.      •Elevate your joint. Elevation helps reduce swelling and pain. Raise your joint above the level of your heart as often as you can. Prop your painful joint on pillows to keep it above your heart comfortably.      •Go to physical therapy if directed. A physical therapist can teach you exercises to improve flexibility and range of motion.       Help prevent gout attacks:   •Do not eat high-purine foods. These foods include meats, seafood, asparagus, spinach, cauliflower, and some types of beans. Healthcare providers may tell you to eat more low-fat milk products, such as yogurt. Milk products may decrease your risk for gout attacks. Vitamin C and coffee may also help. Your healthcare provider or dietitian can help you create a meal plan.      •Drink liquids as directed. Liquids such as water help remove uric acid from your body. Ask how much liquid to drink each day and which liquids are best for you.      •Maintain a healthy weight. Weight loss may decrease the amount of uric acid in your body. Ask your healthcare provider how much you should weigh. Ask him to help you create a weight loss plan if you are overweight.       •Control your blood sugar level if you have diabetes. Keep your blood sugar level in a normal range. This can help prevent gout attacks.       •Limit or do not drink alcohol as directed. Alcohol can trigger a gout attack. Alcohol also increases your risk for dehydration. Ask your healthcare provider if alcohol is safe for you.       Follow up with your doctor as directed: You may be referred to a rheumatologist or podiatrist. Write down your questions so you remember to ask them during your visits.

## 2021-03-07 NOTE — ED ADULT NURSE NOTE - PMH
Alcohol abuse  in recovery  Aphasia S/P CVA    Atrial Fibrillation    Carotid Stenosis    Chronic Obstructive Pulmonary Disease (COPD)    Coronary Artery Disease    DM (diabetes mellitus)    Former smoker    Gout    H/O: Rheumatic Fever    HF (heart failure)    Hyperlipidemia    Hypertension    NSVT (nonsustained ventricular tachycardia)    Pacemaker  defibrillator model # v-268 serial # [id number]  PAD (peripheral artery disease)  with stents

## 2021-03-07 NOTE — ED PROCEDURE NOTE - NS ED PERI NEURO NEG
Final Anesthesia Post-op Assessment    Patient: Buffy Whitley  Procedure(s) Performed: EXCISION OF RIGHT POSTERIOR OCCIPITAL MASS - RIGHT  Anesthesia type: General    Vitals Value Taken Time   Temp 36.8 °C (98.2 °F) 6/19/2020 12:33 PM   Pulse 98 6/19/2020 12:33 PM   Resp 16 6/19/2020 12:33 PM   SpO2 97 % 6/19/2020 12:33 PM   /72 6/19/2020 12:33 PM       Last 24 I/O:     Intake/Output Summary (Last 24 hours) at 6/19/2020 1251  Last data filed at 6/19/2020 1233  Gross per 24 hour   Intake 1060 ml   Output --   Net 1060 ml         Patient Location: Phase II  Post-op Vital Signs:stable  Level of Consciousness: participates in exam and awake  Respiratory Status: spontaneous ventilation and room air  Cardiovascular stable  Hydration: euvolemic  Pain Management: well controlled  Handoff: Handoff to receiving clinician was performed and questions were answered  Nausea: None  Airway Patency:patent  Post-op Assessment: awake, alert, appropriately conversant, or baseline, no complications, patient tolerated procedure well with no complications and evidence of recall       Pre-application: Motor, sensory, and vascular responses intact in the injured extremity./Post-application: Motor, sensory, and vascular responses intact in the injured extremity./The patient/caregiver verbalized understanding of how to care for the injured extremity with splint

## 2021-03-07 NOTE — ED PROVIDER NOTE - MUSCULOSKELETAL, MLM
Spine appears normal, range of motion is not limited, but there is crepitance and discomfort on rom r wrist, full rom. at elbow the olecranon bursa is prominent mobile not red hot or tender. the hand is neuro vasc intact there is warmth to the back of the affected wrist and a small contusion.  pt is uncertain if he fell but wife is sure no trauma

## 2021-03-07 NOTE — ED PROVIDER NOTE - CLINICAL SUMMARY MEDICAL DECISION MAKING FREE TEXT BOX
elderly male with aphasia and gout 3 days of progressive pain in wrist and erythema to skin and now r elbow swelling.  ro fx to wrist vs gout start gout treatment cholcine, if neg fx start prednisone and apply split for comfort  to monitor for cellulitis and refer to orthopedist and pmd

## 2021-03-07 NOTE — ED PROVIDER NOTE - CARE PROVIDER_API CALL
Ravin Ellsworth)  Orthopaedic Surgery Surgery  [address] [place] [address]  Phone: [phone]  Fax: [phone]  Follow Up Time:     MELCHOR CABAN  Internal Medicine  [address] [place] [id number]  Phone: [phone]  Fax: [phone]  Follow Up Time:

## 2021-03-07 NOTE — ED PROVIDER NOTE - OBJECTIVE STATEMENT
pt is a 77 yo male with multiple medical problems who presented to this emergency department 2 days ago for r wrist pain and swelling of uncertain etiology. he denies trauma or other injury.  He was given nsaids for discomfort which helped.  he has bene taking at home as well 400 mg q 8 h.  wife brought him to er because he also has mild swelling to r olecranon without erythema pain loss of rom or trauma  pt denies other complaints.

## 2021-03-07 NOTE — ED PROVIDER NOTE - PMH
Alcohol abuse  in recovery  Aphasia S/P CVA    Atrial Fibrillation    Carotid Stenosis    Chronic Obstructive Pulmonary Disease (COPD)    Coronary Artery Disease    DM (diabetes mellitus)    Former smoker    Gout    H/O: Rheumatic Fever    HF (heart failure)    Hyperlipidemia    Hypertension    NSVT (nonsustained ventricular tachycardia)    Pacemaker  defibrillator model # v-268 serial # [id number]  PAD (peripheral artery disease)  with stents

## 2021-03-07 NOTE — ED PROVIDER NOTE - CONSTITUTIONAL, MLM
Well appearing, awake, alert, oriented to person, place, time/situation and in no apparent distress. mildly aphasic normal...

## 2021-03-07 NOTE — ED PROVIDER NOTE - PROGRESS NOTE DETAILS
Discussed with Patient and/or Family regarding the X-ray findings.  Discussed the risks of occult/secondary fracture or ligamentous/tendon injury. Discussed the importance of close prompt follow-up with Orthopedics for definitive workup and treatment.  Also discussed injury/neurologic precautions.  Verbalization of understanding of all instructions was shown and an opportunity was given for questions.  pt splinted, meds sent to pharmacy with bedside instructions

## 2021-03-07 NOTE — ED PROVIDER NOTE - PATIENT PORTAL LINK FT
You can access the FollowMyHealth Patient Portal offered by [facility] by registering at the following website: [email or web address]. By joining [facility]’s FollowMyHealth portal, you will also be able to view your health information using other applications (apps) compatible with our system.

## 2021-03-23 RX ORDER — TAMSULOSIN HYDROCHLORIDE 0.4 MG/1
0.4 CAPSULE ORAL
Qty: 180 | Refills: 0 | Status: ACTIVE | COMMUNITY
Start: 1900-01-01 | End: 1900-01-01

## 2021-04-05 NOTE — DISCHARGE NOTE ADULT - HOSPITAL COURSE
No 72 M with Hx of ischemic cardiomyoathy with EF 45%, Hx of NSVT s/p AICD, Hx of CAD s/p LAD stent, Atrial Fibrillation on A/C with Coumadin presented to the [place] ED after his defibrillator fired off.  Workup in the ED revealed H/H 17.8/53.8, INR 2.1, Troponin of 2.32, EKG showed nonspecific ST changes with Afib, rate controlled.  Patient seen by Dr. Camacho at [place] and now transferred to [place] to undergo cardiac cath in am.  Pacemaker interrogation revealed + Ventricular Tachycardia and AICD fired appropriately.  Patient without any chest pain, shortness of breath at this time.  Case discussed with Dr. Camacho today, will reverse his INR with Vitamin K in preparation for the procedure in am.  -found to have triple vessel CAD    8.1: no cp, no sob, no distress    REVIEW OF SYSTEMS:    CONSTITUTIONAL: No weakness, No fevers or chills  ENT: No ear ache, No sorethroat  NECK: No pain, No stiffness  RESPIRATORY: No cough, No wheezing, No hemoptysis; No dyspnea  CARDIOVASCULAR: No chest pain, No palpitations  GASTROINTESTINAL: No abd pain, No nausea, No vomiting, No hematemesis, No diarrhea or constipation. No melena, No hematochezia.  GENITOURINARY: No dysuria, No  hematuria  NEUROLOGICAL: No diplopia, No paresthesia, No motor dysfunction  MUSCULOSKELETAL: No arthralgia, No myalgia  SKIN: No rashes, or lesions   PSYCH: no anxiety, no suicidal ideation    All other review of systems is negative unless indicated above    Vital Signs Last 24 Hrs  T(C): 36.9 (01 Aug 2017 09:17), Max: 36.9 (31 Jul 2017 23:18)  T(F): 98.4 (01 Aug 2017 09:17), Max: 98.5 (31 Jul 2017 23:18)  HR: 74 (01 Aug 2017 13:30) (65 - 97)  BP: 115/71 (01 Aug 2017 13:30) (98/56 - 147/88)  BP(mean): 80 (01 Aug 2017 13:30) (66 - 94)  RR: 16 (01 Aug 2017 12:15) (16 - 27)  SpO2: 95% (01 Aug 2017 12:15) (89% - 97%)    PHYSICAL EXAM:    GENERAL: NAD, Well nourished  HEENT:  NC/AT, EOMI, PERRLA, No scleral icterus, Moist mucous membranes  NECK: Supple, No JVD  CNS:  Alert & Oriented X3, Motor Strength 5/5 B/L upper and lower extremities; DTRs 2+ intact   LUNG: Normal Breath sounds, Clear to auscultation bilaterally, No rales, No rhonchi, No wheezing  HEART: RRR; No murmurs, No rubs  ABDOMEN: +BS, ST/ND/NT  GENITOURINARY: Voiding, Bladder not distended  EXTREMITIES:  2+ Peripheral Pulses, No clubbing, No cyanosis, No tibial edema  MUSCULOSKELTAL: Joints normal ROM, No TTP, No effusion  SKIN: no rashes  RECTAL: deferred, not indicated  BREAST: deferred                          16.8   9.6   )-----------( 210      ( 01 Aug 2017 05:51 )             51.4     08-01    136  |  100  |  32<H>  ----------------------------<  79  3.9   |  28  |  1.54<H>    PT/INR - ( 01 Aug 2017 05:51 )   PT: 19.0 sec;   INR: 1.74 ratio         PTT - ( 31 Jul 2017 06:03 )  PTT:50.4 sec    all labs reviewed  all imaging reviewed        Assessment and Plan:     S/P AICD FIRING APPROPRIATELY FOR EPISODE OF VT, R/O ISCHEMIA AS PRECIPITATING EVENT    NON ST ELEVATION MYOCARDIAL INFARCTION    CHRONIC ATRIAL FIBRILLATION WITH THERAPEUTIC INR. CHADS2 = 3 (borderline DM, HTN, CHF)    CHRONIC SYSTOLIC CHF WITH EF 40%    STAGE 3 CKD WITH SUPERIMPOSED MILD ACUTE KIDNEY INJURY    Chronic systolic mild left systolic Chf    PLAN:  -transfer to [facility] for coronary revascularisation  cw dual antiplatelet tx, statin, lopressor, ACEinh  cw amiodarone and digoxin for Afib  hold Coumadin prior to possible surgery, start IV heparin per cardiology    time spent 35min

## 2021-04-14 ENCOUNTER — APPOINTMENT (OUTPATIENT)
Dept: CARDIOLOGY | Facility: CLINIC | Age: 76
End: 2021-04-14

## 2021-05-28 NOTE — ED ADULT NURSE NOTE - NSIMPLEMENTINTERV_GEN_ALL_ED
Left message for patient to call our office.     Implemented All Fall Risk Interventions:  [place] to call system. Call bell, personal items and telephone within reach. Instruct patient to call for assistance. Room bathroom lighting operational. Non-slip footwear when patient is off stretcher. Physically safe environment: no spills, clutter or unnecessary equipment. Stretcher in lowest position, wheels locked, appropriate side rails in place. Provide visual cue, wrist band, yellow gown, etc. Monitor gait and stability. Monitor for mental status changes and reorient to person, place, and time. Review medications for side effects contributing to fall risk. Reinforce activity limits and safety measures with patient and family.

## 2021-06-13 NOTE — PHYSICAL THERAPY INITIAL EVALUATION ADULT - SHORT TERM MEMORY, REHAB EVAL
No ctx, lof, bleeding, or dc.  No HA or vision changes.   GSB done.  Baby feels low.  Nausea this AM.  No fever.  No diarrhea.  
ALEXANDR

## 2021-08-18 NOTE — ED ADULT NURSE NOTE - CHIEF COMPLAINT QUOTE
Video-Visit Details    Type of service:  Video Visit    Video Start Time (time video started): 10:15    Video End Time (time video stopped): 10:35     Originating Location (pt. Location): Home (patient at group home with José Miguel on phone and parents at their home on video)     Distant Location (provider location):  [facility] NUTRITION SERVICES     Mode of Communication:  Video Conference via [facility]    OUTPATIENT NUTRITION ASSESSMENT (VIDEO VISIT DUE TO COVID-19)   REASON FOR ASSESSMENT  Scot Bishop referred by Dr. Morales for MNT related to   Traumatic brain injury with loss of consciousness, sequela (H) [S06.9X9S]  - Primary       G tube feedings (H) [Z93.1]               Patient accompanied by Gerald, father; Audrey, step mother; staff members, José Miguel        ASSESSMENT   Nutrition History:  - Information obtained from father, step mom and staff worker.  Patient's step mom describes patient's eating habits as limited food choices with hamburgers and pizza but now eats all foods provided to him.  Patient's father states that when they feed him, patient will eat most of his meal.  Priyanka, staff worker, states patient eats 45-50% of meals.  Patient currently living at [facility] in [place].  Patient has 7 year old son.      Patient's father states patient's  lbs. Patient is on a regular diet with thin liquids.  Patient is a total feed.     Patient has regular formed bowel movements either daily or every other day.       7/5/2021 Received calorie count from group home.  Patient eating 100% of meals.  Patient receiving 2 meals per day as  states patient will vomit if given breakfast after TF administration.  Patient having difficulty drinking liquids as will chew on cups or straws.  Patient's father bought sippy cup for patient to try and seems to do well with it. Patient will drink 16 oz fluid (orally) water and milk.  Patient receiving 3 (60 mL) water flushes 8  "times per day.   asking if evening flushes can be changed due to prevent waking patient 3 times per night.        7/19 No calorie count provided by [facility].  Diet recall past 24 hours: scrambled egg and yogurt; tuna sandwich, banana and water; spaghetti with shrimp, orange and milk-drank 25%.  Patient continues to drink with sippy cup.  Patient's father states can drink entire sippy cup (8-10 oz in 1 hour). Staff members don't feel patient will drink more than 8 oz per day from sippy cup.  Patient does not eat full breakfast due to previous vomiting after the TF.  Per father, patient working with speech therapist, PT and OT weekly.      8/2/2021 Patient's step mother states that patient will drink entire sippy cup of 8 oz if someone helps him.  José Miguel, [facility] director, states did not receive order to reduce TF so has continued to run at 70 mL/hr x 11 hours. Patient having regular daily stools.      8/18/2021 Received food log from [facility].  Patient eating 2-3 meals + 1-2 snacks (yogurt or applesauce).  Pancakes for breakfast.  Lunch -turkey sandwich or spaghetti with meat sauce Dinner: chicken vale.  Parents state patient can drink liquids when continually offered.  Father asking if patient can use different cup to increase fluid intake.       Diet Recall:  Breakfast: oatmeal   Lunch: 45-50%-turkey sandwich pudding/applesauce + fruit (fresh fruit-chevy or canned fruit)   Dinner: spaghetti  Snack: cookie or pudding   Beverages: water     Exercise: Patient working with PT and OT.      NUTRITION FOCUSED PHYSICAL ASSESSMENT (NFPA) FOR DIAGNOSING MALNUTRITION  Yes         Observed:   No nutrition-related physical findings observed     Obtained from Chart/Interdisciplinary Team:  Non-healing wound near G tube      LABS  Labs reviewed     MEDICATIONS/SUPPLEMENTS  Medications/supplements reviewed-no MVI      ANTHROPOMETRICS   Height: 5'8\"  Weight: 178 lbs (8/18/2021)   BMI " (kg/m2): 27 kg/m2  Weight Status:  Overweight   %IBW: 115%  Weight History: 2/19/2021 taken in MD office with patient in wheelchair. Patient with 15% weight gain in 6 months. Patient with 4% weight loss in 2 weeks.    Wt Readings from Last 10 Encounters:   08/02/21 80.4 kg (177 lb 3.2 oz)   07/19/21 83.7 kg (184 lb 9.6 oz)   07/05/21 81.8 kg (180 lb 6.4 oz)   02/19/21 108.4 kg (239 lb)   02/02/21 69.4 kg (153 lb)   02/02/21 69.4 kg (153 lb)   01/26/21 69.9 kg (154 lb 3.2 oz)   01/21/21 67.3 kg (148 lb 6.4 oz)   01/19/21 68.6 kg (151 lb 3.2 oz)   01/19/21 54.9 kg (121 lb)     ASSESSED NUTRITION NEEDS  Estimated Energy Needs: 6953-7508 kcals/day (25-30 Kcal/Kg)  Justification:  (maintenance)  Estimated Protein Needs:  grams protein/day (1.2-1.5 g pro/Kg)  Justification:  (preservation of lean body mass)  Estimated Fluid Needs: 5770-1880 mL/day (30-35 mL/kg)      NUTRITION SUPPORT ENTERAL:  Type of Feeding Tube: G tube   Enteral Frequency:  Cyclic  Enteral Regimen: Isosource 1.5   Total Enteral Provisions: Isosource 1.5 at 70 mL x 11 hours (8 PM-7 AM) = 1155 kcals (14 kcals/kg ABW) 52 gm protein 12 grams fiber 585 free fluid   1440 water flush + 585 free water from TF + 480 mL oral fluids = 2505 mL (31 mL/kg ABW)  Adjust fluid flushes 180 mL water flush 8 times per day   180 mL water flush before and after tube feeding (8 PM and 7 AM)  180 mL water flush 9 AM, 11 AM, 1 PM, 3 PM, 5 PM, 7 PM      Recommend reduction in TF by 50% (Isosource 1.5 at 35 mL/hr x 11 hours -7 PM - 8 AM) to provide 575 calories, 26 grams protein, 292 mL free fluid and 6 grams fiber if weigh stabilizes   Continue current fluid flushes      ASSESSED MALNUTRITION STATUS  % Weight Loss:  None noted  % Intake:  No decreased intake noted  Subcutaneous Fat Loss:  None observed  Loss of Muscle Mass:  None observed  Fluid Retention:  None noted     Malnutrition Diagnosis:  Patient does not meet two of the above criteria necessary for diagnosing  malnutrition     EVALUATION/PROGRESS TOWARDS GOALS  Previous nutrition goals:  Isosource 1.5 at 35 mL/hr x 11 hours (8 PM-7AM)-not met as order not received  180 mL water flush at 8 PM and 7 AM (before and after TF administration)-met   180 mL water flush 9 AM, 11 PM, 1 PM, 3 PM, 5 PM, 7 PM-met      Previous nutrition diagnosis:  Excessive nutrient intake related to G-tube feeding and oral intake as evidenced by no reduction in TF since started po intake and 15% weight gain -no change, modified below      Current nutrition diagnosis: Excessive nutrient intake related to G-tube feeding and oral intake as evidenced by 15% weight gain in 4 months      INTERVENTIONS   Nutrition Prescription   Recommend 5 day calorie count to assess oral intake to wean TF; add protein source at breakfast; protein bar as snack      IMPLEMENTATION   Assessed learning needs and learning preference  Teaching Method(s) used: Explanation  Vitamin and Mineral Supplements: suggest complete multivitamin and mineral once weaned off TF  Diet Education:  Provided education on calorie intake   Nutrition Education (Content):              a)  Discussed progress towards goals.  Reviewed food log.  It appears patient consumes <30 grams protein orally.  Patient's TF remains at 70 mL/hr.  Recommend continue current regimen.  Will hold off on TF reduction until able to consume higher protein intake (aim for 80 grams per day).  Appreciate consistent weight on patient as had previously not been weighed since February 2021. Explained to parents that drinking cup will need to be approved by speech therapist.  Patient's family and staff member verbalizes understanding of next steps.     GOALS  Weekly weight  Calorie count for 7 days (portion provided and amount eaten)  Isosource 1.5 at 70 mL/hr x 11 hours (8 PM-7AM)  180 mL water flush at 8 PM and 7 AM (before and after TF administration)  180 mL water flush 9 AM, 11 PM, 1 PM, 3 PM, 5 PM, 7 PM  Add protein  source at breakfast  Add protein bar daily as snack  Measure protein source at lunch and dinner      FOLLOW UP/MONITORING   Progress towards goals will be monitored and evaluated per protocol and Practice Guidelines  Patient to follow up in 3 weeks  RD name and number provided      Time Spent with Patient  20 minutes     Lauryn Licona, RD, LD  [facility] Outpatient Dietitian  [phone] (office phone)       cough since 2/9/2018, denies any fever or fever-like symptoms, finished treatment of levofloxacin.

## 2021-09-24 NOTE — CONSULT NOTE ADULT - PROVIDER SPECIALTY LIST ADULT
Progress Note:  Provider Speciality                            Hospitalist      KELSIE BAR MRN-[id number] 65y Male     CHIEF PRESENTING COMPLAINT:  Patient is a 65y old  Male who presents with a chief complaint of acute renal failure (24 Sep 2021 09:01)        SUBJECTIVE:  Patient was seen and examined at bedside. Reports improvement in  presenting complaint. No significant overnight events reported.     HISTORY OF PRESENTING ILLNESS:  HPI:  The patient is a 65 year old male with a history of afib not on AC, HTN, DM, smoker, alcohol abuse sent in by MD for abnormal blood work. Pt had routine blood work done which showed K of 2.9 and Ctn of 9 so told to go to ED. Spoke to sister to obtain full history. As per sister, patient has been having loss of apetite in the last couple of weeks and is nauseous and has lost 25 Ibs during this time. No urinary symptoms. Denies Chest pain, palpitations, headache, dizziness, muscle cramps, active bleeding. Sister report patient takes aspirin once in a while for back pain, otherwise no other pain meds taken. She reports he is an everyday smoker and drinks couple of glasses a vodka a day and used to drink beer daily. Denies colonoscopy in the past.     In the ED, CT C/A/P was done and was unremarkable. US renal was negative for hydronephrosis. Labs are remarkable for Na 131>127, K 2.2>3.5, Bicarb 18>16, BUN 70, Cr 10.3>9.7, Mg 1.3. VBG pH 7.27 K 5.5. UA negative for infection, shows moderate blood. EKG was negative for acute changes. He was given 20mEq K x3 doses and 40mEq K x1 and Mg 2g x1.     Vital Signs Last 24 Hrs  T(C): 37.1 (17 Sep 2021 15:43), Max: 37.1 (17 Sep 2021 15:43)  T(F): 98.8 (17 Sep 2021 15:43), Max: 98.8 (17 Sep 2021 15:43)  HR: 76 (17 Sep 2021 15:43) (76 - 76)  BP: 137/85 (17 Sep 2021 15:43) (137/85 - 137/85)  BP(mean): --  ABP: --  ABP(mean): --  RR: 18 (17 Sep 2021 15:43) (18 - 18)  SpO2: 99% (17 Sep 2021 15:43) (99% - 99%)   (18 Sep 2021 00:14)        REVIEW OF SYSTEMS:  Patient denies any headache, any vision complaints, runny nose, fever, chills, sore throat. Denies chest pain, shortness of breath, palpitation. Denies nausea, vomiting, abdominal pain, diarrhoea, Denies urinary burning, urgency, frequency, dysuria. At least 10 systems were reviewed in ROS. All systems reviewed  are within normal limits except for the complaints as described in Subjective.    PAST MEDICAL & SURGICAL HISTORY:  PAST MEDICAL & SURGICAL HISTORY:  No pertinent past medical history            VITAL SIGNS:  Vital Signs Last 24 Hrs  T(C): 35.8 (24 Sep 2021 08:00), Max: 36.4 (24 Sep 2021 01:00)  T(F): 96.5 (24 Sep 2021 08:00), Max: 97.5 (24 Sep 2021 01:00)  HR: 68 (24 Sep 2021 08:00) (60 - 81)  BP: 125/64 (24 Sep 2021 08:00) (125/64 - 135/72)  BP(mean): --  RR: 19 (24 Sep 2021 08:00) (18 - 19)  SpO2: --          PHYSICAL EXAMINATION:  Not in acute distress  General: No pallor, no icterus  HEENT:   EOMI, no JVD.  Heart: S1+S2 audible  Lungs: bilateral  fair air entry, no wheezing, no crepitations.  Abdomen: Soft, non-tender, non-distended , no  rigidity or guarding.  CNS: Awake alert, CN  grossly intact.  Extremities:  No edema            CONSULTS:  Consultant(s) Notes Reviewed by me.   Care Discussed with Consultants/Other Providers where required.        MEDICATIONS:  MEDICATIONS  (STANDING):  atorvastatin 40 milliGRAM(s) Oral at bedtime  dextrose 40% Gel 15 Gram(s) Oral once  dextrose 5%. 1000 milliLiter(s) (50 mL/Hr) IV Continuous <Continuous>  dextrose 5%. 1000 milliLiter(s) (100 mL/Hr) IV Continuous <Continuous>  dextrose 50% Injectable 25 Gram(s) IV Push once  dextrose 50% Injectable 12.5 Gram(s) IV Push once  dextrose 50% Injectable 25 Gram(s) IV Push once  folic acid 1 milliGRAM(s) Oral daily  glucagon  Injectable 1 milliGRAM(s) IntraMuscular once  heparin  Infusion 2000 Unit(s)/Hr (18 mL/Hr) IV Continuous <Continuous>  insulin glargine Injectable (LANTUS) 19 Unit(s) SubCutaneous at bedtime  insulin lispro (ADMELOG) corrective regimen sliding scale   SubCutaneous three times a day before meals  insulin lispro Injectable (ADMELOG) 6 Unit(s) SubCutaneous three times a day before meals  magnesium oxide 400 milliGRAM(s) Oral two times a day with meals  metoprolol tartrate 25 milliGRAM(s) Oral two times a day  multivitamin 1 Tablet(s) Oral daily  nicotine -   7 mG/24Hr(s) Patch 1 patch Transdermal daily  NIFEdipine XL 60 milliGRAM(s) Oral daily  sodium bicarbonate 650 milliGRAM(s) Oral three times a day  sodium chloride 0.45% 1000 milliLiter(s) (75 mL/Hr) IV Continuous <Continuous>  thiamine 100 milliGRAM(s) Oral daily    MEDICATIONS  (PRN):  acetaminophen   Tablet .. 650 milliGRAM(s) Oral every 6 hours PRN Temp greater or equal to 38.5C (101.3F), Mild Pain (1 - 3)  LORazepam     Tablet 1 milliGRAM(s) Oral every 1 hour PRN Anxiety            ASSESSMENT:        A 65 year old male with a history of afib not on AC, HTN, DM, smoker, alcohol abuse sent in by MD for abnormal blood work.     Acute renal failure  Paroxysmal  Afib  Hypokalemia and hyponatremia  Metabolic acidosis  DM2  Active ETOH abuse        Unclear etiology, possibly renal   Renal biopsy deferred for now as Cr is downtrending  Supplement Potassium & mg  No need for  RRT for now  D/c IVF  continue sodium bicarbonate 650 q 8   Paroxysmal  Afib-keep heparin for now. Bridge with coumadin 5 mg tonight. INR still subtherapeutic  No need for CIWA  TOV today  Handoff: Acute inpatient management  required further, nephrology clearance prior to discharge. TOV today, INR still subtherapeutic       Pulmonology

## 2021-11-05 ENCOUNTER — APPOINTMENT (OUTPATIENT)
Dept: ELECTROPHYSIOLOGY | Facility: CLINIC | Age: 76
End: 2021-11-05

## 2022-01-13 NOTE — PROGRESS NOTE ADULT - PROBLEM SELECTOR PROBLEM 3
Spoke to patient and informed her we do not have results of her bone density. Advised patient to have her ordering provider send us the results or she can get the results and give them to Dr. Tin Fontaine.   Elevated serum creatinine

## 2022-01-17 NOTE — ED ADULT NURSE NOTE - NS ED NURSE LEVEL OF CONSCIOUSNESS SPEECH
[facility] HOSPITALIST  History and Physical     Uri Ohara Patient Status:  Emergency    1976 MRN [id number]   Location [address] Attending Cony Ulrich DO   Hosp Day # 0 PCP None Pcp     Chief Complaint: SOB, body ache SpO2 97%   Breastfeeding No   BMI 37.59 kg/m²   General: No acute distress. Alert and oriented x 3. HEENT: Normocephalic atraumatic. Moist mucous membranes. EOM-I. PERRLA. Anicteric. Neck: No lymphadenopathy. No JVD. No carotid bruits.   Respiratory: Jocelynn -Suspect anxiety playing a component as well    #Presumed new DMII with steroid induced hyperglycemia  -Levemir/SSI    #Leukocytosis  -Presumed steroid induced  -Monitor     #Hyponatremia  -Monitor       Quality:  · DVT Prophylaxis: lovenox  · CODE statu Speaking Coherently

## 2022-02-25 NOTE — ED PROVIDER NOTE - CROS ED SKIN ALL NEG
The patient was referred to Rheumatology by the Oncologist in [place]. She called here for suggestions in the [place] area. I gave her [facility] Rheumatology and she was told they can not see her until December. They suggested that  send a referral and maybe that will help to get in sooner. negative...

## 2022-03-15 NOTE — DISCHARGE NOTE ADULT - FUNCTIONAL STATUS DATE
AMBULATORY CASE MANAGEMENT NOTE    Name and Relationship of Patient/Support Person: Reddy Castellano - Self     [facility] Interim Update    Pt called last night and lmtrc regarding his meds. Pt requested these meds on 3/2 but since there were dose discrepentecies, meds not filled yet. Many attempts to call pt over last week made with no success. Called three more times today, no answer, mailbox full. Also called son, no answer, mailbox full.    Care Coordination    Called [facility] pharmacy to verify doses of med:  Lipitor- pt requested 20 mg daily, 10mg last filled in Epic, [facility] stated they filled 20 mg in Dec 2020.  Norvasc- pt requested 10 mg daily, we last filled 20mg in [place].  [facility] filled 10 mg last in Dec 2020.  Vit E- hospital DC'd in Nov 2021    [facility] Interim Update    3/16: I finally reached pt. He said he slept late. I told him home health was trying to reach him and that they would see him tomorrow, not sure what time. Pt said he is on lasix, not on his list.I told Griselda with [facility] to tell nurse who sees pt tomorrow to call me and we will go over meds one by one together. Pt instructed to get every med he is on, is supposed to be on, and takes prn and put it on kitchen table so nurse can go through it tomorrow. He agreed.          Adult Patient Profile  Questions/Answers    Flowsheet Row Most Recent Value   Symptoms/Conditions Managed at Home neurological   Neurological Symptoms/Conditions dementia   Neurological Management Strategies medication therapy        Education Documentation  No documentation found.        BRANDON RAMOS  Ambulatory Case Management    3/15/2022, 09:20 EDT   11-Aug-2017

## 2022-04-08 NOTE — ED ADULT NURSE NOTE - CAS DISCH TRANSFER METHOD
[facility] at [facility] for rehab. No d/c plans at this time. I will continue to follow at this time upon discharge. Private car

## 2022-05-09 NOTE — PROGRESS NOTE ADULT - MINUTES
Detail Level: Detailed
Depth Of Biopsy: dermis
Was A Bandage Applied: Yes
Size Of Lesion In Cm: 0
Biopsy Type: H and E
Biopsy Method: Dermablade
Anesthesia Type: 1% lidocaine with epinephrine
Anesthesia Volume In Cc (Will Not Render If 0): 0.2
Hemostasis: Drysol
Wound Care: Bacitracin
Dressing: bandage
Destruction After The Procedure: No
Type Of Destruction Used: Electrodesiccation
Cryotherapy Text: The wound bed was treated with cryotherapy after the biopsy was performed.
Electrodesiccation Text: The wound bed was treated with electrodesiccation after the biopsy was performed.
Electrodesiccation And Curettage Text: The wound bed was treated with electrodesiccation and curettage after the biopsy was performed.
Silver Nitrate Text: The wound bed was treated with silver nitrate after the biopsy was performed.
Lab: [id number]
Lab Facility: [id number]
Consent: Written consent was obtained and risks were reviewed including but not limited to scarring, infection, bleeding, scabbing, incomplete removal, nerve damage and allergy to anesthesia.
Post-Care Instructions: I reviewed with the patient in detail post-care instructions. Patient is to keep the biopsy site dry overnight, and then apply bacitracin twice daily until healed. Patient may apply hydrogen peroxide soaks to remove any crusting.
32
Notification Instructions: Patient will be notified of biopsy results. However, patient instructed to call the office if not contacted within 2 weeks.
Billing Type: Third-Party Bill
Information: Selecting Yes will display possible errors in your note based on the variables you have selected. This validation is only offered as a suggestion for you. PLEASE NOTE THAT THE VALIDATION TEXT WILL BE REMOVED WHEN YOU FINALIZE YOUR NOTE. IF YOU WANT TO FAX A PRELIMINARY NOTE YOU WILL NEED TO TOGGLE THIS TO 'NO' IF YOU DO NOT WANT IT IN YOUR FAXED NOTE.

## 2022-05-24 NOTE — ED PROVIDER NOTE - TEMPLATE
Ventricular Assist Device (VAD)  Interrogation Form    This patientâs Ventricular Assist Device  (VAD) has been interrogated  The following items have been evaluated (check all that apply)    []  Pump speed has been adjusted  []  Pump rate had been adjusted  []  Pump percent systole has been adjusted  []  Pump drive pressure has been adjusted  []  Pump vacuum has been adjusted  [x]  Alarm history has been reviewed  []  Data has been downloaded from the device and sent to the device company  []  VAD has been turned off  [] Other: For Continuous Flow Devices: Please check:  []  Heartmate II   [x]  Heartmate 3   []   Heartware    []  Levitronix       Prior to adjustment After adjustment   Speed 5100 RPM  Low = 4700 RPM    Power 3.8 W    PI 7.6    HCT 44% 47%   Flow/Output 3.4 L/min      For Pulsatile Devices: Please Check: []  [employer]   []   Thoratec            Prior To Adjustment       After Adjustment   Rate     % Systole     Vacuum      Left Right Left  Right   Output       Fill Volumes       Drive Pressure         If someone other than the physician records these values, please enter name and date:       Name: Alee Crowell    Date: 5/24/2022    Time: 09:10    List any alarms and how situation was resolved: Patient had no alarms and averages 30-40 PI events daily. Flows and abad have been stable.     Physicianâs interpretation of situation: EENMT

## 2022-07-13 NOTE — DIETITIAN INITIAL EVALUATION ADULT. - EST PROTEIN NEEDS4
Patient was reschedule with Dr. Beard at [facility] on Aug 8, 2022 due to insurance purposes.    69.8

## 2022-08-22 NOTE — ED ADULT NURSE NOTE - OBJECTIVE STATEMENT
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  Patient reports:  Control has been satisfactory  Home blood sugar records: 120-160  Polyuria: No  Polydipsia: No  Polyphagia: No  Foot concerns: No  Hypoglycemia: No  Gastroparesis: No  Low-fat diet: Yes  Vision has been: great   Last eye exam was about 1 year ago   Advised Amado that he should have a dilated diabetic eye exam yearly.    Patient's barriers to care plan: No    Does the patient have the ability to self monitor/manage and comply to their treatment plan:Yes     Compliance to treatment plan: Yes    Medication adherence:  Patient reports adherence to dosing schedules Yes  Side effects of medication: No   Is patient taking Aspirin? Yes    He notes no other additional concerns at this time.      #HM  He is overdue for colonoscopy as he states he refuses to do any prep that involves drinking a large amount of liquid  Cannot tolerate it  Discussed that there may be other options and he would like to follow-up with GI to discuss this    Due for a COVID booster, had 5 days of flu like symptoms after his booster 11/2022      Past Medical History:   Diagnosis Date   • Diabetes mellitus (CMS/[facility])    • Hyperlipidemia            Current Outpatient Medications   Medication Sig Dispense Refill   • insulin glargine (Lantus SoloStar) 100 UNIT/ML pen-injector Inject 24 Units into the skin nightly. Prime 2 units before each dose. 15 mL 0   • metformin (GLUCOPHAGE) 1000 MG tablet Take 1 tablet by mouth 2 times daily (with meals). 90 tablet 3   • atorvastatin (LIPITOR) 20 MG tablet Take 1 tablet by mouth nightly. 90  tablet 3   • Insulin Pen Needle 32G X 4 MM Misc daily. 100 each 1   • blood glucose lancets One Daily 100 each 0   • blood glucose test strip Test blood sugar 1 time daily as directed. Diagnosis: Diabetes. Meter: 100 strip 5   • blood glucose meter Test blood sugar 1 times daily as directed. Diagnosis: Diabetes. Meter: 1 kit 0   • aspirin 81 MG chewable tablet Chew 1 tablet by mouth daily. Do not start before February 17, 2020. 30 tablet 0     No current facility-administered medications for this visit.          ALLERGIES:  No Known Allergies        Past Surgical History:   Procedure Laterality Date   • Colonoscopy  2008   • Hernia repair  1973   • Tonsillectomy and adenoidectomy              Family History   Problem Relation Age of Onset   • Diabetes Mother         NIDDM   • Myocardial Infarction Mother    • Heart disease Mother    • Diabetes Father         NIDDM   • Myocardial Infarction Father    • Cancer Father           Social History     Tobacco Use   • Smoking status: Never Smoker   • Smokeless tobacco: Never Used   Vaping Use   • Vaping Use: never used   Substance Use Topics   • Alcohol use: Yes     Comment: less than once a week   • Drug use: Never       #Health maintenance    Health Maintenance Due   Topic Date Due   • DTaP/Tdap/Td Vaccine (1 - Tdap) Never done   • Colorectal Cancer Screen-  Never done   • Shingles Vaccine (1 of 2) Never done   • Pneumococcal Vaccine 65+ (2 - PCV) 07/27/2021   • Medicare Advantage- Medicare Wellness Visit  Never done   • COVID-19 Vaccine (3 - Booster for Venkata series) 03/07/2022   • Influenza Vaccine (1) 09/01/2022   • Diabetes Eye Exam  10/21/2022                 Review of Systems   Constitutional: Negative for activity change, appetite change, chills, fatigue, fever and unexpected weight change.   HENT: Negative for congestion and rhinorrhea.    Respiratory: Negative for shortness of breath.    Cardiovascular: Negative for chest pain.   Gastrointestinal: Negative for  abdominal distention, abdominal pain, constipation, diarrhea, nausea and vomiting.   Musculoskeletal: Negative for arthralgias.   Skin: Negative for color change, pallor and rash.   Neurological: Negative for dizziness, light-headedness and headaches.   Psychiatric/Behavioral: Negative for agitation. The patient is not nervous/anxious.        OBJECTIVE  Vitals: /71 (BP Location: LUE - Left upper extremity, Patient Position: Sitting, Cuff Size: Regular)   Pulse 65   Ht 6' 1\" (1.854 m)   Wt 90.1 kg (198 lb 8.4 oz)   BMI 26.19 kg/m²   BSA 2.15 m²       Physical Exam  Vitals and nursing note reviewed.   Constitutional:       Appearance: He is well-developed.   HENT:      Head: Normocephalic and atraumatic.      Right Ear: External ear normal.      Left Ear: External ear normal.      Nose: Nose normal.      Neck: Normal range of motion.   Eyes:      Conjunctiva/sclera: Conjunctivae normal.   Cardiovascular:      Rate and Rhythm: Normal rate.   Pulmonary:      Effort: Pulmonary effort is normal.   Abdominal:      General: There is no distension.   Musculoskeletal:         General: Normal range of motion.   Skin:     General: Skin is warm and dry.   Neurological:      Mental Status: He is alert.         ASSESSMENT AND PLAN  Assessment   Problem List Items Addressed This Visit        Cardiac and Vasculature    Benign hypertension     Normotensive in the office today  Continue present management         Mixed hyperlipidemia     Continue atorvastatin            Endocrine and Metabolic    Diabetes mellitus treated with insulin (CMS/[facility]) - Primary     Monitor: The problem is unchanged.  Evaluation: Labs/tests ordered, see encounter summary.  Assessment/Treatment:  Continue current treatment/monitoring regimen.  Continue regular yearly eye exams  Referral to podiatry         Relevant Medications    insulin glargine (Lantus SoloStar) 100 UNIT/ML pen-injector    Other Relevant Orders    GLYCOHEMOGLOBIN (Completed)     SERVICE TO PODIATRY       Gastrointestinal and Abdominal    Screening for colon cancer     Referral to GI to discuss alternative prep given did poorly with large amount of fluid last time         Relevant Orders    SERVICE TO GASTROENTEROLOGY      Other Visit Diagnoses     Need for hepatitis C screening test        Relevant Orders    HEPATITIS C ANTIBODY WITH REFLEX (Completed)              Follow up PRN      Discussed treatment options, plan with patient and all questions answered. Patient verbalized understanding and agreement with plan.     The patient was instructed to call if any questions or problems about concerns addressed today.        Tuan Reed MD  Family Medicine      This communication was dicatated with Dragon assisted speech recognition. If there are any typographical errors or ambiguities in the dictation please contact the provider for further information or correction.         76 year old male presents to the ED complaining of wrist pain. Patient reports left wrist pain and swelling and redness for a few days. Patient was seen in ED 3 days ago and had x-rays (negative) but symptoms persist. Patient has been taking tylenol and advil for pain with relief. Patient denies injury, fall, or obvious trauma. No wounds noted. + range of motion noted with tenderness. Patient reports otherwise he is in his usual state of health, denies recent illness, fever/chills.

## 2022-09-28 NOTE — PATIENT PROFILE ADULT. - NSSUBSTANCEUSE_GEN_ALL_CORE_SD
never used Mustarde Flap Text: The defect edges were debeveled with a #15 scalpel blade.  Given the size, depth and location of the defect and the proximity to free margins a Mustarde flap was deemed most appropriate.  Using a sterile surgical marker, an appropriate flap was drawn incorporating the defect. The area thus outlined was incised with a #15 scalpel blade.  The skin margins were undermined to an appropriate distance in all directions utilizing iris scissors.

## 2022-09-29 NOTE — DIETITIAN INITIAL EVALUATION ADULT. - PROBLEM SELECTOR PLAN 8
Patient with AICD/PPM Simponi Counseling:  I discussed with the patient the risks of golimumab including but not limited to myelosuppression, immunosuppression, autoimmune hepatitis, demyelinating diseases, lymphoma, and serious infections.  The patient understands that monitoring is required including a PPD at baseline and must alert us or the primary physician if symptoms of infection or other concerning signs are noted.

## 2022-11-07 NOTE — PATIENT PROFILE ADULT. - AS SC BRADEN FRICTION
Tylenol and motrin as needed for pain./Take over the counter pain medication
(3) no apparent problem

## 2022-11-17 NOTE — ED ADULT NURSE NOTE - PAIN: PRESENCE, MLM
Body Location Override (Optional - Billing Will Still Be Based On Selected Body Map Location If Applicable): central chin Patient Name (Optional- Will Render 'the Patient' If Blank): Berry Restrepo Mohs Case Number: [id number] Date Of Previous Biopsy (Optional): 09/30/2022 Biopsy Photograph Reviewed: Yes Consent Type: Consent 1 (Standard) Eye Shield Used: No Surgeon Performing Repair (Optional): Reva Marie MD Initial Size Of Lesion: 1.5 X Size Of Lesion In Cm (Optional): 1.2 Number Of Stages: 2 Primary Defect Length In Cm (Final Defect Size - Required For Flaps/Grafts): 2.3 Repair Type: Referred to [facility] for closure Oculoplastic Surgeon Procedure Text (A): After obtaining clear surgical margins the patient was sent to oculoplastics for surgical repair. The patient understands they will receive post-surgical care and follow-up from the referring physician's office. Oculoplastic Surgeon Procedure Text (B): After obtaining clear surgical margins the patient was sent to oculoplastics for surgical repair.  The patient understands they will receive post-surgical care and follow-up from the referring physician's office. Otolaryngologist Procedure Text (A): After obtaining clear surgical margins the patient was sent to otolaryngology for surgical repair. The patient understands they will receive post-surgical care and follow-up from the referring physician's office. Otolaryngologist Procedure Text (B): After obtaining clear surgical margins the patient was sent to otolaryngology for surgical repair.  The patient understands they will receive post-surgical care and follow-up from the referring physician's office. Plastic Surgeon Procedure Text (A): After obtaining clear surgical margins the patient was sent to plastics for surgical repair. The patient understands they will receive post-surgical care and follow-up from the referring physician's office. Plastic Surgeon Procedure Text (B): After obtaining clear surgical margins the patient was sent to plastics for surgical repair.  The patient understands they will receive post-surgical care and follow-up from the referring physician's office. Mid-Level Procedure Text (A): After obtaining clear surgical margins the patient was sent to a mid-level provider for surgical repair. The patient understands they will receive post-surgical care and follow-up from the mid-level provider. Mid-Level Procedure Text (B): After obtaining clear surgical margins the patient was sent to a mid-level provider for surgical repair.  The patient understands they will receive post-surgical care and follow-up from the mid-level provider. Provider Procedure Text (A): After obtaining clear surgical margins the defect was repaired by another provider. Asc (A): Foreign Asc Procedure Text (A): After obtaining clear surgical margins the patient was sent to an [facility] for surgical repair. The patient understands they will receive post-surgical care and follow-up from the [facility] physician. Asc Procedure Text (B): After obtaining clear surgical margins the patient was sent to an [facility] for surgical repair.  The patient understands they will receive post-surgical care and follow-up from the ASC physician. Simple / Intermediate / Complex Repair - Final Wound Length In Cm: 0 Suturegard Retention Suture: 2-0 Nylon Retention Suture Bite Size: 3 mm Length To Time In Minutes Device Was In Place: 10 Number Of Hemigard Strips Per Side: 1 Undermining Type: Entire Wound Debridement Text: The wound edges were debrided prior to proceeding with the closure to facilitate wound healing. Helical Rim Text: The closure involved the helical rim. Vermilion Border Text: The closure involved the vermilion border. Nostril Rim Text: The closure involved the nostril rim. Retention Suture Text: Retention sutures were placed to support the closure and prevent dehiscence. Area H Indication Text: Tumors in this location are included in Area H (eyelids, eyebrows, nose, lips, chin, ear, pre-auricular, post-auricular, temple, genitalia, hands, feet, ankles and areola). Tissue conservation is critical in these anatomic locations. Area M Indication Text: Tumors in this location are included in Area M (cheek, forehead, scalp, neck, jawline and pretibial skin). Mohs surgery is indicated for tumors in these anatomic locations. Area L Indication Text: Tumors in this location are included in Area L (trunk and extremities). Mohs surgery is indicated for larger tumors, or tumors with aggressive histologic features, in these anatomic locations. Depth Of Tumor Invasion (For Histology): dermis Perineural Invasion (For Histology - Be Specific If Possible): absent Special Stains Stage 1 - Results: Base On Clearance Noted Above Stage 2: Additional Anesthesia Type: 1% lidocaine with epinephrine Surgical Defect Width In Cm (Optional): 2.0 Staging Info: By selecting yes to the question above you will include information on AJCC 8 tumor staging in your Mohs note. Information on tumor staging will be automatically added for SCCs on the head and neck. AJCC 8 includes tumor size, tumor depth, perineural involvement and bone invasion. Tumor Depth: Less than 6mm from granular layer and no invasion beyond the subcutaneous fat Was The Patient On Physician Recommended Anticoagulation Therapy?: Please Select the Appropriate Response Medical Necessity Statement: Based on my medical judgement, Mohs surgery is the most appropriate treatment for this cancer compared to other treatments. Alternatives Discussed Intro (Do Not Add Period): I discussed alternative treatments to Mohs surgery and specifically discussed the risks and benefits of Consent 1/Introductory Paragraph: The rationale for Mohs was explained to the patient and consent was obtained. The risks, benefits and alternatives to therapy were discussed in detail. Specifically, the risks of infection, scarring, bleeding, prolonged wound healing, incomplete removal, allergy to anesthesia, nerve injury and recurrence were addressed. Prior to the procedure, the treatment site was clearly identified and confirmed by the patient. All components of Universal Protocol/PAUSE Rule completed. Consent 2/Introductory Paragraph: Mohs surgery was explained to the patient and consent was obtained. The risks, benefits and alternatives to therapy were discussed in detail. Specifically, the risks of infection, scarring, bleeding, prolonged wound healing, incomplete removal, allergy to anesthesia, nerve injury and recurrence were addressed. Prior to the procedure, the treatment site was clearly identified and confirmed by the patient. All components of Universal Protocol/PAUSE Rule completed. Consent 3/Introductory Paragraph: I gave the patient a chance to ask questions they had about the procedure. Following this I explained the Mohs procedure and consent was obtained. The risks, benefits and alternatives to therapy were discussed in detail. Specifically, the risks of infection, scarring, bleeding, prolonged wound healing, incomplete removal, allergy to anesthesia, nerve injury and recurrence were addressed. Prior to the procedure, the treatment site was clearly identified and confirmed by the patient. All components of Universal Protocol/PAUSE Rule completed. Consent (Temporal Branch)/Introductory Paragraph: The rationale for Mohs was explained to the patient and consent was obtained. The risks, benefits and alternatives to therapy were discussed in detail. Specifically, the risks of damage to the temporal branch of the facial nerve, infection, scarring, bleeding, prolonged wound healing, incomplete removal, allergy to anesthesia, and recurrence were addressed. Prior to the procedure, the treatment site was clearly identified and confirmed by the patient. All components of Universal Protocol/PAUSE Rule completed. complains of pain/discomfort Consent (Marginal Mandibular)/Introductory Paragraph: The rationale for Mohs was explained to the patient and consent was obtained. The risks, benefits and alternatives to therapy were discussed in detail. Specifically, the risks of damage to the marginal mandibular branch of the facial nerve, infection, scarring, bleeding, prolonged wound healing, incomplete removal, allergy to anesthesia, and recurrence were addressed. Prior to the procedure, the treatment site was clearly identified and confirmed by the patient. All components of Universal Protocol/PAUSE Rule completed. Consent (Spinal Accessory)/Introductory Paragraph: The rationale for Mohs was explained to the patient and consent was obtained. The risks, benefits and alternatives to therapy were discussed in detail. Specifically, the risks of damage to the spinal accessory nerve, infection, scarring, bleeding, prolonged wound healing, incomplete removal, allergy to anesthesia, and recurrence were addressed. Prior to the procedure, the treatment site was clearly identified and confirmed by the patient. All components of Universal Protocol/PAUSE Rule completed. Consent (Near Eyelid Margin)/Introductory Paragraph: The rationale for Mohs was explained to the patient and consent was obtained. The risks, benefits and alternatives to therapy were discussed in detail. Specifically, the risks of ectropion or eyelid deformity, infection, scarring, bleeding, prolonged wound healing, incomplete removal, allergy to anesthesia, nerve injury and recurrence were addressed. Prior to the procedure, the treatment site was clearly identified and confirmed by the patient. All components of Universal Protocol/PAUSE Rule completed. Consent (Ear)/Introductory Paragraph: The rationale for Mohs was explained to the patient and consent was obtained. The risks, benefits and alternatives to therapy were discussed in detail. Specifically, the risks of ear deformity, infection, scarring, bleeding, prolonged wound healing, incomplete removal, allergy to anesthesia, nerve injury and recurrence were addressed. Prior to the procedure, the treatment site was clearly identified and confirmed by the patient. All components of Universal Protocol/PAUSE Rule completed. Consent (Nose)/Introductory Paragraph: The rationale for Mohs was explained to the patient and consent was obtained. The risks, benefits and alternatives to therapy were discussed in detail. Specifically, the risks of nasal deformity, changes in the flow of air through the nose, infection, scarring, bleeding, prolonged wound healing, incomplete removal, allergy to anesthesia, nerve injury and recurrence were addressed. Prior to the procedure, the treatment site was clearly identified and confirmed by the patient. All components of Universal Protocol/PAUSE Rule completed. Consent (Lip)/Introductory Paragraph: The rationale for Mohs was explained to the patient and consent was obtained. The risks, benefits and alternatives to therapy were discussed in detail. Specifically, the risks of lip deformity, changes in the oral aperture, infection, scarring, bleeding, prolonged wound healing, incomplete removal, allergy to anesthesia, nerve injury and recurrence were addressed. Prior to the procedure, the treatment site was clearly identified and confirmed by the patient. All components of Universal Protocol/PAUSE Rule completed. Consent (Scalp)/Introductory Paragraph: The rationale for Mohs was explained to the patient and consent was obtained. The risks, benefits and alternatives to therapy were discussed in detail. Specifically, the risks of changes in hair growth pattern secondary to repair, infection, scarring, bleeding, prolonged wound healing, incomplete removal, allergy to anesthesia, nerve injury and recurrence were addressed. Prior to the procedure, the treatment site was clearly identified and confirmed by the patient. All components of Universal Protocol/PAUSE Rule completed. Detail Level: Detailed Postop Diagnosis: same Surgeon: Suni Rosa M.D Anesthesia Volume In Cc: 6 Hemostasis: Electrocautery Estimated Blood Loss (Cc): minimal Brow Lift Text: A midfrontal incision was made medially to the defect to allow access to the tissues just superior to the left eyebrow. Following careful dissection inferiorly in a supraperiosteal plane to the level of the left eyebrow, several 3-0 monocryl sutures were used to resuspend the eyebrow orbicularis oculi muscular unit to the superior frontal bone periosteum. This resulted in an appropriate reapproximation of static eyebrow symmetry and correction of the left brow ptosis. Deep Sutures: 4-0 Polysorb Epidermal Sutures: 5-0 Nylon Epidermal Closure: running Suturegard Intro: Intraoperative tissue expansion was performed, utilizing the SUTUREGARD device, in order to reduce wound tension. Suturegard Body: The suture ends were repeatedly re-tightened and re-clamped to achieve the desired tissue expansion. Hemigard Intro: Due to skin fragility and wound tension, it was decided to use HEMIGARD adhesive retention suture devices to permit a linear closure. The skin was cleaned and dried for a 6cm distance away from the wound. Excessive hair, if present, was removed to allow for adhesion. Hemigard Postcare Instructions: The HEMIGARD strips are to remain completely dry for at least 5-7 days. Donor Site Anesthesia Type: same as repair anesthesia Epidermal Closure Graft Donor Site (Optional): simple interrupted Graft Donor Site Bandage (Optional-Leave Blank If You Don't Want In Note): Steri-strips and a pressure bandage were applied to the donor site. Closure 2 Information: This tab is for additional flaps and grafts, including complex repair and grafts and complex repair and flaps. You can also specify a different location for the additional defect, if the location is the same you do not need to select a new one. We will insert the automated text for the repair you select below just as we do for solitary flaps and grafts. Please note that at this time if you select a location with a different insurance zone you will need to override the ICD10 and CPT if appropriate. Closure 3 Information: This tab is for additional flaps and grafts above and beyond our usual structured repairs. Please note if you enter information here it will not currently bill and you will need to add the billing information manually. Closure 4 Information: This tab is for additional flaps and grafts above and beyond our usual structured repairs.  Please note if you enter information here it will not currently bill and you will need to add the billing information manually. Wound Care: Bacitracin Dressing: telfa dressing Wound Care (No Sutures): Petrolatum Dressing (No Sutures): dry sterile dressing Suture Removal: 14 days Unna Boot Text: An Unna boot was placed to help immobilize the limb and facilitate more rapid healing. Home Suture Removal Text: Patient was provided instructions on removing sutures and will remove their sutures at home. If they have any questions or difficulties they will call the office. Post-Care Instructions: I reviewed with the patient in detail post-care instructions. Patient is not to engage in any heavy lifting, exercise, or swimming for the next 7 days. Should the patient develop any fevers, chills, bleeding, severe pain patient will contact the office immediately. Pain Refusal Text: I offered to prescribe pain medication but the patient refused to take this medication. Mauc Instructions: By selecting yes to the question below the [facility] number will be added into the note. This will be calculated automatically based on the diagnosis chosen, the size entered, the body zone selected (H,M,L) and the specific indications you chose. You will also have the option to override the Mohs AUC if you disagree with the automatically calculated number and this option is found in the Case Summary tab. Where Do You Want The Question To Include Opioid Counseling Located?: Case Summary Tab Eye Protection Verbiage: Before proceeding with the stage, a plastic scleral shield was inserted. The globe was anesthetized with a few drops of 1% lidocaine with 1:100,000 epinephrine. Then, an appropriate sized scleral shield was chosen and coated with lacrilube ointment. The shield was gently inserted and left in place for the duration of each stage. After the stage was completed, the shield was gently removed. Mohs Method Verbiage: An incision at a 45 degree angle following the standard Mohs approach was done and the specimen was harvested as a microscopic controlled layer. Surgeon/Pathologist Verbiage (Will Incorporate Name Of Surgeon From Intro If Not Blank): operated in two distinct and integrated capacities as the surgeon and pathologist. Mohs Histo Method Verbiage: Each section was then chromacoded and processed in the Mohs lab using the Mohs protocol and submitted for frozen section. Subsequent Stages Histo Method Verbiage: Using a similar technique to that described above, a thin layer of tissue was removed from all areas where tumor was visible on the previous stage. The tissue was again oriented, mapped, dyed, and processed as above. Mohs Rapid Report Verbiage: The area of clinically evident tumor was marked with skin marking ink and appropriately hatched. The initial incision was made following the Mohs approach through the skin. The specimen was taken to the lab, divided into the necessary number of pieces, chromacoded and processed according to the Mohs protocol. This was repeated in successive stages until a tumor free defect was achieved. Complex Repair Preamble Text (Leave Blank If You Do Not Want): Extensive wide undermining was performed. Intermediate Repair Preamble Text (Leave Blank If You Do Not Want): Undermining was performed with blunt dissection. Non-Graft Cartilage Fenestration Text: The cartilage was fenestrated with a 2mm punch biopsy to help facilitate healing. Graft Cartilage Fenestration Text: The cartilage was fenestrated with a 2mm punch biopsy to help facilitate graft survival and healing. Secondary Intention Text (Leave Blank If You Do Not Want): The defect will heal with secondary intention. No Repair - Repaired With Adjacent Surgical Defect Text (Leave Blank If You Do Not Want): After obtaining clear surgical margins the defect was repaired concurrently with another surgical defect which was in close approximation. Adjacent Tissue Transfer Text: The defect edges were debeveled with a #15 scalpel blade. Given the location of the defect and the proximity to free margins an adjacent tissue transfer was deemed most appropriate. Using a sterile surgical marker, an appropriate flap was drawn incorporating the defect and placing the expected incisions within the relaxed skin tension lines where possible. The area thus outlined was incised deep to adipose tissue with a #15 scalpel blade. The skin margins were undermined to an appropriate distance in all directions utilizing iris scissors. Advancement Flap (Single) Text: The defect edges were debeveled with a #15 scalpel blade. Given the location of the defect and the proximity to free margins a single advancement flap was deemed most appropriate. Using a sterile surgical marker, an appropriate advancement flap was drawn incorporating the defect and placing the expected incisions within the relaxed skin tension lines where possible. The area thus outlined was incised deep to adipose tissue with a #15 scalpel blade. The skin margins were undermined to an appropriate distance in all directions utilizing iris scissors. Advancement Flap (Double) Text: The defect edges were debeveled with a #15 scalpel blade. Given the location of the defect and the proximity to free margins a double advancement flap was deemed most appropriate. Using a sterile surgical marker, the appropriate advancement flaps were drawn incorporating the defect and placing the expected incisions within the relaxed skin tension lines where possible. The area thus outlined was incised deep to adipose tissue with a #15 scalpel blade. The skin margins were undermined to an appropriate distance in all directions utilizing iris scissors. Burow's Advancement Flap Text: The defect edges were debeveled with a #15 scalpel blade. Given the location of the defect and the proximity to free margins a Burow's advancement flap was deemed most appropriate. Using a sterile surgical marker, the appropriate advancement flap was drawn incorporating the defect and placing the expected incisions within the relaxed skin tension lines where possible. The area thus outlined was incised deep to adipose tissue with a #15 scalpel blade. The skin margins were undermined to an appropriate distance in all directions utilizing iris scissors. Chonodrocutaneous Helical Advancement Flap Text: The defect edges were debeveled with a #15 scalpel blade. Given the location of the defect and the proximity to free margins a chondrocutaneous helical advancement flap was deemed most appropriate. Using a sterile surgical marker, the appropriate advancement flap was drawn incorporating the defect and placing the expected incisions within the relaxed skin tension lines where possible. The area thus outlined was incised deep to adipose tissue with a #15 scalpel blade. The skin margins were undermined to an appropriate distance in all directions utilizing iris scissors. Crescentic Advancement Flap Text: The defect edges were debeveled with a #15 scalpel blade. Given the location of the defect and the proximity to free margins a crescentic advancement flap was deemed most appropriate. Using a sterile surgical marker, the appropriate advancement flap was drawn incorporating the defect and placing the expected incisions within the relaxed skin tension lines where possible. The area thus outlined was incised deep to adipose tissue with a #15 scalpel blade. The skin margins were undermined to an appropriate distance in all directions utilizing iris scissors. A-T Advancement Flap Text: The defect edges were debeveled with a #15 scalpel blade. Given the location of the defect, shape of the defect and the proximity to free margins an A-T advancement flap was deemed most appropriate. Using a sterile surgical marker, an appropriate advancement flap was drawn incorporating the defect and placing the expected incisions within the relaxed skin tension lines where possible. The area thus outlined was incised deep to adipose tissue with a #15 scalpel blade. The skin margins were undermined to an appropriate distance in all directions utilizing iris scissors. O-T Advancement Flap Text: The defect edges were debeveled with a #15 scalpel blade. Given the location of the defect, shape of the defect and the proximity to free margins an O-T advancement flap was deemed most appropriate. Using a sterile surgical marker, an appropriate advancement flap was drawn incorporating the defect and placing the expected incisions within the relaxed skin tension lines where possible. The area thus outlined was incised deep to adipose tissue with a #15 scalpel blade. The skin margins were undermined to an appropriate distance in all directions utilizing iris scissors. O-L Flap Text: The defect edges were debeveled with a #15 scalpel blade. Given the location of the defect, shape of the defect and the proximity to free margins an O-L flap was deemed most appropriate. Using a sterile surgical marker, an appropriate advancement flap was drawn incorporating the defect and placing the expected incisions within the relaxed skin tension lines where possible. The area thus outlined was incised deep to adipose tissue with a #15 scalpel blade. The skin margins were undermined to an appropriate distance in all directions utilizing iris scissors. O-Z Flap Text: The defect edges were debeveled with a #15 scalpel blade. Given the location of the defect, shape of the defect and the proximity to free margins an O-Z flap was deemed most appropriate. Using a sterile surgical marker, an appropriate transposition flap was drawn incorporating the defect and placing the expected incisions within the relaxed skin tension lines where possible. The area thus outlined was incised deep to adipose tissue with a #15 scalpel blade. The skin margins were undermined to an appropriate distance in all directions utilizing iris scissors. Double O-Z Flap Text: The defect edges were debeveled with a #15 scalpel blade. Given the location of the defect, shape of the defect and the proximity to free margins a Double O-Z flap was deemed most appropriate. Using a sterile surgical marker, an appropriate transposition flap was drawn incorporating the defect and placing the expected incisions within the relaxed skin tension lines where possible. The area thus outlined was incised deep to adipose tissue with a #15 scalpel blade. The skin margins were undermined to an appropriate distance in all directions utilizing iris scissors. V-Y Flap Text: The defect edges were debeveled with a #15 scalpel blade. Given the location of the defect, shape of the defect and the proximity to free margins a V-Y flap was deemed most appropriate. Using a sterile surgical marker, an appropriate advancement flap was drawn incorporating the defect and placing the expected incisions within the relaxed skin tension lines where possible. The area thus outlined was incised deep to adipose tissue with a #15 scalpel blade. The skin margins were undermined to an appropriate distance in all directions utilizing iris scissors. Advancement-Rotation Flap Text: The defect edges were debeveled with a #15 scalpel blade. Given the location of the defect, shape of the defect and the proximity to free margins an advancement-rotation flap was deemed most appropriate. Using a sterile surgical marker, an appropriate flap was drawn incorporating the defect and placing the expected incisions within the relaxed skin tension lines where possible. The area thus outlined was incised deep to adipose tissue with a #15 scalpel blade. The skin margins were undermined to an appropriate distance in all directions utilizing iris scissors. Mercedes Flap Text: The defect edges were debeveled with a #15 scalpel blade. Given the location of the defect, shape of the defect and the proximity to free margins a Mercedes flap was deemed most appropriate. Using a sterile surgical marker, an appropriate advancement flap was drawn incorporating the defect and placing the expected incisions within the relaxed skin tension lines where possible. The area thus outlined was incised deep to adipose tissue with a #15 scalpel blade. The skin margins were undermined to an appropriate distance in all directions utilizing iris scissors. Modified Advancement Flap Text: The defect edges were debeveled with a #15 scalpel blade. Given the location of the defect, shape of the defect and the proximity to free margins a modified advancement flap was deemed most appropriate. Using a sterile surgical marker, an appropriate advancement flap was drawn incorporating the defect and placing the expected incisions within the relaxed skin tension lines where possible. The area thus outlined was incised deep to adipose tissue with a #15 scalpel blade. The skin margins were undermined to an appropriate distance in all directions utilizing iris scissors. Mucosal Advancement Flap Text: Given the location of the defect, shape of the defect and the proximity to free margins a mucosal advancement flap was deemed most appropriate. Incisions were made with a 15 blade scalpel in the appropriate fashion along the cutaneous vermilion border and the mucosal lip. The remaining actinically damaged mucosal tissue was excised. The mucosal advancement flap was then elevated to the gingival sulcus with care taken to preserve the neurovascular structures and advanced into the primary defect. Care was taken to ensure that precise realignment of the vermilion border was achieved. Peng Advancement Flap Text: The defect edges were debeveled with a #15 scalpel blade. Given the location of the defect, shape of the defect and the proximity to free margins a Peng advancement flap was deemed most appropriate. Using a sterile surgical marker, an appropriate advancement flap was drawn incorporating the defect and placing the expected incisions within the relaxed skin tension lines where possible. The area thus outlined was incised deep to adipose tissue with a #15 scalpel blade. The skin margins were undermined to an appropriate distance in all directions utilizing iris scissors. Hatchet Flap Text: The defect edges were debeveled with a #15 scalpel blade. Given the location of the defect, shape of the defect and the proximity to free margins a hatchet flap was deemed most appropriate. Using a sterile surgical marker, an appropriate hatchet flap was drawn incorporating the defect and placing the expected incisions within the relaxed skin tension lines where possible. The area thus outlined was incised deep to adipose tissue with a #15 scalpel blade. The skin margins were undermined to an appropriate distance in all directions utilizing iris scissors. Rotation Flap Text: The defect edges were debeveled with a #15 scalpel blade. Given the location of the defect, shape of the defect and the proximity to free margins a rotation flap was deemed most appropriate. Using a sterile surgical marker, an appropriate rotation flap was drawn incorporating the defect and placing the expected incisions within the relaxed skin tension lines where possible. The area thus outlined was incised deep to adipose tissue with a #15 scalpel blade. The skin margins were undermined to an appropriate distance in all directions utilizing iris scissors. Spiral Flap Text: The defect edges were debeveled with a #15 scalpel blade. Given the location of the defect, shape of the defect and the proximity to free margins a spiral flap was deemed most appropriate. Using a sterile surgical marker, an appropriate rotation flap was drawn incorporating the defect and placing the expected incisions within the relaxed skin tension lines where possible. The area thus outlined was incised deep to adipose tissue with a #15 scalpel blade. The skin margins were undermined to an appropriate distance in all directions utilizing iris scissors. Staged Advancement Flap Text: The defect edges were debeveled with a #15 scalpel blade. Given the location of the defect, shape of the defect and the proximity to free margins a staged advancement flap was deemed most appropriate. Using a sterile surgical marker, an appropriate advancement flap was drawn incorporating the defect and placing the expected incisions within the relaxed skin tension lines where possible. The area thus outlined was incised deep to adipose tissue with a #15 scalpel blade. The skin margins were undermined to an appropriate distance in all directions utilizing iris scissors. Star Wedge Flap Text: The defect edges were debeveled with a #15 scalpel blade. Given the location of the defect, shape of the defect and the proximity to free margins a star wedge flap was deemed most appropriate. Using a sterile surgical marker, an appropriate rotation flap was drawn incorporating the defect and placing the expected incisions within the relaxed skin tension lines where possible. The area thus outlined was incised deep to adipose tissue with a #15 scalpel blade. The skin margins were undermined to an appropriate distance in all directions utilizing iris scissors. Transposition Flap Text: The defect edges were debeveled with a #15 scalpel blade. Given the location of the defect and the proximity to free margins a transposition flap was deemed most appropriate. Using a sterile surgical marker, an appropriate transposition flap was drawn incorporating the defect. The area thus outlined was incised deep to adipose tissue with a #15 scalpel blade. The skin margins were undermined to an appropriate distance in all directions utilizing iris scissors. Muscle Hinge Flap Text: The defect edges were debeveled with a #15 scalpel blade. Given the size, depth and location of the defect and the proximity to free margins a muscle hinge flap was deemed most appropriate. Using a sterile surgical marker, an appropriate hinge flap was drawn incorporating the defect. The area thus outlined was incised with a #15 scalpel blade. The skin margins were undermined to an appropriate distance in all directions utilizing iris scissors. Mustarde Flap Text: The defect edges were debeveled with a #15 scalpel blade. Given the size, depth and location of the defect and the proximity to free margins a Mustarde flap was deemed most appropriate. Using a sterile surgical marker, an appropriate flap was drawn incorporating the defect. The area thus outlined was incised with a #15 scalpel blade. The skin margins were undermined to an appropriate distance in all directions utilizing iris scissors. Nasal Turnover Hinge Flap Text: The defect edges were debeveled with a #15 scalpel blade. Given the size, depth, location of the defect and the defect being full thickness a nasal turnover hinge flap was deemed most appropriate. Using a sterile surgical marker, an appropriate hinge flap was drawn incorporating the defect. The area thus outlined was incised with a #15 scalpel blade. The flap was designed to recreate the nasal mucosal lining and the alar rim. The skin margins were undermined to an appropriate distance in all directions utilizing iris scissors. Nasalis-Muscle-Based Myocutaneous Island Pedicle Flap Text: Using a #15 blade, an incision was made around the donor flap to the level of the nasalis muscle. Wide lateral undermining was then performed in both the subcutaneous plane above the nasalis muscle, and in a submuscular plane just above periosteum. This allowed the formation of a free nasalis muscle axial pedicle (based on the angular artery) which was still attached to the actual cutaneous flap, increasing its mobility and vascular viability. Hemostasis was obtained with pinpoint electrocoagulation. The flap was mobilized into position and the pivotal anchor points positioned and stabilized with buried interrupted sutures. Subcutaneous and dermal tissues were closed in a multilayered fashion with sutures. Tissue redundancies were excised, and the epidermal edges were apposed without significant tension and sutured with sutures. Orbicularis Oris Muscle Flap Text: The defect edges were debeveled with a #15 scalpel blade. Given that the defect affected the competency of the oral sphincter an orbicularis oris muscle flap was deemed most appropriate to restore this competency and normal muscle function. Using a sterile surgical marker, an appropriate flap was drawn incorporating the defect. The area thus outlined was incised with a #15 scalpel blade. Melolabial Transposition Flap Text: The defect edges were debeveled with a #15 scalpel blade. Given the location of the defect and the proximity to free margins a melolabial flap was deemed most appropriate. Using a sterile surgical marker, an appropriate melolabial transposition flap was drawn incorporating the defect. The area thus outlined was incised deep to adipose tissue with a #15 scalpel blade. The skin margins were undermined to an appropriate distance in all directions utilizing iris scissors. Rhombic Flap Text: The defect edges were debeveled with a #15 scalpel blade. Given the location of the defect and the proximity to free margins a rhombic flap was deemed most appropriate. Using a sterile surgical marker, an appropriate rhombic flap was drawn incorporating the defect. The area thus outlined was incised deep to adipose tissue with a #15 scalpel blade. The skin margins were undermined to an appropriate distance in all directions utilizing iris scissors. Rhomboid Transposition Flap Text: The defect edges were debeveled with a #15 scalpel blade. Given the location of the defect and the proximity to free margins a rhomboid transposition flap was deemed most appropriate. Using a sterile surgical marker, an appropriate rhomboid flap was drawn incorporating the defect. The area thus outlined was incised deep to adipose tissue with a #15 scalpel blade. The skin margins were undermined to an appropriate distance in all directions utilizing iris scissors. Bi-Rhombic Flap Text: The defect edges were debeveled with a #15 scalpel blade. Given the location of the defect and the proximity to free margins a bi-rhombic flap was deemed most appropriate. Using a sterile surgical marker, an appropriate rhombic flap was drawn incorporating the defect. The area thus outlined was incised deep to adipose tissue with a #15 scalpel blade. The skin margins were undermined to an appropriate distance in all directions utilizing iris scissors. Helical Rim Advancement Flap Text: The defect edges were debeveled with a #15 blade scalpel. Given the location of the defect and the proximity to free margins (helical rim) a double helical rim advancement flap was deemed most appropriate. Using a sterile surgical marker, the appropriate advancement flaps were drawn incorporating the defect and placing the expected incisions between the helical rim and antihelix where possible. The area thus outlined was incised through and through with a #15 scalpel blade. With a skin hook and iris scissors, the flaps were gently and sharply undermined and freed up. Bilateral Helical Rim Advancement Flap Text: The defect edges were debeveled with a #15 blade scalpel. Given the location of the defect and the proximity to free margins (helical rim) a bilateral helical rim advancement flap was deemed most appropriate. Using a sterile surgical marker, the appropriate advancement flaps were drawn incorporating the defect and placing the expected incisions between the helical rim and antihelix where possible. The area thus outlined was incised through and through with a #15 scalpel blade. With a skin hook and iris scissors, the flaps were gently and sharply undermined and freed up. Ear Star Wedge Flap Text: The defect edges were debeveled with a #15 blade scalpel. Given the location of the defect and the proximity to free margins (helical rim) an ear star wedge flap was deemed most appropriate. Using a sterile surgical marker, the appropriate flap was drawn incorporating the defect and placing the expected incisions between the helical rim and antihelix where possible. The area thus outlined was incised through and through with a #15 scalpel blade. Banner Transposition Flap Text: The defect edges were debeveled with a #15 scalpel blade. Given the location of the defect and the proximity to free margins a Banner transposition flap was deemed most appropriate. Using a sterile surgical marker, an appropriate flap drawn around the defect. The area thus outlined was incised deep to adipose tissue with a #15 scalpel blade. The skin margins were undermined to an appropriate distance in all directions utilizing iris scissors. Bilobed Flap Text: The defect edges were debeveled with a #15 scalpel blade. Given the location of the defect and the proximity to free margins a bilobe flap was deemed most appropriate. Using a sterile surgical marker, an appropriate bilobe flap drawn around the defect. The area thus outlined was incised deep to adipose tissue with a #15 scalpel blade. The skin margins were undermined to an appropriate distance in all directions utilizing iris scissors. Bilobed Transposition Flap Text: The defect edges were debeveled with a #15 scalpel blade. Given the location of the defect and the proximity to free margins a bilobed transposition flap was deemed most appropriate. Using a sterile surgical marker, an appropriate bilobe flap drawn around the defect. The area thus outlined was incised deep to adipose tissue with a #15 scalpel blade. The skin margins were undermined to an appropriate distance in all directions utilizing iris scissors. Trilobed Flap Text: The defect edges were debeveled with a #15 scalpel blade. Given the location of the defect and the proximity to free margins a trilobed flap was deemed most appropriate. Using a sterile surgical marker, an appropriate trilobed flap drawn around the defect. The area thus outlined was incised deep to adipose tissue with a #15 scalpel blade. The skin margins were undermined to an appropriate distance in all directions utilizing iris scissors. Dorsal Nasal Flap Text: The defect edges were debeveled with a #15 scalpel blade. Given the location of the defect and the proximity to free margins a dorsal nasal flap was deemed most appropriate. Using a sterile surgical marker, an appropriate dorsal nasal flap was drawn around the defect. The area thus outlined was incised deep to adipose tissue with a #15 scalpel blade. The skin margins were undermined to an appropriate distance in all directions utilizing iris scissors. Island Pedicle Flap Text: The defect edges were debeveled with a #15 scalpel blade. Given the location of the defect, shape of the defect and the proximity to free margins an island pedicle advancement flap was deemed most appropriate. Using a sterile surgical marker, an appropriate advancement flap was drawn incorporating the defect, outlining the appropriate donor tissue and placing the expected incisions within the relaxed skin tension lines where possible. The area thus outlined was incised deep to adipose tissue with a #15 scalpel blade. The skin margins were undermined to an appropriate distance in all directions around the primary defect and laterally outward around the island pedicle utilizing iris scissors. There was minimal undermining beneath the pedicle flap. Island Pedicle Flap With Canthal Suspension Text: The defect edges were debeveled with a #15 scalpel blade. Given the location of the defect, shape of the defect and the proximity to free margins an island pedicle advancement flap was deemed most appropriate. Using a sterile surgical marker, an appropriate advancement flap was drawn incorporating the defect, outlining the appropriate donor tissue and placing the expected incisions within the relaxed skin tension lines where possible. The area thus outlined was incised deep to adipose tissue with a #15 scalpel blade. The skin margins were undermined to an appropriate distance in all directions around the primary defect and laterally outward around the island pedicle utilizing iris scissors. There was minimal undermining beneath the pedicle flap. A suspension suture was placed in the canthal tendon to prevent tension and prevent ectropion. Alar Island Pedicle Flap Text: The defect edges were debeveled with a #15 scalpel blade. Given the location of the defect, shape of the defect and the proximity to the alar rim an island pedicle advancement flap was deemed most appropriate. Using a sterile surgical marker, an appropriate advancement flap was drawn incorporating the defect, outlining the appropriate donor tissue and placing the expected incisions within the nasal ala running parallel to the alar rim. The area thus outlined was incised with a #15 scalpel blade. The skin margins were undermined minimally to an appropriate distance in all directions around the primary defect and laterally outward around the island pedicle utilizing iris scissors. There was minimal undermining beneath the pedicle flap. Double Island Pedicle Flap Text: The defect edges were debeveled with a #15 scalpel blade. Given the location of the defect, shape of the defect and the proximity to free margins a double island pedicle advancement flap was deemed most appropriate. Using a sterile surgical marker, an appropriate advancement flap was drawn incorporating the defect, outlining the appropriate donor tissue and placing the expected incisions within the relaxed skin tension lines where possible. The area thus outlined was incised deep to adipose tissue with a #15 scalpel blade. The skin margins were undermined to an appropriate distance in all directions around the primary defect and laterally outward around the island pedicle utilizing iris scissors. There was minimal undermining beneath the pedicle flap. Island Pedicle Flap-Requiring Vessel Identification Text: The defect edges were debeveled with a #15 scalpel blade. Given the location of the defect, shape of the defect and the proximity to free margins an island pedicle advancement flap was deemed most appropriate. Using a sterile surgical marker, an appropriate advancement flap was drawn, based on the axial vessel mentioned above, incorporating the defect, outlining the appropriate donor tissue and placing the expected incisions within the relaxed skin tension lines where possible. The area thus outlined was incised deep to adipose tissue with a #15 scalpel blade. The skin margins were undermined to an appropriate distance in all directions around the primary defect and laterally outward around the island pedicle utilizing iris scissors. There was minimal undermining beneath the pedicle flap. Keystone Flap Text: The defect edges were debeveled with a #15 scalpel blade. Given the location of the defect, shape of the defect a keystone flap was deemed most appropriate. Using a sterile surgical marker, an appropriate keystone flap was drawn incorporating the defect, outlining the appropriate donor tissue and placing the expected incisions within the relaxed skin tension lines where possible. The area thus outlined was incised deep to adipose tissue with a #15 scalpel blade. The skin margins were undermined to an appropriate distance in all directions around the primary defect and laterally outward around the flap utilizing iris scissors. O-T Plasty Text: The defect edges were debeveled with a #15 scalpel blade. Given the location of the defect, shape of the defect and the proximity to free margins an O-T plasty was deemed most appropriate. Using a sterile surgical marker, an appropriate O-T plasty was drawn incorporating the defect and placing the expected incisions within the relaxed skin tension lines where possible. The area thus outlined was incised deep to adipose tissue with a #15 scalpel blade. The skin margins were undermined to an appropriate distance in all directions utilizing iris scissors. O-Z Plasty Text: The defect edges were debeveled with a #15 scalpel blade. Given the location of the defect, shape of the defect and the proximity to free margins an O-Z plasty (double transposition flap) was deemed most appropriate. Using a sterile surgical marker, the appropriate transposition flaps were drawn incorporating the defect and placing the expected incisions within the relaxed skin tension lines where possible. The area thus outlined was incised deep to adipose tissue with a #15 scalpel blade. The skin margins were undermined to an appropriate distance in all directions utilizing iris scissors. Hemostasis was achieved with electrocautery. The flaps were then transposed into place, one clockwise and the other counterclockwise, and anchored with interrupted buried subcutaneous sutures. Double O-Z Plasty Text: The defect edges were debeveled with a #15 scalpel blade. Given the location of the defect, shape of the defect and the proximity to free margins a Double O-Z plasty (double transposition flap) was deemed most appropriate. Using a sterile surgical marker, the appropriate transposition flaps were drawn incorporating the defect and placing the expected incisions within the relaxed skin tension lines where possible. The area thus outlined was incised deep to adipose tissue with a #15 scalpel blade. The skin margins were undermined to an appropriate distance in all directions utilizing iris scissors. Hemostasis was achieved with electrocautery. The flaps were then transposed into place, one clockwise and the other counterclockwise, and anchored with interrupted buried subcutaneous sutures. V-Y Plasty Text: The defect edges were debeveled with a #15 scalpel blade. Given the location of the defect, shape of the defect and the proximity to free margins an V-Y advancement flap was deemed most appropriate. Using a sterile surgical marker, an appropriate advancement flap was drawn incorporating the defect and placing the expected incisions within the relaxed skin tension lines where possible. The area thus outlined was incised deep to adipose tissue with a #15 scalpel blade. The skin margins were undermined to an appropriate distance in all directions utilizing iris scissors. H Plasty Text: Given the location of the defect, shape of the defect and the proximity to free margins a H-plasty was deemed most appropriate for repair. Using a sterile surgical marker, the appropriate advancement arms of the H-plasty were drawn incorporating the defect and placing the expected incisions within the relaxed skin tension lines where possible. The area thus outlined was incised deep to adipose tissue with a #15 scalpel blade. The skin margins were undermined to an appropriate distance in all directions utilizing iris scissors. The opposing advancement arms were then advanced into place in opposite direction and anchored with interrupted buried subcutaneous sutures. W Plasty Text: The lesion was extirpated to the level of the fat with a #15 scalpel blade. Given the location of the defect, shape of the defect and the proximity to free margins a W-plasty was deemed most appropriate for repair. Using a sterile surgical marker, the appropriate transposition arms of the W-plasty were drawn incorporating the defect and placing the expected incisions within the relaxed skin tension lines where possible. The area thus outlined was incised deep to adipose tissue with a #15 scalpel blade. The skin margins were undermined to an appropriate distance in all directions utilizing iris scissors. The opposing transposition arms were then transposed into place in opposite direction and anchored with interrupted buried subcutaneous sutures. Z Plasty Text: The lesion was extirpated to the level of the fat with a #15 scalpel blade. Given the location of the defect, shape of the defect and the proximity to free margins a Z-plasty was deemed most appropriate for repair. Using a sterile surgical marker, the appropriate transposition arms of the Z-plasty were drawn incorporating the defect and placing the expected incisions within the relaxed skin tension lines where possible. The area thus outlined was incised deep to adipose tissue with a #15 scalpel blade. The skin margins were undermined to an appropriate distance in all directions utilizing iris scissors. The opposing transposition arms were then transposed into place in opposite direction and anchored with interrupted buried subcutaneous sutures. Zygomaticofacial Flap Text: Given the location of the defect, shape of the defect and the proximity to free margins a zygomaticofacial flap was deemed most appropriate for repair. Using a sterile surgical marker, the appropriate flap was drawn incorporating the defect and placing the expected incisions within the relaxed skin tension lines where possible. The area thus outlined was incised deep to adipose tissue with a #15 scalpel blade with preservation of a vascular pedicle. The skin margins were undermined to an appropriate distance in all directions utilizing iris scissors. The flap was then placed into the defect and anchored with interrupted buried subcutaneous sutures. Cheek Interpolation Flap Text: A decision was made to reconstruct the defect utilizing an interpolation axial flap and a staged reconstruction. A telfa template was made of the defect. This telfa template was then used to outline the Cheek Interpolation flap. The donor area for the pedicle flap was then injected with anesthesia. The flap was excised through the skin and subcutaneous tissue down to the layer of the underlying musculature. The interpolation flap was carefully excised within this deep plane to maintain its blood supply. The edges of the donor site were undermined. The donor site was closed in a primary fashion. The pedicle was then rotated into position and sutured. Once the tube was sutured into place, adequate blood supply was confirmed with blanching and refill. The pedicle was then wrapped with xeroform gauze and dressed appropriately with a telfa and gauze bandage to ensure continued blood supply and protect the attached pedicle. Cheek-To-Nose Interpolation Flap Text: A decision was made to reconstruct the defect utilizing an interpolation axial flap and a staged reconstruction. A telfa template was made of the defect. This telfa template was then used to outline the Cheek-To-Nose Interpolation flap. The donor area for the pedicle flap was then injected with anesthesia. The flap was excised through the skin and subcutaneous tissue down to the layer of the underlying musculature. The interpolation flap was carefully excised within this deep plane to maintain its blood supply. The edges of the donor site were undermined. The donor site was closed in a primary fashion. The pedicle was then rotated into position and sutured. Once the tube was sutured into place, adequate blood supply was confirmed with blanching and refill. The pedicle was then wrapped with xeroform gauze and dressed appropriately with a telfa and gauze bandage to ensure continued blood supply and protect the attached pedicle. Interpolation Flap Text: A decision was made to reconstruct the defect utilizing an interpolation axial flap and a staged reconstruction. A telfa template was made of the defect. This telfa template was then used to outline the interpolation flap. The donor area for the pedicle flap was then injected with anesthesia. The flap was excised through the skin and subcutaneous tissue down to the layer of the underlying musculature. The interpolation flap was carefully excised within this deep plane to maintain its blood supply. The edges of the donor site were undermined. The donor site was closed in a primary fashion. The pedicle was then rotated into position and sutured. Once the tube was sutured into place, adequate blood supply was confirmed with blanching and refill. The pedicle was then wrapped with xeroform gauze and dressed appropriately with a telfa and gauze bandage to ensure continued blood supply and protect the attached pedicle. Melolabial Interpolation Flap Text: A decision was made to reconstruct the defect utilizing an interpolation axial flap and a staged reconstruction. A telfa template was made of the defect. This telfa template was then used to outline the melolabial interpolation flap. The donor area for the pedicle flap was then injected with anesthesia. The flap was excised through the skin and subcutaneous tissue down to the layer of the underlying musculature. The pedicle flap was carefully excised within this deep plane to maintain its blood supply. The edges of the donor site were undermined. The donor site was closed in a primary fashion. The pedicle was then rotated into position and sutured. Once the tube was sutured into place, adequate blood supply was confirmed with blanching and refill. The pedicle was then wrapped with xeroform gauze and dressed appropriately with a telfa and gauze bandage to ensure continued blood supply and protect the attached pedicle. Mastoid Interpolation Flap Text: A decision was made to reconstruct the defect utilizing an interpolation axial flap and a staged reconstruction. A telfa template was made of the defect. This telfa template was then used to outline the mastoid interpolation flap. The donor area for the pedicle flap was then injected with anesthesia. The flap was excised through the skin and subcutaneous tissue down to the layer of the underlying musculature. The pedicle flap was carefully excised within this deep plane to maintain its blood supply. The edges of the donor site were undermined. The donor site was closed in a primary fashion. The pedicle was then rotated into position and sutured. Once the tube was sutured into place, adequate blood supply was confirmed with blanching and refill. The pedicle was then wrapped with xeroform gauze and dressed appropriately with a telfa and gauze bandage to ensure continued blood supply and protect the attached pedicle. Posterior Auricular Interpolation Flap Text: A decision was made to reconstruct the defect utilizing an interpolation axial flap and a staged reconstruction. A telfa template was made of the defect. This telfa template was then used to outline the posterior auricular interpolation flap. The donor area for the pedicle flap was then injected with anesthesia. The flap was excised through the skin and subcutaneous tissue down to the layer of the underlying musculature. The pedicle flap was carefully excised within this deep plane to maintain its blood supply. The edges of the donor site were undermined. The donor site was closed in a primary fashion. The pedicle was then rotated into position and sutured. Once the tube was sutured into place, adequate blood supply was confirmed with blanching and refill. The pedicle was then wrapped with xeroform gauze and dressed appropriately with a telfa and gauze bandage to ensure continued blood supply and protect the attached pedicle. Paramedian Forehead Flap Text: A decision was made to reconstruct the defect utilizing an interpolation axial flap and a staged reconstruction. A telfa template was made of the defect. This telfa template was then used to outline the paramedian forehead pedicle flap. The donor area for the pedicle flap was then injected with anesthesia. The flap was excised through the skin and subcutaneous tissue down to the layer of the underlying musculature. The pedicle flap was carefully excised within this deep plane to maintain its blood supply. The edges of the donor site were undermined. The donor site was closed in a primary fashion. The pedicle was then rotated into position and sutured. Once the tube was sutured into place, adequate blood supply was confirmed with blanching and refill. The pedicle was then wrapped with xeroform gauze and dressed appropriately with a telfa and gauze bandage to ensure continued blood supply and protect the attached pedicle. Abbe Flap (Upper To Lower Lip) Text: The defect of the lower lip was assessed and measured. Given the location and size of the defect, an Abbe flap was deemed most appropriate. Using a sterile surgical marker, an appropriate Abbe flap was measured and drawn on the upper lip. Local anesthesia was then infiltrated. A scalpel was then used to incise the upper lip through and through the skin, vermilion, muscle and mucosa, leaving the flap pedicled on the opposite side. The flap was then rotated and transferred to the lower lip defect. The flap was then sutured into place with a three layer technique, closing the orbicularis oris muscle layer with subcutaneous buried sutures, followed by a mucosal layer and an epidermal layer. Abbe Flap (Lower To Upper Lip) Text: The defect of the upper lip was assessed and measured. Given the location and size of the defect, an Abbe flap was deemed most appropriate. Using a sterile surgical marker, an appropriate Abbe flap was measured and drawn on the lower lip. Local anesthesia was then infiltrated. A scalpel was then used to incise the upper lip through and through the skin, vermilion, muscle and mucosa, leaving the flap pedicled on the opposite side. The flap was then rotated and transferred to the lower lip defect. The flap was then sutured into place with a three layer technique, closing the orbicularis oris muscle layer with subcutaneous buried sutures, followed by a mucosal layer and an epidermal layer. Estlander Flap (Upper To Lower Lip) Text: The defect of the lower lip was assessed and measured. Given the location and size of the defect, an Estlander flap was deemed most appropriate. Using a sterile surgical marker, an appropriate Estlander flap was measured and drawn on the upper lip. Local anesthesia was then infiltrated. A scalpel was then used to incise the lateral aspect of the flap, through skin, muscle and mucosa, leaving the flap pedicled medially. The flap was then rotated and positioned to fill the lower lip defect. The flap was then sutured into place with a three layer technique, closing the orbicularis oris muscle layer with subcutaneous buried sutures, followed by a mucosal layer and an epidermal layer. Estlander Flap (Lower To Upper Lip) Text: The defect of the lower lip was assessed and measured.  Given the location and size of the defect, an Estlander flap was deemed most appropriate.  Using a sterile surgical marker, an appropriate Estlander flap was measured and drawn on the upper lip. Local anesthesia was then infiltrated. A scalpel was then used to incise the lateral aspect of the flap, through skin, muscle and mucosa, leaving the flap pedicled medially.  The flap was then rotated and positioned to fill the lower lip defect.  The flap was then sutured into place with a three layer technique, closing the orbicularis oris muscle layer with subcutaneous buried sutures, followed by a mucosal layer and an epidermal layer. Cheiloplasty (Less Than 50%) Text: A decision was made to reconstruct the defect with a  cheiloplasty. The defect was undermined extensively. Additional orbicularis oris muscle was excised with a 15 blade scalpel. The defect was converted into a full thickness wedge, of less than 50% of the vertical height of the lip, to facilite a better cosmetic result. Small vessels were then tied off with 5-0 monocyrl. The orbicularis oris, superficial fascia, adipose and dermis were then reapproximated. After the deeper layers were approximated the epidermis was reapproximated with particular care given to realign the vermilion border. Cheiloplasty (Complex) Text: A decision was made to reconstruct the defect with a  cheiloplasty. The defect was undermined extensively. Additional orbicularis oris muscle was excised with a 15 blade scalpel. The defect was converted into a full thickness wedge to facilite a better cosmetic result. Small vessels were then tied off with 5-0 monocyrl. The orbicularis oris, superficial fascia, adipose and dermis were then reapproximated. After the deeper layers were approximated the epidermis was reapproximated with particular care given to realign the vermilion border. Ear Wedge Repair Text: A wedge excision was completed by carrying down an excision through the full thickness of the ear and cartilage with an inward facing Burow's triangle. The wound was then closed in a layered fashion. Full Thickness Lip Wedge Repair (Flap) Text: Given the location of the defect and the proximity to free margins a full thickness wedge repair was deemed most appropriate. Using a sterile surgical marker, the appropriate repair was drawn incorporating the defect and placing the expected incisions perpendicular to the vermilion border. The vermilion border was also meticulously outlined to ensure appropriate reapproximation during the repair. The area thus outlined was incised through and through with a #15 scalpel blade. The muscularis and dermis were reaproximated with deep sutures following hemostasis. Care was taken to realign the vermilion border before proceeding with the superficial closure. Once the vermilion was realigned the superfical and mucosal closure was finished. Ftsg Text: The defect edges were debeveled with a #15 scalpel blade. Given the location of the defect, shape of the defect and the proximity to free margins a full thickness skin graft was deemed most appropriate. Using a sterile surgical marker, the primary defect shape was transferred to the donor site. The area thus outlined was incised deep to adipose tissue with a #15 scalpel blade. The harvested graft was then trimmed of adipose tissue until only dermis and epidermis was left. The skin margins of the secondary defect were undermined to an appropriate distance in all directions utilizing iris scissors. The secondary defect was closed with interrupted buried subcutaneous sutures. The skin edges were then re-apposed with running  sutures. The skin graft was then placed in the primary defect and oriented appropriately. Split-Thickness Skin Graft Text: The defect edges were debeveled with a #15 scalpel blade. Given the location of the defect, shape of the defect and the proximity to free margins a split thickness skin graft was deemed most appropriate. Using a sterile surgical marker, the primary defect shape was transferred to the donor site. The split thickness graft was then harvested. The skin graft was then placed in the primary defect and oriented appropriately. Burow's Graft Text: The defect edges were debeveled with a #15 scalpel blade. Given the location of the defect, shape of the defect, the proximity to free margins and the presence of a standing cone deformity a Burow's skin graft was deemed most appropriate. The standing cone was removed and this tissue was then trimmed to the shape of the primary defect. The adipose tissue was also removed until only dermis and epidermis were left. The skin margins of the secondary defect were undermined to an appropriate distance in all directions utilizing iris scissors. The secondary defect was closed with interrupted buried subcutaneous sutures. The skin edges were then re-apposed with running  sutures. The skin graft was then placed in the primary defect and oriented appropriately. Cartilage Graft Text: The defect edges were debeveled with a #15 scalpel blade. Given the location of the defect, shape of the defect, the fact the defect involved a full thickness cartilage defect a cartilage graft was deemed most appropriate. An appropriate donor site was identified, cleansed, and anesthetized. The cartilage graft was then harvested and transferred to the recipient site, oriented appropriately and then sutured into place. The secondary defect was then repaired using a primary closure. Composite Graft Text: The defect edges were debeveled with a #15 scalpel blade. Given the location of the defect, shape of the defect, the proximity to free margins and the fact the defect was full thickness a composite graft was deemed most appropriate. The defect was outline and then transferred to the donor site. A full thickness graft was then excised from the donor site. The graft was then placed in the primary defect, oriented appropriately and then sutured into place. The secondary defect was then repaired using a primary closure. Epidermal Autograft Text: The defect edges were debeveled with a #15 scalpel blade. Given the location of the defect, shape of the defect and the proximity to free margins an epidermal autograft was deemed most appropriate. Using a sterile surgical marker, the primary defect shape was transferred to the donor site. The epidermal graft was then harvested. The skin graft was then placed in the primary defect and oriented appropriately. Dermal Autograft Text: The defect edges were debeveled with a #15 scalpel blade. Given the location of the defect, shape of the defect and the proximity to free margins a dermal autograft was deemed most appropriate. Using a sterile surgical marker, the primary defect shape was transferred to the donor site. The area thus outlined was incised deep to adipose tissue with a #15 scalpel blade. The harvested graft was then trimmed of adipose and epidermal tissue until only dermis was left. The skin graft was then placed in the primary defect and oriented appropriately. Skin Substitute Text: The defect edges were debeveled with a #15 scalpel blade. Given the location of the defect, shape of the defect and the proximity to free margins a skin substitute graft was deemed most appropriate. The graft material was trimmed to fit the size of the defect. The graft was then placed in the primary defect and oriented appropriately. Tissue Cultured Epidermal Autograft Text: The defect edges were debeveled with a #15 scalpel blade. Given the location of the defect, shape of the defect and the proximity to free margins a tissue cultured epidermal autograft was deemed most appropriate. The graft was then trimmed to fit the size of the defect. The graft was then placed in the primary defect and oriented appropriately. Xenograft Text: The defect edges were debeveled with a #15 scalpel blade. Given the location of the defect, shape of the defect and the proximity to free margins a xenograft was deemed most appropriate. The graft was then trimmed to fit the size of the defect. The graft was then placed in the primary defect and oriented appropriately. Purse String (Simple) Text: Given the location of the defect and the characteristics of the surrounding skin a purse string closure was deemed most appropriate. Undermining was performed circumfirentially around the surgical defect. A purse string suture was then placed and tightened. Purse String (Intermediate) Text: Given the location of the defect and the characteristics of the surrounding skin a purse string intermediate closure was deemed most appropriate. Undermining was performed circumfirentially around the surgical defect. A purse string suture was then placed and tightened. Partial Purse String (Simple) Text: Given the location of the defect and the characteristics of the surrounding skin a simple purse string closure was deemed most appropriate. Undermining was performed circumfirentially around the surgical defect. A purse string suture was then placed and tightened. Wound tension only allowed a partial closure of the circular defect. Partial Purse String (Intermediate) Text: Given the location of the defect and the characteristics of the surrounding skin an intermediate purse string closure was deemed most appropriate. Undermining was performed circumfirentially around the surgical defect. A purse string suture was then placed and tightened. Wound tension only allowed a partial closure of the circular defect. Localized Dermabrasion Text: The patient was draped in routine manner. Localized dermabrasion using 3 x 17 mm wire brush was performed in routine manner to papillary dermis. This spot dermabrasion is being performed to complete skin cancer reconstruction. It also will eliminate the other sun damaged precancerous cells that are known to be part of the regional effect of a lifetime's worth of sun exposure. This localized dermabrasion is therapeutic and should not be considered cosmetic in any regard. Tarsorrhaphy Text: A tarsorrhaphy was performed using Frost sutures. Complex Repair And Flap Additional Text (Will Appearing After The Standard Complex Repair Text): The complex repair was not sufficient to completely close the primary defect. The remaining additional defect was repaired with the flap mentioned below. Complex Repair And Graft Additional Text (Will Appearing After The Standard Complex Repair Text): The complex repair was not sufficient to completely close the primary defect. The remaining additional defect was repaired with the graft mentioned below. Manual Repair Warning Statement: We plan on removing the manually selected variable below in favor of our much easier automatic structured text blocks found in the previous tab. We decided to do this to help make the flow better and give you the full power of structured data. Manual selection is never going to be ideal in our platform and I would encourage you to avoid using manual selection from this point on, especially since I will be sunsetting this feature. It is important that you do one of two things with the customized text below. First, you can save all of the text in a word file so you can have it for future reference. Second, transfer the text to the appropriate area in the Library tab. Lastly, if there is a flap or graft type which we do not have you need to let us know right away so I can add it in before the variable is hidden. No need to panic, we plan to give you roughly 6 months to make the change. Same Histology In Subsequent Stages Text: The pattern and morphology of the tumor is as described in the first stage. No Residual Tumor Seen Histology Text: There were no malignant cells seen in the sections examined. Inflammation Suggestive Of Cancer Camouflage Histology Text: There was a dense lymphocytic infiltrate which prevented adequate histologic evaluation of adjacent structures. Bcc Histology Text: There were numerous aggregates of basaloid cells. Bcc Infiltrative Histology Text: There were numerous aggregates of basaloid cells demonstrating an infiltrative pattern. Mart-1 - Positive Histology Text: MART-1 staining demonstrates areas of higher density and clustering of melanocytes with Pagetoid spread upwards within the epidermis. The surgical margins are positive for tumor cells. Mart-1 - Negative Histology Text: MART-1 staining demonstrates a normal density and pattern of melanocytes along the dermal-epidermal junction. The surgical margins are negative for tumor cells. Information: Selecting Yes will display possible errors in your note based on the variables you have selected. This validation is only offered as a suggestion for you. PLEASE NOTE THAT THE VALIDATION TEXT WILL BE REMOVED WHEN YOU FINALIZE YOUR NOTE. IF YOU WANT TO FAX A PRELIMINARY NOTE YOU WILL NEED TO TOGGLE THIS TO 'NO' IF YOU DO NOT WANT IT IN YOUR FAXED NOTE.

## 2022-11-29 NOTE — ED ADULT TRIAGE NOTE - MEANS OF ARRIVAL
ambulatory Quality 226: Preventive Care And Screening: Tobacco Use: Screening And Cessation Intervention: Patient screened for tobacco use and is an ex/non-smoker Detail Level: Detailed Quality 431: Preventive Care And Screening: Unhealthy Alcohol Use - Screening: Patient not identified as an unhealthy alcohol user when screened for unhealthy alcohol use using a systematic screening method Quality 130: Documentation Of Current Medications In The Medical Record: Current Medications Documented

## 2023-01-01 NOTE — DISCHARGE NOTE ADULT - CARE PLAN
99 Principal Discharge DX:	NSVT (nonsustained ventricular tachycardia)  Goal:	transfer to [facility] for cath.  Instructions for follow-up, activity and diet:	hold on coumadin  Secondary Diagnosis:	Troponin level elevated  Goal:	as above Principal Discharge DX:	NSVT (nonsustained ventricular tachycardia)  Goal:	transfer to [facility] for cath.  Instructions for follow-up, activity and diet:	hold on coumadin  Secondary Diagnosis:	Troponin level elevated  Goal:	as above Principal Discharge DX:	NSVT (nonsustained ventricular tachycardia)  Goal:	transfer to [facility] for cath.  Instructions for follow-up, activity and diet:	hold on coumadin  Secondary Diagnosis:	Troponin level elevated  Goal:	as above

## 2023-03-09 NOTE — PHYSICAL THERAPY INITIAL EVALUATION ADULT - GAIT DEVIATIONS NOTED, PT EVAL
Male decreased step length/decreased velocity of limb motion/some postural sway noted but over-all steady /c rolling walker, desaturated/decreased stride length

## 2023-03-11 NOTE — PROGRESS NOTE ADULT - SUBJECTIVE AND OBJECTIVE BOX
This progress note was completed in part by resident acting as telephonic scribe during COVID-19 crisis. Physical exam was completed by attending physician, and findings below are those of the attending physician, and have been reviewed in detail.    Patient is a 75y old  Male who presents with a chief complaint of nosebleeds. 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 2015)/PPM, A Fib on Xarelto, DM who presents with epistaxis that began last night. Hx obtained from sister Sandra (HCP) as pt is aphasic. Per daughter, pt had an episode of epistaxis at 6pm yesterday after spending the day outside with his lady friend Angelia. The sister tried to put ice and compresses on to stop the bleeding without relief. Pt continued to have episodes of epistaxis throughout the night and into the morning, which prompted her to take him to the ED. As pt was being prepared to be taken to ED, the paramedics noted he had dark tarry formed stool. Sister said she was unaware of this until today. Pt has never had dark stools or hx of GI bleeds in the past. Pt follows with ENT as an outpatient for hx of epistaxis. Pt has hx of picking his nose per sister. Did not take xarelto last night. Admits to weakness, fatigue. Denies CP, SOB, cough, fever, chills, night sweats, abd pain, NVD, focal weakness, numbness tingling. PT IS DNR DNI.    In ED: Pt received one dose 40mg IV protonix, 1 unit pRBCs, 1L NS bolus  Vitals , /60, RR 18, T 97.2, SpO2 97% on RA  Labs WBC: 13.69, Hb 9.2 (13-14s per chart review), Cr 1.4 (at baseline), Alk phos 131, FOBT+  EKG: afib w RVR, rate 106. Isolated T wave inversion in V6, possible ST depressions (new compared to previous EKG) in V4-V6  Imaging  < from: CT Head No Cont (05.08.20 @ 12:43) >  IMPRESSION:  Mild chronic microvascular changes without evidence of an acute transcortical infarction or hemorrhage. MR is a more sensitive imaging modality for the evaluation of an acute infarction.     < end of copied text >  < from: Xray Chest 1 View-PORTABLE IMMEDIATE (05.08.20 @ 11:00) >  IMPRESSION:    No lung consolidations.    < end of copied text > (08 May 2020 13:06)      ----  INTERVAL HPI/OVERNIGHT EVENTS: Pt seen and evaluated at the bedside. No acute overnight events occurred. Passing soft stool, no further episodes of epistaxis documented. Patient maintaining O2 sat on 3L NC, hemodynamically stable and afebrile. Labs and imagining reviewed.       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  DM (diabetes mellitus)  HF (heart failure)  PAD (peripheral artery disease): with stents  Former smoker  Hypertension  H/O: Rheumatic Fever  Atrial Fibrillation  Pacemaker: defibrillator model # v-268 serial # [id number]  Hyperlipidemia  Gout  Coronary Artery Disease  Chronic Obstructive Pulmonary Disease (COPD)  Carotid Stenosis  S/P CABG x 1  CAD S/P percutaneous coronary angioplasty: stent placed  Popliteal artery injury: iliac/ popliteal angioplasty with stents 2010  Arm injury: s/p surgery 2009  Pacemaker: [employer] serial #[id number]   model #v-268  S/P Implantation of Automatic Cardioverter/Defibrillator (AICD)  S/P Tonsillectomy      FAMILY HISTORY:  No pertinent family history in first degree relatives      Allergies    No Known Allergies    Intolerances        ----  REVIEW OF SYSTEMS:  as per HPI    ----  PHYSICAL EXAM:  TELE: Vpaced with underlying Afib  Constitutional: NAD, awake and alert, well-developed  HEENT: Moist Mucous Membranes, Anicteric  Pulmonary: Non-labored, breath sounds are clear bilaterally, No wheezing, rales or rhonchi  Cardiovascular: IRRR/RRR, S1 and S2, No murmurs, rubs, gallops or clicks  Gastrointestinal: Bowel Sounds present, soft, nontender.   Lymph: No peripheral edema. No lymphadenopathy.  Skin: No visible rashes or ulcers.  Psych:  Mood & affect appropriate  Neuro: + expressive aphasia    T(C): 36.9 (05-12-20 @ 05:08), Max: 36.9 (05-12-20 @ 05:08)  HR: 77 (05-12-20 @ 11:31) (74 - 84)  BP: 102/61 (05-12-20 @ 05:08) (96/60 - 112/64)  RR: 18 (05-12-20 @ 05:08) (17 - 18)  SpO2: 93% (05-12-20 @ 11:31) (88% - 100%)  Wt(kg): --    ----  I&O's Summary    11 May 2020 07:01  -  12 May 2020 07:00  --------------------------------------------------------  IN: 750 mL / OUT: 3800 mL / NET: -3050 mL        LABS:                        9.1    6.28  )-----------( 198      ( 12 May 2020 06:23 )             28.3     05-12    135  |  103  |  27<H>  ----------------------------<  94  4.2   |  28  |  1.50<H>    Ca    8.5      12 May 2020 06:23    TPro  6.1  /  Alb  2.6<L>  /  TBili  1.1  /  DBili  x   /  AST  34  /  ALT  21  /  AlkPhos  110  05-12    PT/INR - ( 11 May 2020 06:46 )   PT: 17.3 sec;   INR: 1.52 ratio             CAPILLARY BLOOD GLUCOSE    COVID-19 Inflammatory Markers:  Auto Neutrophil #: 4.69 K/uL (05-12-20 @ 06:23)  Auto Neutrophil #: 4.65 K/uL (05-11-20 @ 06:46)  Auto Neutrophil #: 6.31 K/uL (05-09-20 @ 08:14)  Auto Neutrophil #: 11.64 K/uL (05-08-20 @ 11:07)    Auto Lymphocyte #: 0.59 K/uL (05-12-20 @ 06:23)  Auto Lymphocyte #: 0.58 K/uL (05-11-20 @ 06:46)  Auto Lymphocyte #: 0.77 K/uL (05-09-20 @ 08:14)  Auto Lymphocyte #: 0.27 K/uL (05-08-20 @ 11:07)      Ferritin, Serum: 37 ng/mL (05-08-20 @ 14:54)      D-Dimer Assay, Quantitative: <150 ng/mL DDU (05-08-20 @ 11:07)        Creatine Kinase, Serum: 123 U/L (05-09-20 @ 22:22)  Creatine Kinase, Serum: 116 U/L (05-09-20 @ 20:16)  Creatine Kinase, Serum: 114 U/L (05-09-20 @ 16:11)  Creatine Kinase, Serum: 102 U/L (05-09-20 @ 08:14)  Creatine Kinase, Serum: 102 U/L (05-08-20 @ 23:55)  Creatine Kinase, Serum: 105 U/L (05-08-20 @ 16:53)  Creatine Kinase, Serum: 117 U/L (05-08-20 @ 11:07)      CPK Mass Assay %: 2.1 % (05-09-20 @ 22:22)  CPK Mass Assay %: 2.2 % (05-09-20 @ 20:16)  CPK Mass Assay %: 2.4 % (05-09-20 @ 16:11)  CPK Mass Assay %: 2.9 % (05-09-20 @ 08:14)  CPK Mass Assay %: 3.3 % (05-08-20 @ 23:55)  CPK Mass Assay %: 3.1 % (05-08-20 @ 16:53)  CPK Mass Assay %: 3.2 % (05-08-20 @ 11:07)    Troponin I, Serum: .093 ng/mL (05-09-20 @ 22:22)  Troponin I, Serum: .085 ng/mL (05-09-20 @ 20:16)  Troponin I, Serum: .085 ng/mL (05-09-20 @ 16:11)  Troponin I, Serum: .097 ng/mL (05-09-20 @ 08:14)  Troponin I, Serum: .091 ng/mL (05-08-20 @ 23:55)  Troponin I, Serum: .077 ng/mL (05-08-20 @ 16:53)  Troponin I, Serum: .050 ng/mL (05-08-20 @ 11:07)      Prothrombin Time, Plasma: 17.3 sec (05-11-20 @ 06:46)  Prothrombin Time, Plasma: 16.1 sec (05-10-20 @ 07:41)  Prothrombin Time, Plasma: 17.9 sec (05-09-20 @ 08:14)  Prothrombin Time, Plasma: 23.6 sec (05-08-20 @ 11:07)    Activated Partial Thromboplastin Time: 34.0 sec (05-08-20 @ 11:07)    RADIOLOGY:  No new imaging abdominal pain

## 2023-03-29 NOTE — ED PROVIDER NOTE - TIMING
sudden onset O-Z Plasty Text: The defect edges were debeveled with a #15 scalpel blade.  Given the location of the defect, shape of the defect and the proximity to free margins an O-Z plasty (double transposition flap) was deemed most appropriate.  Using a sterile surgical marker, the appropriate transposition flaps were drawn incorporating the defect and placing the expected incisions within the relaxed skin tension lines where possible.    The area thus outlined was incised deep to adipose tissue with a #15 scalpel blade.  The skin margins were undermined to an appropriate distance in all directions utilizing iris scissors.  Hemostasis was achieved with electrocautery.  The flaps were then transposed into place, one clockwise and the other counterclockwise, and anchored with interrupted buried subcutaneous sutures.

## 2023-07-02 NOTE — ED ADULT TRIAGE NOTE - ARRIVAL FROM
80-year-old male with past medical history of high blood pressure, diabetes, hyperlipidemia, GERD, prostate cancer status post seed implant in 2008, atrial fibrillation, SVT, follows with Dr. Ángela Tinoco from June 29 to July 1 for shortness of breath, was in SVT for which he was given meds with no response and then electrocardioverted with response seen by EP (was recommended to have ILR implantation but did not want to stay for that so was to follow-up with EP as an outpatient, refused anticoagulation for atrial fibrillation despite knowing the risks) presents today for Home 80-year-old male with past medical history of high blood pressure, diabetes, hyperlipidemia, GERD, prostate cancer status post seed implant in 2008, atrial fibrillation, SVT, follows with Dr. Ángela Tinoco from June 29 to July 1 for shortness of breath, was in SVT for which he was given meds with no response and then electrocardioverted with response seen by EP (was recommended to have ILR implantation but did not want to stay for that so was to follow-up with EP as an outpatient, refused anticoagulation for atrial fibrillation despite knowing the risks) presents today for shortness of breath on exertion.  Patient reports that shortness has improved since his initial admission but is still present and is unable to tolerate exerting himself.  Patient had a CT angio chest for PE evaluation on June 30, stress test on June 30, as well as an echo, with no significant findings.  Ex-smoker.  Quit approximately 45 years ago.    No fever, chills, n/v, cp,  pleuritic cp, palpitations, diaphoresis, cough, ha/lh/dizziness, numbness/tingling, neck pain/ stiffness, abd pain, diarrhea, constipation, melena/brbpr, urinary symptoms, trauma, weakness, edema, calf pain/swelling/erythema, sick contacts, recent travel or rash.    On exam:  Vital Signs: I have reviewed the initial vital signs. Constitutional: Non toxic appearing pt sitting on stretcher speaking full sentences but becomes shortness of breath and has to pause to continue his sentence.  Saturation 98 on room air.. Integumentary: No rash. ENT: MMM NECK: Supple, non-tender, no meningeal signs. Cardiovascular: RRR, radial pulses 2/4 b/l. No JVD. Respiratory: BS present b/l, ctabl, no wheezing or crackles, no accessory muscle use, no stridor. Gastrointestinal: BS present throughout all 4 quadrants, soft, nd, nt, no rebound tenderness or guarding, no cvat. Musculoskeletal: FROM, no edema, no calf pain/swelling/erythema. Neurologic: AAOx3, motor 5/5 and sensation intact throughout upper and lowe ext, CN II-XII intact, No facial droop or slurring of speech. No focal deficits.    Plan: Monitor, EKG. CXR, labs, EP consult, reassess.

## 2023-07-05 NOTE — ED PROVIDER NOTE - DURATION
yesterday Benzoyl Peroxide Counseling: Patient counseled that medicine may cause skin irritation and bleach clothing.  In the event of skin irritation, the patient was advised to reduce the amount of the drug applied or use it less frequently.   The patient verbalized understanding of the proper use and possible adverse effects of benzoyl peroxide.  All of the patient's questions and concerns were addressed.

## 2023-08-24 NOTE — DIETITIAN INITIAL EVALUATION ADULT. - TIME FRAME
Daily Note     Today's date: 2023  Patient name: Desmond Lyn  : 1958  MRN: [id number]  Referring provider: Frances Lynch MD  Dx:   Encounter Diagnosis     ICD-10-CM    1. Pain in right buttock  M79.18               1on1 with 2 PT's. Subjective: Pt reports no new complaints; continued progress. Objective: See treatment diary below      Assessment: Tolerated treatment well. Pt reports feeling stronger. Patient exhibited good technique with therapeutic exercises and would benefit from continued PT      Plan: Continue per plan of care.       Precautions: N/A      Manuals    R Gluteal STM        R Hip Post Mob        R Hip PROM SE 6' NB 6' SE 6' SE 6' TP 4'   R Hip IR/ER MREs        R Hip LAD SE 4'  NB 4' SE 4' SE 4' TP 4'   Neuro Re-Ed        Lateral Step Up 2x8 B 6" 2x8 B 6" 3x6 B 6" 3x6 B 6"  3x10 B 6"   Sidelying Clamshell        Single Arm Row with Trunk Rotation    3x8 mtb B 2x10 mtb B   SL Total Gym Leg Press 1x8 B L6 1x10 B L6 2x12 B  2x12 B  2x12 B   Standing Hip 3 Way - Manuel        Hip Hike        SL Hip Abduction  PT assist 2x5 B 1x10 B 2x8  2x8  2x8   Ther [place]'  L5x10' L6x10' L6x10'  L6x10'   Standing PBall Press with March   2x15 2x15 2x15   Dead Bug with Pball   2x15 2x15 2x15   Modified Side Plank        Bridge - March        Hip Thrust - Manuel        Diaphragmatic Breathing        ASLR 2x5 B 2x5 B 2x8 B 2x8 B  2x8 B   Supine DKTC 2x10  2x10 2x10 2x10  2x10   Seated lumbar flexion w/pball 10"x10 10"x10 10"x10 10"x10 10"x10   Ther Activity        Deadlift - Kettlebell        Sit to Stand - Kettlebell, Tempo        Gait Training        Sidestepping - Resisted, [place]                Modalities few months

## 2023-11-03 NOTE — PHYSICAL THERAPY INITIAL EVALUATION ADULT - PATIENT/FAMILY AGREES WITH PLAN
yes
Communicate risk of Fall with Harm to all staff, patient, and family/Provide visual cue: red socks, yellow wristband, yellow gown, etc/Reinforce activity limits and safety measures with patient and family/Bed in lowest position, wheels locked, appropriate side rails in place/Call bell, personal items and telephone in reach/Instruct patient to call for assistance before getting out of bed/chair/stretcher/Non-slip footwear applied when patient is off stretcher/[place] to call system/Physically safe environment - no spills, clutter or unnecessary equipment/Purposeful Proactive Rounding/Room/bathroom lighting operational, light cord in reach

## 2024-05-17 NOTE — ED ADULT NURSE NOTE - MUSCULOSKELETAL WDL
Full range of motion of upper and lower extremities, no joint tenderness/swelling.
FAMILY HISTORY:  No pertinent family history in first degree relatives

## 2024-10-14 NOTE — ED PROVIDER NOTE - NEUROLOGICAL, MLM
----- Message from Ruth Lowery sent at 10/10/2024  9:43 AM EDT -----  Patient does have urinary retention and overflow incontinence, his prostate is blocking his bladder from emptying properly  We need to get him into see urology I placed an order, if we cannot get him in within the next 1 to 2 weeks lets call to get him an appointment  If he has problems where he cannot urinate between now and then that would be a urgent for needing a bladder cath   Alert and oriented, no focal deficits, no motor or sensory deficits. aphasia

## 2024-11-22 NOTE — DISCHARGE NOTE ADULT - USE THE 5 A'S (ASK, ADVISE, ASSESS, ASSIST, ARRANGE)
POST OPERATIVE INSTRUCTION SHEET  SKIN TUMOR/LESION REMOVAL          Activity:    No strenuous activity for 48 hours  No activity that stresses the suture closure/incision  Regular diet  ABSOLUTELY NO NICOTINE OF ANY TYPE    Wound Care:  Leave head wrap in place for 48 hours. After 48 hours, you may remove ace wrap and gauze. Leave yellow dressing on scalp in place. Do not remove yellow dressing on scalp and do not get it wet.  Leave pressure dressing on chest in place for 48 hours. After 48 hours, you may remove tape, gauze and yellow dressing.   Once the pressure dressing on chest has been removed,you should gently wash with soap and water using fingertips then apply Bacitracin 3 times a day for 4 days. DO NOT USE BACITRACIN LONGER THAN FOUR DAYS. Keep incision covered with dry dressing while using Bacitracin.  Recommend sleeping in a recliner or with head elevated for next 2-3 nights.    Limitations:  No swimming, hot tub, sauna or soaking in a bathtub    Prescriptions:  Take exactly as prescribed    Follow-Up:  Follow up on Wednesday, 11/27 at 8:30am, unless otherwise instructed by Dr. Rose.   Please come to your post op appointment at our office at this address: [address] [place] [address]      Notify our office if you experience any of the following:   Develop a fever (temperature is greater than 100.5F)   Develop redness greater than 1 cm around incision or red streaks up extremity   Have any excess bleeding/ increased drainage or swelling at the incision site    *A prescription for Tramadol has been sent to your pharmacy  *You may resume aspirin and Xarelto on Sunday, if held pre operatively and if medically able to hold that long      How Do you Prevent Surgical Site Infections?       *HAND WASHING     *STOP SHAVING   Wash your hands multiple times daily  Do not shave incisional area 48 hours before   with soap for 20 seconds.    or until 2 weeks after surgery.  This can   Before eating and wound  care.   cause tiny cuts in the skin which can lead to infection.   After using the bathroom or touching pets.                                    *BALANCE      Times of activity and rest.      Stay away from people who may be sick.             *QUIT SMOKING      Cigarette smoking leads to slow wound      healing.                   *WASH YOUR BODY      Follow your doctor's instructions on washing the night before and the day of your surgery.       You may use products like:  Dial antibacterial soap, Hibiclens, Ariella-hex.         Do not share personal items such as towels, wash cloths, or toothbrush.       *BLOOD SUGAR CONTROL                  Lower blood sugars help to prevent                          infections, scarring, and slow wound                          healing.  Blood sugar goal less than 200.                   *EAT HEALTHY       Eat plenty of protein,        vegetables, and fruits.                                       Limit sugars and processed foods.                     *BED LINENS      *DRESSING CHANGE        Make sure your bed linens are freshly   Follow your discharge instructions for wound        washed.  NO pets in the bed.  Your animals care and bathing:       carry bacteria in their fur, skin or feathers  ~Keep your dressing clean and dry       which can enter site, causing infection.  ~Clean and washed clothes are important                                                            ~Call the doctor if you develop a fever,                                                       your incision has redness, an odor or                                                             yellowish/greenish drainage.                 Statement Selected

## 2024-12-09 NOTE — PHYSICAL THERAPY INITIAL EVALUATION ADULT - PERTINENT HX OF CURRENT PROBLEM, REHAB EVAL
Patient states completed 10 day course of Levaquin, but congestion has persisted. Friday seen by physiatry and noted to be hypoxic. Patient has occasional non-productive cough, but sister and friend have noted course breath sounds and congestion worsening. Denies fever, chills, SOB, chest pain. Communicate Risk of Fall with Harm to all staff/Reinforce activity limits and safety measures with patient and family/Tailored Fall Risk Interventions/Visual Cue: Yellow wristband and red socks/Bed in lowest position, wheels locked, appropriate side rails in place/Call bell, personal items and telephone in reach/Instruct patient to call for assistance before getting out of bed or chair/Non-slip footwear when patient is out of bed/[place] to call system/Physically safe environment - no spills, clutter or unnecessary equipment/Purposeful Proactive Rounding/Room/bathroom lighting operational, light cord in reach

## 2025-02-18 NOTE — ED ADULT NURSE NOTE - PSH

## 2025-05-19 NOTE — PHYSICAL THERAPY INITIAL EVALUATION ADULT - ORIENTATION, REHAB EVAL
Patient cancelled cs and egd, waiting to rs so I can schedule pre op for cardiac clearence    person/Pt w/ expressive aphasia person/place/situation/? Pt w/ expressive aphasia